# Patient Record
Sex: MALE | Race: WHITE | NOT HISPANIC OR LATINO | Employment: FULL TIME | URBAN - METROPOLITAN AREA
[De-identification: names, ages, dates, MRNs, and addresses within clinical notes are randomized per-mention and may not be internally consistent; named-entity substitution may affect disease eponyms.]

---

## 2017-01-17 RX ORDER — ATORVASTATIN CALCIUM 80 MG/1
TABLET, FILM COATED ORAL
Qty: 90 TABLET | Refills: 2 | Status: SHIPPED | OUTPATIENT
Start: 2017-01-17 | End: 2017-06-13 | Stop reason: SDUPTHER

## 2017-01-19 RX ORDER — ALPRAZOLAM 0.5 MG/1
0.5 TABLET ORAL DAILY PRN
Qty: 30 TABLET | Refills: 2 | Status: SHIPPED | OUTPATIENT
Start: 2017-01-19 | End: 2017-06-13 | Stop reason: SDUPTHER

## 2017-01-19 NOTE — TELEPHONE ENCOUNTER
----- Message from Miranda Arriaga sent at 1/19/2017  9:32 AM CST -----  Patient's wife, Mayte, called.    No. [phone]    Patient is leaving the country in early March.  He needs an appointment with Dr. Olmedo in February to get his medication before he leaves.  Please call.

## 2017-01-19 NOTE — TELEPHONE ENCOUNTER
----- Message from Miranda Arriaga sent at 1/19/2017  9:32 AM CST -----  Patient's wife, Mayte, called.    No. [phone]    Patient is leaving the country in early March.  He needs an appointment with Dr. Olmedo in February to get his medication before he leaves.  Please call.

## 2017-01-20 NOTE — TELEPHONE ENCOUNTER
----- Message from Heather Morin sent at 1/19/2017 10:08 AM CST -----  Contact: Mayte(wife)/[phone]  Patient's wife said she gave you the wrong pharmacy it is actually [facility] at [address] [facility] [address] at [phone].  Please advise

## 2017-01-21 RX ORDER — CARVEDILOL 12.5 MG/1
TABLET ORAL
Qty: 180 TABLET | Refills: 1 | Status: SHIPPED | OUTPATIENT
Start: 2017-01-21 | End: 2017-06-13 | Stop reason: SDUPTHER

## 2017-05-09 RX ORDER — LISINOPRIL 40 MG/1
TABLET ORAL
Qty: 90 TABLET | Refills: 0 | Status: SHIPPED | OUTPATIENT
Start: 2017-05-09 | End: 2017-06-13 | Stop reason: SDUPTHER

## 2017-06-13 ENCOUNTER — LAB VISIT (OUTPATIENT)
Dept: LAB | Facility: HOSPITAL | Age: 57
End: 2017-06-13
Attending: FAMILY MEDICINE
Payer: COMMERCIAL

## 2017-06-13 ENCOUNTER — OFFICE VISIT (OUTPATIENT)
Dept: FAMILY MEDICINE | Facility: CLINIC | Age: 57
End: 2017-06-13
Payer: COMMERCIAL

## 2017-06-13 VITALS
WEIGHT: 209.88 LBS | OXYGEN SATURATION: 97 % | BODY MASS INDEX: 30.05 KG/M2 | HEART RATE: 68 BPM | SYSTOLIC BLOOD PRESSURE: 138 MMHG | DIASTOLIC BLOOD PRESSURE: 84 MMHG | HEIGHT: 70 IN

## 2017-06-13 DIAGNOSIS — Z00.00 ROUTINE GENERAL MEDICAL EXAMINATION AT A HEALTH CARE FACILITY: ICD-10-CM

## 2017-06-13 DIAGNOSIS — I25.10 CORONARY ARTERY DISEASE DUE TO CALCIFIED CORONARY LESION: ICD-10-CM

## 2017-06-13 DIAGNOSIS — I10 ESSENTIAL HYPERTENSION: ICD-10-CM

## 2017-06-13 DIAGNOSIS — F17.200 TOBACCO DEPENDENCE: ICD-10-CM

## 2017-06-13 DIAGNOSIS — E78.5 HYPERLIPIDEMIA, UNSPECIFIED HYPERLIPIDEMIA TYPE: ICD-10-CM

## 2017-06-13 DIAGNOSIS — F41.1 GAD (GENERALIZED ANXIETY DISORDER): ICD-10-CM

## 2017-06-13 DIAGNOSIS — R11.0 NAUSEA: ICD-10-CM

## 2017-06-13 DIAGNOSIS — R73.01 IMPAIRED FASTING GLUCOSE: ICD-10-CM

## 2017-06-13 DIAGNOSIS — Z00.00 ROUTINE GENERAL MEDICAL EXAMINATION AT A HEALTH CARE FACILITY: Primary | ICD-10-CM

## 2017-06-13 DIAGNOSIS — F43.9 STRESS: ICD-10-CM

## 2017-06-13 DIAGNOSIS — I25.84 CORONARY ARTERY DISEASE DUE TO CALCIFIED CORONARY LESION: ICD-10-CM

## 2017-06-13 DIAGNOSIS — Z12.11 COLON CANCER SCREENING: ICD-10-CM

## 2017-06-13 DIAGNOSIS — R41.3 MEMORY PROBLEM: ICD-10-CM

## 2017-06-13 DIAGNOSIS — M62.81 MUSCLE WEAKNESS: ICD-10-CM

## 2017-06-13 DIAGNOSIS — R06.09 DOE (DYSPNEA ON EXERTION): ICD-10-CM

## 2017-06-13 DIAGNOSIS — I73.9 PERIPHERAL VASCULAR DISEASE OF EXTREMITY: ICD-10-CM

## 2017-06-13 DIAGNOSIS — Z11.59 NEED FOR HEPATITIS C SCREENING TEST: ICD-10-CM

## 2017-06-13 LAB
ALBUMIN SERPL BCP-MCNC: 4 G/DL
ALP SERPL-CCNC: 59 U/L
ALT SERPL W/O P-5'-P-CCNC: 25 U/L
ANION GAP SERPL CALC-SCNC: 8 MMOL/L
AST SERPL-CCNC: 22 U/L
BASOPHILS # BLD AUTO: 0.04 K/UL
BASOPHILS NFR BLD: 0.4 %
BILIRUB SERPL-MCNC: 0.8 MG/DL
BUN SERPL-MCNC: 15 MG/DL
CALCIUM SERPL-MCNC: 9.3 MG/DL
CHLORIDE SERPL-SCNC: 104 MMOL/L
CHOLEST/HDLC SERPL: 4.3 {RATIO}
CO2 SERPL-SCNC: 24 MMOL/L
COMPLEXED PSA SERPL-MCNC: 0.77 NG/ML
CREAT SERPL-MCNC: 1 MG/DL
DIFFERENTIAL METHOD: ABNORMAL
EOSINOPHIL # BLD AUTO: 0.3 K/UL
EOSINOPHIL NFR BLD: 2.9 %
ERYTHROCYTE [DISTWIDTH] IN BLOOD BY AUTOMATED COUNT: 12.8 %
EST. GFR  (AFRICAN AMERICAN): >60 ML/MIN/1.73 M^2
EST. GFR  (NON AFRICAN AMERICAN): >60 ML/MIN/1.73 M^2
GLUCOSE SERPL-MCNC: 111 MG/DL
HCT VFR BLD AUTO: 42.7 %
HDL/CHOLESTEROL RATIO: 23.3 %
HDLC SERPL-MCNC: 120 MG/DL
HDLC SERPL-MCNC: 28 MG/DL
HGB BLD-MCNC: 15.1 G/DL
LDLC SERPL CALC-MCNC: 66.8 MG/DL
LYMPHOCYTES # BLD AUTO: 3.4 K/UL
LYMPHOCYTES NFR BLD: 31.4 %
MCH RBC QN AUTO: 32.8 PG
MCHC RBC AUTO-ENTMCNC: 35.4 %
MCV RBC AUTO: 93 FL
MONOCYTES # BLD AUTO: 0.9 K/UL
MONOCYTES NFR BLD: 8.1 %
NEUTROPHILS # BLD AUTO: 6.2 K/UL
NEUTROPHILS NFR BLD: 57 %
NONHDLC SERPL-MCNC: 92 MG/DL
PLATELET # BLD AUTO: 272 K/UL
PMV BLD AUTO: 9.2 FL
POTASSIUM SERPL-SCNC: 4.9 MMOL/L
PROT SERPL-MCNC: 6.8 G/DL
RBC # BLD AUTO: 4.61 M/UL
SODIUM SERPL-SCNC: 136 MMOL/L
TRIGL SERPL-MCNC: 126 MG/DL
TSH SERPL DL<=0.005 MIU/L-ACNC: 0.96 UIU/ML
WBC # BLD AUTO: 10.82 K/UL

## 2017-06-13 PROCEDURE — 99215 OFFICE O/P EST HI 40 MIN: CPT | Mod: 25,S$GLB,, | Performed by: FAMILY MEDICINE

## 2017-06-13 PROCEDURE — 86803 HEPATITIS C AB TEST: CPT

## 2017-06-13 PROCEDURE — 83036 HEMOGLOBIN GLYCOSYLATED A1C: CPT

## 2017-06-13 PROCEDURE — 84153 ASSAY OF PSA TOTAL: CPT

## 2017-06-13 PROCEDURE — 99396 PREV VISIT EST AGE 40-64: CPT | Mod: S$GLB,,, | Performed by: FAMILY MEDICINE

## 2017-06-13 PROCEDURE — 84443 ASSAY THYROID STIM HORMONE: CPT

## 2017-06-13 PROCEDURE — 85025 COMPLETE CBC W/AUTO DIFF WBC: CPT

## 2017-06-13 PROCEDURE — 36415 COLL VENOUS BLD VENIPUNCTURE: CPT

## 2017-06-13 PROCEDURE — 99999 PR PBB SHADOW E&M-EST. PATIENT-LVL IV: CPT | Mod: PBBFAC,,, | Performed by: FAMILY MEDICINE

## 2017-06-13 PROCEDURE — 80053 COMPREHEN METABOLIC PANEL: CPT

## 2017-06-13 PROCEDURE — 80061 LIPID PANEL: CPT

## 2017-06-13 RX ORDER — ALPRAZOLAM 0.5 MG/1
0.5 TABLET ORAL DAILY PRN
Qty: 30 TABLET | Refills: 2 | Status: SHIPPED | OUTPATIENT
Start: 2017-06-13 | End: 2018-05-03 | Stop reason: SDUPTHER

## 2017-06-13 RX ORDER — ATORVASTATIN CALCIUM 80 MG/1
80 TABLET, FILM COATED ORAL DAILY
Qty: 90 TABLET | Refills: 3 | Status: SHIPPED | OUTPATIENT
Start: 2017-06-13 | End: 2017-10-02 | Stop reason: SDUPTHER

## 2017-06-13 RX ORDER — CARVEDILOL 12.5 MG/1
12.5 TABLET ORAL 2 TIMES DAILY WITH MEALS
Qty: 180 TABLET | Refills: 3 | Status: SHIPPED | OUTPATIENT
Start: 2017-06-13 | End: 2017-10-02 | Stop reason: SDUPTHER

## 2017-06-13 RX ORDER — EZETIMIBE 10 MG/1
10 TABLET ORAL DAILY
Qty: 90 TABLET | Refills: 3 | Status: SHIPPED | OUTPATIENT
Start: 2017-06-13 | End: 2017-10-02 | Stop reason: SDUPTHER

## 2017-06-13 RX ORDER — SERTRALINE HYDROCHLORIDE 25 MG/1
25 TABLET, FILM COATED ORAL DAILY
Qty: 30 TABLET | Refills: 11 | Status: SHIPPED | OUTPATIENT
Start: 2017-06-13 | End: 2018-06-22 | Stop reason: SDUPTHER

## 2017-06-13 RX ORDER — LISINOPRIL 40 MG/1
40 TABLET ORAL DAILY
Qty: 90 TABLET | Refills: 3 | Status: SHIPPED | OUTPATIENT
Start: 2017-06-13 | End: 2017-10-02 | Stop reason: SDUPTHER

## 2017-06-13 NOTE — PROGRESS NOTES
"(Portions of this note were dictated using voice recognition software and may contain dictation related errors in spelling/grammar/syntax not found on text review)    CC:   Chief Complaint   Patient presents with    Annual Exam       HPI: 57 y.o. male presents today for annual wellness exam although has several other acute complaints to discuss as well which are dressed here    1.  Got a bike recently, has not ridden a bike in over 30 years.  When he started he felt that after even minimal activity he felt like his legs are getting very weak.  No significant pain or claudication type symptoms with this activity but just overall feeling like his legs were "jelly".  He does not get much exercise overall.  Not significant dyspnea during this activity    2.  Did have an issue where [place] and had to climb several flights of stairs with some heavy tools.  Felt very winded after this activity.  Does have a CAD history, has a cardiology point of coming up tomorrow.  No recent stress test on file    3.  In the morning before he eats anything he will feel some nausea and dry heaves.  No specific emesis.  Sometimes feels a little bit better when he eats something but then a few hours later he will have the recurrence of the symptoms.  He takes Nexium over-the-counter daily.  No recent endoscopies.  No blood in the stool, hematemesis, diarrhea, melena, constipation.  No severe abdominal pain.  He is a chronic smoker    4.  Sometimes he feels like he will forget certain things.  For example he will walk into the kitchen and forget why he was there.  His wife notices this to.  They joke about it but he is concerned that this could be something more serious.    5.  He finds himself stressed out a lot.  Feels anxious and perseverates on matters.  Wonders a lot of "what if this happens".  Denies mariia depression but sometimes feels feelings of hopelessness.  He thinks his job might be putting a lot of " stress on him.  They also take care of his 1-year-old grandson.  Wife sometimes seems very occupied on his grandson, but sometimes he wonders when they can have some time to themselves and this can be somewhat upsetting.  He is on Xanax monotherapy as needed but he is not on any maintenance therapy for anxiety    Past Medical History:   Diagnosis Date    Anticoagulant long-term use     Anxiety     BCC (basal cell carcinoma)     CAD (coronary artery disease)     Erectile dysfunction     HLD (hyperlipidemia)     HTN (hypertension)     Impaired fasting glucose        Past Surgical History:   Procedure Laterality Date    ANGIOPLASTY      CARDIAC SURGERY      stents placed    KNEE ARTHROSCOPY W/ MENISCECTOMY      VARICOSE VEIN SURGERY      left saphenous       Family History   Problem Relation Age of Onset    Diabetes Brother     Coronary artery disease Brother      premature    Stomach cancer Father     No Known Problems Mother     No Known Problems Sister     No Known Problems Maternal Grandmother     No Known Problems Maternal Grandfather     No Known Problems Paternal Grandmother     No Known Problems Paternal Grandfather     No Known Problems Maternal Aunt     No Known Problems Maternal Uncle     No Known Problems Paternal Aunt     No Known Problems Paternal Uncle     Prostate cancer Neg Hx     Colon cancer Neg Hx     Anemia Neg Hx     Arrhythmia Neg Hx     Asthma Neg Hx     Clotting disorder Neg Hx     Fainting Neg Hx     Heart attack Neg Hx     Heart disease Neg Hx     Heart failure Neg Hx     Hyperlipidemia Neg Hx     Hypertension Neg Hx     Stroke Neg Hx     Atrial Septal Defect Neg Hx        Social History     Social History    Marital status:      Spouse name: N/A    Number of children: N/A    Years of education: N/A     Occupational History    Not on file.     Social History Main Topics    Smoking status: Current Every Day Smoker     Packs/day: 1.00      Years: 30.00     Types: Cigarettes    Smokeless tobacco: Current User    Alcohol use Yes      Comment: every now and then per pt    Drug use: No    Sexual activity: Not on file     Other Topics Concern    Not on file     Social History Narrative    No narrative on file       ROS:  GENERAL: No fever, chills, fatigability or weight loss.  SKIN: No rashes, no itching.  HEAD: no ha  EYES: No visual changes  EARS: No ear pain or changes in hearing.  NOSE: No congestion or rhinorrhea.  MOUTH & THROAT: No hoarseness, change in voice, or sore throat.  NODES: Denies swollen glands.  CHEST: Above.  CARDIOVASCULAR: Denies chest pain, PND, orthopnea.  ABDOMEN: Above  URINARY: No flank pain, dysuria or hematuria.  PERIPHERAL VASCULAR: No claudication or cyanosis.  MUSCULOSKELETAL: Above.  NEUROLOGIC: No weakness or numbness.  PSYCHIATRIC: Above    Vital signs reviewed  PE:   APPEARANCE: Well nourished, well developed, in no acute distress.    HEAD: Normocephalic, atraumatic.  EYES: PERRL. EOMI.   Conjunctivae noninjected.  EARS: TM's intact. Light reflex normal. No retraction or perforation  NOSE: Mucosa pink. Airway clear.  MOUTH & THROAT: No tonsillar enlargement. No pharyngeal erythema or exudate.   NECK: Supple with no cervical lymphadenopathy.  No carotid bruits.  No thyromegaly  CHEST: Good inspiratory effort. Lungs clear to auscultation with no wheezes or crackles.  CARDIOVASCULAR: Normal S1, S2. No rubs, murmurs, or gallops.  ABDOMEN: Bowel sounds normal. Not distended. Soft. No tenderness or masses. No organomegaly.  EXTREMITIES: .  Diminished peripheral pulses bilaterally.  Lack of hair on distal lower extremities.  No edema. he does have some varicose veins noted bilateral lower extremitiesH  MSK: Normal hip flexor, hamstring, quadriceps strength bilaterally.  No sign of muscle atrophy.    PSYCHIATRIC: Pleasant demeanor.  Mood is described as anxious.  Normal affect in the office    IMPRESSION  Muscle  weakness  Nausea  Stress  Generalized anxiety disorder  Memory problem      PLAN  1.  Could be element of deconditioning.  No severe claudication symptoms associated with his muscle as described above.  Would advise graduated increase in activity since he does not get any dedicated exercise and a regular basis.  If significant development of muscle pain with activity, further delineation of his PAD status will be needed    2.  Nausea: Concerned about his chronic tobacco use and daily use of Nexium and potential risks for PUD.  No recent endoscopies.  He is due for his colonoscopy already, but will consult gastroenterology first to see if perhaps he can have an EGD done at the same time    3. Stress and anxiety: On no maintenance treatment.  This could potentially have something to do with his memory problems as well.  Discussed maintenance treatment with him and he agrees to try.  Will start Zoloft 25 mg a day and titrate upward as needed.  Discussed time to effectiveness and potential risks and side effects.  We can follow-up in the next 1-2 months to check on effectiveness.  He is to let me know if he has any problems with this medication or any other concerns    4.  He will be following up with cardiology given the JUAREZ symptom that he experienced on the job out of the country several months prior, along with his current CAD status

## 2017-06-13 NOTE — PROGRESS NOTES
(Portions of this note were dictated using voice recognition software and may contain dictation related errors in spelling/grammar/syntax not found on text review)    CC:   Chief Complaint   Patient presents with    Annual Exam       HPI: 57 y.o. male here for annual exam but several of the complaints mentioned in the attached evaluation and management note.    Anxiety, taking Xanax 0.5 mg half a tablet once a day, sometimes twice a day as needed.  Needs refill    PAD, evaluated by cardiology, had angiography with ballooning on 10/19/15, improved symptomology and JULY thereafter.  Was on currently on Pletal but not on currently.  No current claudication symptoms    Hypertension/hyperlipidemia/CAD history: On Coreg 12.5 mg twice a day, lisinopril 40 mg daily, atorvastatin 80 mg daily, 81 mg aspirin regimen, Zetia 10 mg daily.  Still smoking, pre-contemplative per our last discussion on this.          Past Medical History:   Diagnosis Date    Anticoagulant long-term use     Anxiety     CAD (coronary artery disease)     Erectile dysfunction     HLD (hyperlipidemia)     HTN (hypertension)     Impaired fasting glucose        Past Surgical History:   Procedure Laterality Date    ANGIOPLASTY      CARDIAC SURGERY      stents placed    KNEE ARTHROSCOPY W/ MENISCECTOMY      VARICOSE VEIN SURGERY      left saphenous       Family History   Problem Relation Age of Onset    Diabetes Brother     Coronary artery disease Brother      premature    Stomach cancer Father     No Known Problems Mother     No Known Problems Sister     No Known Problems Maternal Grandmother     No Known Problems Maternal Grandfather     No Known Problems Paternal Grandmother     No Known Problems Paternal Grandfather     No Known Problems Maternal Aunt     No Known Problems Maternal Uncle     No Known Problems Paternal Aunt     No Known Problems Paternal Uncle     Prostate cancer Neg Hx     Colon cancer Neg Hx     Anemia Neg Hx      Arrhythmia Neg Hx     Asthma Neg Hx     Clotting disorder Neg Hx     Fainting Neg Hx     Heart attack Neg Hx     Heart disease Neg Hx     Heart failure Neg Hx     Hyperlipidemia Neg Hx     Hypertension Neg Hx     Stroke Neg Hx     Atrial Septal Defect Neg Hx        Social History     Social History    Marital status:      Spouse name: N/A    Number of children: N/A    Years of education: N/A     Occupational History    Not on file.     Social History Main Topics    Smoking status: Current Every Day Smoker     Packs/day: 1.00     Years: 30.00     Types: Cigarettes    Smokeless tobacco: Current User    Alcohol use Yes      Comment: every now and then per pt    Drug use: No    Sexual activity: Not on file     Other Topics Concern    Not on file     Social History Narrative    No narrative on file       HEALTH SCREENINGS    Immunizations  Tetanus in 2015  Pneumovax 2012    Colonoscopy 2010.  Repeat in 5 years.,  2 polyps removed.  One hyperplastic polyp and one tubular adenoma.      Lab Results   Component Value Date    WBC 11.20 05/09/2016    HGB 15.3 05/09/2016    HCT 44.5 05/09/2016     05/09/2016    CHOL 126 05/09/2016    TRIG 89 05/09/2016    HDL 34 (L) 05/09/2016    ALT 24 05/09/2016    AST 19 05/09/2016     05/09/2016    K 5.2 (H) 05/09/2016     05/09/2016    CREATININE 0.8 05/09/2016    BUN 13 05/09/2016    CO2 28 05/09/2016    TSH 0.562 05/09/2016    PSA 0.49 05/09/2016    INR 1.0 10/19/2015    GLUF 96 06/22/2007    HGBA1C 6.2 05/09/2016    LDLCALC 74.2 05/09/2016     05/09/2016             Vital signs reviewed  PE:   APPEARANCE: Well nourished, well developed, in no acute distress.    HEAD: Normocephalic, atraumatic.  EYES: PERRL. EOMI.   Conjunctivae noninjected.  EARS: TM's intact. Light reflex normal. No retraction or perforation.    NOSE: Mucosa pink. Airway clear.  MOUTH & THROAT: No tonsillar enlargement. No pharyngeal erythema or exudate.   NECK:  Supple with no cervical lymphadenopathy.  No carotid bruits.  No thyromegaly  CHEST: Good inspiratory effort. Lungs clear to auscultation with no wheezes or crackles.  CARDIOVASCULAR: Normal S1, S2. No rubs, murmurs, or gallops.  ABDOMEN: Bowel sounds normal. Not distended. Soft. No tenderness or masses. No organomegaly.  EXTREMITIES: No edema, cyanosis, or clubbing.  PROSTATE: Smooth, symmetric, non-enlarged, nontender      IMPRESSION  1. Routine general medical examination at a health care facility    2. Coronary artery disease due to calcified coronary lesion    3. Essential hypertension    4. Impaired fasting glucose    5. Hyperlipidemia, unspecified hyperlipidemia type    6. Peripheral vascular disease of extremity: s/p R. SFA DCB 10/19/2015    7. Colon cancer screening        PLAN  Orders Placed This Encounter   Procedures    CT Chest Lung Screening Low Dose    CBC auto differential    Comprehensive metabolic panel    Hemoglobin A1c    Lipid panel    TSH    PSA, Screening       Hypertension: Fair control on recheck.  Continue current therapy, meds refilled    Hyperlipidemia: Check labs.  Lipitor and Zetia are refilled    I FG: Try to work on weight loss, dietary modification, exercise.  Check labs above    CAD: He has an appointment with cardiology coming up tomorrow.  Continue current therapy including aspirin regimen    PVD:.  As with CAD    Colon cancer screening: Overdue.  see E/M note    Lung cancer screening.  Patient meets criteria for this.  Low-dose lung cancer CT ordered    Tobacco dependence: Significant time spent with counseling on cessation.  Offered free cessation clinic referral.  Patient is pre-contemplative about this    Please see attached evaluation and management note for further details on acute symptomology

## 2017-06-14 LAB
ESTIMATED AVG GLUCOSE: 117 MG/DL
HBA1C MFR BLD HPLC: 5.7 %
HCV AB SERPL QL IA: NEGATIVE

## 2017-06-22 ENCOUNTER — OFFICE VISIT (OUTPATIENT)
Dept: GASTROENTEROLOGY | Facility: CLINIC | Age: 57
End: 2017-06-22
Payer: COMMERCIAL

## 2017-06-22 ENCOUNTER — HOSPITAL ENCOUNTER (OUTPATIENT)
Dept: RADIOLOGY | Facility: HOSPITAL | Age: 57
Discharge: HOME | End: 2017-06-22
Attending: FAMILY MEDICINE

## 2017-06-22 VITALS
SYSTOLIC BLOOD PRESSURE: 137 MMHG | HEIGHT: 70 IN | BODY MASS INDEX: 29.7 KG/M2 | DIASTOLIC BLOOD PRESSURE: 89 MMHG | HEART RATE: 62 BPM | WEIGHT: 207.44 LBS

## 2017-06-22 DIAGNOSIS — F17.200 TOBACCO DEPENDENCE: ICD-10-CM

## 2017-06-22 DIAGNOSIS — Z12.11 COLON CANCER SCREENING: Primary | ICD-10-CM

## 2017-06-22 DIAGNOSIS — R13.10 DYSPHAGIA, UNSPECIFIED TYPE: ICD-10-CM

## 2017-06-22 DIAGNOSIS — R10.13 ABDOMINAL PAIN, EPIGASTRIC: ICD-10-CM

## 2017-06-22 DIAGNOSIS — Z86.010 HISTORY OF COLON POLYPS: ICD-10-CM

## 2017-06-22 DIAGNOSIS — K21.9 GASTROESOPHAGEAL REFLUX DISEASE, ESOPHAGITIS PRESENCE NOT SPECIFIED: ICD-10-CM

## 2017-06-22 PROCEDURE — 99204 OFFICE O/P NEW MOD 45 MIN: CPT | Mod: S$GLB,,, | Performed by: NURSE PRACTITIONER

## 2017-06-22 PROCEDURE — 99999 PR PBB SHADOW E&M-EST. PATIENT-LVL III: CPT | Mod: PBBFAC,,, | Performed by: NURSE PRACTITIONER

## 2017-06-22 PROCEDURE — 76497 UNLISTED CT PROCEDURE: CPT | Mod: TC

## 2017-06-22 NOTE — PROGRESS NOTES
Subjective:       Patient ID: Jose G Shafer is a 57 y.o. male.    Chief Complaint: Nausea    HPI  Nausea every morning.  No vomiting.  Has chronic acid reflux and uses Nexium daily, if he does not use this will have heartburn and reflux.  Reports dysphagia with certain foods like meats.  Has had to regurgitate for relief.  Father had esophageal cancer.  He has never had EGD.   Bowel habit is changed over the last 3-5 months with more frequent stools.  No blood with bowel movements or black stools.  No diarrhea.   Colonoscopy in 2010 with colon polyps. Denies family history of colon cancer.  He uses Ibuprofen regularly for arthritic complaints.   Review of Systems   Constitutional: Negative.  Negative for activity change, appetite change, fatigue, fever and unexpected weight change.   HENT: Positive for trouble swallowing. Negative for sore throat.    Respiratory: Negative.  Negative for cough, choking and shortness of breath.    Cardiovascular: Negative.  Negative for chest pain.   Gastrointestinal: Positive for nausea. Negative for abdominal distention, abdominal pain, blood in stool, constipation, diarrhea and vomiting.   Genitourinary: Negative.  Negative for difficulty urinating, dysuria and hematuria.   Musculoskeletal: Negative.  Negative for neck pain and neck stiffness.   Skin: Negative.    Neurological: Negative.  Negative for dizziness, syncope, weakness and light-headedness.   Psychiatric/Behavioral: Negative.        Objective:      Physical Exam   Constitutional: He is oriented to person, place, and time. He appears well-developed and well-nourished. No distress.   HENT:   Head: Normocephalic.   Eyes: No scleral icterus.   Neck: Neck supple.   Cardiovascular: Normal rate.    Pulmonary/Chest: Effort normal. No respiratory distress. He exhibits no tenderness.   Abdominal: Soft. Bowel sounds are normal. He exhibits no distension and no mass. There is tenderness in the epigastric area.    Musculoskeletal: Normal range of motion.   Neurological: He is alert and oriented to person, place, and time.   Skin: Skin is warm and dry. He is not diaphoretic. No pallor.   Psychiatric: He has a normal mood and affect. His behavior is normal. Judgment and thought content normal.   Vitals reviewed.      Assessment:       1. Colon cancer screening    2. History of colon polyps    3. Gastroesophageal reflux disease, esophagitis presence not specified    4. Abdominal pain, epigastric    5. Dysphagia, unspecified type        Plan:         Jose G was seen today for nausea.    Diagnoses and all orders for this visit:    Colon cancer screening    History of colon polyps    Gastroesophageal reflux disease, esophagitis presence not specified    Abdominal pain, epigastric    Dysphagia, unspecified type    Other orders  -     Case request GI: ESOPHAGOGASTRODUODENOSCOPY (EGD), COLONOSCOPY    I have explained the planned procedures to the patient.The risks, benefits and alternatives of the procedure were also explained in detail. Patient verbalized understanding, all questions were answered. The patient agrees to proceed as planned    Continue Nexium.  Discussed reflux prevention.  Avoid NSAIDs.

## 2017-06-22 NOTE — LETTER
June 22, 2017      Amaury Olmedo MD  [address] [facility] [address] [facility] [address]  [address]  [place] [address]           [place] [facility]  [address] [facility] [address] [facility] [address]  [place] [address]  Phone: [phone]          Patient: Jose G Shafer   MR Number: [id number]   YOB: 1960   Date of Visit: 6/22/2017       Dear Dr. Amaury Olmedo:    Thank you for referring Jose G Shafer to me for evaluation. Attached you will find relevant portions of my assessment and plan of care.    If you have questions, please do not hesitate to call me. I look forward to following Jose G Shafer along with you.    Sincerely,    Viji De Luna, NITISH    Enclosure  CC:  No Recipients    If you would like to receive this communication electronically, please contact [email or web address] or [phone] to request more information on [employer] access.    For providers and/or their staff who would like to refer a patient to [facility], please contact us through our one-stop-shop provider referral line, [facility] , at [phone].    If you feel you have received this communication in error or would no longer like to receive these types of communications, please e-mail [email or web address]

## 2017-06-23 ENCOUNTER — OFFICE VISIT (OUTPATIENT)
Dept: CARDIOLOGY | Facility: CLINIC | Age: 57
End: 2017-06-23
Payer: COMMERCIAL

## 2017-06-23 ENCOUNTER — HOSPITAL ENCOUNTER (OUTPATIENT)
Dept: CARDIOLOGY | Facility: CLINIC | Age: 57
Discharge: HOME | End: 2017-06-23
Payer: COMMERCIAL

## 2017-06-23 VITALS
SYSTOLIC BLOOD PRESSURE: 138 MMHG | DIASTOLIC BLOOD PRESSURE: 80 MMHG | WEIGHT: 208.75 LBS | BODY MASS INDEX: 29.89 KG/M2 | HEART RATE: 64 BPM | HEIGHT: 70 IN

## 2017-06-23 DIAGNOSIS — I87.2 VENOUS INSUFFICIENCY OF BOTH LOWER EXTREMITIES: ICD-10-CM

## 2017-06-23 DIAGNOSIS — R06.09 DOE (DYSPNEA ON EXERTION): ICD-10-CM

## 2017-06-23 DIAGNOSIS — I73.9 PERIPHERAL VASCULAR DISEASE OF EXTREMITY: ICD-10-CM

## 2017-06-23 DIAGNOSIS — N52.01 ERECTILE DYSFUNCTION DUE TO ARTERIAL INSUFFICIENCY: ICD-10-CM

## 2017-06-23 DIAGNOSIS — I25.10 CAD (CORONARY ARTERY DISEASE): ICD-10-CM

## 2017-06-23 DIAGNOSIS — R20.2 NUMBNESS AND TINGLING OF LEFT SIDE OF FACE: ICD-10-CM

## 2017-06-23 DIAGNOSIS — I25.10 CORONARY ARTERY DISEASE, ANGINA PRESENCE UNSPECIFIED, UNSPECIFIED VESSEL OR LESION TYPE, UNSPECIFIED WHETHER NATIVE OR TRANSPLANTED HEART: ICD-10-CM

## 2017-06-23 DIAGNOSIS — E78.00 PURE HYPERCHOLESTEROLEMIA: ICD-10-CM

## 2017-06-23 DIAGNOSIS — R20.0 NUMBNESS AND TINGLING OF LEFT SIDE OF FACE: ICD-10-CM

## 2017-06-23 DIAGNOSIS — I25.118 CORONARY ARTERY DISEASE OF NATIVE ARTERY OF NATIVE HEART WITH STABLE ANGINA PECTORIS: Primary | ICD-10-CM

## 2017-06-23 DIAGNOSIS — I25.10 CORONARY ARTERY DISEASE, ANGINA PRESENCE UNSPECIFIED, UNSPECIFIED VESSEL OR LESION TYPE, UNSPECIFIED WHETHER NATIVE OR TRANSPLANTED HEART: Primary | ICD-10-CM

## 2017-06-23 DIAGNOSIS — I10 ESSENTIAL HYPERTENSION: ICD-10-CM

## 2017-06-23 PROCEDURE — 99214 OFFICE O/P EST MOD 30 MIN: CPT | Mod: S$GLB,,, | Performed by: INTERNAL MEDICINE

## 2017-06-23 PROCEDURE — 99999 PR PBB SHADOW E&M-EST. PATIENT-LVL III: CPT | Mod: PBBFAC,,, | Performed by: INTERNAL MEDICINE

## 2017-06-23 PROCEDURE — 93000 ELECTROCARDIOGRAM COMPLETE: CPT | Mod: S$GLB,,, | Performed by: INTERNAL MEDICINE

## 2017-06-23 RX ORDER — SILDENAFIL 100 MG/1
100 TABLET, FILM COATED ORAL
Qty: 10 TABLET | Refills: 3 | Status: SHIPPED | OUTPATIENT
Start: 2017-06-23 | End: 2018-09-21

## 2017-06-23 RX ORDER — HYDROGEN PEROXIDE 3 %
20 SOLUTION, NON-ORAL MISCELLANEOUS
COMMUNITY
End: 2022-12-10 | Stop reason: CLARIF

## 2017-06-23 NOTE — PROGRESS NOTES
"Subjective:   Patient ID:  Jose G Shafer is a 57 y.o. male who presents for follow-up of Peripheral Vascular Disease      HPI:   Pt is a 56 y/o man with a h/o HTN, HPL, and CAD s/p remote PCI, PVD s/p PCI of SFA in 2015 here for JUAREZ and chest tightness.  In 6-2005 he had an inferior STEMI-->DAVID to pRCA.  LVEF preserved.  Since Nov 2016 he has noticed increased fatigue and JUAREZ with his usual activities.  He has had episodes of CP as well.  The most noticeable episode occurred in April after climbing stairs and ladders at work.  He states that "I just couldn't go one and my coworkers were concerned".  CP resolved after resting. He is no longer having claudication.  He also c/o erectile dysfunction - viagra does work. ROS+ facial tingling.  Doesn't feel that he could walk a far distance on a treadmill.     Vitals:    06/23/17 0748   BP: 138/80   Pulse: 64   Weight: 94.7 kg (208 lb 12.4 oz)   Height: 5' 10" (1.778 m)     Body mass index is 29.96 kg/m².  Estimated Creatinine Clearance: 94.2 mL/min (based on Cr of 1).    Lab Results   Component Value Date     06/13/2017    K 4.9 06/13/2017     06/13/2017    CO2 24 06/13/2017    BUN 15 06/13/2017    CREATININE 1.0 06/13/2017     (H) 06/13/2017    HGBA1C 5.7 (H) 06/13/2017    MG 2.4 06/23/2005    AST 22 06/13/2017    ALT 25 06/13/2017    ALBUMIN 4.0 06/13/2017    PROT 6.8 06/13/2017    BILITOT 0.8 06/13/2017    WBC 10.82 06/13/2017    HGB 15.1 06/13/2017    HCT 42.7 06/13/2017    MCV 93 06/13/2017     06/13/2017    INR 1.0 10/19/2015    PSA 0.77 06/13/2017    TSH 0.956 06/13/2017    CHOL 120 06/13/2017    HDL 28 (L) 06/13/2017    LDLCALC 66.8 06/13/2017    TRIG 126 06/13/2017       Current Outpatient Prescriptions   Medication Sig    alprazolam (XANAX) 0.5 MG tablet Take 1 tablet (0.5 mg total) by mouth daily as needed for Anxiety.    ascorbic acid (VITAMIN C) 500 MG tablet Take 500 mg by mouth 2 (two) times daily.    aspirin (BABY ASPIRIN) " 81 MG Chew Take 81 mg by mouth. 1 Tablet, Chewable Oral Every day    atorvastatin (LIPITOR) 80 MG tablet Take 1 tablet (80 mg total) by mouth once daily.    carvedilol (COREG) 12.5 MG tablet Take 1 tablet (12.5 mg total) by mouth 2 (two) times daily with meals.    esomeprazole (NEXIUM) 20 MG capsule Take 20 mg by mouth before breakfast.    ezetimibe (ZETIA) 10 mg tablet Take 1 tablet (10 mg total) by mouth once daily.    lisinopril (PRINIVIL,ZESTRIL) 40 MG tablet Take 1 tablet (40 mg total) by mouth once daily.    MULTIVITAMIN ORAL Take by mouth. 1  By mouth Every morning    nitroGLYCERIN (NITROSTAT) 0.4 MG SL tablet Place 1 tablet (0.4 mg total) under the tongue every 5 (five) minutes as needed for Chest pain. 1 Tablet, Sublingual Sublingual    omega-3 fatty acids (FISH OIL) 500 mg Cap Take by mouth. 1 Capsule Oral     promethazine (PHENERGAN) 25 MG tablet Take 1 tablet (25 mg total) by mouth every 6 (six) hours as needed for Nausea.    sertraline (ZOLOFT) 25 MG tablet Take 1 tablet (25 mg total) by mouth once daily.    sildenafil (VIAGRA) 100 MG tablet Take 1 tablet (100 mg total) by mouth On call Procedure for Erectile Dysfunction. 1 Tablet Oral .  take one tablet 30 minutes prior to sex.     No current facility-administered medications for this visit.        Review of Systems   Constitution: Negative for decreased appetite, weakness, malaise/fatigue, weight gain and weight loss.   HENT: Negative for headaches.    Eyes: Negative for visual disturbance.   Cardiovascular: Negative for chest pain, claudication, dyspnea on exertion, irregular heartbeat, orthopnea, palpitations, paroxysmal nocturnal dyspnea and syncope.   Respiratory: Negative for cough, shortness of breath and snoring.    Skin: Negative for rash.   Musculoskeletal: Negative for arthritis, muscle cramps, muscle weakness and myalgias.   Gastrointestinal: Negative for abdominal pain, anorexia, change in bowel habit and nausea.  "  Genitourinary: Negative for dysuria and frequency.   Neurological: Negative for excessive daytime sleepiness, dizziness, loss of balance and numbness.   Psychiatric/Behavioral: Negative for depression.       Objective:   Physical Exam   Constitutional: He is oriented to person, place, and time. He appears well-developed and well-nourished.   HENT:   Head: Normocephalic and atraumatic.   Eyes: Pupils are equal, round, and reactive to light.   Neck: Normal range of motion. Neck supple.   Cardiovascular: Normal rate, regular rhythm, normal heart sounds and intact distal pulses.  Exam reveals decreased pulses. Exam reveals no gallop and no friction rub.    No murmur heard.  Pulses:       Dorsalis pedis pulses are 0 on the right side, and 0 on the left side.   Pulmonary/Chest: Effort normal and breath sounds normal.   Abdominal: Soft. Bowel sounds are normal. There is no hepatosplenomegaly. There is no tenderness.   Musculoskeletal: Normal range of motion.   Neurological: He is alert and oriented to person, place, and time.   Skin: Skin is warm and dry.   Psychiatric: He has a normal mood and affect. His speech is normal and behavior is normal. Judgment and thought content normal.       Assessment:     1. Coronary artery disease of native artery of native heart with stable angina pectoris    2. Essential hypertension    3. Peripheral vascular disease of extremity: s/p R. SFA DCB 10/19/2015    4. Venous insufficiency of both lower extremities    5. Pure hypercholesterolemia    6. Erectile dysfunction due to arterial insufficiency    7. JUAREZ (dyspnea on exertion)    8. Numbness and tingling of left side of face        Plan:   Pt with sxs concerning for worsening of CAD. Last SPECT in 2011 was inconclusive with inability to achieve target and "fixed" inferior wall defect thought to be infarct on study - however ECHO with normal WMA.  Will obtain PET with absolute flow to get a definitive answer on ischemic " burden.  Carotids to evaluate facial complaints - no bruits heard but he has PVD and CAD  Refilled viagra- Pt aware of nitro- viagra interaction and safe use of either med.  He hasn't really used nitro in several years.

## 2017-06-27 ENCOUNTER — PATIENT MESSAGE (OUTPATIENT)
Dept: CARDIOLOGY | Facility: CLINIC | Age: 57
End: 2017-06-27

## 2017-06-30 ENCOUNTER — PATIENT MESSAGE (OUTPATIENT)
Dept: FAMILY MEDICINE | Facility: CLINIC | Age: 57
End: 2017-06-30

## 2017-07-14 ENCOUNTER — TELEPHONE (OUTPATIENT)
Dept: GASTROENTEROLOGY | Facility: CLINIC | Age: 57
End: 2017-07-14

## 2017-07-17 ENCOUNTER — SURGERY (OUTPATIENT)
Age: 57
End: 2017-07-17

## 2017-07-17 ENCOUNTER — ANESTHESIA EVENT (OUTPATIENT)
Dept: ENDOSCOPY | Facility: HOSPITAL | Age: 57
End: 2017-07-17
Payer: COMMERCIAL

## 2017-07-17 ENCOUNTER — ANESTHESIA (OUTPATIENT)
Dept: ENDOSCOPY | Facility: HOSPITAL | Age: 57
End: 2017-07-17
Payer: COMMERCIAL

## 2017-07-17 ENCOUNTER — HOSPITAL ENCOUNTER (OUTPATIENT)
Facility: HOSPITAL | Age: 57
Discharge: HOME | End: 2017-07-17
Attending: INTERNAL MEDICINE | Admitting: INTERNAL MEDICINE
Payer: COMMERCIAL

## 2017-07-17 DIAGNOSIS — Z12.11 SCREENING FOR COLORECTAL CANCER: ICD-10-CM

## 2017-07-17 DIAGNOSIS — Z12.12 SCREENING FOR COLORECTAL CANCER: ICD-10-CM

## 2017-07-17 DIAGNOSIS — K21.9 GASTROESOPHAGEAL REFLUX DISEASE, ESOPHAGITIS PRESENCE NOT SPECIFIED: ICD-10-CM

## 2017-07-17 DIAGNOSIS — R13.14 PHARYNGOESOPHAGEAL DYSPHAGIA: Primary | ICD-10-CM

## 2017-07-17 DIAGNOSIS — Z86.010 HISTORY OF COLON POLYPS: ICD-10-CM

## 2017-07-17 PROCEDURE — 45385 COLONOSCOPY W/LESION REMOVAL: CPT | Mod: 33,,, | Performed by: INTERNAL MEDICINE

## 2017-07-17 PROCEDURE — 37000008 HC ANESTHESIA 1ST 15 MINUTES: Performed by: INTERNAL MEDICINE

## 2017-07-17 PROCEDURE — 88305 TISSUE EXAM BY PATHOLOGIST: CPT | Performed by: PATHOLOGY

## 2017-07-17 PROCEDURE — 27201012 HC FORCEPS, HOT/COLD, DISP: Performed by: INTERNAL MEDICINE

## 2017-07-17 PROCEDURE — 25000003 PHARM REV CODE 250: Performed by: INTERNAL MEDICINE

## 2017-07-17 PROCEDURE — 45385 COLONOSCOPY W/LESION REMOVAL: CPT | Performed by: INTERNAL MEDICINE

## 2017-07-17 PROCEDURE — 43239 EGD BIOPSY SINGLE/MULTIPLE: CPT | Mod: 51,,, | Performed by: INTERNAL MEDICINE

## 2017-07-17 PROCEDURE — 27201089 HC SNARE, DISP (ANY): Performed by: INTERNAL MEDICINE

## 2017-07-17 PROCEDURE — 43450 DILATE ESOPHAGUS 1/MULT PASS: CPT | Mod: 51,,, | Performed by: INTERNAL MEDICINE

## 2017-07-17 PROCEDURE — 43450 DILATE ESOPHAGUS 1/MULT PASS: CPT | Performed by: INTERNAL MEDICINE

## 2017-07-17 PROCEDURE — 25000003 PHARM REV CODE 250: Performed by: NURSE ANESTHETIST, CERTIFIED REGISTERED

## 2017-07-17 PROCEDURE — 88305 TISSUE EXAM BY PATHOLOGIST: CPT | Mod: 26,,, | Performed by: PATHOLOGY

## 2017-07-17 PROCEDURE — 63600175 PHARM REV CODE 636 W HCPCS: Performed by: NURSE ANESTHETIST, CERTIFIED REGISTERED

## 2017-07-17 PROCEDURE — 37000009 HC ANESTHESIA EA ADD 15 MINS: Performed by: INTERNAL MEDICINE

## 2017-07-17 PROCEDURE — 43239 EGD BIOPSY SINGLE/MULTIPLE: CPT | Performed by: INTERNAL MEDICINE

## 2017-07-17 RX ORDER — PROPOFOL 10 MG/ML
VIAL (ML) INTRAVENOUS
Status: DISCONTINUED | OUTPATIENT
Start: 2017-07-17 | End: 2017-07-17

## 2017-07-17 RX ORDER — FENTANYL CITRATE 50 UG/ML
INJECTION, SOLUTION INTRAMUSCULAR; INTRAVENOUS
Status: DISCONTINUED | OUTPATIENT
Start: 2017-07-17 | End: 2017-07-17

## 2017-07-17 RX ORDER — SODIUM CHLORIDE 9 MG/ML
INJECTION, SOLUTION INTRAVENOUS CONTINUOUS
Status: DISCONTINUED | OUTPATIENT
Start: 2017-07-17 | End: 2017-07-17 | Stop reason: HOSPADM

## 2017-07-17 RX ORDER — PROPOFOL 10 MG/ML
VIAL (ML) INTRAVENOUS CONTINUOUS PRN
Status: DISCONTINUED | OUTPATIENT
Start: 2017-07-17 | End: 2017-07-17

## 2017-07-17 RX ORDER — LIDOCAINE HCL/PF 100 MG/5ML
SYRINGE (ML) INTRAVENOUS
Status: DISCONTINUED | OUTPATIENT
Start: 2017-07-17 | End: 2017-07-17

## 2017-07-17 RX ADMIN — LIDOCAINE HYDROCHLORIDE 100 MG: 20 INJECTION, SOLUTION INTRAVENOUS at 01:07

## 2017-07-17 RX ADMIN — PROPOFOL 150 MCG/KG/MIN: 10 INJECTION, EMULSION INTRAVENOUS at 01:07

## 2017-07-17 RX ADMIN — SODIUM CHLORIDE: 0.9 INJECTION, SOLUTION INTRAVENOUS at 12:07

## 2017-07-17 RX ADMIN — PROPOFOL 20 MG: 10 INJECTION, EMULSION INTRAVENOUS at 01:07

## 2017-07-17 RX ADMIN — FENTANYL CITRATE 50 MCG: 50 INJECTION, SOLUTION INTRAMUSCULAR; INTRAVENOUS at 01:07

## 2017-07-17 RX ADMIN — PROPOFOL 40 MG: 10 INJECTION, EMULSION INTRAVENOUS at 01:07

## 2017-07-17 NOTE — DISCHARGE INSTRUCTIONS
Discharge Summary/Instructions for EGD and Colonoscopy  Jose G Shafer  7/17/2017  Jeremiah Syed Jr., *    Restrictions on Activity:    - Do not drive car or operate machinery until the day after the procedure.  - The following day: return to full activity including work.  - For 3 days: No heavy lifting, straining or running.  - Diet: Eat and drink normally unless instructed otherwise.    Treatment for Common Side Effects:  - Mild abdominal pain and bloating or excessive gas: rest, eat lightly and use a heating pad.   -Sore Throat - treat with throat lozenges, gargle with warm salt water.    Symptoms to watch for and report to your physician:  1. Severe abdominal pain.  2. Fever within 24 hours after a procedure.  3. A large amount of rectal bleeding. (A small amount of blood from the rectum is not serious, especially if hemorrhoids are present.)  4. Because air was put into your colon during the procedure, expelling large amount of air from your rectum is normal.  5. You may not have a bowel movement for 1-3 days because of the colonoscopy prep. This is normal.  6. Go directly to the emergency room if you notice any of the following:     Chills and/orfever over 101   Persistent vomiting   Severe abdominal pain, other than gas cramps   Severe chest pain   Black, tarry stools   Any bleeding - exceeding one tablespoon    If you have any questions or problems, please call your Physician:    Jeremiah Syed Jr., *    Lab Results: Contact Physician's Office    If a complication or emergency situation arises and you are unable to reach your Physician - GO TO THE EMERGENCY ROOM.

## 2017-07-17 NOTE — TRANSFER OF CARE
"Anesthesia Transfer of Care Note    Patient: Jose G Shafer    Procedure(s) Performed: Procedure(s) (LRB):  ESOPHAGOGASTRODUODENOSCOPY (EGD) (N/A)  COLONOSCOPY Miralax (N/A)    Patient location: GI    Anesthesia Type: MAC    Transport from OR: Transported from OR on room air with adequate spontaneous ventilation    Post pain: adequate analgesia    Post assessment: no apparent anesthetic complications and tolerated procedure well    Post vital signs: stable    Level of consciousness: awake, alert and oriented    Nausea/Vomiting: no nausea/vomiting    Complications: none    Transfer of care protocol was followed      Last vitals:   Visit Vitals  BP (!) 136/93 (BP Method: Automatic)   Pulse 71   Temp 36.9 °C (98.5 °F) (Oral)   Resp 18   Ht 5' 10" (1.778 m)   Wt 93.4 kg (206 lb)   SpO2 98%   BMI 29.56 kg/m²     "

## 2017-07-17 NOTE — ANESTHESIA POSTPROCEDURE EVALUATION
"Anesthesia Post Evaluation    Patient: Jose G Shafer    Procedure(s) Performed: Procedure(s) (LRB):  ESOPHAGOGASTRODUODENOSCOPY (EGD) (N/A)  COLONOSCOPY Miralax (N/A)    Final Anesthesia Type: MAC  Patient location during evaluation: GI PACU  Patient participation: Yes- Able to Participate  Level of consciousness: awake and alert and oriented  Post-procedure vital signs: reviewed and stable  Pain management: adequate  Airway patency: patent  PONV status at discharge: No PONV  Anesthetic complications: no      Cardiovascular status: blood pressure returned to baseline, hemodynamically stable and stable  Respiratory status: unassisted, spontaneous ventilation and room air  Hydration status: euvolemic  Follow-up not needed.        Visit Vitals  BP (!) 136/93 (BP Method: Automatic)   Pulse 71   Temp 36.9 °C (98.5 °F) (Oral)   Resp 18   Ht 5' 10" (1.778 m)   Wt 93.4 kg (206 lb)   SpO2 98%   BMI 29.56 kg/m²       Pain/Duran Score: Pain Assessment Performed: Yes (7/17/2017 12:34 PM)  Presence of Pain: denies (7/17/2017 12:34 PM)      "

## 2017-07-17 NOTE — ANESTHESIA PREPROCEDURE EVALUATION
07/17/2017  Jose G Shafer is a 57 y.o., male with CAD s/p PCI in 2005, smoker, PVD, HTN and HLD for colonoscopy and EGD under MAC.     Anesthesia Evaluation    I have reviewed the Patient Summary Reports.    I have reviewed the Nursing Notes.   I have reviewed the Medications.     Review of Systems  Social:  Smoker    Hematology/Oncology:     Oncology Normal     EENT/Dental:EENT/Dental Normal   Cardiovascular:   Exercise tolerance: good Hypertension CAD  CABG/stent  PVD hyperlipidemia    Pulmonary:  Pulmonary Normal    Hepatic/GI:  Hepatic/GI Normal    Neurological:  Neurology Normal    Endocrine:   Diabetes (pre-diabetes)        Physical Exam  General:  Well nourished    Airway/Jaw/Neck:  Airway Findings: Mouth Opening: Normal Tongue: Normal  General Airway Assessment: Adult  Mallampati: III  TM Distance: Normal, at least 6 cm      Dental:  Dental Findings: Periodontal disease, Severe    Chest/Lungs:  Chest/Lungs Findings: Decreased Breath Sounds Bilateral, Normal Respiratory Rate, Expiratory Wheezes, Mild     Heart/Vascular:  Heart Findings: Normal          Lab Results   Component Value Date    WBC 10.82 06/13/2017    HGB 15.1 06/13/2017    HCT 42.7 06/13/2017    MCV 93 06/13/2017     06/13/2017       Chemistry        Component Value Date/Time     06/13/2017 0911    K 4.9 06/13/2017 0911     06/13/2017 0911    CO2 24 06/13/2017 0911    BUN 15 06/13/2017 0911    CREATININE 1.0 06/13/2017 0911     (H) 06/13/2017 0911        Component Value Date/Time    CALCIUM 9.3 06/13/2017 0911    ALKPHOS 59 06/13/2017 0911    AST 22 06/13/2017 0911    ALT 25 06/13/2017 0911    BILITOT 0.8 06/13/2017 0911    ESTGFRAFRICA >60 06/13/2017 0911    EGFRNONAA >60 06/13/2017 0911        ECG 6/23/2017  Normal sinus rhythm  Left axis deviation  Incomplete right bundle branch block  Possible Inferior  infarct ,age undetermined  Abnormal ECG  When compared with ECG of 19-OCT-2015 09:35,  Possible Inferior infarct is now Present  Confirmed by SUSHIL JORDAN MD ([id number]) on 6/23/2017 5:44:40 PM      Anesthesia Plan  Type of Anesthesia, risks & benefits discussed:  Anesthesia Type:  MAC  Patient's Preference:   Intra-op Monitoring Plan:   Intra-op Monitoring Plan Comments:   Post Op Pain Control Plan:   Post Op Pain Control Plan Comments:   Induction:   IV  Beta Blocker:  Patient is on a Beta-Blocker and has received one dose within the past 24 hours (No further documentation required).       Informed Consent: Patient understands risks and agrees with Anesthesia plan.  Questions answered. Anesthesia consent signed with patient.  ASA Score: 3     Day of Surgery Review of History & Physical:            Ready For Surgery From Anesthesia Perspective.

## 2017-07-18 VITALS
TEMPERATURE: 98 F | BODY MASS INDEX: 29.49 KG/M2 | DIASTOLIC BLOOD PRESSURE: 70 MMHG | OXYGEN SATURATION: 98 % | HEIGHT: 70 IN | WEIGHT: 206 LBS | SYSTOLIC BLOOD PRESSURE: 138 MMHG | RESPIRATION RATE: 18 BRPM | HEART RATE: 75 BPM

## 2017-07-28 ENCOUNTER — CLINICAL SUPPORT (OUTPATIENT)
Dept: CARDIOLOGY | Facility: CLINIC | Age: 57
End: 2017-07-28
Payer: COMMERCIAL

## 2017-07-28 ENCOUNTER — PATIENT MESSAGE (OUTPATIENT)
Dept: CARDIOLOGY | Facility: CLINIC | Age: 57
End: 2017-07-28

## 2017-07-28 ENCOUNTER — TELEPHONE (OUTPATIENT)
Dept: CARDIOLOGY | Facility: CLINIC | Age: 57
End: 2017-07-28

## 2017-07-28 DIAGNOSIS — I25.118 CORONARY ARTERY DISEASE OF NATIVE ARTERY OF NATIVE HEART WITH STABLE ANGINA PECTORIS: ICD-10-CM

## 2017-07-28 DIAGNOSIS — R06.09 DOE (DYSPNEA ON EXERTION): ICD-10-CM

## 2017-07-28 DIAGNOSIS — R20.0 NUMBNESS AND TINGLING OF LEFT SIDE OF FACE: ICD-10-CM

## 2017-07-28 DIAGNOSIS — R20.2 NUMBNESS AND TINGLING OF LEFT SIDE OF FACE: ICD-10-CM

## 2017-07-28 LAB — INTERNAL CAROTID STENOSIS: NORMAL

## 2017-07-28 PROCEDURE — 78492 MYOCRD IMG PET MLT RST&STRS: CPT | Mod: S$GLB,,, | Performed by: INTERNAL MEDICINE

## 2017-07-28 PROCEDURE — 93015 CV STRESS TEST SUPVJ I&R: CPT | Mod: S$GLB,,, | Performed by: INTERNAL MEDICINE

## 2017-07-28 PROCEDURE — 93880 EXTRACRANIAL BILAT STUDY: CPT | Mod: S$GLB,,, | Performed by: INTERNAL MEDICINE

## 2017-07-28 PROCEDURE — A9555 RB82 RUBIDIUM: HCPCS | Mod: S$GLB,,, | Performed by: INTERNAL MEDICINE

## 2017-07-28 NOTE — TELEPHONE ENCOUNTER
----- Message from Tony Quezada MD sent at 7/28/2017  3:45 PM CDT -----  Please contact pt and inform that carotids look ok.

## 2017-07-31 ENCOUNTER — TELEPHONE (OUTPATIENT)
Dept: GASTROENTEROLOGY | Facility: CLINIC | Age: 57
End: 2017-07-31

## 2017-07-31 NOTE — TELEPHONE ENCOUNTER
----- Message from Jeremiah Syed Jr., MD sent at 7/30/2017 10:59 PM CDT -----  Gastric biopsies negative for H. Pylori  Small colon adenoma. F/u colon 5 yr

## 2017-10-02 RX ORDER — ATORVASTATIN CALCIUM 80 MG/1
80 TABLET, FILM COATED ORAL DAILY
Qty: 90 TABLET | Refills: 3 | Status: SHIPPED | OUTPATIENT
Start: 2017-10-02 | End: 2018-08-15 | Stop reason: SDUPTHER

## 2017-10-02 RX ORDER — EZETIMIBE 10 MG/1
10 TABLET ORAL DAILY
Qty: 90 TABLET | Refills: 3 | Status: SHIPPED | OUTPATIENT
Start: 2017-10-02 | End: 2018-08-15 | Stop reason: SDUPTHER

## 2017-10-02 RX ORDER — CARVEDILOL 12.5 MG/1
12.5 TABLET ORAL 2 TIMES DAILY WITH MEALS
Qty: 180 TABLET | Refills: 3 | Status: SHIPPED | OUTPATIENT
Start: 2017-10-02 | End: 2018-08-15 | Stop reason: SDUPTHER

## 2017-10-02 RX ORDER — LISINOPRIL 40 MG/1
40 TABLET ORAL DAILY
Qty: 90 TABLET | Refills: 3 | Status: SHIPPED | OUTPATIENT
Start: 2017-10-02 | End: 2018-08-15 | Stop reason: SDUPTHER

## 2018-05-04 ENCOUNTER — APPOINTMENT (RX ONLY)
Dept: URBAN - METROPOLITAN AREA CLINIC 98 | Facility: CLINIC | Age: 58
Setting detail: DERMATOLOGY
End: 2018-05-04

## 2018-05-04 ENCOUNTER — TELEPHONE (OUTPATIENT)
Dept: FAMILY MEDICINE | Facility: CLINIC | Age: 58
End: 2018-05-04

## 2018-05-04 DIAGNOSIS — D485 NEOPLASM OF UNCERTAIN BEHAVIOR OF SKIN: ICD-10-CM

## 2018-05-04 DIAGNOSIS — L57.8 OTHER SKIN CHANGES DUE TO CHRONIC EXPOSURE TO NONIONIZING RADIATION: ICD-10-CM

## 2018-05-04 DIAGNOSIS — Z85.828 PERSONAL HISTORY OF OTHER MALIGNANT NEOPLASM OF SKIN: ICD-10-CM

## 2018-05-04 DIAGNOSIS — L82.1 OTHER SEBORRHEIC KERATOSIS: ICD-10-CM

## 2018-05-04 DIAGNOSIS — D22 MELANOCYTIC NEVI: ICD-10-CM

## 2018-05-04 DIAGNOSIS — L57.0 ACTINIC KERATOSIS: ICD-10-CM

## 2018-05-04 DIAGNOSIS — L81.4 OTHER MELANIN HYPERPIGMENTATION: ICD-10-CM

## 2018-05-04 DIAGNOSIS — I83.81 VARICOSE VEINS OF LOWER EXTREMITIES WITH PAIN: ICD-10-CM

## 2018-05-04 PROBLEM — D48.5 NEOPLASM OF UNCERTAIN BEHAVIOR OF SKIN: Status: ACTIVE | Noted: 2018-05-04

## 2018-05-04 PROBLEM — D22.39 MELANOCYTIC NEVI OF OTHER PARTS OF FACE: Status: ACTIVE | Noted: 2018-05-04

## 2018-05-04 PROBLEM — I83.813 VARICOSE VEINS OF BILATERAL LOWER EXTREMITIES WITH PAIN: Status: ACTIVE | Noted: 2018-05-04

## 2018-05-04 PROBLEM — I10 ESSENTIAL (PRIMARY) HYPERTENSION: Status: ACTIVE | Noted: 2018-05-04

## 2018-05-04 PROCEDURE — ? SUNSCREEN RECOMMENDATIONS

## 2018-05-04 PROCEDURE — ? LIQUID NITROGEN

## 2018-05-04 PROCEDURE — 99214 OFFICE O/P EST MOD 30 MIN: CPT | Mod: 25

## 2018-05-04 PROCEDURE — 17003 DESTRUCT PREMALG LES 2-14: CPT

## 2018-05-04 PROCEDURE — ? BIOPSY BY SHAVE METHOD

## 2018-05-04 PROCEDURE — ? OBSERVATION AND MEASURE

## 2018-05-04 PROCEDURE — ? COUNSELING

## 2018-05-04 PROCEDURE — 11100: CPT | Mod: 59

## 2018-05-04 PROCEDURE — 17000 DESTRUCT PREMALG LESION: CPT

## 2018-05-04 PROCEDURE — ? TREATMENT REGIMEN

## 2018-05-04 RX ORDER — ALPRAZOLAM 0.5 MG/1
0.5 TABLET ORAL DAILY PRN
Qty: 30 TABLET | Refills: 2 | Status: SHIPPED | OUTPATIENT
Start: 2018-05-04 | End: 2018-05-04 | Stop reason: SDUPTHER

## 2018-05-04 RX ORDER — ALPRAZOLAM 0.5 MG/1
0.5 TABLET ORAL DAILY PRN
Qty: 30 TABLET | Refills: 2 | Status: SHIPPED | OUTPATIENT
Start: 2018-05-04 | End: 2018-09-21 | Stop reason: SDUPTHER

## 2018-05-04 ASSESSMENT — LOCATION SIMPLE DESCRIPTION DERM
LOCATION SIMPLE: LEFT HAND
LOCATION SIMPLE: LEFT ANTERIOR NECK
LOCATION SIMPLE: RIGHT TEMPLE
LOCATION SIMPLE: CHEST
LOCATION SIMPLE: LEFT SHOULDER
LOCATION SIMPLE: NECK
LOCATION SIMPLE: LEFT UPPER BACK
LOCATION SIMPLE: RIGHT PRETIBIAL REGION
LOCATION SIMPLE: RIGHT FOREARM
LOCATION SIMPLE: LEFT CHEEK
LOCATION SIMPLE: RIGHT ANTERIOR NECK
LOCATION SIMPLE: LEFT WRIST
LOCATION SIMPLE: RIGHT SHOULDER
LOCATION SIMPLE: LEFT FOREARM
LOCATION SIMPLE: RIGHT UPPER BACK
LOCATION SIMPLE: LEFT PRETIBIAL REGION
LOCATION SIMPLE: NOSE

## 2018-05-04 ASSESSMENT — LOCATION ZONE DERM
LOCATION ZONE: HAND
LOCATION ZONE: TRUNK
LOCATION ZONE: NECK
LOCATION ZONE: NECK
LOCATION ZONE: FACE
LOCATION ZONE: ARM
LOCATION ZONE: NOSE
LOCATION ZONE: LEG

## 2018-05-04 ASSESSMENT — LOCATION DETAILED DESCRIPTION DERM
LOCATION DETAILED: RIGHT DISTAL PRETIBIAL REGION
LOCATION DETAILED: RIGHT CENTRAL TEMPLE
LOCATION DETAILED: LEFT RADIAL DORSAL HAND
LOCATION DETAILED: LEFT SUPERIOR UPPER BACK
LOCATION DETAILED: RIGHT MEDIAL SUPERIOR CHEST
LOCATION DETAILED: LEFT INFERIOR MEDIAL UPPER BACK
LOCATION DETAILED: LEFT INFERIOR PREAURICULAR CHEEK
LOCATION DETAILED: LEFT DISTAL DORSAL FOREARM
LOCATION DETAILED: LEFT MEDIAL MALAR CHEEK
LOCATION DETAILED: RIGHT SUPERIOR LATERAL UPPER BACK
LOCATION DETAILED: RIGHT POSTERIOR SHOULDER
LOCATION DETAILED: LEFT SUPERIOR LATERAL NECK
LOCATION DETAILED: LEFT POSTERIOR SHOULDER
LOCATION DETAILED: NASAL DORSUM
LOCATION DETAILED: LEFT CLAVICULAR NECK
LOCATION DETAILED: LEFT DISTAL PRETIBIAL REGION
LOCATION DETAILED: LEFT PROXIMAL DORSAL FOREARM
LOCATION DETAILED: RIGHT PROXIMAL DORSAL FOREARM
LOCATION DETAILED: LEFT MEDIAL SUPERIOR CHEST
LOCATION DETAILED: LEFT DORSAL WRIST
LOCATION DETAILED: RIGHT MID-UPPER BACK
LOCATION DETAILED: RIGHT INFERIOR ANTERIOR NECK

## 2018-05-04 NOTE — PROCEDURE: LIQUID NITROGEN
Detail Level: Detailed
Render Post-Care Instructions In Note?: yes
Post-Care Instructions: LIQUID NITROGEN TREATMENT Patient Information\\nLiquid Nitrogen is a very cold, liquefied gas with a temperature of 320 degrees below zero. It is used to freeze and destroy a variety of skin lesions such as keratoses and warts. It burns when applied and sometimes for several minutes thereafter. There are certain expectations that you should have following treatment:\\n1. The skin tends to become red and swollen within hours of treatment. In many patients, true blisters occur and are especially common around the fingers and eyelids. A scab then forms which will sometimes remain for 1 ­3 weeks and drop off. The lesion treated will often drop off at that point as well.\\n2. If you get a large or tender blister, you may gently prick the blister with a \"sterilized\" (i.e. soaked in alcohol) needle and allow the blister fluid to drain, but leave the blister roof intact. If the blister isn't bothering you, then it's best to leave it alone.\\n3. Clean the treatment site with soap and water once or twice daily. \\n4. We recommend applying petrolatum (Vaseline, Aquaphor) several times daily to the treated areas for the next 1 to 2 weeks. This will speed up healing, decrease pain, and improve the appearance of any scar that may form. We do not recommend antibiotic ointment (Bacitracin or Polysporin) as this has no superiority to pertolatum and may cause an allergic reaction. If the treated area is in a body­fold (arm pit, groin) then zinc oxide paste (Desitin ointment, A&D ointment, Toni's Butt Paste) may be preferable. A Band­Aid is not necessary unless you find that the area is very weepy. \\n5. In general, you may participate without restriction in your daily activities including sports, swimming, and showering. We do not however recommend getting any wound in fresh or salt water.\\n6. If you have any questions, please contact our office.\\nIMPORTANT: Lesions treated with liquid nitrogen should resolve completely. If a lesion persists beyond 6 weeks we advise you to return to the clinic immediately for re­evaluation. (The  is instructed to get you in immediately.) Warts and Benign Keratoses may require multiple treatment sessions to clear the lesions. Commonly you can develop a white blemish or scar at the treatment site.
Consent: The patient's consent was obtained including but not limited to risks of crusting, scabbing, blistering, scarring, darker or lighter pigmentary change, recurrence, incomplete removal and infection.
Duration Of Freeze Thaw-Cycle (Seconds): 0

## 2018-05-04 NOTE — PROCEDURE: BIOPSY BY SHAVE METHOD
Wound Care: Petrolatum
Bill 53125 For Specimen Handling/Conveyance To Laboratory?: no
Hemostasis: Drysol
Cryotherapy Text: The wound bed was treated with cryotherapy after the biopsy was performed.
Consent: Written consent was obtained and risks were reviewed including but not limited to scarring, infection, bleeding, scabbing, incomplete removal, nerve damage and allergy to anesthesia.
Billing Type: Third-Party Bill
Electrodesiccation And Curettage Text: The wound bed was treated with electrodesiccation and curettage after the biopsy was performed.
Was A Bandage Applied: Yes
Biopsy Method: Personna blade
Size Of Lesion In Cm: 1.5
Detail Level: Detailed
Dressing: bandage
Lab Facility: [id number]
Lab: [id number]
Anesthesia Volume In Cc (Will Not Render If 0): 0.5
Electrodesiccation Text: The wound bed was treated with electrodesiccation after the biopsy was performed.
Curettage Text: The wound bed was treated with curettage after the biopsy was performed.
Post-Care Instructions: 1) Gently wash the biopsy site with regular soap and water daily. If a scab develops, you can dilute hydrogen peroxide 1:1 with tap water and apply it to dissolve the crust.\\n2) Keep a small amount of white petrolatum (Aquaphor) and a non­stick bandage on the dressing at all times. Antibiotic ointments (Neosporin, etc) have not shown any superiority to simple petrolatum, cost more, and run the risk of a contact allergy. Keeping the wound covered has been shown to be superior to \"airing it out.\" If the bandage is irritating you, let us know and we can find a less­irritating alternative dressing together.\\n3) Notify the office if significant, persistent bleeding occurs from the biopsy site.\\n4) Do not expose the wound to fresh, salty, or brackish water until it has completely healed (after about 2 weeks). Tap or chlorinated water should be fine.\\n5) A small rim of redness and a small amount of yellow debris on the wound bed are normal. If you encounter increasing warmth, redness, pain, or liquid pus after the first day, these might indicate a localized infection and you should notify our office via phone or email.\\n6) If regular bandaids are uncomfortable or irritating, then oval hydrocolloid dressings (often sold as \"blister pads\") can be cut to fit and placed on the wound after the first 3­4 days, and changed out every 3­4 days. It is ok to wear these dressings in the shower or pool.\\n7) If stitches have been placed, you will need to return to the clinic in 1 or 2 weeks to have them professionally removed. Cutting the knots yourself may leave irritating portions of suture material under the skin.\\n8) Do not assume that \"no news is good news.\" If you have not received a call from our office within 7 days, please let us know so that we can investigate what might be holding up the biopsy report.\\n9) Wound care should continue until the biopsy site is pink, smooth, and dry with new skin, usually by 2 weeks.
Anesthesia Type: 1% lidocaine with epinephrine
Notification Instructions: Patient will be notified of biopsy results. However, patient instructed to call the office if not contacted within 2 weeks.
X Size Of Lesion In Cm: 1.1
Additional Anesthesia Volume In Cc (Will Not Render If 0): 0
Type Of Destruction Used: Curettage
Silver Nitrate Text: The wound bed was treated with silver nitrate after the biopsy was performed.
Biopsy Type: H and E

## 2018-06-22 RX ORDER — SERTRALINE HYDROCHLORIDE 25 MG/1
25 TABLET, FILM COATED ORAL DAILY
Qty: 30 TABLET | Refills: 11 | Status: SHIPPED | OUTPATIENT
Start: 2018-06-22 | End: 2018-09-21

## 2018-08-16 RX ORDER — LISINOPRIL 40 MG/1
TABLET ORAL
Qty: 90 TABLET | Refills: 2 | Status: SHIPPED | OUTPATIENT
Start: 2018-08-16 | End: 2019-07-18 | Stop reason: SDUPTHER

## 2018-08-16 RX ORDER — CARVEDILOL 12.5 MG/1
TABLET ORAL
Qty: 180 TABLET | Refills: 2 | Status: SHIPPED | OUTPATIENT
Start: 2018-08-16 | End: 2019-07-18 | Stop reason: SDUPTHER

## 2018-08-16 RX ORDER — ATORVASTATIN CALCIUM 80 MG/1
TABLET, FILM COATED ORAL
Qty: 90 TABLET | Refills: 2 | Status: SHIPPED | OUTPATIENT
Start: 2018-08-16 | End: 2019-07-18 | Stop reason: SDUPTHER

## 2018-08-16 RX ORDER — EZETIMIBE 10 MG/1
TABLET ORAL
Qty: 90 TABLET | Refills: 2 | Status: SHIPPED | OUTPATIENT
Start: 2018-08-16 | End: 2019-07-18 | Stop reason: SDUPTHER

## 2018-09-21 ENCOUNTER — OFFICE VISIT (OUTPATIENT)
Dept: FAMILY MEDICINE | Facility: CLINIC | Age: 58
End: 2018-09-21
Payer: COMMERCIAL

## 2018-09-21 VITALS
HEIGHT: 70 IN | BODY MASS INDEX: 31.25 KG/M2 | DIASTOLIC BLOOD PRESSURE: 82 MMHG | HEART RATE: 89 BPM | SYSTOLIC BLOOD PRESSURE: 127 MMHG | WEIGHT: 218.25 LBS | TEMPERATURE: 99 F | OXYGEN SATURATION: 97 %

## 2018-09-21 DIAGNOSIS — I10 ESSENTIAL HYPERTENSION: ICD-10-CM

## 2018-09-21 DIAGNOSIS — I73.9 PERIPHERAL VASCULAR DISEASE OF EXTREMITY: ICD-10-CM

## 2018-09-21 DIAGNOSIS — F17.200 TOBACCO DEPENDENCE: ICD-10-CM

## 2018-09-21 DIAGNOSIS — Z00.00 ROUTINE GENERAL MEDICAL EXAMINATION AT A HEALTH CARE FACILITY: Primary | ICD-10-CM

## 2018-09-21 DIAGNOSIS — E78.5 HYPERLIPIDEMIA, UNSPECIFIED HYPERLIPIDEMIA TYPE: ICD-10-CM

## 2018-09-21 DIAGNOSIS — R41.3 MEMORY PROBLEM: ICD-10-CM

## 2018-09-21 DIAGNOSIS — I25.10 CORONARY ARTERY DISEASE DUE TO CALCIFIED CORONARY LESION: ICD-10-CM

## 2018-09-21 DIAGNOSIS — M67.911 DISORDER OF RIGHT ROTATOR CUFF: ICD-10-CM

## 2018-09-21 DIAGNOSIS — R91.8 PULMONARY NODULES: ICD-10-CM

## 2018-09-21 DIAGNOSIS — R06.83 SNORING: ICD-10-CM

## 2018-09-21 DIAGNOSIS — R73.01 IMPAIRED FASTING GLUCOSE: ICD-10-CM

## 2018-09-21 DIAGNOSIS — I25.84 CORONARY ARTERY DISEASE DUE TO CALCIFIED CORONARY LESION: ICD-10-CM

## 2018-09-21 PROCEDURE — 3079F DIAST BP 80-89 MM HG: CPT | Mod: CPTII,S$GLB,, | Performed by: FAMILY MEDICINE

## 2018-09-21 PROCEDURE — 3008F BODY MASS INDEX DOCD: CPT | Mod: CPTII,S$GLB,, | Performed by: FAMILY MEDICINE

## 2018-09-21 PROCEDURE — 99214 OFFICE O/P EST MOD 30 MIN: CPT | Mod: 25,S$GLB,, | Performed by: FAMILY MEDICINE

## 2018-09-21 PROCEDURE — 90686 IIV4 VACC NO PRSV 0.5 ML IM: CPT | Mod: S$GLB,,, | Performed by: FAMILY MEDICINE

## 2018-09-21 PROCEDURE — 99396 PREV VISIT EST AGE 40-64: CPT | Mod: 25,S$GLB,, | Performed by: FAMILY MEDICINE

## 2018-09-21 PROCEDURE — 90471 IMMUNIZATION ADMIN: CPT | Mod: S$GLB,,, | Performed by: FAMILY MEDICINE

## 2018-09-21 PROCEDURE — 3074F SYST BP LT 130 MM HG: CPT | Mod: CPTII,S$GLB,, | Performed by: FAMILY MEDICINE

## 2018-09-21 PROCEDURE — 99999 PR PBB SHADOW E&M-EST. PATIENT-LVL V: CPT | Mod: PBBFAC,,, | Performed by: FAMILY MEDICINE

## 2018-09-21 RX ORDER — FLUTICASONE PROPIONATE 50 MCG
1 SPRAY, SUSPENSION (ML) NASAL DAILY
Qty: 1 BOTTLE | Refills: 0 | Status: SHIPPED | OUTPATIENT
Start: 2018-09-21 | End: 2018-11-10 | Stop reason: SDUPTHER

## 2018-09-21 RX ORDER — ALPRAZOLAM 0.5 MG/1
0.5 TABLET ORAL DAILY PRN
Qty: 30 TABLET | Refills: 2 | Status: SHIPPED | OUTPATIENT
Start: 2018-09-21 | End: 2019-07-23 | Stop reason: SDUPTHER

## 2018-09-21 NOTE — PATIENT INSTRUCTIONS
ROTATOR CUFF REHABILITATION EXERCISES    You may do all of these exercises right away.  Isometric shoulder external rotation:  a doorway with your elbow bent 90 degrees and the back of the wrist on your injured side pressed against the door frame. Try to press your hand outward into the door frame. Hold for 5 seconds. Do 2 sets of 15.   Isometric shoulder internal rotation:  a doorway with your elbow bent 90 degrees and the front of the wrist on your injured side pressed against the door frame. Try to press your palm into the door frame. Hold for 5 seconds. Do 2 sets of 15.   Wand exercise, flexion: Stand upright and hold a stick in both hands, palms down. Stretch your arms by lifting them over your head, keeping your arms straight. Hold for 5 seconds and return to the starting position. Repeat 10 times.   Wand exercise, extension: Stand upright and hold a stick in both hands behind your back. Move the stick away from your back. Hold this position for 5 seconds. Relax and return to the starting position. Repeat 10 times.   Wand exercise, external rotation: Lie on your back and hold a stick in both hands, palms up. Your upper arms should be resting on the floor with your elbows at your sides and bent 90 degrees. Use your uninjured arm to push your injured arm out away from your body. Keep the elbow of your injured arm at your side while it is being pushed. Hold the stretch for 5 seconds. Repeat 10 times.   Wand exercise, shoulder abduction and adduction: Stand and hold a stick with both hands, palms facing away from your body. Rest the stick against the front of your thighs. Use your uninjured arm to push your injured arm out to the side and up as high as possible. Keep your arms straight. Hold for 5 seconds. Repeat 10 times.   Resisted shoulder external rotation: Stand sideways next to a door with your injured arm farther from the door. Tie a knot in the end of the tubing and shut the knot in the  door at waist level. Hold the other end of the tubing with the hand of your injured arm. Rest the hand of your injured arm across your stomach. Keeping your elbow in at your side, rotate your arm outward and away from your waist. Slowly return your arm to the starting position. Make sure you keep your elbow bent 90 degrees and your forearm parallel to the floor. Repeat 10 times. Build up to 2 sets of 15.   Resisted shoulder internal rotation: Stand sideways next to a door with your injured arm closest to the door. Tie a knot in the end of the tubing and shut the knot in the door at waist level. Hold the other end of the tubing with the hand of your injured arm. Bend the elbow of your injured arm 90 degrees. Keeping your elbow in at your side, rotate your forearm across your body and then slowly back to the starting position. Make sure you keep your forearm parallel to the floor. Do 2 sets of 8 to 12.   Scaption: Stand with your arms at your sides and with your elbows straight. Slowly raise your arms to eye level. As you raise your arms, spread them apart so that they are only slightly in front of your body (at about a 30-degree angle to the front of your body). Point your thumbs toward the ceiling. Hold for 2 seconds and lower your arms slowly. Do 2 sets of 15. Progress to holding a soup can or light weight when you are doing the exercise and increase the weight as the exercise gets easier.   Side-lying external rotation: Lie on your uninjured side with your injured arm at your side and your elbow bent 90 degrees. Keeping your elbow against your side, raise your forearm toward the ceiling and hold for 2 seconds. Slowly lower your arm. Do 2 sets of 15. You can start doing this exercise holding a soup can or light weight and gradually increase the weight as long as there is no pain.   Horizontal abduction: Lie on your stomach on a table or the edge of a bed with the arm on your injured side hanging down over the edge.  "Raise your arm out to the side, with your thumb pointed toward the ceiling, until your arm is parallel to the floor. Hold for 2 seconds and then lower it slowly. Start this exercise with no weight. As you get stronger, add a light weight or hold a soup can. Do 2 sets of 15.   Push-up with a plus: Begin on the floor on your hands and knees. Keep your hands a shoulder width apart and lift your feet off the floor. Arch your back as high as possible and round your shoulders (this is the "plus" part or the exercise). Bend your elbows and lower your body to the floor. Return to the starting position and arch your back again. Do 2 sets of 15.           "

## 2018-09-21 NOTE — PROGRESS NOTES
(Portions of this note were dictated using voice recognition software and may contain dictation related errors in spelling/grammar/syntax not found on text review)    CC:   Chief Complaint   Patient presents with    Shoulder Pain     Right Shoulder    Medication Refill       HPI: 58 y.o. male Last visit June of 2017     Presents today for annual health maintenance exam.  Acute issues also noted on attached e/m note    Anxiety, started on Zoloft 25 mg a day at last visit, discussed cutting down Xanax to just as needed and not on a regular basis.  Finds that he is only taking Xanax once every couple of weeks or so.  Does need a refill however.  Anxiety levels may depend on what he has to do at work.  Had a different job so he feels like the stress level is not as bad as it was prior.  He does note some ED issues however and does not know if this is more related to Zoloft her other risk factors as well given his chronic comorbidities and smoking history.     PAD, evaluated by cardiology, had angiography with ballooning on 10/19/15, improved symptomology and JULY thereafter.  Was on currently on Pletal but not on currently.  No current claudication symptoms.      Hypertension/hyperlipidemia/CAD history: On Coreg 12.5 mg twice a day, lisinopril 40 mg daily, atorvastatin 80 mg daily, 81 mg aspirin regimen, Zetia 10 mg daily.  Still smoking, pre-contemplative per our last discussion on this.     IFG:  Last labs as below    Tobacco dependence:  Had tried Chantix in the past but it made him very nervous.  Has tried nicotine patch and gum.  Has been having some teeth issues so needs to get to the dentist and was concerned about retrying done because of this.  Found the patch also made him somewhat nervous as well.  He has never gone to smoking cessation Clinic but is open to this.    Past Medical History:   Diagnosis Date    Anticoagulant long-term use     Anxiety     BCC (basal cell carcinoma)     CAD (coronary artery  disease)     Erectile dysfunction     HLD (hyperlipidemia)     HTN (hypertension)     Impaired fasting glucose        Past Surgical History:   Procedure Laterality Date    ANGIOPLASTY      AORTOGRAM WITH RUNOFF Left 10/19/2015    Performed by [facility] at [facility] CATH LAB    CARDIAC SURGERY      stents placed    COLONOSCOPY N/A 7/17/2017    Procedure: COLONOSCOPY Miralax;  Surgeon: Jeremiah Syed Jr., MD;  Location: [facility] ENDO;  Service: Endoscopy;  Laterality: N/A;    COLONOSCOPY Miralax N/A 7/17/2017    Performed by Jeremiah Syed Jr., MD at [facility] ENDO    ESOPHAGOGASTRODUODENOSCOPY (EGD) N/A 7/17/2017    Performed by Jeremiah [facility]ed Jr., MD at [facility] ENDO    KNEE ARTHROSCOPY W/ MENISCECTOMY      VARICOSE VEIN SURGERY      left saphenous       Family History   Problem Relation Age of Onset    Diabetes Brother     Coronary artery disease Brother         premature    Stomach cancer Father     No Known Problems Mother     No Known Problems Sister     No Known Problems Maternal Grandmother     No Known Problems Maternal Grandfather     No Known Problems Paternal Grandmother     No Known Problems Paternal Grandfather     No Known Problems Maternal Aunt     No Known Problems Maternal Uncle     No Known Problems Paternal Aunt     No Known Problems Paternal Uncle     Prostate cancer Neg Hx     Colon cancer Neg Hx     Anemia Neg Hx     Arrhythmia Neg Hx     Asthma Neg Hx     Clotting disorder Neg Hx     Fainting Neg Hx     Heart attack Neg Hx     Heart disease Neg Hx     Heart failure Neg Hx     Hyperlipidemia Neg Hx     Hypertension Neg Hx     Stroke Neg Hx     Atrial Septal Defect Neg Hx        Social History     Socioeconomic History    Marital status:      Spouse name: Not on file    Number of children: Not on file    Years of education: Not on file    Highest education level: Not on file   Social Needs    Financial resource strain: Not on file     Food insecurity - worry: Not on file    Food insecurity - inability: Not on file    Transportation needs - medical: Not on file    Transportation needs - non-medical: Not on file   Occupational History    Not on file   Tobacco Use    Smoking status: Current Every Day Smoker     Packs/day: 1.00     Years: 30.00     Pack years: 30.00     Types: Cigarettes    Smokeless tobacco: Never Used   Substance and Sexual Activity    Alcohol use: Yes     Comment: every now and then per pt    Drug use: No    Sexual activity: Not on file   Other Topics Concern    Not on file   Social History Narrative    Not on file         Lab Results   Component Value Date    WBC 10.82 06/13/2017    HGB 15.1 06/13/2017    HCT 42.7 06/13/2017     06/13/2017    CHOL 120 06/13/2017    TRIG 126 06/13/2017    HDL 28 (L) 06/13/2017    ALT 25 06/13/2017    AST 22 06/13/2017     06/13/2017    K 4.9 06/13/2017     06/13/2017    CREATININE 1.0 06/13/2017    CALCIUM 9.3 06/13/2017    BUN 15 06/13/2017    CO2 24 06/13/2017    TSH 0.956 06/13/2017    PSA 0.77 06/13/2017    INR 1.0 10/19/2015    HGBA1C 5.7 (H) 06/13/2017    LDLCALC 66.8 06/13/2017     (H) 06/13/2017               Vital signs reviewed  PE:   APPEARANCE: Well nourished, well developed, in no acute distress.    HEAD: Normocephalic, atraumatic.  EYES: PERRL. EOMI.   Conjunctivae noninjected.  EARS: TM's intact. Light reflex normal. No retraction or perforation.    NOSE:  Deviated septum  MOUTH & THROAT: No tonsillar enlargement. No pharyngeal erythema or exudate.   NECK: Supple with no cervical lymphadenopathy.    CHEST: Good inspiratory effort. Lungs clear to auscultation with no wheezes or crackles.  CARDIOVASCULAR: Normal S1, S2. No rubs, murmurs, or gallops.  ABDOMEN: Bowel sounds normal. Not distended. Soft. No tenderness or masses. No organomegaly.  EXTREMITIES: No edema, cyanosis, or clubbing.  PROSTATE: Smooth, symmetric, non-enlarged,  nontender      IMPRESSION  1. Routine general medical examination at a health care facility    2. Essential hypertension    3. Hyperlipidemia, unspecified hyperlipidemia type    4. Coronary artery disease due to calcified coronary lesion    5. Impaired fasting glucose    6. Peripheral vascular disease of extremity: s/p R. CHAVO DCB 10/19/2015    7. Tobacco dependence    8. Pulmonary nodules        PLAN  Orders Placed This Encounter   Procedures    CT Chest Without Contrast    CBC auto differential    Comprehensive metabolic panel    Lipid panel    TSH    Hemoglobin A1c    PSA, Screening     Hypertension:  Continue current therapy    Dyslipidemia:  Lifestyle modification.  Continue statin and Zetia.  Check labs    CAD/PVD:  Stable    Tobacco dependence:  Smoking cessation Clinic referral    Pulmonary micro nodules noted on screening chest CT last year.  Update chest CT for stability    IFG:  Lifestyle modification    Anxiety:  Fairly stable.  Given some ED issues, can try stopping Zoloft for a few months to see if his symptoms are stable yet ED issues improved.  Refill Xanax for as needed use but if he notices worsening of anxiety with stopping SSRI, may want to try different SSRI    Please see attached evaluation and management note for further details on acute symptomology          HEALTH SCREENINGS   Immunizations  Tetanus in 2015  Pneumovax 2012  Flu: today    CT lung cancer screening 2017, 3 micro nodule densities right lung, limited chronic scarring both lungs, emphysematous blebs noted left apex.  Consideration for follow-up 6/12 months study to confirm micro nodule stability    Colonoscopy 2017.  5 mm polyp, adenoma.  Repeat in 5 years  EGD 2017, normal, no H pylori.

## 2018-09-21 NOTE — PROGRESS NOTES
(Portions of this note were dictated using voice recognition software and may contain dictation related errors in spelling/grammar/syntax not found on text review)    CC:   Chief Complaint   Patient presents with    Shoulder Pain     Right Shoulder    Medication Refill       HPI: 58 y.o. male here for annual exam but has some separate concerns    1.  Right shoulder pain progressive for the last 6 months.  He finds that if he has to internally rotate his arm middle hurt.  Sometimes her sleep on his shoulder.  No direct trauma.  No paresthesias.  No left shoulder problems. Has not tried any specific therapies for this    2.  Concerned about memory.  States that at work he is sharp as a tack.  However, sometimes he has noted that he will go into a store and forget what he was supposed to get.  Denies any bizarre behavior, wandering, getting lost.  Does sometimes feel that when he forgets some of these things, he is under a lot of stress    3.  Wife states that he snores at bedtime.  Denies any fatigue during the day.  No hypersomnolence.  Overall notes good energy.    Past Medical History:   Diagnosis Date    Anticoagulant long-term use     Anxiety     BCC (basal cell carcinoma)     CAD (coronary artery disease)     Erectile dysfunction     HLD (hyperlipidemia)     HTN (hypertension)     Impaired fasting glucose        Past Surgical History:   Procedure Laterality Date    ANGIOPLASTY      AORTOGRAM WITH RUNOFF Left 10/19/2015    Performed by [facility] at [facility] CATH LAB    CARDIAC SURGERY      stents placed    COLONOSCOPY N/A 7/17/2017    Procedure: COLONOSCOPY Miralax;  Surgeon: Jeremiah Syed Jr., MD;  Location: [facility] ENDO;  Service: Endoscopy;  Laterality: N/A;    COLONOSCOPY Miralax N/A 7/17/2017    Performed by Jeremiah [facility] Jr., MD at [facility] ENDO    ESOPHAGOGASTRODUODENOSCOPY (EGD) N/A 7/17/2017    Performed by Jerem[facility] Jr., MD at [facility] ENDO    KNEE ARTHROSCOPY W/  MENISCECTOMY      VARICOSE VEIN SURGERY      left saphenous       Family History   Problem Relation Age of Onset    Diabetes Brother     Coronary artery disease Brother         premature    Stomach cancer Father     No Known Problems Mother     No Known Problems Sister     No Known Problems Maternal Grandmother     No Known Problems Maternal Grandfather     No Known Problems Paternal Grandmother     No Known Problems Paternal Grandfather     No Known Problems Maternal Aunt     No Known Problems Maternal Uncle     No Known Problems Paternal Aunt     No Known Problems Paternal Uncle     Prostate cancer Neg Hx     Colon cancer Neg Hx     Anemia Neg Hx     Arrhythmia Neg Hx     Asthma Neg Hx     Clotting disorder Neg Hx     Fainting Neg Hx     Heart attack Neg Hx     Heart disease Neg Hx     Heart failure Neg Hx     Hyperlipidemia Neg Hx     Hypertension Neg Hx     Stroke Neg Hx     Atrial Septal Defect Neg Hx        Social History     Socioeconomic History    Marital status:      Spouse name: Not on file    Number of children: Not on file    Years of education: Not on file    Highest education level: Not on file   Social Needs    Financial resource strain: Not on file    Food insecurity - worry: Not on file    Food insecurity - inability: Not on file    Transportation needs - medical: Not on file    Transportation needs - non-medical: Not on file   Occupational History    Not on file   Tobacco Use    Smoking status: Current Every Day Smoker     Packs/day: 1.00     Years: 30.00     Pack years: 30.00     Types: Cigarettes    Smokeless tobacco: Never Used   Substance and Sexual Activity    Alcohol use: Yes     Comment: every now and then per pt    Drug use: No    Sexual activity: Not on file   Other Topics Concern    Not on file   Social History Narrative    Not on file       ROS:  GENERAL: No fever, chills, fatigability or weight loss.  SKIN: No rashes, no  itching.  HEAD: No headaches.  EYES: No visual changes  EARS: No ear pain or changes in hearing.  NOSE:  Occasional congestion but nothing severe  MOUTH & THROAT: No hoarseness, change in voice, or sore throat.  NODES: Denies swollen glands.  CHEST: Denies JUAREZ, cyanosis, wheezing, cough and sputum production.  CARDIOVASCULAR: Denies chest pain, PND, orthopnea.  ABDOMEN: No nausea, vomiting, or changes in bowel function.  URINARY: No flank pain, dysuria or hematuria.  PERIPHERAL VASCULAR: No claudication or cyanosis.  MUSCULOSKELETAL:  Above  NEUROLOGIC:  Above    Vital signs reviewed  PE:   APPEARANCE: Well nourished, well developed, in no acute distress.    HEAD: Normocephalic, atraumatic.  EYES: PERRL. EOMI.   Conjunctivae noninjected.  EARS: TM's intact. Light reflex normal. No retraction or perforation  NOSE:  Deviated septum noted  MOUTH & THROAT: No tonsillar enlargement. No pharyngeal erythema or exudate.   NECK: Supple with no cervical lymphadenopathy.    CHEST: Good inspiratory effort. Lungs clear to auscultation with no wheezes or crackles.  CARDIOVASCULAR: Normal S1, S2. No rubs, murmurs, or gallops.  ABDOMEN: Bowel sounds normal. Not distended. Soft. No tenderness or masses. No organomegaly.  EXTREMITIES: No edema, cyanosis, or clubbing.  MSK: .+empty can/painful arc/resisted ext rot right shoulder.  Positive Neer's and Schuler impingement sign.  No long head biceps tendon tenderness.  Negative speed's test.  No acromioclavicular joint tenderness.  NEUROmini-mental status exam:  Time: 4/5 (missed the date:  September 22nd instead of September 21st)  Place: 5/5  Immediate recall: 3/3  WORLD backwards: perfect score, 5 points/5   Delayed recall: 3/3  Object ID: 2/2  Phrase repetition:1/1  Three-step command: 3/3  Read and obey command: 1/1  Write a sentence: 1/1  Clock face: 1/1    Total score: 29/30      IMPRESSION  Right rotator cuff syndrome  Memory problem, possibly situational  Snoring    PLAN  Right  rotator cuff syndrome:  Discussed rotator cuff exercises, Tylenol if needed for pain.  Notify symptoms are more significant in which case we may need to do further imaging.  Could also consider interventions like subacromial corticosteroid injection trial    Memory problem:  We discussed aspects of dementia.  She does not seem to have these major concerning features.  Some of the symptoms may be stress related.  However, multiple risk factors for especially med vascular dementia given his coronary artery disease, PID, smoking history.  Emphasize importance for risk factor modification and quitting smoking.  Could consider a further evaluation for any progressive issues    Snoring:  Discussed differential including deviated septum, nasal congestion/rhinitis, sleep apnea.  Denies any other significant systemic symptoms of sleep apnea like daytime hypersomnolence, overall fatigue, headaches.  Would advise trial of Flonase 2 sprays each nostril once daily for the next month.  Consider further workup including sleep study if symptoms are progressive.  Also if memory issues are progressive, may need to entertain sleep study as well

## 2018-09-25 ENCOUNTER — HOSPITAL ENCOUNTER (OUTPATIENT)
Dept: RADIOLOGY | Facility: HOSPITAL | Age: 58
Discharge: HOME | End: 2018-09-25
Attending: FAMILY MEDICINE
Payer: COMMERCIAL

## 2018-09-25 DIAGNOSIS — Z00.00 ROUTINE GENERAL MEDICAL EXAMINATION AT A HEALTH CARE FACILITY: ICD-10-CM

## 2018-09-25 DIAGNOSIS — R73.01 IMPAIRED FASTING GLUCOSE: ICD-10-CM

## 2018-09-25 DIAGNOSIS — R91.8 PULMONARY NODULES: ICD-10-CM

## 2018-09-25 DIAGNOSIS — I25.10 CORONARY ARTERY DISEASE DUE TO CALCIFIED CORONARY LESION: ICD-10-CM

## 2018-09-25 DIAGNOSIS — I10 ESSENTIAL HYPERTENSION: ICD-10-CM

## 2018-09-25 DIAGNOSIS — I25.84 CORONARY ARTERY DISEASE DUE TO CALCIFIED CORONARY LESION: ICD-10-CM

## 2018-09-25 DIAGNOSIS — E78.5 HYPERLIPIDEMIA, UNSPECIFIED HYPERLIPIDEMIA TYPE: ICD-10-CM

## 2018-09-25 PROCEDURE — 71250 CT THORAX DX C-: CPT | Mod: 26,,, | Performed by: RADIOLOGY

## 2018-09-25 PROCEDURE — 71250 CT THORAX DX C-: CPT | Mod: TC,PO

## 2018-09-28 ENCOUNTER — PATIENT MESSAGE (OUTPATIENT)
Dept: FAMILY MEDICINE | Facility: CLINIC | Age: 58
End: 2018-09-28

## 2018-09-28 DIAGNOSIS — N28.1 RENAL CYST: Primary | ICD-10-CM

## 2018-09-28 DIAGNOSIS — R93.429 ABNORMAL FINDING ON DIAGNOSTIC IMAGING OF KIDNEY: ICD-10-CM

## 2018-09-28 NOTE — TELEPHONE ENCOUNTER
Dear Jose G Shafer      Your labs and lung CT scan or reviewed    Labs show the fasting blood sugars just within the diabetes range.  This secondary test we did call the A1c test is in a more borderline range.  Healthy diet, regular exercise, and weight loss will be critical for blood sugar control.  Also there is evidence suggesting that  quitting smoking can also help with your blood sugar.  I do recommend following up with blood sugar testing in the next 3 months or so    The CT scan showed stable lung nodule.  Given that this was taken over a year after the initial nodule was noted, no  follow-up is needed at this time.  However, the lung exam did show some signs of emphysema, or COPD.  This is typically from smoking.  Let me know if you are having any issues like shortness of breath or cough in which case we may need to get some extra lung testing to see if he needed to start on any medications for this.  Otherwise, quitting smoking is the best thing you can do to prevent progression of your lung disease to  permanent and irreversible damage    There were several other incidental findings noted on the CT scan like fatty liver (diet, exercise, weight loss will help), also a probable cyst was noted on the left kidney, but it is recommended getting an ultrasound to truly characterize this.  If just a  cyst, this is typically benign and nothing needs to be done about it.  I will pass this on to my staff who can contact to about setting up a kidney ultrasound    Please let me know if you have any questions

## 2018-10-09 ENCOUNTER — TELEPHONE (OUTPATIENT)
Dept: FAMILY MEDICINE | Facility: CLINIC | Age: 58
End: 2018-10-09

## 2018-10-09 NOTE — TELEPHONE ENCOUNTER
----- Message from Yesi Donahue sent at 10/9/2018  8:20 AM CDT -----  Contact: [phone] /self  Patient called in returning your call. Please advise.

## 2018-10-17 ENCOUNTER — HOSPITAL ENCOUNTER (OUTPATIENT)
Dept: RADIOLOGY | Facility: HOSPITAL | Age: 58
Discharge: HOME | End: 2018-10-17
Attending: FAMILY MEDICINE
Payer: COMMERCIAL

## 2018-10-17 ENCOUNTER — PATIENT MESSAGE (OUTPATIENT)
Dept: FAMILY MEDICINE | Facility: CLINIC | Age: 58
End: 2018-10-17

## 2018-10-17 DIAGNOSIS — N28.1 RENAL CYST: ICD-10-CM

## 2018-10-17 DIAGNOSIS — R93.429 ABNORMAL FINDING ON DIAGNOSTIC IMAGING OF KIDNEY: ICD-10-CM

## 2018-10-17 PROCEDURE — 76770 US EXAM ABDO BACK WALL COMP: CPT | Mod: 26,,, | Performed by: RADIOLOGY

## 2018-10-17 PROCEDURE — 76770 US EXAM ABDO BACK WALL COMP: CPT | Mod: TC,PO

## 2018-10-17 NOTE — TELEPHONE ENCOUNTER
Dear Jose G Shafer    Your recent ultrasound shows a benign kidney cyst.  No further workup is needed.  Also there is evidence of fatty liver.  Healthy weight, healthy diet, and regular exercise should help treat fatty liver    Amaury Olmedo MD

## 2018-11-11 RX ORDER — FLUTICASONE PROPIONATE 50 MCG
SPRAY, SUSPENSION (ML) NASAL
Qty: 16 ML | Refills: 11 | Status: SHIPPED | OUTPATIENT
Start: 2018-11-11 | End: 2019-07-18 | Stop reason: SDUPTHER

## 2019-01-09 ENCOUNTER — TELEPHONE (OUTPATIENT)
Dept: FAMILY MEDICINE | Facility: CLINIC | Age: 59
End: 2019-01-09

## 2019-01-09 NOTE — TELEPHONE ENCOUNTER
Advised that we did not have anyone specific to recommend but that they could establsih with any [facility] physician on the [facility].

## 2019-01-09 NOTE — TELEPHONE ENCOUNTER
----- Message from Kelsey Dejesus sent at 1/9/2019 11:44 AM CST -----  Contact: wife/Mayte  [phone]  Patient wife is requesting a referral to a Family Physician on the [facility].    Please call and advise

## 2019-05-13 RX ORDER — SERTRALINE HYDROCHLORIDE 25 MG/1
TABLET, FILM COATED ORAL
Qty: 30 TABLET | Refills: 11 | Status: SHIPPED | OUTPATIENT
Start: 2019-05-13 | End: 2019-07-18 | Stop reason: SDUPTHER

## 2019-07-17 ENCOUNTER — PATIENT OUTREACH (OUTPATIENT)
Dept: ADMINISTRATIVE | Facility: HOSPITAL | Age: 59
End: 2019-07-17

## 2019-07-18 ENCOUNTER — TELEPHONE (OUTPATIENT)
Dept: VASCULAR SURGERY | Facility: CLINIC | Age: 59
End: 2019-07-18

## 2019-07-18 ENCOUNTER — OFFICE VISIT (OUTPATIENT)
Dept: FAMILY MEDICINE | Facility: CLINIC | Age: 59
End: 2019-07-18
Payer: COMMERCIAL

## 2019-07-18 VITALS
WEIGHT: 221 LBS | HEIGHT: 70 IN | BODY MASS INDEX: 31.64 KG/M2 | DIASTOLIC BLOOD PRESSURE: 87 MMHG | SYSTOLIC BLOOD PRESSURE: 133 MMHG | HEART RATE: 69 BPM | TEMPERATURE: 98 F

## 2019-07-18 DIAGNOSIS — Z76.89 ENCOUNTER TO ESTABLISH CARE: Primary | ICD-10-CM

## 2019-07-18 DIAGNOSIS — I87.2 VENOUS INSUFFICIENCY OF BOTH LOWER EXTREMITIES: ICD-10-CM

## 2019-07-18 DIAGNOSIS — I73.9 PERIPHERAL VASCULAR DISEASE OF EXTREMITY: ICD-10-CM

## 2019-07-18 DIAGNOSIS — M25.522 LEFT ELBOW PAIN: ICD-10-CM

## 2019-07-18 DIAGNOSIS — Z79.899 ENCOUNTER FOR LONG-TERM (CURRENT) USE OF MEDICATIONS: ICD-10-CM

## 2019-07-18 DIAGNOSIS — F17.210 CIGARETTE SMOKER: ICD-10-CM

## 2019-07-18 DIAGNOSIS — R07.9 CHEST PAIN AT REST: ICD-10-CM

## 2019-07-18 DIAGNOSIS — M77.12 LATERAL EPICONDYLITIS OF LEFT ELBOW: ICD-10-CM

## 2019-07-18 DIAGNOSIS — Z76.0 MEDICATION REFILL: ICD-10-CM

## 2019-07-18 PROCEDURE — 3079F DIAST BP 80-89 MM HG: CPT | Mod: CPTII,S$GLB,, | Performed by: FAMILY MEDICINE

## 2019-07-18 PROCEDURE — 99406 BEHAV CHNG SMOKING 3-10 MIN: CPT | Mod: S$GLB,,, | Performed by: FAMILY MEDICINE

## 2019-07-18 PROCEDURE — 3008F BODY MASS INDEX DOCD: CPT | Mod: CPTII,S$GLB,, | Performed by: FAMILY MEDICINE

## 2019-07-18 PROCEDURE — 99999 PR PBB SHADOW E&M-EST. PATIENT-LVL IV: CPT | Mod: PBBFAC,,, | Performed by: FAMILY MEDICINE

## 2019-07-18 PROCEDURE — 99406 PR TOBACCO USE CESSATION INTERMEDIATE 3-10 MINUTES: ICD-10-PCS | Mod: S$GLB,,, | Performed by: FAMILY MEDICINE

## 2019-07-18 PROCEDURE — 3008F PR BODY MASS INDEX (BMI) DOCUMENTED: ICD-10-PCS | Mod: CPTII,S$GLB,, | Performed by: FAMILY MEDICINE

## 2019-07-18 PROCEDURE — 3075F PR MOST RECENT SYSTOLIC BLOOD PRESS GE 130-139MM HG: ICD-10-PCS | Mod: CPTII,S$GLB,, | Performed by: FAMILY MEDICINE

## 2019-07-18 PROCEDURE — 99215 OFFICE O/P EST HI 40 MIN: CPT | Mod: 25,S$GLB,, | Performed by: FAMILY MEDICINE

## 2019-07-18 PROCEDURE — 3075F SYST BP GE 130 - 139MM HG: CPT | Mod: CPTII,S$GLB,, | Performed by: FAMILY MEDICINE

## 2019-07-18 PROCEDURE — 3079F PR MOST RECENT DIASTOLIC BLOOD PRESSURE 80-89 MM HG: ICD-10-PCS | Mod: CPTII,S$GLB,, | Performed by: FAMILY MEDICINE

## 2019-07-18 PROCEDURE — 99999 PR PBB SHADOW E&M-EST. PATIENT-LVL IV: ICD-10-PCS | Mod: PBBFAC,,, | Performed by: FAMILY MEDICINE

## 2019-07-18 PROCEDURE — 99215 PR OFFICE/OUTPT VISIT, EST, LEVL V, 40-54 MIN: ICD-10-PCS | Mod: 25,S$GLB,, | Performed by: FAMILY MEDICINE

## 2019-07-18 RX ORDER — LISINOPRIL 40 MG/1
TABLET ORAL
Qty: 90 TABLET | Refills: 3 | Status: SHIPPED | OUTPATIENT
Start: 2019-07-18 | End: 2021-07-01 | Stop reason: SDUPTHER

## 2019-07-18 RX ORDER — CILOSTAZOL 50 MG/1
50 TABLET ORAL 2 TIMES DAILY
Qty: 60 TABLET | Refills: 11 | Status: SHIPPED | OUTPATIENT
Start: 2019-07-18 | End: 2020-07-19 | Stop reason: SDUPTHER

## 2019-07-18 RX ORDER — CARVEDILOL 12.5 MG/1
12.5 TABLET ORAL 2 TIMES DAILY WITH MEALS
Qty: 180 TABLET | Refills: 3 | Status: SHIPPED | OUTPATIENT
Start: 2019-07-18 | End: 2020-07-16

## 2019-07-18 RX ORDER — SERTRALINE HYDROCHLORIDE 25 MG/1
25 TABLET, FILM COATED ORAL DAILY
Qty: 90 TABLET | Refills: 3 | Status: SHIPPED | OUTPATIENT
Start: 2019-07-18 | End: 2020-07-10 | Stop reason: SDUPTHER

## 2019-07-18 RX ORDER — IBUPROFEN 200 MG
1 TABLET ORAL DAILY
Qty: 28 PATCH | Refills: 0 | Status: SHIPPED | OUTPATIENT
Start: 2019-07-18 | End: 2021-08-11

## 2019-07-18 RX ORDER — IBUPROFEN 800 MG/1
800 TABLET ORAL EVERY 6 HOURS PRN
Qty: 30 TABLET | Refills: 1 | Status: SHIPPED | OUTPATIENT
Start: 2019-07-18 | End: 2022-12-10 | Stop reason: CLARIF

## 2019-07-18 RX ORDER — NITROGLYCERIN 0.4 MG/1
0.4 TABLET SUBLINGUAL EVERY 5 MIN PRN
Qty: 100 TABLET | Refills: 3 | Status: SHIPPED | OUTPATIENT
Start: 2019-07-18

## 2019-07-18 RX ORDER — EZETIMIBE 10 MG/1
TABLET ORAL
Qty: 90 TABLET | Refills: 3 | Status: SHIPPED | OUTPATIENT
Start: 2019-07-18 | End: 2021-07-01 | Stop reason: SDUPTHER

## 2019-07-18 RX ORDER — ATORVASTATIN CALCIUM 80 MG/1
TABLET, FILM COATED ORAL
Qty: 90 TABLET | Refills: 3 | Status: SHIPPED | OUTPATIENT
Start: 2019-07-18 | End: 2021-03-28 | Stop reason: SDUPTHER

## 2019-07-18 RX ORDER — METHYLPREDNISOLONE 4 MG/1
TABLET ORAL
Qty: 1 PACKAGE | Refills: 0 | Status: SHIPPED | OUTPATIENT
Start: 2019-07-18 | End: 2019-08-08

## 2019-07-18 RX ORDER — FLUTICASONE PROPIONATE 50 MCG
SPRAY, SUSPENSION (ML) NASAL
Qty: 16 ML | Refills: 11 | Status: SHIPPED | OUTPATIENT
Start: 2019-07-18

## 2019-07-18 NOTE — PATIENT INSTRUCTIONS
How to Quit Smoking  Smoking is one of the hardest habits to break. About half of all people who have ever smoked have been able to quit. Most people who still smoke want to quit. Here are some of the best ways to stop smoking.    Keep trying  Most smokers make many attempts at quitting before they are successful. Its important not to give up.  Go cold turkey  Most former smokers quit cold turkey (all at once). Trying to cut back gradually doesn't seem to work as well, perhaps because it continues the smoking habit. Also, it is possible to inhale more while smoking fewer cigarettes. This results in the same amount of nicotine in your body.  Get support  Support programs can be a big help, especially for heavy smokers. These groups offer lectures, ways to change behavior, and peer support. Here are some ways to find a support program:  · Free national quitline: 800-QUIT-NOW (967-277-5579).  · Hospital quit-smoking programs.  · American Lung Association: (437.587.9223).  · American Cancer Society (585-258-8669).  Support at home is important too. Nonsmokers can offer praise and encouragement. If the smoker in your life finds it hard to quit, encourage them to keep trying.  Over-the-counter medicines  Nicotine replacement therapy may make quitting easier. Certain aids, such as the nicotine patch, gum, and lozenges, are available without a prescription. It is best to use these under a doctors care, though. The skin patch provides a steady supply of nicotine. Nicotine gum and lozenges give temporary bursts of low levels of nicotine. Both methods reduce the craving for cigarettes. Warning: If you have nausea, vomiting, dizziness, weakness, or a fast heartbeat, stop using these products and see your doctor.  Prescription medicines  After reviewing your smoking patterns and past attempts to quit, your doctor may offer a prescription medicine such as bupropion, varenicline, a nicotine inhaler, or nasal spray. Each has  "advantages and side effects. Your doctor can review these with you.  Health benefits of quitting  The benefits of quitting start right away and keep improving the longer you go without smoking. These benefits occur at any age.  So whether you are 17 or 70, quitting is a good decision. Some of the benefits include:  · 20 minutes: Blood pressure and pulse return to normal.  · 8 hours: Oxygen levels return to normal.  · 2 days: Ability to smell and taste begin to improve as damaged nerves regrow.  · 2 to 3 weeks: Circulation and lung function improve.  · 1 to 9 months: Coughing, congestion, and shortness of breath decrease; tiredness decreases.  · 1 year: Risk of heart attack decreases by half.  · 5 years: Risk of lung cancer decreases by half; risk of stroke becomes the same as a nonsmokers.  For more on how to quit smoking, try these online resources:   · Smokefree.gov  · "Clearing the Air" booklet from the National Cancer [place]: smokefree.gov/sites/default/files/pdf/clearing-the-air-accessible.pdf  Date Last Reviewed: 3/1/2017  © 8996-5007 [employer]. [address] [place] [address]. All rights reserved. This information is not intended as a substitute for professional medical care. Always follow your healthcare professional's instructions.        "

## 2019-07-18 NOTE — TELEPHONE ENCOUNTER
----- Message from Tawana Ross sent at 7/18/2019  7:51 AM CDT -----  Contact: self call cell  Pt needs to be seen for Peripheral vascular disease of extremity  Venous insufficiency of both lower extremities.  I was told to send a message because we can t book on yall .please advise

## 2019-07-18 NOTE — PROGRESS NOTES
Subjective:      Patient ID: Jose G Shafer is a 59 y.o. male.    Chief Complaint: Establish Care; Left elbow pain; Leg Pain; Medication Refill; and Nicotine Dependence      Problem List Items Addressed This Visit     Peripheral vascular disease of extremity: s/p R. SFA DCB 10/19/2015    Overview     Chronic.  Worsening.  Patient reports he had a balloon in 2015.  Was seen vascular surgery prior.  Starting to have claudication in both legs.         Venous insufficiency of both lower extremities    Overview     Chronic.  Patient has varicose veins bilaterally.  Had vein stripping performed previously.  Swelling in the left leg worse than the right.  Patient cannot wear compression stockings daily because of his work.         Current Assessment & Plan      strongly recommend compression stockings.  Follow-up with vascular surgery         Encounter for long-term (current) use of medications    Overview     07/18/2019   Patient is on CHRONIC long-term drug therapy for managed conditions. See medication list. Reports compliance.  No side effects reported.  Routine lab work is being monitored.  Patient does  need refills today.     Lab Results   Component Value Date    WBC 11.04 09/25/2018    HGB 14.6 09/25/2018    HCT 42.9 09/25/2018    MCV 98 09/25/2018     09/25/2018      CMP  Sodium   Date Value Ref Range Status   09/25/2018 140 136 - 145 mmol/L Final     Potassium   Date Value Ref Range Status   09/25/2018 4.9 3.5 - 5.1 mmol/L Final     Chloride   Date Value Ref Range Status   09/25/2018 105 95 - 110 mmol/L Final     CO2   Date Value Ref Range Status   09/25/2018 28 23 - 29 mmol/L Final     Glucose   Date Value Ref Range Status   09/25/2018 127 (H) 70 - 110 mg/dL Final     BUN, Bld   Date Value Ref Range Status   09/25/2018 18 6 - 20 mg/dL Final     Creatinine   Date Value Ref Range Status   09/25/2018 1.0 0.5 - 1.4 mg/dL Final     Calcium   Date Value Ref Range Status   09/25/2018 10.3 8.7 - 10.5 mg/dL  Final     Total Protein   Date Value Ref Range Status   09/25/2018 6.9 6.0 - 8.4 g/dL Final     Albumin   Date Value Ref Range Status   09/25/2018 4.2 3.5 - 5.2 g/dL Final     Total Bilirubin   Date Value Ref Range Status   09/25/2018 0.8 0.1 - 1.0 mg/dL Final     Comment:     For infants and newborns, interpretation of results should be based  on gestational age, weight and in agreement with clinical  observations.  Premature Infant recommended reference ranges:  Up to 24 hours.............<8.0 mg/dL  Up to 48 hours............<12.0 mg/dL  3-5 days..................<15.0 mg/dL  6-29 days.................<15.0 mg/dL       Alkaline Phosphatase   Date Value Ref Range Status   09/25/2018 64 55 - 135 U/L Final     AST   Date Value Ref Range Status   09/25/2018 24 10 - 40 U/L Final     ALT   Date Value Ref Range Status   09/25/2018 32 10 - 44 U/L Final     Anion Gap   Date Value Ref Range Status   09/25/2018 7 (L) 8 - 16 mmol/L Final     eGFR if    Date Value Ref Range Status   09/25/2018 >60.0 >60 mL/min/1.73 m^2 Final     eGFR if non    Date Value Ref Range Status   09/25/2018 >60.0 >60 mL/min/1.73 m^2 Final     Comment:     Calculation used to obtain the estimated glomerular filtration  rate (eGFR) is the CKD-EPI equation.        Lab Results   Component Value Date    TSH 1.082 09/25/2018             Current Assessment & Plan     Discussed risks and benefits of medications          Encounter to establish care - Primary    Overview     New patient.  Patient has several problems today would like addressed.  See problem list.         Current Assessment & Plan     Complete history and physical was completed today.  Complete and thorough medication reconciliation was performed.  Discussed risks and benefits of medications.  Advised patient on orders and health maintenance.  We discussed old records and old labs if available.  Will request any records not available through epic.  Continue  current medications listed on your summary sheet.  07/18/2019           Chest pain at rest    Overview     Patient is refill of nitroglycerin.  Reports chest pain is controlled.         Medication refill    Overview     Patient any medication refills.  Reports compliance.  No side effects reported.         Left elbow pain    Overview     Right handed, started a few months ago a/w with shoulder pain but did exercise and stetching and shoulder is better.    Not taking anything for it.   Sharp, shooting pins   Around the elbow,   Radiates into the forearm  Has not had any steroid injections.          Current Assessment & Plan     Trial of anti-inflammatories and oral steroids.  Will inject epicondylitis if not improving.         Lateral epicondylitis of left elbow    Overview     New problem.  Acute.  Has not had any evaluation or treatment for this condition.  Patient does a lot of overhead lifting.         Cigarette smoker    Overview     Current smoker.  Would like to start nicotine replacement to stop                Past Medical History:  Past Medical History:   Diagnosis Date    Anticoagulant long-term use     Anxiety     BCC (basal cell carcinoma)     CAD (coronary artery disease)     Erectile dysfunction     HLD (hyperlipidemia)     HTN (hypertension)     Impaired fasting glucose     Lateral epicondylitis of left elbow 7/18/2019     Past Surgical History:   Procedure Laterality Date    ANGIOPLASTY      AORTOGRAM WITH RUNOFF Left 10/19/2015    Performed by [facility] at [facility] CATH LAB    CARDIAC SURGERY      stents placed    COLONOSCOPY Miralax N/A 7/17/2017    Per[facility] at [facility] ENDO    ESOPHAGOGASTRODUODENOSCOPY (EGD) N/A 7/17/2017    Per[facility] by [facility] at [facility] ENDO    KNEE ARTHROSCOPY W/ MENISCECTOMY      VARICOSE VEIN SURGERY      left saphenous     Review of patient's allergies indicates:  No Known Allergies  Current Outpatient  Medications on File Prior to Visit   Medication Sig Dispense Refill    ALPRAZolam (XANAX) 0.5 MG tablet Take 1 tablet (0.5 mg total) by mouth daily as needed for Anxiety. 30 tablet 2    ascorbic acid (VITAMIN C) 500 MG tablet Take 500 mg by mouth 2 (two) times daily.      aspirin (BABY ASPIRIN) 81 MG Chew Take 81 mg by mouth. 1 Tablet, Chewable Oral Every day      esomeprazole (NEXIUM) 20 MG capsule Take 20 mg by mouth before breakfast.      MULTIVITAMIN ORAL Take by mouth. 1  By mouth Every morning      omega-3 fatty acids (FISH OIL) 500 mg Cap Take by mouth. 1 Capsule Oral       [DISCONTINUED] atorvastatin (LIPITOR) 80 MG tablet TAKE 1 TABLET BY MOUTH ONE TIME DAILY 90 tablet 2    [DISCONTINUED] carvedilol (COREG) 12.5 MG tablet TAKE 1 TABLET BY MOUTH TWICE DAILY WITH MEALS 180 tablet 2    [DISCONTINUED] ezetimibe (ZETIA) 10 mg tablet TAKE 1 TABLET BY MOUTH ONE TIME DAILY 90 tablet 2    [DISCONTINUED] fluticasone (FLONASE) 50 mcg/actuation nasal spray USE 1 SPRAY (50 MCG TOTAL) BY EACH NARE ROUTE ONCE DAILY. 16 mL 11    [DISCONTINUED] lisinopril (PRINIVIL,ZESTRIL) 40 MG tablet TAKE 1 TABLET BY MOUTH ONE TIME DAILY 90 tablet 2    [DISCONTINUED] nitroGLYCERIN (NITROSTAT) 0.4 MG SL tablet Place 1 tablet (0.4 mg total) under the tongue every 5 (five) minutes as needed for Chest pain. 1 Tablet, Sublingual Sublingual 100 tablet 3    [DISCONTINUED] sertraline (ZOLOFT) 25 MG tablet TAKE 1 TABLET BY MOUTH EVERY DAY 30 tablet 11     No current facility-administered medications on file prior to visit.      Social History     Socioeconomic History    Marital status:      Spouse name: Not on file    Number of children: Not on file    Years of education: Not on file    Highest education level: Not on file   Occupational History    Not on file   Social Needs    Financial resource strain: Not on file    Food insecurity:     Worry: Not on file     Inability: Not on file    Transportation needs:      Medical: Not on file     Non-medical: Not on file   Tobacco Use    Smoking status: Current Every Day Smoker     Packs/day: 1.00     Years: 30.00     Pack years: 30.00     Types: Cigarettes    Smokeless tobacco: Never Used   Substance and Sexual Activity    Alcohol use: Yes     Comment: every now and then per pt    Drug use: No    Sexual activity: Not on file   Lifestyle    Physical activity:     Days per week: Not on file     Minutes per session: Not on file    Stress: Not on file   Relationships    Social connections:     Talks on phone: Not on file     Gets together: Not on file     Attends Jainism service: Not on file     Active member of club or organization: Not on file     Attends meetings of clubs or organizations: Not on file     Relationship status: Not on file   Other Topics Concern    Not on file   Social History Narrative    Not on file     Family History   Problem Relation Age of Onset    Diabetes Brother     Coronary artery disease Brother         premature    Stomach cancer Father     No Known Problems Mother     No Known Problems Sister     No Known Problems Maternal Grandmother     No Known Problems Maternal Grandfather     No Known Problems Paternal Grandmother     No Known Problems Paternal Grandfather     No Known Problems Maternal Aunt     No Known Problems Maternal Uncle     No Known Problems Paternal Aunt     No Known Problems Paternal Uncle     Prostate cancer Neg Hx     Colon cancer Neg Hx     Anemia Neg Hx     Arrhythmia Neg Hx     Asthma Neg Hx     Clotting disorder Neg Hx     Fainting Neg Hx     Heart attack Neg Hx     Heart disease Neg Hx     Heart failure Neg Hx     Hyperlipidemia Neg Hx     Hypertension Neg Hx     Stroke Neg Hx     Atrial Septal Defect Neg Hx        I have reviewed the complete PMH, social history, surgical history, allergies and medications.  As well as family history.    Review of Systems   Constitutional: Positive for fatigue.  "Negative for activity change and unexpected weight change.   HENT: Negative for hearing loss, rhinorrhea and trouble swallowing.    Eyes: Negative for discharge and visual disturbance.   Respiratory: Negative for chest tightness and wheezing.    Cardiovascular: Positive for leg swelling. Negative for chest pain and palpitations.   Gastrointestinal: Negative for blood in stool, constipation, diarrhea and vomiting.   Endocrine: Negative for polydipsia and polyuria.   Genitourinary: Negative for difficulty urinating, hematuria and urgency.   Musculoskeletal: Positive for arthralgias. Negative for joint swelling and neck pain.   Skin: Negative for color change, rash and wound.   Neurological: Negative for weakness and headaches.   Psychiatric/Behavioral: Negative for confusion and dysphoric mood.       Objective:     /87   Pulse 69   Temp 98.4 °F (36.9 °C)   Ht 5' 10" (1.778 m)   Wt 100.2 kg (221 lb)   BMI 31.71 kg/m²     Physical Exam   Constitutional: He is oriented to person, place, and time. He appears well-developed and well-nourished. No distress.   HENT:   Head: Normocephalic and atraumatic.   Eyes: Pupils are equal, round, and reactive to light. EOM are normal.   Neck: Normal range of motion. Neck supple.   Cardiovascular: Normal rate, regular rhythm, normal heart sounds and intact distal pulses.   No murmur heard.  Lower extremity swelling trace on the left no swelling on the right.  Venous stasis changes.  Bilateral varicose veins   Pulmonary/Chest: Effort normal and breath sounds normal. No respiratory distress. He has no wheezes.   Abdominal: Soft. Bowel sounds are normal. He exhibits no distension.   Musculoskeletal: Normal range of motion. He exhibits tenderness. He exhibits no edema.        Left forearm: He exhibits tenderness and bony tenderness. He exhibits no swelling, no edema, no deformity and no laceration.        Arms:  Neurological: He is alert and oriented to person, place, and time. " No cranial nerve deficit.   Skin: Skin is warm and dry. Capillary refill takes less than 2 seconds.   Psychiatric: He has a normal mood and affect. His behavior is normal.   Nursing note and vitals reviewed.      Assessment:     1. Encounter to establish care    2. Encounter for long-term (current) use of medications    3. Peripheral vascular disease of extremity: s/p R. SFA DCB 10/19/2015    4. Venous insufficiency of both lower extremities    5. Chest pain at rest    6. Medication refill    7. Left elbow pain    8. Lateral epicondylitis of left elbow    9. Cigarette smoker        Plan:     I have Reviewed and summarized old records.  I have performed thorough medication reconciliation today and discussed risk and benefits of each medication.  I have reviewed labs and discussed with patient.  All questions were answered.  I am requesting old records and will review them once they are available.    Problem List Items Addressed This Visit     Peripheral vascular disease of extremity: s/p R. SFA DCB 10/19/2015    Relevant Medications    cilostazol (PLETAL) 50 MG Tab    Other Relevant Orders    Ambulatory referral to Vascular Surgery    Venous insufficiency of both lower extremities      strongly recommend compression stockings.  Follow-up with vascular surgery         Relevant Orders    Ambulatory referral to Vascular Surgery    Encounter for long-term (current) use of medications     Discussed risks and benefits of medications          Relevant Medications    atorvastatin (LIPITOR) 80 MG tablet    carvedilol (COREG) 12.5 MG tablet    ezetimibe (ZETIA) 10 mg tablet    fluticasone propionate (FLONASE) 50 mcg/actuation nasal spray    lisinopril (PRINIVIL,ZESTRIL) 40 MG tablet    nitroGLYCERIN (NITROSTAT) 0.4 MG SL tablet    sertraline (ZOLOFT) 25 MG tablet    Encounter to establish care - Primary     Complete history and physical was completed today.  Complete and thorough medication reconciliation was performed.   Discussed risks and benefits of medications.  Advised patient on orders and health maintenance.  We discussed old records and old labs if available.  Will request any records not available through epic.  Continue current medications listed on your summary sheet.  07/18/2019           Chest pain at rest    Relevant Medications    nitroGLYCERIN (NITROSTAT) 0.4 MG SL tablet    Medication refill    Relevant Medications    atorvastatin (LIPITOR) 80 MG tablet    carvedilol (COREG) 12.5 MG tablet    ezetimibe (ZETIA) 10 mg tablet    fluticasone propionate (FLONASE) 50 mcg/actuation nasal spray    lisinopril (PRINIVIL,ZESTRIL) 40 MG tablet    nitroGLYCERIN (NITROSTAT) 0.4 MG SL tablet    sertraline (ZOLOFT) 25 MG tablet    Left elbow pain     Trial of anti-inflammatories and oral steroids.  Will inject epicondylitis if not improving.         Relevant Medications    ibuprofen (ADVIL,MOTRIN) 800 MG tablet    methylPREDNISolone (MEDROL DOSEPACK) 4 mg tablet    Lateral epicondylitis of left elbow    Relevant Medications    ibuprofen (ADVIL,MOTRIN) 800 MG tablet    methylPREDNISolone (MEDROL DOSEPACK) 4 mg tablet    Cigarette smoker    Relevant Medications    nicotine (NICODERM CQ) 21 mg/24 hr    Other Relevant Orders    Ambulatory referral to Smoking Cessation Program          Follow up in about 1 month (around 8/18/2019) for Annual Wellness Exam.    DISCLAIMER: This note was compiled by using a speech recognition dictation system and therefore please be aware that typographical / speech recognition errors can and do occur.  Please contact me if you see any errors specifically.    Leonardo Vázquez MD  We Offer Telehealth & Same Day Appointments!   Book your Telehealth appointment with me through my nurse or   Clinic appointments on [facility]!    Office: [phone]          Check out my Facebook Page and Follow Me at: CLICK HERE    Check out my website at [employer] by clicking on: CLICK HERE    To Schedule appointments  online, go to [employer]: CLICK HERE     Location: [email or web address]    [address] [facility] [place] [address]    FAX: [phone]

## 2019-07-18 NOTE — ASSESSMENT & PLAN NOTE
Complete history and physical was completed today.  Complete and thorough medication reconciliation was performed.  Discussed risks and benefits of medications.  Advised patient on orders and health maintenance.  We discussed old records and old labs if available.  Will request any records not available through epic.  Continue current medications listed on your summary sheet.  07/18/2019

## 2019-07-22 ENCOUNTER — TELEPHONE (OUTPATIENT)
Dept: VASCULAR SURGERY | Facility: CLINIC | Age: 59
End: 2019-07-22

## 2019-07-22 NOTE — TELEPHONE ENCOUNTER
----- Message from Noemi Nicholas sent at 7/22/2019  8:15 AM CDT -----  Type: Needs Medical Advice    Who Called:  Patient  Best Call Back Number: [phone] cell or [phone] wife cell  Additional Information: patient is requesting to schedule a consult for blockages in his legs. Please contact to schedule.     Thank you

## 2019-07-23 DIAGNOSIS — I73.9 PERIPHERAL VASCULAR DISEASE OF EXTREMITY: Primary | ICD-10-CM

## 2019-07-23 RX ORDER — ALPRAZOLAM 0.5 MG/1
0.5 TABLET ORAL DAILY PRN
Qty: 30 TABLET | Refills: 0 | Status: SHIPPED | OUTPATIENT
Start: 2019-07-23 | End: 2020-04-02 | Stop reason: SDUPTHER

## 2019-07-23 NOTE — TELEPHONE ENCOUNTER
Spoke to patient, informed none of the cardiovascular surgeons here in the [facility] are accepting vein patients at this time.  States he was treated for this same problem at [facility] about 3 yrs ago, phone number for [facility] given to patient.

## 2019-07-23 NOTE — TELEPHONE ENCOUNTER
----- Message from Lacey Gilmore sent at 7/23/2019  9:05 AM CDT -----  Contact: Wife-Mayte  Type:  RX Refill Request    Who Called: Mayte  Refill or New Rx:REFILL  RX Name and Strength:  ALPRAZolam (XANAX) 0.5 MG tablet  How is the patient currently taking it? (ex. 1XDay):1  Is this a 30 day or 90 day RX:30  Preferred Pharmacy with phone number:  [facility]/pharmacy #[id number] - [place] - [address] [place] [address]  [place] [address]  Phone: [phone] Fax: [phone]  Local or Mail Order:LOCAL  Ordering Provider:REINIER  Would the patient rather a call back or a response via [facility]? CALL BACK  Best Call Back Number:[phone]  Additional Information:

## 2019-08-09 ENCOUNTER — HOSPITAL ENCOUNTER (OUTPATIENT)
Dept: VASCULAR SURGERY | Facility: CLINIC | Age: 59
Discharge: HOME | End: 2019-08-09
Attending: SURGERY
Payer: COMMERCIAL

## 2019-08-09 DIAGNOSIS — I73.9 PERIPHERAL VASCULAR DISEASE OF EXTREMITY: ICD-10-CM

## 2019-08-09 PROCEDURE — 93923 PR NON-INVASIVE PHYSIOLOGIC STUDY EXTREMITY 3 LEVELS: ICD-10-PCS | Mod: S$GLB,,, | Performed by: SURGERY

## 2019-08-09 PROCEDURE — 93923 UPR/LXTR ART STDY 3+ LVLS: CPT | Mod: S$GLB,,, | Performed by: SURGERY

## 2019-08-16 ENCOUNTER — LAB VISIT (OUTPATIENT)
Dept: LAB | Facility: HOSPITAL | Age: 59
End: 2019-08-16
Attending: FAMILY MEDICINE
Payer: COMMERCIAL

## 2019-08-16 ENCOUNTER — OFFICE VISIT (OUTPATIENT)
Dept: FAMILY MEDICINE | Facility: CLINIC | Age: 59
End: 2019-08-16
Payer: COMMERCIAL

## 2019-08-16 VITALS
WEIGHT: 220.19 LBS | HEIGHT: 70 IN | TEMPERATURE: 98 F | HEART RATE: 80 BPM | SYSTOLIC BLOOD PRESSURE: 128 MMHG | DIASTOLIC BLOOD PRESSURE: 82 MMHG | BODY MASS INDEX: 31.52 KG/M2

## 2019-08-16 DIAGNOSIS — I73.9 PERIPHERAL VASCULAR DISEASE OF EXTREMITY: ICD-10-CM

## 2019-08-16 DIAGNOSIS — Z12.5 ENCOUNTER FOR PROSTATE CANCER SCREENING: ICD-10-CM

## 2019-08-16 DIAGNOSIS — Z23 NEED FOR PNEUMOCOCCAL VACCINATION: ICD-10-CM

## 2019-08-16 DIAGNOSIS — R73.03 PREDIABETES: ICD-10-CM

## 2019-08-16 DIAGNOSIS — I25.118 CORONARY ARTERY DISEASE OF NATIVE ARTERY OF NATIVE HEART WITH STABLE ANGINA PECTORIS: ICD-10-CM

## 2019-08-16 DIAGNOSIS — Z79.899 ENCOUNTER FOR LONG-TERM (CURRENT) USE OF MEDICATIONS: ICD-10-CM

## 2019-08-16 DIAGNOSIS — E78.00 PURE HYPERCHOLESTEROLEMIA: ICD-10-CM

## 2019-08-16 DIAGNOSIS — Z79.899 ENCOUNTER FOR LONG-TERM (CURRENT) USE OF MEDICATIONS: Primary | ICD-10-CM

## 2019-08-16 DIAGNOSIS — R53.83 FATIGUE, UNSPECIFIED TYPE: ICD-10-CM

## 2019-08-16 LAB
ALBUMIN SERPL BCP-MCNC: 4.3 G/DL (ref 3.5–5.2)
ALP SERPL-CCNC: 71 U/L (ref 55–135)
ALT SERPL W/O P-5'-P-CCNC: 24 U/L (ref 10–44)
ANION GAP SERPL CALC-SCNC: 9 MMOL/L (ref 8–16)
AST SERPL-CCNC: 19 U/L (ref 10–40)
BASOPHILS # BLD AUTO: 0.07 K/UL (ref 0–0.2)
BASOPHILS NFR BLD: 0.7 % (ref 0–1.9)
BILIRUB SERPL-MCNC: 0.6 MG/DL (ref 0.1–1)
BILIRUB UR QL STRIP: NEGATIVE
BUN SERPL-MCNC: 16 MG/DL (ref 6–20)
CALCIUM SERPL-MCNC: 9.8 MG/DL (ref 8.7–10.5)
CHLORIDE SERPL-SCNC: 106 MMOL/L (ref 95–110)
CHOLEST SERPL-MCNC: 138 MG/DL (ref 120–199)
CHOLEST/HDLC SERPL: 4.3 {RATIO} (ref 2–5)
CLARITY UR: CLEAR
CO2 SERPL-SCNC: 24 MMOL/L (ref 23–29)
COLOR UR: YELLOW
COMPLEXED PSA SERPL-MCNC: 0.71 NG/ML (ref 0–4)
CREAT SERPL-MCNC: 0.9 MG/DL (ref 0.5–1.4)
DIFFERENTIAL METHOD: ABNORMAL
EOSINOPHIL # BLD AUTO: 0.3 K/UL (ref 0–0.5)
EOSINOPHIL NFR BLD: 3.1 % (ref 0–8)
ERYTHROCYTE [DISTWIDTH] IN BLOOD BY AUTOMATED COUNT: 13 % (ref 11.5–14.5)
EST. GFR  (AFRICAN AMERICAN): >60 ML/MIN/1.73 M^2
EST. GFR  (NON AFRICAN AMERICAN): >60 ML/MIN/1.73 M^2
ESTIMATED AVG GLUCOSE: 117 MG/DL (ref 68–131)
GLUCOSE SERPL-MCNC: 105 MG/DL (ref 70–110)
GLUCOSE UR QL STRIP: NEGATIVE
HBA1C MFR BLD HPLC: 5.7 % (ref 4–5.6)
HCT VFR BLD AUTO: 44.1 % (ref 40–54)
HDLC SERPL-MCNC: 32 MG/DL (ref 40–75)
HDLC SERPL: 23.2 % (ref 20–50)
HGB BLD-MCNC: 14.2 G/DL (ref 14–18)
HGB UR QL STRIP: NEGATIVE
IMM GRANULOCYTES # BLD AUTO: 0.03 K/UL (ref 0–0.04)
IMM GRANULOCYTES NFR BLD AUTO: 0.3 % (ref 0–0.5)
KETONES UR QL STRIP: NEGATIVE
LDLC SERPL CALC-MCNC: 80.2 MG/DL (ref 63–159)
LEUKOCYTE ESTERASE UR QL STRIP: NEGATIVE
LYMPHOCYTES # BLD AUTO: 3 K/UL (ref 1–4.8)
LYMPHOCYTES NFR BLD: 29.4 % (ref 18–48)
MCH RBC QN AUTO: 32.6 PG (ref 27–31)
MCHC RBC AUTO-ENTMCNC: 32.2 G/DL (ref 32–36)
MCV RBC AUTO: 101 FL (ref 82–98)
MONOCYTES # BLD AUTO: 0.9 K/UL (ref 0.3–1)
MONOCYTES NFR BLD: 8.9 % (ref 4–15)
NEUTROPHILS # BLD AUTO: 5.9 K/UL (ref 1.8–7.7)
NEUTROPHILS NFR BLD: 57.6 % (ref 38–73)
NITRITE UR QL STRIP: NEGATIVE
NONHDLC SERPL-MCNC: 106 MG/DL
NRBC BLD-RTO: 0 /100 WBC
PH UR STRIP: 6 [PH] (ref 5–8)
PLATELET # BLD AUTO: 286 K/UL (ref 150–350)
PMV BLD AUTO: 9.8 FL (ref 9.2–12.9)
POTASSIUM SERPL-SCNC: 4.8 MMOL/L (ref 3.5–5.1)
PROT SERPL-MCNC: 7 G/DL (ref 6–8.4)
PROT UR QL STRIP: ABNORMAL
RBC # BLD AUTO: 4.35 M/UL (ref 4.6–6.2)
SODIUM SERPL-SCNC: 139 MMOL/L (ref 136–145)
SP GR UR STRIP: 1.03 (ref 1–1.03)
T3FREE SERPL-MCNC: 2.8 PG/ML (ref 2.3–4.2)
T4 SERPL-MCNC: 5.9 UG/DL (ref 4.5–11.5)
TRIGL SERPL-MCNC: 129 MG/DL (ref 30–150)
TSH SERPL DL<=0.005 MIU/L-ACNC: 0.84 UIU/ML (ref 0.4–4)
URN SPEC COLLECT METH UR: ABNORMAL
WBC # BLD AUTO: 10.14 K/UL (ref 3.9–12.7)

## 2019-08-16 PROCEDURE — 85025 COMPLETE CBC W/AUTO DIFF WBC: CPT

## 2019-08-16 PROCEDURE — 90471 IMMUNIZATION ADMIN: CPT | Mod: S$GLB,,, | Performed by: FAMILY MEDICINE

## 2019-08-16 PROCEDURE — 81002 URINALYSIS NONAUTO W/O SCOPE: CPT | Mod: PO

## 2019-08-16 PROCEDURE — 99999 PR PBB SHADOW E&M-EST. PATIENT-LVL IV: CPT | Mod: PBBFAC,,, | Performed by: FAMILY MEDICINE

## 2019-08-16 PROCEDURE — 84481 FREE ASSAY (FT-3): CPT

## 2019-08-16 PROCEDURE — 90732 PNEUMOCOCCAL POLYSACCHARIDE VACCINE 23-VALENT =>2YO SQ IM: ICD-10-PCS | Mod: S$GLB,,, | Performed by: FAMILY MEDICINE

## 2019-08-16 PROCEDURE — 84443 ASSAY THYROID STIM HORMONE: CPT

## 2019-08-16 PROCEDURE — 80053 COMPREHEN METABOLIC PANEL: CPT

## 2019-08-16 PROCEDURE — 36415 COLL VENOUS BLD VENIPUNCTURE: CPT | Mod: PO

## 2019-08-16 PROCEDURE — 80061 LIPID PANEL: CPT

## 2019-08-16 PROCEDURE — 3074F SYST BP LT 130 MM HG: CPT | Mod: CPTII,S$GLB,, | Performed by: FAMILY MEDICINE

## 2019-08-16 PROCEDURE — 90732 PPSV23 VACC 2 YRS+ SUBQ/IM: CPT | Mod: S$GLB,,, | Performed by: FAMILY MEDICINE

## 2019-08-16 PROCEDURE — 99214 PR OFFICE/OUTPT VISIT, EST, LEVL IV, 30-39 MIN: ICD-10-PCS | Mod: 25,S$GLB,, | Performed by: FAMILY MEDICINE

## 2019-08-16 PROCEDURE — 83036 HEMOGLOBIN GLYCOSYLATED A1C: CPT

## 2019-08-16 PROCEDURE — 3079F DIAST BP 80-89 MM HG: CPT | Mod: CPTII,S$GLB,, | Performed by: FAMILY MEDICINE

## 2019-08-16 PROCEDURE — 3079F PR MOST RECENT DIASTOLIC BLOOD PRESSURE 80-89 MM HG: ICD-10-PCS | Mod: CPTII,S$GLB,, | Performed by: FAMILY MEDICINE

## 2019-08-16 PROCEDURE — 3008F PR BODY MASS INDEX (BMI) DOCUMENTED: ICD-10-PCS | Mod: CPTII,S$GLB,, | Performed by: FAMILY MEDICINE

## 2019-08-16 PROCEDURE — 84436 ASSAY OF TOTAL THYROXINE: CPT

## 2019-08-16 PROCEDURE — 3074F PR MOST RECENT SYSTOLIC BLOOD PRESSURE < 130 MM HG: ICD-10-PCS | Mod: CPTII,S$GLB,, | Performed by: FAMILY MEDICINE

## 2019-08-16 PROCEDURE — 99214 OFFICE O/P EST MOD 30 MIN: CPT | Mod: 25,S$GLB,, | Performed by: FAMILY MEDICINE

## 2019-08-16 PROCEDURE — 84153 ASSAY OF PSA TOTAL: CPT

## 2019-08-16 PROCEDURE — 99999 PR PBB SHADOW E&M-EST. PATIENT-LVL IV: ICD-10-PCS | Mod: PBBFAC,,, | Performed by: FAMILY MEDICINE

## 2019-08-16 PROCEDURE — 90471 PNEUMOCOCCAL POLYSACCHARIDE VACCINE 23-VALENT =>2YO SQ IM: ICD-10-PCS | Mod: S$GLB,,, | Performed by: FAMILY MEDICINE

## 2019-08-16 PROCEDURE — 3008F BODY MASS INDEX DOCD: CPT | Mod: CPTII,S$GLB,, | Performed by: FAMILY MEDICINE

## 2019-08-16 NOTE — PROGRESS NOTES
"Subjective:      Patient ID: Jose G Shafer is a 59 y.o. male.    Chief Complaint: Follow-up (Lab review); Diabetes; and Leg Pain      Problem List Items Addressed This Visit     HLD (hyperlipidemia)    Overview     Lab Results   Component Value Date    CHOL 138 08/16/2019    CHOL 129 09/25/2018    CHOL 120 06/13/2017     Lab Results   Component Value Date    HDL 32 (L) 08/16/2019    HDL 28 (L) 09/25/2018    HDL 28 (L) 06/13/2017     Lab Results   Component Value Date    LDLCALC 80.2 08/16/2019    LDLCALC 78.8 09/25/2018    LDLCALC 66.8 06/13/2017     Lab Results   Component Value Date    TRIG 129 08/16/2019    TRIG 111 09/25/2018    TRIG 126 06/13/2017     Lab Results   Component Value Date    CHOLHDL 23.2 08/16/2019    CHOLHDL 21.7 09/25/2018    CHOLHDL 23.3 06/13/2017     Chronic. Reports compliance with statin. No SE. No sx.          Current Assessment & Plan     Due for fasting lipids. Discussed hyperlipidemia this sees course.  Discussed the risk of cardiovascular disease, increase stroke and heart attack risk.  Patient voiced understanding understood the treatment plan. Discussed healthy diet and increased need for exercise.           CAD (coronary artery disease):s/p MI and PCI 2005    Overview     Prev History: 6-2005 he had an inferior STEMI-->DAVID to pRCA.  LVEF preserved    On ASA  Reports cards took off plavix a few years back.     Dr. Quezada- [place]         Current Assessment & Plan     Advised to follow up with Cardiology concerning previous coronary artery disease and stenting.      Last card note: Pt with sxs concerning for worsening of CAD. Last SPECT in 2011 was inconclusive with inability to achieve target and "fixed" inferior wall defect thought to be infarct on study - however ECHO with normal WMA.  Will obtain PET with absolute flow to get a definitive answer on ischemic burden.  Carotids to evaluate facial complaints - no bruits heard but he has PVD and CAD  Refilled viagra- Pt aware of " nitro- viagra interaction and safe use of either med.  He hasn't really used nitro in several years.          Peripheral vascular disease of extremity: s/p R. SFA DCB 10/19/2015    Overview     Initial HPI: Chronic.  Worsening.  Patient reports he had a balloon in .  Was seen vascular surgery prior.  Starting to have claudication in both legs.    No stent.     ==============================================  2019  Better on Pletal.     VAS US Ankle Brachial Indices Resting  PAT NAME: GELACIO WILLIAM  MED REC#: [id number]  :      1960  SEX:      M  EXAM RILEY: 2019 14:10  REF PHYS: GRACE AGUILERA    Indication:  - Peripheral vascular disease.  Results:  Lower Extremities Segmental Pressure [mmHg]:                    Right             Left  _______________________________________________________________  Brachial          129               127  Low Thigh         140               130  Calf              105               116  Posterior Tibial  110               124  Dorsalis Pedis    107               108  JULY (Post. Tib.)  0.85              0.96  JULY (Dors. Ped.)  0.83              0.84    Report Summary:  Impression:   Rt JULY (0.85) Segment/Brachial Index may be falsely elevated due to suspected medial calcinosis. PVR waveforms indicate moderate multi - level  peripheral arterial obstructive disease.    Lt JULY (0.96) Segment/Brachial Index may be falsely elevated due to suspected medial calcinosis. PVR waveforms indicate moderate multi - level peripheral arterial obstructive disease.    Sonographer: Candido Fitzgerald RVT    Electronically Signed by:  Adrian Marquez MD [[id number]]                        On: 2019 16:26             Current Assessment & Plan     Seeing Vascular next week. Continue Pletal.          Encounter for long-term (current) use of medications - Primary    Overview     2019   Patient is on CHRONIC long-term drug therapy for managed conditions. See medication  list. Reports compliance.  No side effects reported.  Routine lab work is being monitored.  Patient does  need refills today.     Lab Results   Component Value Date    WBC 11.04 09/25/2018    HGB 14.6 09/25/2018    HCT 42.9 09/25/2018    MCV 98 09/25/2018     09/25/2018      CMP  Sodium   Date Value Ref Range Status   09/25/2018 140 136 - 145 mmol/L Final     Potassium   Date Value Ref Range Status   09/25/2018 4.9 3.5 - 5.1 mmol/L Final     Chloride   Date Value Ref Range Status   09/25/2018 105 95 - 110 mmol/L Final     CO2   Date Value Ref Range Status   09/25/2018 28 23 - 29 mmol/L Final     Glucose   Date Value Ref Range Status   09/25/2018 127 (H) 70 - 110 mg/dL Final     BUN, Bld   Date Value Ref Range Status   09/25/2018 18 6 - 20 mg/dL Final     Creatinine   Date Value Ref Range Status   09/25/2018 1.0 0.5 - 1.4 mg/dL Final     Calcium   Date Value Ref Range Status   09/25/2018 10.3 8.7 - 10.5 mg/dL Final     Total Protein   Date Value Ref Range Status   09/25/2018 6.9 6.0 - 8.4 g/dL Final     Albumin   Date Value Ref Range Status   09/25/2018 4.2 3.5 - 5.2 g/dL Final     Total Bilirubin   Date Value Ref Range Status   09/25/2018 0.8 0.1 - 1.0 mg/dL Final     Comment:     For infants and newborns, interpretation of results should be based  on gestational age, weight and in agreement with clinical  observations.  Premature Infant recommended reference ranges:  Up to 24 hours.............<8.0 mg/dL  Up to 48 hours............<12.0 mg/dL  3-5 days..................<15.0 mg/dL  6-29 days.................<15.0 mg/dL       Alkaline Phosphatase   Date Value Ref Range Status   09/25/2018 64 55 - 135 U/L Final     AST   Date Value Ref Range Status   09/25/2018 24 10 - 40 U/L Final     ALT   Date Value Ref Range Status   09/25/2018 32 10 - 44 U/L Final     Anion Gap   Date Value Ref Range Status   09/25/2018 7 (L) 8 - 16 mmol/L Final     eGFR if    Date Value Ref Range Status   09/25/2018 >60.0  >60 mL/min/1.73 m^2 Final     eGFR if non    Date Value Ref Range Status   09/25/2018 >60.0 >60 mL/min/1.73 m^2 Final     Comment:     Calculation used to obtain the estimated glomerular filtration  rate (eGFR) is the CKD-EPI equation.        Lab Results   Component Value Date    TSH 1.082 09/25/2018    ===============================    08/16/2019  Reports doing well with med regimen. Legs are better on Pletal. Due for Annual labs today.          Current Assessment & Plan     Labs ordered.    Complete history and physical was completed today.  Complete and thorough medication reconciliation was performed.  Discussed risks and benefits of medications.  Advised patient on orders and health maintenance.  We discussed old records and old labs if available.  Will request any records not available through epic.  Continue current medications listed on your summary sheet.           Prediabetes    Overview     08/16/2019   Chronic. Stable.     Lab Results   Component Value Date    HGBA1C 5.7 (H) 09/25/2018        Patient taking nothing.     Med Compliance? No meds; not compliant with diet.   Eye Dr.? DUE   Ft exam? DUE, was having issues with the left foot, hurting  Pneumonia Vaccine? DUE            Current Assessment & Plan     Monitor hemoglobin A1c.  Discussed diabetic diet and exercise protocol.  Continue medications.  Monitor for side effects.  Discussed checking blood glucose.  Discussed symptoms to monitor for and to notify me immediately if persistent or worsening.  Follow up with Ophthalmology/Optometry and Podiatry.           Fatigue    Overview     Chronic. Uncontolled. Works a lot. Due for evaluation .          Current Assessment & Plan     Evaluation for chronic fatigue.  Patient with coronary artery disease.  Patient on medications.         Encounter for prostate cancer screening    Overview     08/16/2019  Lab Results   Component Value Date    PSA 0.55 09/25/2018    PSA 0.77 06/13/2017    PSA  0.49 05/09/2016            Current Assessment & Plan     Due for PSA, no sxs today         Need for pneumococcal vaccination    Overview     + smoker, prediabetes, has not had pneum vaccine         Current Assessment & Plan     Update pneumonia vaccine today.                Past Medical History:  Past Medical History:   Diagnosis Date    Anticoagulant long-term use     Anxiety     BCC (basal cell carcinoma)     CAD (coronary artery disease)     Erectile dysfunction     HLD (hyperlipidemia)     HTN (hypertension)     Impaired fasting glucose     Lateral epicondylitis of left elbow 7/18/2019     Past Surgical History:   Procedure Laterality Date    ANGIOPLASTY      AORTOGRAM WITH RUNOFF Left 10/19/2015    Performed by [facility] at [facility] C[facility]    CARDIAC SURGERY      stents placed    COLONOSCOPY Miralax N/A 7/17/2017    Performed by Jeremiah Syed Jr., [facility] at [facility]    ESOPHAGOGASTRODUODENOSCOPY (EGD) N/A 7/17/2017    Performed by Jeremiah Syed Jr., MD at [facility]    KNEE ARTHROSCOPY W/ MENISCECTOMY      VARICOSE VEIN SURGERY      left saphenous     Review of patient's allergies indicates:  No Known Allergies  Current Outpatient Medications on File Prior to Visit   Medication Sig Dispense Refill    ALPRAZolam (XANAX) 0.5 MG tablet Take 1 tablet (0.5 mg total) by mouth daily as needed for Anxiety. 30 tablet 0    ascorbic acid (VITAMIN C) 500 MG tablet Take 500 mg by mouth 2 (two) times daily.      aspirin (BABY ASPIRIN) 81 MG Chew Take 81 mg by mouth. 1 Tablet, Chewable Oral Every day      atorvastatin (LIPITOR) 80 MG tablet TAKE 1 TABLET BY MOUTH ONE TIME DAILY 90 tablet 3    carvedilol (COREG) 12.5 MG tablet Take 1 tablet (12.5 mg total) by mouth 2 (two) times daily with meals. 180 tablet 3    cilostazol (PLETAL) 50 MG Tab Take 1 tablet (50 mg total) by mouth 2 (two) times daily. 60 tablet 11    esomeprazole (NEXIUM) 20 MG capsule Take 20 mg by mouth before  breakfast.      ezetimibe (ZETIA) 10 mg tablet TAKE 1 TABLET BY MOUTH ONE TIME DAILY 90 tablet 3    fluticasone propionate (FLONASE) 50 mcg/actuation nasal spray USE 1 SPRAY (50 MCG TOTAL) BY EACH NARE ROUTE ONCE DAILY. 16 mL 11    ibuprofen (ADVIL,MOTRIN) 800 MG tablet Take 1 tablet (800 mg total) by mouth every 6 (six) hours as needed for Pain. 30 tablet 1    lisinopril (PRINIVIL,ZESTRIL) 40 MG tablet TAKE 1 TABLET BY MOUTH ONE TIME DAILY 90 tablet 3    MULTIVITAMIN ORAL Take by mouth. 1  By mouth Every morning      nicotine (NICODERM CQ) 21 mg/24 hr Place 1 patch onto the skin once daily. 28 patch 0    nitroGLYCERIN (NITROSTAT) 0.4 MG SL tablet Place 1 tablet (0.4 mg total) under the tongue every 5 (five) minutes as needed for Chest pain. 1 Tablet, Sublingual Sublingual 100 tablet 3    omega-3 fatty acids (FISH OIL) 500 mg Cap Take by mouth. 1 Capsule Oral       sertraline (ZOLOFT) 25 MG tablet Take 1 tablet (25 mg total) by mouth once daily. 90 tablet 3     No current facility-administered medications on file prior to visit.      Social History     Socioeconomic History    Marital status:      Spouse name: Not on file    Number of children: Not on file    Years of education: Not on file    Highest education level: Not on file   Occupational History    Not on file   Social Needs    Financial resource strain: Not on file    Food insecurity:     Worry: Not on file     Inability: Not on file    Transportation needs:     Medical: Not on file     Non-medical: Not on file   Tobacco Use    Smoking status: Current Every Day Smoker     Packs/day: 1.00     Years: 30.00     Pack years: 30.00     Types: Cigarettes    Smokeless tobacco: Never Used   Substance and Sexual Activity    Alcohol use: Yes     Comment: every now and then per pt    Drug use: No    Sexual activity: Not on file   Lifestyle    Physical activity:     Days per week: Not on file     Minutes per session: Not on file    Stress:  Not on file   Relationships    Social connections:     Talks on phone: Not on file     Gets together: Not on file     Attends Denominational service: Not on file     Active member of club or organization: Not on file     Attends meetings of clubs or organizations: Not on file     Relationship status: Not on file   Other Topics Concern    Not on file   Social History Narrative    Not on file     Family History   Problem Relation Age of Onset    Diabetes Brother     Coronary artery disease Brother         premature    Stomach cancer Father     No Known Problems Mother     No Known Problems Sister     No Known Problems Maternal Grandmother     No Known Problems Maternal Grandfather     No Known Problems Paternal Grandmother     No Known Problems Paternal Grandfather     No Known Problems Maternal Aunt     No Known Problems Maternal Uncle     No Known Problems Paternal Aunt     No Known Problems Paternal Uncle     Prostate cancer Neg Hx     Colon cancer Neg Hx     Anemia Neg Hx     Arrhythmia Neg Hx     Asthma Neg Hx     Clotting disorder Neg Hx     Fainting Neg Hx     Heart attack Neg Hx     Heart disease Neg Hx     Heart failure Neg Hx     Hyperlipidemia Neg Hx     Hypertension Neg Hx     Stroke Neg Hx     Atrial Septal Defect Neg Hx        I have reviewed the complete PMH, social history, surgical history, allergies and medications.  As well as family history.    Review of Systems   Constitutional: Positive for fatigue. Negative for activity change and unexpected weight change.   HENT: Negative for hearing loss, rhinorrhea and trouble swallowing.    Eyes: Negative for discharge and visual disturbance.   Respiratory: Negative for chest tightness and wheezing.    Cardiovascular: Negative for chest pain and palpitations.   Gastrointestinal: Negative for blood in stool, constipation, diarrhea and vomiting.   Endocrine: Negative for polydipsia and polyuria.   Genitourinary: Negative for difficulty  "urinating, hematuria and urgency.   Musculoskeletal: Positive for myalgias. Negative for arthralgias, joint swelling and neck pain.   Neurological: Negative for weakness and headaches.   Psychiatric/Behavioral: Negative for confusion and dysphoric mood.       Objective:     /82   Pulse 80   Temp 97.9 °F (36.6 °C) (Oral)   Ht 5' 10" (1.778 m)   Wt 99.9 kg (220 lb 3.2 oz)   BMI 31.60 kg/m²     Physical Exam   Constitutional: He is oriented to person, place, and time. He appears well-developed and well-nourished. No distress.   HENT:   Head: Normocephalic and atraumatic.   Eyes: Pupils are equal, round, and reactive to light. EOM are normal.   Neck: Normal range of motion. Neck supple.   Cardiovascular: Normal rate, regular rhythm, normal heart sounds and intact distal pulses.   No murmur heard.  Pulses:       Dorsalis pedis pulses are 2+ on the right side, and 2+ on the left side.        Posterior tibial pulses are 2+ on the right side, and 2+ on the left side.   Lower extremity swelling trace on the left no swelling on the right.  Venous stasis changes.  Bilateral varicose veins   Pulmonary/Chest: Effort normal and breath sounds normal. No respiratory distress. He has no wheezes.   Abdominal: Soft. Bowel sounds are normal. He exhibits no distension.   Musculoskeletal: Normal range of motion. He exhibits tenderness. He exhibits no edema.        Left forearm: He exhibits no tenderness, no bony tenderness, no swelling, no edema, no deformity and no laceration.        Arms:       Right foot: There is normal range of motion and no deformity.        Left foot: There is normal range of motion and no deformity.   Feet:   Right Foot:   Skin Integrity: Positive for callus and dry skin. Negative for ulcer, blister, skin breakdown, erythema or warmth.   Left Foot:   Skin Integrity: Positive for callus and dry skin. Negative for ulcer, blister, skin breakdown, erythema or warmth.   Neurological: He is alert and " "oriented to person, place, and time. No cranial nerve deficit.   Skin: Skin is warm and dry. Capillary refill takes less than 2 seconds.   Psychiatric: He has a normal mood and affect. His behavior is normal.   Nursing note and vitals reviewed.      Assessment:     1. Encounter for long-term (current) use of medications    2. Peripheral vascular disease of extremity: s/p R. SFA DCB 10/19/2015    3. Pure hypercholesterolemia    4. Coronary artery disease of native artery of native heart with stable angina pectoris    5. Prediabetes    6. Fatigue, unspecified type    7. Encounter for prostate cancer screening    8. Need for pneumococcal vaccination        Plan:     I have Reviewed and summarized old records.  I have performed thorough medication reconciliation today and discussed risk and benefits of each medication.  I have reviewed labs and discussed with patient.  All questions were answered.  I am requesting old records and will review them once they are available.    Problem List Items Addressed This Visit     HLD (hyperlipidemia)     Due for fasting lipids. Discussed hyperlipidemia this sees course.  Discussed the risk of cardiovascular disease, increase stroke and heart attack risk.  Patient voiced understanding understood the treatment plan. Discussed healthy diet and increased need for exercise.           Relevant Orders    Lipid panel (Completed)    CAD (coronary artery disease):s/p MI and PCI 2005     Advised to follow up with Cardiology concerning previous coronary artery disease and stenting.      Last card note: Pt with sxs concerning for worsening of CAD. Last SPECT in 2011 was inconclusive with inability to achieve target and "fixed" inferior wall defect thought to be infarct on study - however ECHO with normal WMA.  Will obtain PET with absolute flow to get a definitive answer on ischemic burden.  Carotids to evaluate facial complaints - no bruits heard but he has PVD and CAD  Refilled viagra- Pt " aware of nitro- viagra interaction and safe use of either med.  He hasn't really used nitro in several years.          Relevant Orders    Hemoglobin A1c (Completed)    Lipid panel (Completed)    Peripheral vascular disease of extremity: s/p R. CHAVO DCB 10/19/2015     Seeing Vascular next week. Continue Pletal.          Relevant Orders    Lipid panel (Completed)    Encounter for long-term (current) use of medications - Primary     Labs ordered.    Complete history and physical was completed today.  Complete and thorough medication reconciliation was performed.  Discussed risks and benefits of medications.  Advised patient on orders and health maintenance.  We discussed old records and old labs if available.  Will request any records not available through epic.  Continue current medications listed on your summary sheet.           Relevant Orders    CBC auto differential (Completed)    Comprehensive metabolic panel (Completed)    Hemoglobin A1c (Completed)    Lipid panel (Completed)    TSH (Completed)    T3, free (Completed)    T4 (Completed)    URINALYSIS (Completed)    PSA, Screening (Completed)    Prediabetes     Monitor hemoglobin A1c.  Discussed diabetic diet and exercise protocol.  Continue medications.  Monitor for side effects.  Discussed checking blood glucose.  Discussed symptoms to monitor for and to notify me immediately if persistent or worsening.  Follow up with Ophthalmology/Optometry and Podiatry.           Relevant Orders    Hemoglobin A1c (Completed)    TSH (Completed)    T3, free (Completed)    T4 (Completed)    URINALYSIS (Completed)    Ambulatory referral to Optometry    Ambulatory referral to Podiatry    Fatigue     Evaluation for chronic fatigue.  Patient with coronary artery disease.  Patient on medications.         Relevant Orders    CBC auto differential (Completed)    Comprehensive metabolic panel (Completed)    Hemoglobin A1c (Completed)    TSH (Completed)    T3, free (Completed)    T4  (Completed)    URINALYSIS (Completed)    Encounter for prostate cancer screening     Due for PSA, no sxs today         Relevant Orders    URINALYSIS (Completed)    PSA, Screening (Completed)    Need for pneumococcal vaccination     Update pneumonia vaccine today.         Relevant Orders    Pneumococcal Polysaccharide Vaccine (23 Valent) (SQ/IM) (Completed)          Follow up in about 4 weeks (around 9/13/2019) for lab follow up .    If no improvement in symptoms or symptoms worsen, advised to call/follow-up at clinic or go to ER. Patient voiced understanding and all questions/concerns were addressed.     DISCLAIMER: This note was compiled by using a speech recognition dictation system and therefore please be aware that typographical / speech recognition errors can and do occur.  Please contact me if you see any errors specifically.    Leonardo Vázquez MD  We Offer Telehealth & Same Day Appointments!   Book your Telehealth appointment with me through my nurse or   Clinic appointments on [employer]!    Office: [phone]          Check out my Facebook Page and Follow Me at: CLICK HERE    Check out my website at [employer] by clicking on: CLICK HERE    To Schedule appointments online, go to [employer]: CLICK HERE     Location: [email or web address]    [address] [place] [address]    FAX: [phone]

## 2019-08-16 NOTE — ASSESSMENT & PLAN NOTE
Due for fasting lipids. Discussed hyperlipidemia this sees course.  Discussed the risk of cardiovascular disease, increase stroke and heart attack risk.  Patient voiced understanding understood the treatment plan. Discussed healthy diet and increased need for exercise.

## 2019-08-16 NOTE — ASSESSMENT & PLAN NOTE
Labs ordered.    Complete history and physical was completed today.  Complete and thorough medication reconciliation was performed.  Discussed risks and benefits of medications.  Advised patient on orders and health maintenance.  We discussed old records and old labs if available.  Will request any records not available through epic.  Continue current medications listed on your summary sheet.

## 2019-08-16 NOTE — PATIENT INSTRUCTIONS
Pneumococcal Vaccination  There are 2 pneumococcal vaccinations that protect people against pneumococcal disease.  Pneumococcal disease  Pneumococcal disease is caused by bacteria (Streptococcus pneumoniae). This germ is easily spread when someone with the bacteria coughs, sneezes, laughs, or talks. You can get pneumococcal disease more than once. This is because there are many different types (strains) of the bacteria. Some strains are also resistant to treatment with antibiotics.  There are different kinds of pneumococcal disease, depending on what part of the body is infected. They include:  · Pneumonia. Infection in the lungs.  · Meningitis. Infection of the covering of the brain and spinal cord.  · Otitis media. Infection of the middle ear.  · Bacteremia or septicemia. Infection in the blood.  Pneumococcal disease can be life-threatening, especially for people in high-risk groups. Each year, thousands of people die of this disease. Thousands more become seriously ill.  The vaccine    The pneumococcal vaccines are the best way to avoid pneumococcal disease. They are safe and effective. The vaccines are given as shots (injections). This can be done at your healthcare provider's office or a health clinic. Drugstores, senior centers, and workplaces often offer vaccinations, too. If you have questions, ask your healthcare provider.  The pneumococcal vaccines are recommended for:  · Persons 65 and older  · Infants  · People with chronic health problems (such as diabetes, chronic lung or heart disease, liver disease)  · People who have a cochlear implant  · People who have weakened immune systems  · People who live in nursing homes or other long-term care facilities  · People who smoke or have asthma  They are given:  · In a 4-dose series in infants  · One time in adults; some people need a second dose of one of the vaccines  Your healthcare provider can tell you more about the vaccines, whether you should get them,  and the number of shots you should get.  Date Last Reviewed: 11/1/2016  © 6937-7052 [employer]. [address] [place] [address], [place] [address]. All rights reserved. This information is not intended as a substitute for professional medical care. Always follow your healthcare professional's instructions.

## 2019-08-19 NOTE — PROGRESS NOTES
Check urine microalbumin.  Repeat A1c in 3 months.  Make sure patient acute follow-up appointment so we can discuss CBC and other labs in detail.    Please call the patient to make sure that they received the results through [employer].  I have sent a message to them with the interpretation.  See if they have any questions and make a follow-up appointment if not already schedule and if needed.    I have reviewed your recent blood work.    Your complete blood count is abnormal but stable and consistent with previous readings.  Your red blood cell count has been low over the last 4 years but stable.  Your MCV is slightly elevated from previous labs but your hemoglobin is normal which means that you have no anemia.  Will check for folate and B12 deficiency on you next blood work  Your metabolic panel which shows a glucose kidney function electrolytes and liver function is normal.   Thyroid studies are normal.   Your cholesterol is stable.  LDL is below 100 which is at goal.  Continue statin..    New PSA screening is negative.  Stable from previous readings.  Your hemoglobin A1c is still prediabetic at 5.7.  Consistent with previous readings.  Need to change diet exercise.  Versus starting medication for diabetes.  Your urine is showing signs of protein in the urine which needs to be monitored...

## 2019-08-19 NOTE — PROGRESS NOTES
Please call to make sure patient get the results.    Your urinalysis is abnormal.  Showing trace protein.  This is likely from diabetes and or high blood pressure.  Will need to monitor with regular urinalysis.  We will also check a test called microalbumin under next blood work which test for the amount of protein in the urine.

## 2019-08-22 ENCOUNTER — OFFICE VISIT (OUTPATIENT)
Dept: VASCULAR SURGERY | Facility: CLINIC | Age: 59
End: 2019-08-22
Attending: SURGERY
Payer: COMMERCIAL

## 2019-08-22 VITALS
HEIGHT: 70 IN | WEIGHT: 218.25 LBS | DIASTOLIC BLOOD PRESSURE: 90 MMHG | SYSTOLIC BLOOD PRESSURE: 130 MMHG | BODY MASS INDEX: 31.25 KG/M2 | HEART RATE: 78 BPM | TEMPERATURE: 98 F

## 2019-08-22 DIAGNOSIS — I73.9 PAD (PERIPHERAL ARTERY DISEASE): Primary | ICD-10-CM

## 2019-08-22 DIAGNOSIS — F17.219 CIGARETTE NICOTINE DEPENDENCE WITH NICOTINE-INDUCED DISORDER: ICD-10-CM

## 2019-08-22 PROCEDURE — 99999 PR PBB SHADOW E&M-EST. PATIENT-LVL IV: CPT | Mod: PBBFAC,,, | Performed by: SURGERY

## 2019-08-22 PROCEDURE — 99244 PR OFFICE CONSULTATION,LEVEL IV: ICD-10-PCS | Mod: S$GLB,,, | Performed by: SURGERY

## 2019-08-22 PROCEDURE — 99244 OFF/OP CNSLTJ NEW/EST MOD 40: CPT | Mod: S$GLB,,, | Performed by: SURGERY

## 2019-08-22 PROCEDURE — 99999 PR PBB SHADOW E&M-EST. PATIENT-LVL IV: ICD-10-PCS | Mod: PBBFAC,,, | Performed by: SURGERY

## 2019-08-22 NOTE — LETTER
September 2, 2019      Leonardo Vázquez MD  [address]  [place] [address]           [facility]  [address]  [facility] [address]  Phone: [phone]  Fax: [phone]          Patient: Jose G Shafer   MR Number: [id number]   YOB: 1960   Date of Visit: 8/22/2019       Dear Dr. Leonardo Vázquez:    Thank you for referring Jose G Shafer to me for evaluation. Attached you will find relevant portions of my assessment and plan of care.    If you have questions, please do not hesitate to call me. I look forward to following Jose G Shafer along with you.    Sincerely,    Robin Serna MD    Enclosure  CC:  No Recipients    If you would like to receive this communication electronically, please contact [email or web address] or [phone] to request more information on [employer] Link access.    For providers and/or their staff who would like to refer a patient to [facility], please contact us through our one-stop-shop provider referral line, [facility], at [phone].    If you feel you have received this communication in error or would no longer like to receive these types of communications, please e-mail [email or web address]

## 2019-08-22 NOTE — PROGRESS NOTES
Jose G Shafer  08/22/2019    HPI:  Patient is a 59 y.o. male with a h/o CAD (s/p stenting), HTN, HLD who is here today for evaluation of claudication symptoms. He reports that he has had issues with claudication in his right lower extremity in the past and with an JULY demonstrating of 50-70% stenosis in right SFA. He underwent drug eluting balloon angioplasty (10/19/15) with Dr. Rodriguez which resolved his symptoms. He returns today for similar symptoms of claudication which started in February 2019. He reports that he is unable to walk more than 30 yards without having cramping in his bilateral calves R>L. On the right leg, he feels the cramp goes up the back of his leg and reports some cramping in his buttocks. This is relieved by rest.      Reports hx of MI with stent placment  Denies history of stroke  Tobacco use: 40 pack year - currently smoking 1 ppd    Past Medical History:   Diagnosis Date    Anticoagulant long-term use     Anxiety     BCC (basal cell carcinoma)     CAD (coronary artery disease)     Erectile dysfunction     HLD (hyperlipidemia)     HTN (hypertension)     Impaired fasting glucose     Lateral epicondylitis of left elbow 7/18/2019     Past Surgical History:   Procedure Laterality Date    ANGIOPLASTY      AORTOGRAM WITH RUNOFF Left 10/19/2015    Performed by [facility] at [facility] CATH LAB    CARDIAC SURGERY      stents placed    COLONOSCOPY Miralax N/A 7/17/2017    Performed by Jeremiah [facility]ed Jr., [facility] at [facility] ENDO    ESOPHAGOGASTRODUODENOSCOPY (EGD) N/A 7/17/2017    Performed by [facility] Jr[facility] at [facility] ENDO    KNEE ARTHROSCOPY W/ MENISCECTOMY      VARICOSE VEIN SURGERY      left saphenous     Family History   Problem Relation Age of Onset    Diabetes Brother     Coronary artery disease Brother         premature    Stomach cancer Father     No Known Problems Mother     No Known Problems Sister     No Known Problems Maternal Grandmother     No  Known Problems Maternal Grandfather     No Known Problems Paternal Grandmother     No Known Problems Paternal Grandfather     No Known Problems Maternal Aunt     No Known Problems Maternal Uncle     No Known Problems Paternal Aunt     No Known Problems Paternal Uncle     Prostate cancer Neg Hx     Colon cancer Neg Hx     Anemia Neg Hx     Arrhythmia Neg Hx     Asthma Neg Hx     Clotting disorder Neg Hx     Fainting Neg Hx     Heart attack Neg Hx     Heart disease Neg Hx     Heart failure Neg Hx     Hyperlipidemia Neg Hx     Hypertension Neg Hx     Stroke Neg Hx     Atrial Septal Defect Neg Hx      Social History     Socioeconomic History    Marital status:      Spouse name: Not on file    Number of children: Not on file    Years of education: Not on file    Highest education level: Not on file   Occupational History    Not on file   Social Needs    Financial resource strain: Not on file    Food insecurity:     Worry: Not on file     Inability: Not on file    Transportation needs:     Medical: Not on file     Non-medical: Not on file   Tobacco Use    Smoking status: Current Every Day Smoker     Packs/day: 1.00     Years: 30.00     Pack years: 30.00     Types: Cigarettes    Smokeless tobacco: Never Used   Substance and Sexual Activity    Alcohol use: Yes     Comment: every now and then per pt    Drug use: No    Sexual activity: Not on file   Lifestyle    Physical activity:     Days per week: Not on file     Minutes per session: Not on file    Stress: Not on file   Relationships    Social connections:     Talks on phone: Not on file     Gets together: Not on file     Attends Tenriism service: Not on file     Active member of club or organization: Not on file     Attends meetings of clubs or organizations: Not on file     Relationship status: Not on file   Other Topics Concern    Not on file   Social History Narrative    Not on file       Current Outpatient Medications:      ALPRAZolam (XANAX) 0.5 MG tablet, Take 1 tablet (0.5 mg total) by mouth daily as needed for Anxiety., Disp: 30 tablet, Rfl: 0    ascorbic acid (VITAMIN C) 500 MG tablet, Take 500 mg by mouth 2 (two) times daily., Disp: , Rfl:     aspirin (BABY ASPIRIN) 81 MG Chew, Take 81 mg by mouth. 1 Tablet, Chewable Oral Every day, Disp: , Rfl:     atorvastatin (LIPITOR) 80 MG tablet, TAKE 1 TABLET BY MOUTH ONE TIME DAILY, Disp: 90 tablet, Rfl: 3    carvedilol (COREG) 12.5 MG tablet, Take 1 tablet (12.5 mg total) by mouth 2 (two) times daily with meals., Disp: 180 tablet, Rfl: 3    cilostazol (PLETAL) 50 MG Tab, Take 1 tablet (50 mg total) by mouth 2 (two) times daily., Disp: 60 tablet, Rfl: 11    esomeprazole (NEXIUM) 20 MG capsule, Take 20 mg by mouth before breakfast., Disp: , Rfl:     ezetimibe (ZETIA) 10 mg tablet, TAKE 1 TABLET BY MOUTH ONE TIME DAILY, Disp: 90 tablet, Rfl: 3    fluticasone propionate (FLONASE) 50 mcg/actuation nasal spray, USE 1 SPRAY (50 MCG TOTAL) BY EACH NARE ROUTE ONCE DAILY., Disp: 16 mL, Rfl: 11    ibuprofen (ADVIL,MOTRIN) 800 MG tablet, Take 1 tablet (800 mg total) by mouth every 6 (six) hours as needed for Pain., Disp: 30 tablet, Rfl: 1    lisinopril (PRINIVIL,ZESTRIL) 40 MG tablet, TAKE 1 TABLET BY MOUTH ONE TIME DAILY, Disp: 90 tablet, Rfl: 3    MULTIVITAMIN ORAL, Take by mouth. 1  By mouth Every morning, Disp: , Rfl:     nicotine (NICODERM CQ) 21 mg/24 hr, Place 1 patch onto the skin once daily., Disp: 28 patch, Rfl: 0    nitroGLYCERIN (NITROSTAT) 0.4 MG SL tablet, Place 1 tablet (0.4 mg total) under the tongue every 5 (five) minutes as needed for Chest pain. 1 Tablet, Sublingual Sublingual, Disp: 100 tablet, Rfl: 3    omega-3 fatty acids (FISH OIL) 500 mg Cap, Take by mouth. 1 Capsule Oral , Disp: , Rfl:     sertraline (ZOLOFT) 25 MG tablet, Take 1 tablet (25 mg total) by mouth once daily., Disp: 90 tablet, Rfl: 3    REVIEW OF SYSTEMS:  General: negative; ENT: negative; Allergy  and Immunology: negative; Hematological and Lymphatic: Negative; Endocrine: negative; Respiratory: no cough, shortness of breath, or wheezing; Cardiovascular: no chest pain or dyspnea on exertion; Gastrointestinal: no abdominal pain/back, change in bowel habits, or bloody stools; Genito-Urinary: no dysuria, trouble voiding, or hematuria; Musculoskeletal: negative  Neurological: no TIA or stroke symptoms; Psychiatric: no nervousness, anxiety or depression.    PHYSICAL EXAM:      Pulse: 78  Temp: 97.7 °F (36.5 °C)      General appearance:  Alert, well-appearing, and in no distress.  Oriented to person, place, and time   Neurological: Normal speech, no focal findings noted; CN II - XII grossly intact           Musculoskeletal: Digits/nail without cyanosis/clubbing.  Normal muscle strength/tone.                 Neck: Supple, no significant adenopathy; thyroid is not enlarged                  No carotid bruit can be auscultated                Chest:  Clear to auscultation, no wheezes, rales or rhonchi, symmetric air entry     No use of accessory muscles             Cardiac: Normal rate and regular rhythm, S1 and S2 normal; PMI non-displaced          Abdomen: Soft, nontender, nondistended, no masses or organomegaly     No rebound tenderness noted; bowel sounds normal     Pulsatile aortic mass is not palpable.     No groin adenopathy      Extremities:   2+ femoral pulses bilaterally     1+ pedal pulses palpable.     No pre-tibial edema     No ulcerations    LAB RESULTS:  Lab Results   Component Value Date    K 4.8 08/16/2019    K 4.9 09/25/2018    K 4.9 06/13/2017    CREATININE 0.9 08/16/2019    CREATININE 1.0 09/25/2018    CREATININE 1.0 06/13/2017     Lab Results   Component Value Date    WBC 10.14 08/16/2019    WBC 11.04 09/25/2018    WBC 10.82 06/13/2017    HCT 44.1 08/16/2019    HCT 42.9 09/25/2018    HCT 42.7 06/13/2017     08/16/2019     09/25/2018     06/13/2017     Lab Results   Component  Value Date    HGBA1C 5.7 (H) 08/16/2019    HGBA1C 5.7 (H) 09/25/2018    HGBA1C 5.7 (H) 06/13/2017     IMAGING:  Rt JULY (0.85) Segment/Brachial Index may be falsely elevated due to suspected medial calcinosis.  Lt JULY (0.96) Segment/Brachial Index may be falsely elevated due to suspected medial calcinosis.     IMP/PLAN:  59 y.o. male with claudication symptoms and PVRs consistent with moderate multi-level PAD.      He is already taking recommended medical therapy asa 81 mg and atorvastatin 80 mg  I discussed with him the importance of smoking cessation, but he reports he does not see this happening in the near future. We discussed smoking cessation for greater than 10 minutes as well as the impact that continued smoking can have on the progression of Vascular disease. This discussion included the use of medications, patches, nicotine gum, and lifestyle modifications.  Without smoking cessation, any intervention to improve his claudication will have little to no durability and may worsen his long-term leg function when the disease inevitably recurs.   I also discussed with him exercise programs which may enable him to walk further before experiencing symptoms.    I recommended for him to RTC in 3 months for follow up and repeat ABIs.       Robin Serna MD FACS  Vascular/Endovascular Surgery

## 2019-08-23 ENCOUNTER — HOSPITAL ENCOUNTER (OUTPATIENT)
Dept: RADIOLOGY | Facility: HOSPITAL | Age: 59
Discharge: HOME | End: 2019-08-23
Attending: PODIATRIST
Payer: COMMERCIAL

## 2019-08-23 ENCOUNTER — OFFICE VISIT (OUTPATIENT)
Dept: PODIATRY | Facility: CLINIC | Age: 59
End: 2019-08-23
Payer: COMMERCIAL

## 2019-08-23 VITALS
BODY MASS INDEX: 30.71 KG/M2 | HEIGHT: 70 IN | DIASTOLIC BLOOD PRESSURE: 82 MMHG | SYSTOLIC BLOOD PRESSURE: 132 MMHG | HEART RATE: 69 BPM | WEIGHT: 214.5 LBS

## 2019-08-23 DIAGNOSIS — I77.9 PAOD (PERIPHERAL ARTERIAL OCCLUSIVE DISEASE): ICD-10-CM

## 2019-08-23 DIAGNOSIS — M21.6X2 ACQUIRED EQUINUS DEFORMITY OF BOTH FEET: ICD-10-CM

## 2019-08-23 DIAGNOSIS — R25.2 FOOT CRAMPS: ICD-10-CM

## 2019-08-23 DIAGNOSIS — M21.6X1 ACQUIRED EQUINUS DEFORMITY OF BOTH FEET: ICD-10-CM

## 2019-08-23 DIAGNOSIS — I77.9 PAOD (PERIPHERAL ARTERIAL OCCLUSIVE DISEASE): Primary | ICD-10-CM

## 2019-08-23 PROCEDURE — 93922 UPR/L XTREMITY ART 2 LEVELS: CPT | Mod: TC,PO

## 2019-08-23 PROCEDURE — 93925 LOWER EXTREMITY STUDY: CPT | Mod: 26,,, | Performed by: RADIOLOGY

## 2019-08-23 PROCEDURE — 99203 OFFICE O/P NEW LOW 30 MIN: CPT | Mod: S$GLB,,, | Performed by: PODIATRIST

## 2019-08-23 PROCEDURE — 3008F BODY MASS INDEX DOCD: CPT | Mod: CPTII,S$GLB,, | Performed by: PODIATRIST

## 2019-08-23 PROCEDURE — 99999 PR PBB SHADOW E&M-EST. PATIENT-LVL III: CPT | Mod: PBBFAC,,, | Performed by: PODIATRIST

## 2019-08-23 PROCEDURE — 3079F DIAST BP 80-89 MM HG: CPT | Mod: CPTII,S$GLB,, | Performed by: PODIATRIST

## 2019-08-23 PROCEDURE — 99999 PR PBB SHADOW E&M-EST. PATIENT-LVL III: ICD-10-PCS | Mod: PBBFAC,,, | Performed by: PODIATRIST

## 2019-08-23 PROCEDURE — 3075F PR MOST RECENT SYSTOLIC BLOOD PRESS GE 130-139MM HG: ICD-10-PCS | Mod: CPTII,S$GLB,, | Performed by: PODIATRIST

## 2019-08-23 PROCEDURE — 3079F PR MOST RECENT DIASTOLIC BLOOD PRESSURE 80-89 MM HG: ICD-10-PCS | Mod: CPTII,S$GLB,, | Performed by: PODIATRIST

## 2019-08-23 PROCEDURE — 93922 US ARTERIAL LOWER EXTREMITY BILAT WITH ABI (XPD): ICD-10-PCS | Mod: 26,,, | Performed by: RADIOLOGY

## 2019-08-23 PROCEDURE — 99203 PR OFFICE/OUTPT VISIT, NEW, LEVL III, 30-44 MIN: ICD-10-PCS | Mod: S$GLB,,, | Performed by: PODIATRIST

## 2019-08-23 PROCEDURE — 3008F PR BODY MASS INDEX (BMI) DOCUMENTED: ICD-10-PCS | Mod: CPTII,S$GLB,, | Performed by: PODIATRIST

## 2019-08-23 PROCEDURE — 93922 UPR/L XTREMITY ART 2 LEVELS: CPT | Mod: 26,,, | Performed by: RADIOLOGY

## 2019-08-23 PROCEDURE — 93925 US ARTERIAL LOWER EXTREMITY BILAT WITH ABI (XPD): ICD-10-PCS | Mod: 26,,, | Performed by: RADIOLOGY

## 2019-08-23 PROCEDURE — 3075F SYST BP GE 130 - 139MM HG: CPT | Mod: CPTII,S$GLB,, | Performed by: PODIATRIST

## 2019-08-23 NOTE — LETTER
August 23, 2019      Leonardo Vázquez MD  [address]  [place] [address]           [facility]  [address] [facility] [address] [place] [address]  Phone: [phone]          Patient: Jose G Shafer   MR Number: [id number]   YOB: 1960   Date of Visit: 8/23/2019       Dear Dr. Leonardo Vázquez:    Thank you for referring Jose G Shafer to me for evaluation. Attached you will find relevant portions of my assessment and plan of care.    If you have questions, please do not hesitate to call me. I look forward to following Jose G Shafer along with you.    Sincerely,    Alexis Farah, DPNICHOLAS    Enclosure  CC:  No Recipients    If you would like to receive this communication electronically, please contact [email or web address] or [phone] to request more information on [employer] Link access.    For providers and/or their staff who would like to refer a patient to [facility], please contact us through our one-stop-shop provider referral line, [facility] , at [phone].    If you feel you have received this communication in error or would no longer like to receive these types of communications, please e-mail [email or web address]

## 2019-08-23 NOTE — PROGRESS NOTES
Subjective:      Patient ID: Jose G Shafer is a 59 y.o. male.    Chief Complaint: Toe Pain (toe cramping in both feet)    Jose G is a 59 y.o. male who presents to the podiatry clinic  with complaint of bilateral foot pain. He relates cramping sensations at night but can also come in the day. He has to rub and stretch them out when the cramps come, it happens at least daily. He relates cramping in the calf muscles with short distances walking, he relates that he has had problems with stenosis of his LE before with angiogram to try and open it back up several years ago.       Review of Systems   Constitution: Negative for chills and fever.   Cardiovascular: Positive for claudication and leg swelling.   Respiratory: Negative for shortness of breath.    Skin: Negative for itching, nail changes and rash.   Musculoskeletal: Positive for arthritis, muscle cramps and myalgias. Negative for muscle weakness.   Gastrointestinal: Negative for nausea and vomiting.   Neurological: Negative for focal weakness, loss of balance, numbness and paresthesias.           Objective:      Physical Exam   Constitutional: He is oriented to person, place, and time. He appears well-developed and well-nourished. No distress.   Cardiovascular:   Pulses:       Dorsalis pedis pulses are 1+ on the left side.        Posterior tibial pulses are 1+ on the left side.   Non palpable PT pulses bilateral, 3-5 sec capillary refill time to toes 1-5 bilateral. Toes and feet are warm to touch proximally with distal cooling b/l. There is no hair growth on the feet and toes b/l. There is mild edema b/l with spider veins and varicosities present b/l.      Musculoskeletal:   Equinus noted b/l ankles with < 10 deg DF noted. MMT 5/5 in DF/PF/Inv/Ev resistance with no reproduction of pain in any direction. Passive range of motion of ankle and pedal joints is painless b/l.     Neurological: He is alert and oriented to person, place, and time. He has normal  strength. He displays no atrophy and no tremor. No sensory deficit. He exhibits normal muscle tone.   Negative tinel sign bilateral.   Skin: Skin is warm, dry and intact. No abrasion, no bruising, no burn, no ecchymosis, no laceration, no lesion, no petechiae and no rash noted. He is not diaphoretic. No cyanosis or erythema. No pallor. Nails show no clubbing.   Skin temperature, texture and turgor within normal limits.   Psychiatric: He has a normal mood and affect. His behavior is normal.             Assessment:       Encounter Diagnoses   Name Primary?    PAOD (peripheral arterial occlusive disease) Yes    Acquired equinus deformity of both feet     Foot cramps          Plan:       Jose G was seen today for toe pain.    Diagnoses and all orders for this visit:    PAOD (peripheral arterial occlusive disease)  -     US Lower Extremity Arteries Bilateral; Future    Acquired equinus deformity of both feet  -     Ambulatory consult to Physical Therapy    Foot cramps  -     Ambulatory consult to Physical Therapy      I counseled the patient on his conditions, their implications and medical management.    Discussed cramping can be from PAOD, magnesium or potassium deficiency, dehydration, medication side effects etc.    He had recent JULY's which were fairly normal however these can be elevated due to calcifications in the vessels and with his history of PAOD I will order arterial US to further assess possible stenosis especially with his history of claudication and cramping.    Will start with PT as he is very stiff and this can exacerbate the cramping, will need stretching exercises and a good home stretching program set up    Return 6 weeks follow up I will call with arterial US results.    Alexis Farah DPM

## 2019-08-27 ENCOUNTER — PATIENT MESSAGE (OUTPATIENT)
Dept: PODIATRY | Facility: CLINIC | Age: 59
End: 2019-08-27

## 2019-08-27 ENCOUNTER — TELEPHONE (OUTPATIENT)
Dept: PODIATRY | Facility: CLINIC | Age: 59
End: 2019-08-27

## 2019-08-27 NOTE — TELEPHONE ENCOUNTER
----- Message from Kar Fitzgerald sent at 8/27/2019  7:17 AM CDT -----  Type:  Patient Returning Call    Who Called:  Self   Who Left Message for Patient:    Does the patient know what this is regarding?:  Test results  Best Call Back Number:  [phone]  Additional Information:

## 2019-08-28 ENCOUNTER — PATIENT MESSAGE (OUTPATIENT)
Dept: FAMILY MEDICINE | Facility: CLINIC | Age: 59
End: 2019-08-28

## 2019-08-30 ENCOUNTER — TELEPHONE (OUTPATIENT)
Dept: PODIATRY | Facility: CLINIC | Age: 59
End: 2019-08-30

## 2019-08-30 NOTE — TELEPHONE ENCOUNTER
----- Message from Cesar Michaels sent at 8/29/2019  1:39 PM CDT -----  Contact: pt's wife  Type:  Test Results    Who Called:  Pt's wife  Name of Test (Lab/Mammo/Etc):    Date of Test:  8-23-19  Ordering Provider:  Gagan  Where the test was performed:  [facility]  Best Call Back Number:  [phone]  Additional Information:  Ok to call wife

## 2019-08-30 NOTE — TELEPHONE ENCOUNTER
Spoke with wife in length about US results, she wanted to know if her  needs to see PT still with these issues.  Wife also wanted to know when Farah wanted to see patient back.  Will send message to Vascular dept on [place] as this is what the patient wants.     Let patient Wife know he was out today but as soon as we heard anything back we would give them a call. She voiced all understanding.     Please call patient to see about setting up for Vascular Consult. Thank you.

## 2019-09-10 ENCOUNTER — PATIENT MESSAGE (OUTPATIENT)
Dept: PODIATRY | Facility: CLINIC | Age: 59
End: 2019-09-10

## 2019-09-10 DIAGNOSIS — I77.9 PAOD (PERIPHERAL ARTERIAL OCCLUSIVE DISEASE): Primary | ICD-10-CM

## 2019-09-10 NOTE — TELEPHONE ENCOUNTER
Patient given the results from his US as shown in imaging results - informed him this is the same results given to his wife on 8/30 by Marcia.  Patient verbalized understanding and requested a referral be put in the system for the Vascular department on the [facility].

## 2019-10-02 ENCOUNTER — OFFICE VISIT (OUTPATIENT)
Dept: PODIATRY | Facility: CLINIC | Age: 59
End: 2019-10-02
Payer: COMMERCIAL

## 2019-10-02 VITALS
WEIGHT: 214.5 LBS | HEART RATE: 72 BPM | BODY MASS INDEX: 30.71 KG/M2 | SYSTOLIC BLOOD PRESSURE: 145 MMHG | DIASTOLIC BLOOD PRESSURE: 91 MMHG | HEIGHT: 70 IN

## 2019-10-02 DIAGNOSIS — M21.6X2 ACQUIRED EQUINUS DEFORMITY OF BOTH FEET: ICD-10-CM

## 2019-10-02 DIAGNOSIS — R25.2 FOOT CRAMPS: ICD-10-CM

## 2019-10-02 DIAGNOSIS — I77.9 PAOD (PERIPHERAL ARTERIAL OCCLUSIVE DISEASE): Primary | ICD-10-CM

## 2019-10-02 DIAGNOSIS — M21.6X1 ACQUIRED EQUINUS DEFORMITY OF BOTH FEET: ICD-10-CM

## 2019-10-02 PROCEDURE — 3077F SYST BP >= 140 MM HG: CPT | Mod: CPTII,S$GLB,, | Performed by: PODIATRIST

## 2019-10-02 PROCEDURE — 99213 OFFICE O/P EST LOW 20 MIN: CPT | Mod: S$GLB,,, | Performed by: PODIATRIST

## 2019-10-02 PROCEDURE — 3080F PR MOST RECENT DIASTOLIC BLOOD PRESSURE >= 90 MM HG: ICD-10-PCS | Mod: CPTII,S$GLB,, | Performed by: PODIATRIST

## 2019-10-02 PROCEDURE — 3008F PR BODY MASS INDEX (BMI) DOCUMENTED: ICD-10-PCS | Mod: CPTII,S$GLB,, | Performed by: PODIATRIST

## 2019-10-02 PROCEDURE — 3080F DIAST BP >= 90 MM HG: CPT | Mod: CPTII,S$GLB,, | Performed by: PODIATRIST

## 2019-10-02 PROCEDURE — 3008F BODY MASS INDEX DOCD: CPT | Mod: CPTII,S$GLB,, | Performed by: PODIATRIST

## 2019-10-02 PROCEDURE — 99999 PR PBB SHADOW E&M-EST. PATIENT-LVL III: CPT | Mod: PBBFAC,,, | Performed by: PODIATRIST

## 2019-10-02 PROCEDURE — 3077F PR MOST RECENT SYSTOLIC BLOOD PRESSURE >= 140 MM HG: ICD-10-PCS | Mod: CPTII,S$GLB,, | Performed by: PODIATRIST

## 2019-10-02 PROCEDURE — 99999 PR PBB SHADOW E&M-EST. PATIENT-LVL III: ICD-10-PCS | Mod: PBBFAC,,, | Performed by: PODIATRIST

## 2019-10-02 PROCEDURE — 99213 PR OFFICE/OUTPT VISIT, EST, LEVL III, 20-29 MIN: ICD-10-PCS | Mod: S$GLB,,, | Performed by: PODIATRIST

## 2019-10-02 NOTE — PATIENT INSTRUCTIONS
2. Supportive shoes at all times (athletic shoe including pratt, new balance, asics, HOKA or casual shoes like Dansko, Andria, Naot, Vionoic, Fit flop  clog or wedge with extra heel padding and arch support. For house slippers would recommend Fitflop or Spenco found on amazon.com, Never walk barefoot or in flats.    (Varsity sports, Phidippides, SoWeTrip company, Masseys, Goodfeet, Cantilever, Feet First, Foot Solutions, Therapeutic shoes, SAS, [facility] pro shop) http://www.PromisePay.com/    3. Orthotic (recommend the following brands: Superfeet, Spenco, Powerstep, Sof Sole Fit Series)

## 2019-10-02 NOTE — PROGRESS NOTES
Subjective:      Patient ID: Jose G Shafer is a 59 y.o. male.    Chief Complaint: Follow-up (6 week f/u, PCP--08/16/2019)    Jose G is a 59 y.o. male who presents to the podiatry clinic  with complaint of bilateral foot pain. He relates cramping sensations at night but can also come in the day. He has to rub and stretch them out when the cramps come, it happens at least daily. He relates cramping in the calf muscles with short distances walking, he relates that he has had problems with stenosis of his LE before with angiogram to try and open it back up several years ago.     10/2/19: Patient returns for follow up bilateral leg and foot cramping. Relates he was unable to set up an appointment with vascular, still having cramping in the calf muscles with short distance walking needing to rest before continuing.     Review of Systems   Constitution: Negative for chills and fever.   Cardiovascular: Positive for claudication and leg swelling.   Respiratory: Negative for shortness of breath.    Skin: Negative for itching, nail changes and rash.   Musculoskeletal: Positive for arthritis, muscle cramps and myalgias. Negative for muscle weakness.   Gastrointestinal: Negative for nausea and vomiting.   Neurological: Negative for focal weakness, loss of balance, numbness and paresthesias.           Objective:      Physical Exam   Constitutional: He is oriented to person, place, and time. He appears well-developed and well-nourished. No distress.   Cardiovascular:   Pulses:       Dorsalis pedis pulses are 1+ on the left side.        Posterior tibial pulses are 1+ on the left side.   Non palpable PT pulses bilateral, 3-5 sec capillary refill time to toes 1-5 bilateral. Toes and feet are warm to touch proximally with distal cooling b/l. There is no hair growth on the feet and toes b/l. There is mild edema b/l with spider veins and varicosities present b/l.      Musculoskeletal:   Equinus noted b/l ankles with < 10  deg DF noted. MMT 5/5 in DF/PF/Inv/Ev resistance with no reproduction of pain in any direction. Passive range of motion of ankle and pedal joints is painless b/l.     Neurological: He is alert and oriented to person, place, and time. He has normal strength. He displays no atrophy and no tremor. No sensory deficit. He exhibits normal muscle tone.   Negative tinel sign bilateral.   Skin: Skin is warm, dry and intact. No abrasion, no bruising, no burn, no ecchymosis, no laceration, no lesion, no petechiae and no rash noted. He is not diaphoretic. No cyanosis or erythema. No pallor. Nails show no clubbing.   Skin temperature, texture and turgor within normal limits.   Psychiatric: He has a normal mood and affect. His behavior is normal.             Assessment:       Encounter Diagnoses   Name Primary?    PAOD (peripheral arterial occlusive disease) Yes    Acquired equinus deformity of both feet     Foot cramps          Plan:       Jose G was seen today for follow-up.    Diagnoses and all orders for this visit:    PAOD (peripheral arterial occlusive disease)    Acquired equinus deformity of both feet    Foot cramps      I counseled the patient on his conditions, their implications and medical management.    Will get him an appointment with vascular today as claudication with significant blockages on US are present    Patient will obtain over the counter arch supports and wear them in shoes whenever possible.  Athletic shoes intended for walking or running are usually best.    Return PRN after he sees vascular if symptoms persist    Alexis Farah DPM

## 2019-11-07 ENCOUNTER — OFFICE VISIT (OUTPATIENT)
Dept: VASCULAR SURGERY | Facility: CLINIC | Age: 59
End: 2019-11-07
Payer: COMMERCIAL

## 2019-11-07 VITALS
WEIGHT: 218 LBS | DIASTOLIC BLOOD PRESSURE: 98 MMHG | HEIGHT: 70 IN | BODY MASS INDEX: 31.21 KG/M2 | HEART RATE: 88 BPM | RESPIRATION RATE: 18 BRPM | SYSTOLIC BLOOD PRESSURE: 151 MMHG

## 2019-11-07 DIAGNOSIS — I73.9 PAD (PERIPHERAL ARTERY DISEASE): Primary | ICD-10-CM

## 2019-11-07 DIAGNOSIS — F17.218 NICOTINE DEPENDENCE, CIGARETTES, WITH OTHER NICOTINE-INDUCED DISORDERS: ICD-10-CM

## 2019-11-07 PROCEDURE — 99205 PR OFFICE/OUTPT VISIT, NEW, LEVL V, 60-74 MIN: ICD-10-PCS | Mod: 25,S$GLB,, | Performed by: THORACIC SURGERY (CARDIOTHORACIC VASCULAR SURGERY)

## 2019-11-07 PROCEDURE — 3077F PR MOST RECENT SYSTOLIC BLOOD PRESSURE >= 140 MM HG: ICD-10-PCS | Mod: CPTII,S$GLB,, | Performed by: THORACIC SURGERY (CARDIOTHORACIC VASCULAR SURGERY)

## 2019-11-07 PROCEDURE — 99999 PR PBB SHADOW E&M-EST. PATIENT-LVL III: CPT | Mod: PBBFAC,,, | Performed by: THORACIC SURGERY (CARDIOTHORACIC VASCULAR SURGERY)

## 2019-11-07 PROCEDURE — 3008F BODY MASS INDEX DOCD: CPT | Mod: CPTII,S$GLB,, | Performed by: THORACIC SURGERY (CARDIOTHORACIC VASCULAR SURGERY)

## 2019-11-07 PROCEDURE — 99999 PR PBB SHADOW E&M-EST. PATIENT-LVL III: ICD-10-PCS | Mod: PBBFAC,,, | Performed by: THORACIC SURGERY (CARDIOTHORACIC VASCULAR SURGERY)

## 2019-11-07 PROCEDURE — 3008F PR BODY MASS INDEX (BMI) DOCUMENTED: ICD-10-PCS | Mod: CPTII,S$GLB,, | Performed by: THORACIC SURGERY (CARDIOTHORACIC VASCULAR SURGERY)

## 2019-11-07 PROCEDURE — 99406 BEHAV CHNG SMOKING 3-10 MIN: CPT | Mod: S$GLB,,, | Performed by: THORACIC SURGERY (CARDIOTHORACIC VASCULAR SURGERY)

## 2019-11-07 PROCEDURE — 3080F PR MOST RECENT DIASTOLIC BLOOD PRESSURE >= 90 MM HG: ICD-10-PCS | Mod: CPTII,S$GLB,, | Performed by: THORACIC SURGERY (CARDIOTHORACIC VASCULAR SURGERY)

## 2019-11-07 PROCEDURE — 3077F SYST BP >= 140 MM HG: CPT | Mod: CPTII,S$GLB,, | Performed by: THORACIC SURGERY (CARDIOTHORACIC VASCULAR SURGERY)

## 2019-11-07 PROCEDURE — 99406 PR TOBACCO USE CESSATION INTERMEDIATE 3-10 MINUTES: ICD-10-PCS | Mod: S$GLB,,, | Performed by: THORACIC SURGERY (CARDIOTHORACIC VASCULAR SURGERY)

## 2019-11-07 PROCEDURE — 99205 OFFICE O/P NEW HI 60 MIN: CPT | Mod: 25,S$GLB,, | Performed by: THORACIC SURGERY (CARDIOTHORACIC VASCULAR SURGERY)

## 2019-11-07 PROCEDURE — 3080F DIAST BP >= 90 MM HG: CPT | Mod: CPTII,S$GLB,, | Performed by: THORACIC SURGERY (CARDIOTHORACIC VASCULAR SURGERY)

## 2019-11-07 NOTE — LETTER
November 7, 2019      Alexis Farah, DPNICHOLAS  [address] [facility] [address] [place] [address]           [facility]  [address] [place] [address]  Phone: [phone]          Patient: Jose G Shafer   MR Number: [id number]   YOB: 1960   Date of Visit: 11/7/2019       Dear Dr. Alexis Farah:    Thank you for referring Jose G Shafer to me for evaluation. Attached you will find relevant portions of my assessment and plan of care.    If you have questions, please do not hesitate to call me. I look forward to following Jose G Shafer along with you.    Sincerely,    Blanca Arzola MD    Enclosure  CC:  No Recipients    If you would like to receive this communication electronically, please contact [email or web address] or [phone] to request more information on [employer] Link access.    For providers and/or their staff who would like to refer a patient to [facility], please contact us through our one-stop-shop provider referral line, [facility], at [phone].    If you feel you have received this communication in error or would no longer like to receive these types of communications, please e-mail [email or web address]

## 2019-11-07 NOTE — PROGRESS NOTES
OFFICE VISIT      This patient was referred to me by Dr. Alexis Farah    History of present illness:  The patient is a 59-year-old gentleman with known peripheral arterial occlusive disease who underwent angioplasty of the distal right superficial femoral artery in 2015 by Dr. Drew Rodriguez in [place].  Patient was found to have a 2 cm occlusion of the distal right superficial femoral artery.  His pain in the right lower extremity resolved after that was done.  The patient states that about 6 months ago he started experiencing pain in the right calf after walking but over the last several months it has become more severe and at shorter distances.  He states that now he can walk only about 30 yd before having to quit walking.  He is also getting some pain in the left calf with walking.  This is affecting his ability to work.  He denies any pain that would be consistent with rest pain and ischemic origin but he does complain of cramps in bilateral feet and calves when lying supine at night.  This does not happen every night but does occur quite frequently.  Patient has a strong history of cigarette smoking.  In the past he has not been able to quit smoking but states that he is now interested in quitting smoking.  He he will be referred to the smoking cessation Clinic.      Past Medical History:   Diagnosis Date    Anticoagulant long-term use     Anxiety     BCC (basal cell carcinoma)     CAD (coronary artery disease)     Erectile dysfunction     HLD (hyperlipidemia)     HTN (hypertension)     Impaired fasting glucose     Lateral epicondylitis of left elbow 7/18/2019       Past Surgical History:   Procedure Laterality Date    ANGIOPLASTY      CARDIAC SURGERY      stents placed    COLONOSCOPY N/A 7/17/2017    Procedure: COLONOSCOPY Miralax;  Surgeon: Jeremiah Syed Jr., MD;  [facility];  Service: Endoscopy;  Laterality: N/A;    KNEE ARTHROSCOPY W/ MENISCECTOMY      VARICOSE VEIN  SURGERY      left saphenous       Review of patient's allergies indicates:  No Known Allergies    Current Outpatient Medications   Medication Sig Dispense Refill    ALPRAZolam (XANAX) 0.5 MG tablet Take 1 tablet (0.5 mg total) by mouth daily as needed for Anxiety. 30 tablet 0    ascorbic acid (VITAMIN C) 500 MG tablet Take 500 mg by mouth 2 (two) times daily.      aspirin (BABY ASPIRIN) 81 MG Chew Take 81 mg by mouth. 1 Tablet, Chewable Oral Every day      atorvastatin (LIPITOR) 80 MG tablet TAKE 1 TABLET BY MOUTH ONE TIME DAILY 90 tablet 3    carvedilol (COREG) 12.5 MG tablet Take 1 tablet (12.5 mg total) by mouth 2 (two) times daily with meals. 180 tablet 3    cilostazol (PLETAL) 50 MG Tab Take 1 tablet (50 mg total) by mouth 2 (two) times daily. 60 tablet 11    esomeprazole (NEXIUM) 20 MG capsule Take 20 mg by mouth before breakfast.      ezetimibe (ZETIA) 10 mg tablet TAKE 1 TABLET BY MOUTH ONE TIME DAILY 90 tablet 3    ibuprofen (ADVIL,MOTRIN) 800 MG tablet Take 1 tablet (800 mg total) by mouth every 6 (six) hours as needed for Pain. 30 tablet 1    lisinopril (PRINIVIL,ZESTRIL) 40 MG tablet TAKE 1 TABLET BY MOUTH ONE TIME DAILY 90 tablet 3    MULTIVITAMIN ORAL Take by mouth. 1  By mouth Every morning      nitroGLYCERIN (NITROSTAT) 0.4 MG SL tablet Place 1 tablet (0.4 mg total) under the tongue every 5 (five) minutes as needed for Chest pain. 1 Tablet, Sublingual Sublingual 100 tablet 3    omega-3 fatty acids (FISH OIL) 500 mg Cap Take by mouth. 1 Capsule Oral       sertraline (ZOLOFT) 25 MG tablet Take 1 tablet (25 mg total) by mouth once daily. 90 tablet 3    fluticasone propionate (FLONASE) 50 mcg/actuation nasal spray USE 1 SPRAY (50 MCG TOTAL) BY EACH NARE ROUTE ONCE DAILY. (Patient not taking: Reported on 11/7/2019) 16 mL 11    nicotine (NICODERM CQ) 21 mg/24 hr Place 1 patch onto the skin once daily. (Patient not taking: Reported on 11/7/2019) 28 patch 0     No current  facility-administered medications for this visit.        Family History   Problem Relation Age of Onset    Diabetes Brother     Coronary artery disease Brother         premature    Stomach cancer Father     No Known Problems Mother     No Known Problems Sister     No Known Problems Maternal Grandmother     No Known Problems Maternal Grandfather     No Known Problems Paternal Grandmother     No Known Problems Paternal Grandfather     No Known Problems Maternal Aunt     No Known Problems Maternal Uncle     No Known Problems Paternal Aunt     No Known Problems Paternal Uncle     Prostate cancer Neg Hx     Colon cancer Neg Hx     Anemia Neg Hx     Arrhythmia Neg Hx     Asthma Neg Hx     Clotting disorder Neg Hx     Fainting Neg Hx     Heart attack Neg Hx     Heart disease Neg Hx     Heart failure Neg Hx     Hyperlipidemia Neg Hx     Hypertension Neg Hx     Stroke Neg Hx     Atrial Septal Defect Neg Hx        Social History     Socioeconomic History    Marital status:      Spouse name: Not on file    Number of children: Not on file    Years of education: Not on file    Highest education level: Not on file   Occupational History    Not on file   Social Needs    Financial resource strain: Not on file    Food insecurity:     Worry: Not on file     Inability: Not on file    Transportation needs:     Medical: Not on file     Non-medical: Not on file   Tobacco Use    Smoking status: Current Every Day Smoker     Packs/day: 1.00     Years: 40.00     Pack years: 40.00     Types: Cigarettes    Smokeless tobacco: Never Used   Substance and Sexual Activity    Alcohol use: Yes     Comment: every now and then per pt    Drug use: No    Sexual activity: Not on file   Lifestyle    Physical activity:     Days per week: Not on file     Minutes per session: Not on file    Stress: Not on file   Relationships    Social connections:     Talks on phone: Not on file     Gets together: Not on file      Attends Hoahaoism service: Not on file     Active member of club or organization: Not on file     Attends meetings of clubs or organizations: Not on file     Relationship status: Not on file   Other Topics Concern    Not on file   Social History Narrative    Not on file       REVIEW OF SYSTEMS:  He complains of pain in bilateral calves much worse on the right and on the left after walking about 30 yd.  He has to quit small walking for the pain to go away.  He also complains of cramps in bilateral feet and bilateral calves when lying supine night.        PHYSICAL EXAM:  Physical Exam    Vitals:    11/07/19 1451   BP: (!) 151/98   Pulse: 88   Resp: 18     Awake and alert  Head is normocephalic  Pupils equal round reactive  Sclerae anicteric  Neck is supple without any jugular vein distension.  Trachea is midline.  Heart has a regular rate and rhythm.  Lungs are clear.  Abdomen is soft and nontender.  Extremities:  Bilateral feet are pink and warm with slightly decreased capillary refill.  He has palpable femoral pulses but I cannot palpate popliteal dorsalis pedis or posterior tibial pulses in either lower extremity.  Neurologic:  He is awake and alert and oriented. He has no lateralizing neurologic deficits.  Gait is normal.      US Lower Extrem Arteries Bilat with JULY (xpd)   Order: [id number]   Status:  Final result   Visible to patient:  Yes (Patient Portal) Next appt:  None Dx:  PAOD (peripheral arterial occlusive d...   Details     Reading Physician Reading Date Result Priority   Myles Coles IV, MD 8/23/2019 Routine      Narrative     EXAMINATION:  US ARTERIAL LOWER EXTREMITY BILAT WITH JULY (XPD)    CLINICAL HISTORY:  Disorder of arteries and arterioles, unspecified    TECHNIQUE:  Ankle-brachial indices and lower extremity bilateral arterial.  Measurements were made by NASCET criteria.    COMPARISON:  08/28/2015    FINDINGS:  A right arm systolic pressure of 128 was obtained with a Left arm systolic  pressure of 119 being obtained.  A right ankle systolic pressure of 135 was obtained, and a left ankle systolic pressure of 126 was obtained.  This creates a right ankle-brachial index of 1.1 and a a left ankle-brachial index of 0.98.    A moderate quantity of atherosclerotic plaque appears to be present in the right common femoral artery which demonstrates triphasic flow.  Monophasic flow is noted within the femoral artery proximally suggesting a possible proximal stenosis.  A large quantity of plaque is suspected within the mid femoral artery which demonstrates a velocity elevation to 374 from 83.  Within the distal femoral artery no significant diastolic flow is noted with monophasic systolic flow.  The posterior tibial artery, peroneal artery, and anterior tibial artery demonstrates some monophasic flow as well    The left common femoral artery demonstrates extensive atherosclerotic calcification within the mid femoral artery as well with a velocity elevation to 336 centimeters/second from 47 centimeters/second.  The left common femoral artery demonstrates monophasic flow as do the more distal patent vessels suggesting possible proximal stenosis.  The popliteal artery, peroneal artery, anterior tibial artery, and posterior tibial artery demonstrate monophasic flow      Impression       The ankle-brachial index on the right is of 1.1 an ankle-brachial index on the left is of 0.98.    Monophasic flow is noted through the entire left lower extremity arterial system suggesting proximal stenosis.    Velocity elevations are noted within each mid femoral vessel suggesting stenoses in the 50-75% range with velocity ratios that may suggest this degree of stenosis is greater than 75% bilaterally.  If more sensitive and specific evaluation is necessary CTA could be performed.  The degree of plaque visualized within the femoral arteries bilaterally suggests possible multifocal stenosis not ideally displayed on this  exam      Electronically signed by: Myles Coles MD  Date: 08/23/2019  Time: 16:10             Last Resulted: 08/23/19 16:10 Order Details View Encounter Lab and Collection Details Routing Result History            Patient Result Comments     Viewed by Jose G Shafer on 9/17/2019 12:32 PM   Written by Alexis Farah DPM on 8/26/2019 12:43 PM   Although the JULY's are normal the ultrasound shows evidences of significant stenosis bilateral of greater than 75% with follow up imaging suggested. I recommend either going back to the vascular surgeon at [facility] or getting set up with cardiology or vascular surgery here on the [facility] to have this worked up further.   External Result Report     External Result Report   Narrative     EXAMINATION:  US ARTERIAL LOWER EXTREMITY BILAT WITH JULY (XPD)    CLINICAL HISTORY:  Disorder of arteries and arterioles, unspecified    TECHNIQUE:  Ankle-brachial indices and lower extremity bilateral arterial.  Measurements were made by NASCET criteria.    COMPARISON:  08/28/2015    FINDINGS:  A right arm systolic pressure of 128 was obtained with a Left arm systolic pressure of 119 being obtained.  A right ankle systolic pressure of 135 was obtained, and a left ankle systolic pressure of 126 was obtained.  This creates a right ankle-brachial index of 1.1 and a a left ankle-brachial index of 0.98.    A moderate quantity of atherosclerotic plaque appears to be present in the right common femoral artery which demonstrates triphasic flow.  Monophasic flow is noted within the femoral artery proximally suggesting a possible proximal stenosis.  A large quantity of plaque is suspected within the mid femoral artery which demonstrates a velocity elevation to 374 from 83.  Within the distal femoral artery no significant diastolic flow is noted with monophasic systolic flow.  The posterior tibial artery, peroneal artery, and anterior tibial artery demonstrates some monophasic flow as  well    The left common femoral artery demonstrates extensive atherosclerotic calcification within the mid femoral artery as well with a velocity elevation to 336 centimeters/second from 47 centimeters/second.  The left common femoral artery demonstrates monophasic flow as do the more distal patent vessels suggesting possible proximal stenosis.  The popliteal artery, peroneal artery, anterior tibial artery, and posterior tibial artery demonstrate monophasic flow   Impression       The ankle-brachial index on the right is of 1.1 an ankle-brachial index on the left is of 0.98.    Monophasic flow is noted through the entire left lower extremity arterial system suggesting proximal stenosis.    Velocity elevations are noted within each mid femoral vessel suggesting stenoses in the 50-75% range with velocity ratios that may suggest this degree of stenosis is greater than 75% bilaterally.  If more sensitive and specific evaluation is necessary CTA could be performed.  The degree of plaque visualized within the femoral arteries bilaterally suggests possible multifocal stenosis not ideally displayed on this exam                IMPRESSION:  1.  Peripheral arterial occlusive disease  2.  Severe intermittent claudication affecting ability to work.  3.  Nocturnal cramps  4.  Nicotine dependence, cigarette smoking  5.  Coronary artery disease  6.  Hypertension  7.  Hyperlipidemia      RECOMMENDATIONS:  The patient has peripheral arterial occlusive disease.  He has intermittent claudication that is affecting his ability to work.  He does have cramps in bilateral feet and calves at night but I think that these are nocturnal cramps.  He has no symptoms characteristic of rest pain of ischemic origin.  Had a long talk with the patient regarding smoking cessation and he was counseled for over 5 min.  Patient states that he wants to quit smoking and he is now being referred to the smoking cessation Clinic.  I told the patient that I  usually do not recommend intervention for intermittent claudication.  However the patient cannot do his work.  I told him that I really would not like to do an operation but we could consider an arteriogram with possible percutaneous intervention, especially if he quit smoking.  I did warn him that his nocturnal cramps would not go way with improving blood supply to the lower extremities.  No one knows what causes nocturnal cramps and the only thing that helped was quinine sulfate but the FDA took it off the market many years ago.  The patient has been referred to the smoking cessation Clinic.  We will set up arteriography in the next 1-2 weeks.        Ashish Arzola MD

## 2019-11-11 DIAGNOSIS — I73.9 PAD (PERIPHERAL ARTERY DISEASE): Primary | ICD-10-CM

## 2019-11-11 RX ORDER — LIDOCAINE HYDROCHLORIDE 10 MG/ML
1 INJECTION, SOLUTION EPIDURAL; INFILTRATION; INTRACAUDAL; PERINEURAL ONCE
Status: CANCELLED | OUTPATIENT
Start: 2019-11-11 | End: 2019-11-11

## 2019-11-22 PROBLEM — I73.9 PAD (PERIPHERAL ARTERY DISEASE): Status: ACTIVE | Noted: 2019-11-22

## 2019-11-25 ENCOUNTER — TELEPHONE (OUTPATIENT)
Dept: VASCULAR SURGERY | Facility: CLINIC | Age: 59
End: 2019-11-25

## 2019-11-25 NOTE — TELEPHONE ENCOUNTER
----- Message from Fiorella Yu sent at 11/25/2019 10:42 AM CST -----  Contact: Wife  ( Mayte ) [phone]  Needs a 2 week follow up appointment. Please advise

## 2019-11-25 NOTE — TELEPHONE ENCOUNTER
Spoke to wife, f/u angio appt rescheduled from 12/12 to 12/4 @ 9441, voices understanding and is agreeable.

## 2019-11-25 NOTE — TELEPHONE ENCOUNTER
S/P AORO 11/22 @ [facility], spoke to wife, follow up appt scheduled 12/12 @ 2:15 pm, voices understanding and is agreeable.

## 2019-11-25 NOTE — TELEPHONE ENCOUNTER
----- Message from Sreekanth Dunn sent at 11/25/2019  2:37 PM CST -----  Contact: pt  Type:  Patient Returning Call    Who Called:  pt  Who Left Message for Patient:  Amalia blood  Does the patient know what this is regarding?:  no  Best Call Back Number:  [phone]  Additional Information:

## 2019-12-04 ENCOUNTER — OFFICE VISIT (OUTPATIENT)
Dept: VASCULAR SURGERY | Facility: CLINIC | Age: 59
End: 2019-12-04
Payer: COMMERCIAL

## 2019-12-04 VITALS
DIASTOLIC BLOOD PRESSURE: 96 MMHG | HEIGHT: 70 IN | SYSTOLIC BLOOD PRESSURE: 148 MMHG | BODY MASS INDEX: 31.85 KG/M2 | HEART RATE: 69 BPM | WEIGHT: 222.44 LBS

## 2019-12-04 DIAGNOSIS — I73.9 PAD (PERIPHERAL ARTERY DISEASE): Primary | ICD-10-CM

## 2019-12-04 PROCEDURE — 3080F DIAST BP >= 90 MM HG: CPT | Mod: CPTII,S$GLB,, | Performed by: THORACIC SURGERY (CARDIOTHORACIC VASCULAR SURGERY)

## 2019-12-04 PROCEDURE — 3080F PR MOST RECENT DIASTOLIC BLOOD PRESSURE >= 90 MM HG: ICD-10-PCS | Mod: CPTII,S$GLB,, | Performed by: THORACIC SURGERY (CARDIOTHORACIC VASCULAR SURGERY)

## 2019-12-04 PROCEDURE — 99214 OFFICE O/P EST MOD 30 MIN: CPT | Mod: S$GLB,,, | Performed by: THORACIC SURGERY (CARDIOTHORACIC VASCULAR SURGERY)

## 2019-12-04 PROCEDURE — 99999 PR PBB SHADOW E&M-EST. PATIENT-LVL III: CPT | Mod: PBBFAC,,, | Performed by: THORACIC SURGERY (CARDIOTHORACIC VASCULAR SURGERY)

## 2019-12-04 PROCEDURE — 99999 PR PBB SHADOW E&M-EST. PATIENT-LVL III: ICD-10-PCS | Mod: PBBFAC,,, | Performed by: THORACIC SURGERY (CARDIOTHORACIC VASCULAR SURGERY)

## 2019-12-04 PROCEDURE — 99214 PR OFFICE/OUTPT VISIT, EST, LEVL IV, 30-39 MIN: ICD-10-PCS | Mod: S$GLB,,, | Performed by: THORACIC SURGERY (CARDIOTHORACIC VASCULAR SURGERY)

## 2019-12-04 PROCEDURE — 3008F BODY MASS INDEX DOCD: CPT | Mod: CPTII,S$GLB,, | Performed by: THORACIC SURGERY (CARDIOTHORACIC VASCULAR SURGERY)

## 2019-12-04 PROCEDURE — 3008F PR BODY MASS INDEX (BMI) DOCUMENTED: ICD-10-PCS | Mod: CPTII,S$GLB,, | Performed by: THORACIC SURGERY (CARDIOTHORACIC VASCULAR SURGERY)

## 2019-12-04 PROCEDURE — 3077F PR MOST RECENT SYSTOLIC BLOOD PRESSURE >= 140 MM HG: ICD-10-PCS | Mod: CPTII,S$GLB,, | Performed by: THORACIC SURGERY (CARDIOTHORACIC VASCULAR SURGERY)

## 2019-12-04 PROCEDURE — 3077F SYST BP >= 140 MM HG: CPT | Mod: CPTII,S$GLB,, | Performed by: THORACIC SURGERY (CARDIOTHORACIC VASCULAR SURGERY)

## 2019-12-04 NOTE — PROGRESS NOTES
OFFICE VISIT      History of present illness:  The patient is a 59-year-old gentleman with peripheral arterial occlusive disease who underwent angioplasty and stenting of his right superficial femoral artery and right popliteal artery for severe intermittent claudication affecting activities of daily living.  Patient states that he can now walk without getting pain in his right lower extremity.  He is still having pain in his left calf with walking short distances.  At the time of angiography was found to have a high-grade stenosis of the distal left superficial femoral artery.      Past Medical History:   Diagnosis Date    Anticoagulant long-term use     Anxiety     BCC (basal cell carcinoma)     CAD (coronary artery disease)     Erectile dysfunction     GERD (gastroesophageal reflux disease)     HLD (hyperlipidemia)     HTN (hypertension)     Impaired fasting glucose     Lateral epicondylitis of left elbow 7/18/2019       Past Surgical History:   Procedure Laterality Date    ANGIOPLASTY      AORTOGRAPHY WITH SERIALOGRAPHY N/A 11/22/2019    Procedure: AORTOGRAM, WITH SERIALOGRAPHY;  Surgeon: Blanca Arzola MD;  Location: [facility] CATH;  Service: Cardiovascular;  Laterality: N/A;    CARDIAC SURGERY      stents placed    COLONOSCOPY N/A 7/17/2017    Procedure: COLONOSCOPY Miralax;  Surgeon: Jeremiah Syed Jr., MD;  Location: [facility] ENDO;  Service: Endoscopy;  Laterality: N/A;    KNEE ARTHROSCOPY W/ MENISCECTOMY      PERCUTANEOUS TRANSLUMINAL ANGIOPLASTY N/A 11/22/2019    Procedure: Pta (Angioplasty, Percutaneous, Transluminal);  Surgeon: Blanca Arzola MD;  Location: [facility] CATH;  Service: Cardiovascular;  Laterality: N/A;    VARICOSE VEIN SURGERY      left saphenous       Review of patient's allergies indicates:  No Known Allergies    Current Outpatient Medications   Medication Sig Dispense Refill    ALPRAZolam (XANAX) 0.5 MG tablet Take 1 tablet (0.5 mg total) by mouth daily as needed for Anxiety. 30 tablet  0    ascorbic acid (VITAMIN C) 500 MG tablet Take 500 mg by mouth 2 (two) times daily.      aspirin (BABY ASPIRIN) 81 MG Chew Take 81 mg by mouth. 1 Tablet, Chewable Oral Every day      atorvastatin (LIPITOR) 80 MG tablet TAKE 1 TABLET BY MOUTH ONE TIME DAILY 90 tablet 3    carvedilol (COREG) 12.5 MG tablet Take 1 tablet (12.5 mg total) by mouth 2 (two) times daily with meals. 180 tablet 3    cilostazol (PLETAL) 50 MG Tab Take 1 tablet (50 mg total) by mouth 2 (two) times daily. 60 tablet 11    clopidogrel (PLAVIX) 75 mg tablet Take 1 tablet (75 mg total) by mouth once daily. 180 tablet 1    esomeprazole (NEXIUM) 20 MG capsule Take 20 mg by mouth before breakfast.      ezetimibe (ZETIA) 10 mg tablet TAKE 1 TABLET BY MOUTH ONE TIME DAILY 90 tablet 3    fluticasone propionate (FLONASE) 50 mcg/actuation nasal spray USE 1 SPRAY (50 MCG TOTAL) BY EACH NARE ROUTE ONCE DAILY. 16 mL 11    ibuprofen (ADVIL,MOTRIN) 800 MG tablet Take 1 tablet (800 mg total) by mouth every 6 (six) hours as needed for Pain. 30 tablet 1    lisinopril (PRINIVIL,ZESTRIL) 40 MG tablet TAKE 1 TABLET BY MOUTH ONE TIME DAILY 90 tablet 3    MULTIVITAMIN ORAL Take by mouth. 1  By mouth Every morning      nicotine (NICODERM CQ) 21 mg/24 hr Place 1 patch onto the skin once daily. 28 patch 0    nitroGLYCERIN (NITROSTAT) 0.4 MG SL tablet Place 1 tablet (0.4 mg total) under the tongue every 5 (five) minutes as needed for Chest pain. 1 Tablet, Sublingual Sublingual 100 tablet 3    omega-3 fatty acids (FISH OIL) 500 mg Cap Take by mouth. 1 Capsule Oral       sertraline (ZOLOFT) 25 MG tablet Take 1 tablet (25 mg total) by mouth once daily. 90 tablet 3     No current facility-administered medications for this visit.      Facility-Administered Medications Ordered in Other Visits   Medication Dose Route Frequency Provider Last Rate Last Dose    lidocaine (PF) 10 mg/ml (1%) injection 10 mg  1 mL Intradermal Once Xavi Cornelius MD            Family History   Problem Relation Age of Onset    Diabetes Brother     Coronary artery disease Brother         premature    Stomach cancer Father     No Known Problems Mother     No Known Problems Sister     No Known Problems Maternal Grandmother     No Known Problems Maternal Grandfather     No Known Problems Paternal Grandmother     No Known Problems Paternal Grandfather     No Known Problems Maternal Aunt     No Known Problems Maternal Uncle     No Known Problems Paternal Aunt     No Known Problems Paternal Uncle     Prostate cancer Neg Hx     Colon cancer Neg Hx     Anemia Neg Hx     Arrhythmia Neg Hx     Asthma Neg Hx     Clotting disorder Neg Hx     Fainting Neg Hx     Heart attack Neg Hx     Heart disease Neg Hx     Heart failure Neg Hx     Hyperlipidemia Neg Hx     Hypertension Neg Hx     Stroke Neg Hx     Atrial Septal Defect Neg Hx        Social History     Socioeconomic History    Marital status:      Spouse name: Not on file    Number of children: Not on file    Years of education: Not on file    Highest education level: Not on file   Occupational History    Not on file   Social Needs    Financial resource strain: Not on file    Food insecurity:     Worry: Not on file     Inability: Not on file    Transportation needs:     Medical: Not on file     Non-medical: Not on file   Tobacco Use    Smoking status: Current Every Day Smoker     Packs/day: 0.25     Years: 40.00     Pack years: 10.00     Types: Cigarettes    Smokeless tobacco: Never Used   Substance and Sexual Activity    Alcohol use: Yes     Comment: every now and then per pt    Drug use: No    Sexual activity: Not on file   Lifestyle    Physical activity:     Days per week: Not on file     Minutes per session: Not on file    Stress: Not on file   Relationships    Social connections:     Talks on phone: Not on file     Gets together: Not on file     Attends Mandaen service: Not on file      Active member of club or organization: Not on file     Attends meetings of clubs or organizations: Not on file     Relationship status: Not on file   Other Topics Concern    Not on file   Social History Narrative    Not on file       REVIEW OF SYSTEMS:  The severe pain in the right lower extremity with ambulation has resolved since he underwent angioplasty and stenting of the right superficial femoral and popliteal arteries.  He is still experiencing pain in the left calf after walking very short distances and this is affecting his activities of daily living.  All other systems are reviewed and are negative.        PHYSICAL EXAM:  Physical Exam    Vitals:    12/04/19 0942   BP: (!) 148/96   Pulse: 69     Awake and alert.  Head is normocephalic.  Neck is supple without any masses or jugular vein distension.  Heart has a regular rate and rhythm.  Lungs are clear.  Abdomen is soft and nontender.  Extremities the right foot is warmer than the left foot and has normal capillary refill.  The left ft has slightly decreased capillary refill.  The patient now has a palpable right posterior tibial pulse which was not present prior to the angioplasty and stenting of the right superficial femoral and popliteal arteries.  He has no palpable left posterior tibial or dorsalis pedis pulses.  Neurologic:  Grossly intact.        IMPRESSION:  1.  Peripheral arterial occlusive disease.  2.  High-grade stenosis of the right superficial femoral and popliteal arteries.  3.  Status post angioplasty and stenting of the right superficial femoral and popliteal arteries.  4.  High-grade stenosis of the left superficial femoral artery.  5. Severe intermittent claudication of the left lower extremity affecting of his ability to do activities of daily living.      RECOMMENDATIONS:  We will set up angioplasty with or without stenting of the left superficial femoral artery although I think that he might need stenting since the stenosis is being  caused by heavily calcified plaque.        Ashish Arzola MD

## 2019-12-05 DIAGNOSIS — I73.9 PAD (PERIPHERAL ARTERY DISEASE): Primary | ICD-10-CM

## 2019-12-05 RX ORDER — LIDOCAINE HYDROCHLORIDE 10 MG/ML
1 INJECTION, SOLUTION EPIDURAL; INFILTRATION; INTRACAUDAL; PERINEURAL ONCE
Status: CANCELLED | OUTPATIENT
Start: 2019-12-05 | End: 2019-12-05

## 2020-01-02 ENCOUNTER — OFFICE VISIT (OUTPATIENT)
Dept: CARDIOLOGY | Facility: CLINIC | Age: 60
End: 2020-01-02
Payer: COMMERCIAL

## 2020-01-02 VITALS
DIASTOLIC BLOOD PRESSURE: 94 MMHG | HEIGHT: 70 IN | WEIGHT: 219.56 LBS | HEART RATE: 86 BPM | BODY MASS INDEX: 31.43 KG/M2 | SYSTOLIC BLOOD PRESSURE: 158 MMHG

## 2020-01-02 DIAGNOSIS — I25.10 CORONARY ARTERY DISEASE INVOLVING NATIVE CORONARY ARTERY OF NATIVE HEART WITHOUT ANGINA PECTORIS: ICD-10-CM

## 2020-01-02 DIAGNOSIS — I10 ESSENTIAL HYPERTENSION: ICD-10-CM

## 2020-01-02 DIAGNOSIS — R07.89 ATYPICAL CHEST PAIN: Primary | ICD-10-CM

## 2020-01-02 DIAGNOSIS — I73.9 PAD (PERIPHERAL ARTERY DISEASE): ICD-10-CM

## 2020-01-02 DIAGNOSIS — E78.2 MIXED HYPERLIPIDEMIA: ICD-10-CM

## 2020-01-02 DIAGNOSIS — I73.9 PERIPHERAL VASCULAR DISEASE OF EXTREMITY: ICD-10-CM

## 2020-01-02 PROCEDURE — 3077F PR MOST RECENT SYSTOLIC BLOOD PRESSURE >= 140 MM HG: ICD-10-PCS | Mod: CPTII,S$GLB,, | Performed by: INTERNAL MEDICINE

## 2020-01-02 PROCEDURE — 3080F DIAST BP >= 90 MM HG: CPT | Mod: CPTII,S$GLB,, | Performed by: INTERNAL MEDICINE

## 2020-01-02 PROCEDURE — 99999 PR PBB SHADOW E&M-EST. PATIENT-LVL III: CPT | Mod: PBBFAC,,, | Performed by: INTERNAL MEDICINE

## 2020-01-02 PROCEDURE — 3077F SYST BP >= 140 MM HG: CPT | Mod: CPTII,S$GLB,, | Performed by: INTERNAL MEDICINE

## 2020-01-02 PROCEDURE — 99214 OFFICE O/P EST MOD 30 MIN: CPT | Mod: S$GLB,,, | Performed by: INTERNAL MEDICINE

## 2020-01-02 PROCEDURE — 3008F BODY MASS INDEX DOCD: CPT | Mod: CPTII,S$GLB,, | Performed by: INTERNAL MEDICINE

## 2020-01-02 PROCEDURE — 93000 ELECTROCARDIOGRAM COMPLETE: CPT | Mod: S$GLB,,, | Performed by: INTERNAL MEDICINE

## 2020-01-02 PROCEDURE — 93000 EKG 12-LEAD: ICD-10-PCS | Mod: S$GLB,,, | Performed by: INTERNAL MEDICINE

## 2020-01-02 PROCEDURE — 99214 PR OFFICE/OUTPT VISIT, EST, LEVL IV, 30-39 MIN: ICD-10-PCS | Mod: S$GLB,,, | Performed by: INTERNAL MEDICINE

## 2020-01-02 PROCEDURE — 3080F PR MOST RECENT DIASTOLIC BLOOD PRESSURE >= 90 MM HG: ICD-10-PCS | Mod: CPTII,S$GLB,, | Performed by: INTERNAL MEDICINE

## 2020-01-02 PROCEDURE — 3008F PR BODY MASS INDEX (BMI) DOCUMENTED: ICD-10-PCS | Mod: CPTII,S$GLB,, | Performed by: INTERNAL MEDICINE

## 2020-01-02 PROCEDURE — 99999 PR PBB SHADOW E&M-EST. PATIENT-LVL III: ICD-10-PCS | Mod: PBBFAC,,, | Performed by: INTERNAL MEDICINE

## 2020-01-02 NOTE — PROGRESS NOTES
Subjective:    Patient ID:  Jose G Shafer is a 59 y.o. male who presents for evaluation of Coronary Artery Disease (Ref by Dr. Arzola - establish care.  Aortogram - Dr Arzola 12/13/19); Hypertension; Hyperlipidemia; and Peripheral Vascular Disease      Problem List Items Addressed This Visit        Cardiac/Vascular    Coronary artery disease involving native coronary artery of native heart without angina pectoris    Overview     Prev History: 6-2005 he had an inferior STEMI-->DAVID to pRCA.  LVEF preserved           Essential hypertension    Relevant Orders    Echo    Mixed hyperlipidemia    PAD (peripheral artery disease)    Overview     Status post right SFA and left SFA intervention in late 2019 with Dr. Arzola         Peripheral vascular disease of extremity: s/p R. SFA DCB 10/19/2015      Other Visit Diagnoses     Atypical chest pain    -  Primary    Relevant Orders    Nuclear Stress - Cardiology Interpreted          HPI    Referred by Dr. Arzola    The patient states that he feels well. Having some symptoms of atypical of chest discomfort he wants to get evaluated.    No further claudication in the legs.    Home BP reportedly in the 130s systolic  Last ischemic evaluation was PET stress test in 2017 that showed normal perfusion    Personal history of heart attack or stroke - 2005 - STEMI  Family history of heart disease - Grandparents and uncles    Past Medical History:   Diagnosis Date    Anticoagulant long-term use     Anxiety     BCC (basal cell carcinoma)     CAD (coronary artery disease)     Erectile dysfunction     GERD (gastroesophageal reflux disease)     HLD (hyperlipidemia)     HTN (hypertension)     Impaired fasting glucose     Lateral epicondylitis of left elbow 7/18/2019       Past Surgical History:   Procedure Laterality Date    ANGIOPLASTY      AORTOGRAPHY WITH SERIALOGRAPHY N/A 11/22/2019    Procedure: AORTOGRAM, WITH SERIALOGRAPHY;  Surgeon: Blanca Arzola MD;  Location: [facility] CATH;   Service: Cardiovascular;  Laterality: N/A;    AORTOGRAPHY WITH SERIALOGRAPHY N/A 12/13/2019    Procedure: AORTOGRAM, WITH SERIALOGRAPHY;  Surgeon: Blanca Arzola MD;  Location: [facility] CATH;  Service: Cardiovascular;  Laterality: N/A;    CARDIAC SURGERY  2005    stents placed    COLONOSCOPY N/A 7/17/2017    Procedure: COLONOSCOPY Miralax;  Surgeon: Jeremiah Syed Jr., MD;  Location: [facility] ENDO;  Service: Endoscopy;  Laterality: N/A;    KNEE ARTHROSCOPY W/ MENISCECTOMY      PERCUTANEOUS TRANSLUMINAL ANGIOPLASTY N/A 11/22/2019    Procedure: Pta (Angioplasty, Percutaneous, Transluminal);  Surgeon: Blanca Arzola MD;  Location: [facility] CATH;  Service: Cardiovascular;  Laterality: N/A;    PERCUTANEOUS TRANSLUMINAL ANGIOPLASTY N/A 12/13/2019    Procedure: Pta (Angioplasty, Percutaneous, Transluminal);  Surgeon: Blanca Arzola MD;  Location: [facility] CATH;  Service: Cardiovascular;  Laterality: N/A;    VARICOSE VEIN SURGERY      left saphenous       Family History   Problem Relation Age of Onset    Diabetes Brother     Coronary artery disease Brother         premature    Stomach cancer Father     No Known Problems Mother     No Known Problems Sister     No Known Problems Maternal Grandmother     No Known Problems Maternal Grandfather     No Known Problems Paternal Grandmother     No Known Problems Paternal Grandfather     No Known Problems Maternal Aunt     No Known Problems Maternal Uncle     No Known Problems Paternal Aunt     No Known Problems Paternal Uncle     Prostate cancer Neg Hx     Colon cancer Neg Hx     Anemia Neg Hx     Arrhythmia Neg Hx     Asthma Neg Hx     Clotting disorder Neg Hx     Fainting Neg Hx     Heart attack Neg Hx     Heart disease Neg Hx     Heart failure Neg Hx     Hyperlipidemia Neg Hx     Hypertension Neg Hx     Stroke Neg Hx     Atrial Septal Defect Neg Hx        Social History     Socioeconomic History    Marital status:      Spouse name: Not on file    Number of  children: Not on file    Years of education: Not on file    Highest education level: Not on file   Occupational History    Not on file   Social Needs    Financial resource strain: Not on file    Food insecurity:     Worry: Not on file     Inability: Not on file    Transportation needs:     Medical: Not on file     Non-medical: Not on file   Tobacco Use    Smoking status: Current Every Day Smoker     Packs/day: 0.25     Years: 40.00     Pack years: 10.00     Types: Cigarettes    Smokeless tobacco: Never Used   Substance and Sexual Activity    Alcohol use: Yes     Comment: every now and then per pt    Drug use: No    Sexual activity: Not on file   Lifestyle    Physical activity:     Days per week: Not on file     Minutes per session: Not on file    Stress: Not on file   Relationships    Social connections:     Talks on phone: Not on file     Gets together: Not on file     Attends Rastafari service: Not on file     Active member of club or organization: Not on file     Attends meetings of clubs or organizations: Not on file     Relationship status: Not on file   Other Topics Concern    Not on file   Social History Narrative    Not on file       Review of patient's allergies indicates:  No Known Allergies    Review of Systems   Constitution: Negative for decreased appetite, fever and malaise/fatigue.   Eyes: Negative for blurred vision.   Cardiovascular: Negative for chest pain, dyspnea on exertion, irregular heartbeat and leg swelling.   Respiratory: Negative for cough, hemoptysis, shortness of breath and wheezing.    Endocrine: Negative for cold intolerance and heat intolerance.   Hematologic/Lymphatic: Negative for bleeding problem.   Musculoskeletal: Negative for muscle weakness and myalgias.   Gastrointestinal: Negative for abdominal pain, constipation and diarrhea.   Genitourinary: Negative for bladder incontinence.   Neurological: Negative for dizziness and weakness.   Psychiatric/Behavioral:  "Negative for depression.        Objective:     Vitals:    01/02/20 0855   BP: (!) 158/94   BP Location: Left arm   Patient Position: Sitting   BP Method: Medium (Automatic)   Pulse: 86   Weight: 99.6 kg (219 lb 9.3 oz)   Height: 5' 10" (1.778 m)        Physical Exam   Constitutional: He is oriented to person, place, and time. He appears well-developed and well-nourished.   HENT:   Head: Normocephalic and atraumatic.   Neck: Normal range of motion. Neck supple. No JVD present.   Cardiovascular: Normal rate, regular rhythm and normal heart sounds. Exam reveals no gallop and no friction rub.   No murmur heard.  Pulmonary/Chest: Effort normal and breath sounds normal. No respiratory distress. He has no wheezes. He has no rales.   Abdominal: Soft. Bowel sounds are normal. There is no tenderness. There is no rebound and no guarding.   Musculoskeletal: He exhibits no edema.   Neurological: He is alert and oriented to person, place, and time.   Skin: Skin is warm and dry.   Psychiatric: His behavior is normal.           Current Outpatient Medications on File Prior to Visit   Medication Sig    ALPRAZolam (XANAX) 0.5 MG tablet Take 1 tablet (0.5 mg total) by mouth daily as needed for Anxiety.    ascorbic acid (VITAMIN C) 500 MG tablet Take 500 mg by mouth 2 (two) times daily.    aspirin (BABY ASPIRIN) 81 MG Chew Take 81 mg by mouth. 1 Tablet, Chewable Oral Every day    atorvastatin (LIPITOR) 80 MG tablet TAKE 1 TABLET BY MOUTH ONE TIME DAILY    carvedilol (COREG) 12.5 MG tablet Take 1 tablet (12.5 mg total) by mouth 2 (two) times daily with meals.    cilostazol (PLETAL) 50 MG Tab Take 1 tablet (50 mg total) by mouth 2 (two) times daily.    clopidogrel (PLAVIX) 75 mg tablet Take 1 tablet (75 mg total) by mouth once daily.    esomeprazole (NEXIUM) 20 MG capsule Take 20 mg by mouth before breakfast.    ezetimibe (ZETIA) 10 mg tablet TAKE 1 TABLET BY MOUTH ONE TIME DAILY    fluticasone propionate (FLONASE) 50 " mcg/actuation nasal spray USE 1 SPRAY (50 MCG TOTAL) BY EACH NARE ROUTE ONCE DAILY.    ibuprofen (ADVIL,MOTRIN) 800 MG tablet Take 1 tablet (800 mg total) by mouth every 6 (six) hours as needed for Pain.    lisinopril (PRINIVIL,ZESTRIL) 40 MG tablet TAKE 1 TABLET BY MOUTH ONE TIME DAILY    MULTIVITAMIN ORAL Take by mouth. 1  By mouth Every morning    nicotine (NICODERM CQ) 21 mg/24 hr Place 1 patch onto the skin once daily.    nitroGLYCERIN (NITROSTAT) 0.4 MG SL tablet Place 1 tablet (0.4 mg total) under the tongue every 5 (five) minutes as needed for Chest pain. 1 Tablet, Sublingual Sublingual    omega-3 fatty acids (FISH OIL) 500 mg Cap Take by mouth. 1 Capsule Oral     sertraline (ZOLOFT) 25 MG tablet Take 1 tablet (25 mg total) by mouth once daily.     Current Facility-Administered Medications on File Prior to Visit   Medication    lidocaine (PF) 10 mg/ml (1%) injection 10 mg       Lipid Panel:   Lab Results   Component Value Date    CHOL 138 08/16/2019    HDL 32 (L) 08/16/2019    LDLCALC 80.2 08/16/2019    TRIG 129 08/16/2019    CHOLHDL 23.2 08/16/2019         The 10-year ASCVD risk score (Javan CURRY Jr., et al., 2013) is: 20.8%    Values used to calculate the score:      Age: 59 years      Sex: Male      Is Non- : No      Diabetic: No      Tobacco smoker: Yes      Systolic Blood Pressure: 158 mmHg      Is BP treated: Yes      HDL Cholesterol: 32 mg/dL      Total Cholesterol: 138 mg/dL    All pertinent labs, imaging, and EKGs reviewed.    Assessment:       1. Atypical chest pain    2. Mixed hyperlipidemia    3. PAD (peripheral artery disease)    4. Essential hypertension    5. Coronary artery disease involving native coronary artery of native heart without angina pectoris    6. Peripheral vascular disease of extremity: s/p R. CHAVO DCB 10/19/2015         Plan:     Symptoms of atypical chest pain  BP elevated today  Home BP reportedly in the 130s systolic    Home BP  log  Echocardiogram  Nuclear stress test  Continue aspirin 81 mg PO Daily  Continue Plavix 75 mg PO Daily for least 6 months (06/14/2020)  Continue atorvastatin 80 mg PO Daily    Continue other cardiac medications  Mediterranean Diet/Cardiovascular Exercise Program    Patient queried and all questions were answered.    F/u in 6 months to reassess symptoms      Signed:    Harjinder Perez MD  1/2/2020 8:32 AM

## 2020-01-02 NOTE — LETTER
January 2, 2020      Blanca Arzola MD  [address] [facility] [address] [place] [address]           [facility]  [address] [place] [address]  Phone: [phone]          Patient: Jose G Shafer   MR Number: [id number]   YOB: 1960   Date of Visit: 1/2/2020       Dear Dr. Blanca Arzola:    Thank you for referring Jose G Shafer to me for evaluation. Attached you will find relevant portions of my assessment and plan of care.    If you have questions, please do not hesitate to call me. I look forward to following Jose G Shafer along with you.    Sincerely,    Harjinder Perez MD    Enclosure  CC:  No Recipients    If you would like to receive this communication electronically, please contact [email or web address] or [phone] to request more information on Dragon Ports Link access.    For providers and/or their staff who would like to refer a patient to [facility], please contact us through our one-stop-shop provider referral line, [facility], at [phone].    If you feel you have received this communication in error or would no longer like to receive these types of communications, please e-mail [email or web address]

## 2020-01-03 DIAGNOSIS — I25.10 CORONARY ARTERY DISEASE INVOLVING NATIVE CORONARY ARTERY OF NATIVE HEART WITHOUT ANGINA PECTORIS: Primary | ICD-10-CM

## 2020-01-15 ENCOUNTER — PATIENT MESSAGE (OUTPATIENT)
Dept: VASCULAR SURGERY | Facility: CLINIC | Age: 60
End: 2020-01-15

## 2020-01-20 ENCOUNTER — HOSPITAL ENCOUNTER (OUTPATIENT)
Dept: RADIOLOGY | Facility: HOSPITAL | Age: 60
Discharge: HOME | End: 2020-01-20
Attending: INTERNAL MEDICINE
Payer: COMMERCIAL

## 2020-01-20 ENCOUNTER — CLINICAL SUPPORT (OUTPATIENT)
Dept: CARDIOLOGY | Facility: CLINIC | Age: 60
End: 2020-01-20
Attending: INTERNAL MEDICINE
Payer: COMMERCIAL

## 2020-01-20 VITALS — BODY MASS INDEX: 31.35 KG/M2 | HEIGHT: 70 IN | WEIGHT: 219 LBS

## 2020-01-20 DIAGNOSIS — R07.89 ATYPICAL CHEST PAIN: ICD-10-CM

## 2020-01-20 DIAGNOSIS — I10 ESSENTIAL HYPERTENSION: ICD-10-CM

## 2020-01-20 PROCEDURE — 93306 ECHO (CUPID ONLY): ICD-10-PCS | Mod: S$GLB,,, | Performed by: INTERNAL MEDICINE

## 2020-01-20 PROCEDURE — 93015 STRESS TEST WITH MYOCARDIAL PERFUSION (CUPID ONLY): ICD-10-PCS | Mod: ,,, | Performed by: INTERNAL MEDICINE

## 2020-01-20 PROCEDURE — 99999 PR PBB SHADOW E&M-EST. PATIENT-LVL I: ICD-10-PCS | Mod: PBBFAC,,,

## 2020-01-20 PROCEDURE — 78452 STRESS TEST WITH MYOCARDIAL PERFUSION (CUPID ONLY): ICD-10-PCS | Mod: 26,,, | Performed by: INTERNAL MEDICINE

## 2020-01-20 PROCEDURE — 78452 HT MUSCLE IMAGE SPECT MULT: CPT | Mod: 26,,, | Performed by: INTERNAL MEDICINE

## 2020-01-20 PROCEDURE — 93306 TTE W/DOPPLER COMPLETE: CPT | Mod: S$GLB,,, | Performed by: INTERNAL MEDICINE

## 2020-01-20 PROCEDURE — 99999 PR PBB SHADOW E&M-EST. PATIENT-LVL I: CPT | Mod: PBBFAC,,,

## 2020-01-20 PROCEDURE — 93015 CV STRESS TEST SUPVJ I&R: CPT | Mod: ,,, | Performed by: INTERNAL MEDICINE

## 2020-01-21 LAB
AV INDEX (PROSTH): 0.62
AV MEAN GRADIENT: 5 MMHG
AV PEAK GRADIENT: 11 MMHG
AV VALVE AREA: 2.66 CM2
AV VELOCITY RATIO: 0.62
BSA FOR ECHO PROCEDURE: 2.21 M2
CV ECHO LV RWT: 0.31 CM
CV STRESS BASE HR: 74 BPM
DIASTOLIC BLOOD PRESSURE: 92 MMHG
DOP CALC AO PEAK VEL: 1.66 M/S
DOP CALC AO VTI: 32.45 CM
DOP CALC LVOT AREA: 4.3 CM2
DOP CALC LVOT DIAMETER: 2.34 CM
DOP CALC LVOT PEAK VEL: 1.03 M/S
DOP CALC LVOT STROKE VOLUME: 86.4 CM3
DOP CALCLVOT PEAK VEL VTI: 20.1 CM
E WAVE DECELERATION TIME: 143.01 MSEC
E/A RATIO: 1.22
ECHO LV POSTERIOR WALL: 0.85 CM (ref 0.6–1.1)
FRACTIONAL SHORTENING: 28 % (ref 28–44)
INTERVENTRICULAR SEPTUM: 0.85 CM (ref 0.6–1.1)
LA MAJOR: 4.87 CM
LA MINOR: 4.53 CM
LA WIDTH: 3.48 CM
LEFT ATRIUM SIZE: 4.23 CM
LEFT ATRIUM VOLUME INDEX: 27.1 ML/M2
LEFT ATRIUM VOLUME: 58.73 CM3
LEFT INTERNAL DIMENSION IN SYSTOLE: 3.96 CM (ref 2.1–4)
LEFT VENTRICLE DIASTOLIC VOLUME INDEX: 67.31 ML/M2
LEFT VENTRICLE DIASTOLIC VOLUME: 146.02 ML
LEFT VENTRICLE MASS INDEX: 79 G/M2
LEFT VENTRICLE SYSTOLIC VOLUME INDEX: 31.6 ML/M2
LEFT VENTRICLE SYSTOLIC VOLUME: 68.48 ML
LEFT VENTRICULAR INTERNAL DIMENSION IN DIASTOLE: 5.48 CM (ref 3.5–6)
LEFT VENTRICULAR MASS: 171.64 G
MV PEAK A VEL: 0.73 M/S
MV PEAK E VEL: 0.89 M/S
NUC REST EJECTION FRACTION: 62
OHS CV CPX 1 MINUTE RECOVERY HEART RATE: 105 BPM
OHS CV CPX 85 PERCENT MAX PREDICTED HEART RATE MALE: 136
OHS CV CPX MAX PREDICTED HEART RATE: 160
OHS CV CPX PATIENT IS FEMALE: 0
OHS CV CPX PATIENT IS MALE: 1
OHS CV CPX PEAK DIASTOLIC BLOOD PRESSURE: 92 MMHG
OHS CV CPX PEAK HEAR RATE: 111 BPM
OHS CV CPX PEAK RATE PRESSURE PRODUCT: NORMAL
OHS CV CPX PEAK SYSTOLIC BLOOD PRESSURE: 146 MMHG
OHS CV CPX PERCENT MAX PREDICTED HEART RATE ACHIEVED: 69
OHS CV CPX RATE PRESSURE PRODUCT PRESENTING: NORMAL
PISA TR MAX VEL: 2.27 M/S
PULM VEIN S/D RATIO: 1.34
PV PEAK D VEL: 0.41 M/S
PV PEAK S VEL: 0.55 M/S
RA MAJOR: 4.27 CM
RA PRESSURE: 3 MMHG
RA WIDTH: 2.72 CM
RIGHT VENTRICULAR END-DIASTOLIC DIMENSION: 3.3 CM
SINUS: 3.49 CM
STRESS ECHO TARGET HR: 136 BPM
SYSTOLIC BLOOD PRESSURE: 146 MMHG
TR MAX PG: 21 MMHG
TRICUSPID ANNULAR PLANE SYSTOLIC EXCURSION: 2.43 CM
TV REST PULMONARY ARTERY PRESSURE: 24 MMHG

## 2020-04-02 ENCOUNTER — PATIENT MESSAGE (OUTPATIENT)
Dept: FAMILY MEDICINE | Facility: CLINIC | Age: 60
End: 2020-04-02

## 2020-04-02 RX ORDER — ALPRAZOLAM 0.5 MG/1
0.5 TABLET ORAL DAILY PRN
Qty: 30 TABLET | Refills: 0 | Status: CANCELLED | OUTPATIENT
Start: 2020-04-02

## 2020-04-02 NOTE — TELEPHONE ENCOUNTER
Last office visit was 08/16/2019 with Dr. Vázquez, Patient requesting refill on pended medication.  Would you like a video visit?

## 2020-04-28 ENCOUNTER — TELEPHONE (OUTPATIENT)
Dept: CARDIOLOGY | Facility: CLINIC | Age: 60
End: 2020-04-28

## 2020-04-28 NOTE — TELEPHONE ENCOUNTER
----- Message from Jade Pacheco sent at 4/28/2020  3:30 PM CDT -----  Contact: [phone]  Patient is returning nurse's phone call, in regards to rescheduling an appt.  I attempted to schedule an appt dates show Sept 2020.  Please call patient back at [phone].

## 2020-07-10 DIAGNOSIS — Z76.0 MEDICATION REFILL: ICD-10-CM

## 2020-07-10 DIAGNOSIS — Z79.899 ENCOUNTER FOR LONG-TERM (CURRENT) USE OF MEDICATIONS: ICD-10-CM

## 2020-07-10 RX ORDER — SERTRALINE HYDROCHLORIDE 25 MG/1
25 TABLET, FILM COATED ORAL DAILY
Qty: 90 TABLET | Refills: 1 | Status: SHIPPED | OUTPATIENT
Start: 2020-07-10 | End: 2021-01-04

## 2020-07-16 ENCOUNTER — OFFICE VISIT (OUTPATIENT)
Dept: FAMILY MEDICINE | Facility: CLINIC | Age: 60
End: 2020-07-16
Payer: COMMERCIAL

## 2020-07-16 ENCOUNTER — LAB VISIT (OUTPATIENT)
Dept: LAB | Facility: HOSPITAL | Age: 60
End: 2020-07-16
Attending: FAMILY MEDICINE
Payer: COMMERCIAL

## 2020-07-16 VITALS
HEIGHT: 70 IN | WEIGHT: 222.19 LBS | BODY MASS INDEX: 31.81 KG/M2 | HEART RATE: 72 BPM | DIASTOLIC BLOOD PRESSURE: 98 MMHG | SYSTOLIC BLOOD PRESSURE: 142 MMHG | TEMPERATURE: 99 F

## 2020-07-16 DIAGNOSIS — R91.8 PULMONARY NODULES: ICD-10-CM

## 2020-07-16 DIAGNOSIS — Z00.01 ENCOUNTER FOR GENERAL ADULT MEDICAL EXAMINATION WITH ABNORMAL FINDINGS: Primary | ICD-10-CM

## 2020-07-16 DIAGNOSIS — R91.8 OTHER NONSPECIFIC ABNORMAL FINDING OF LUNG FIELD: ICD-10-CM

## 2020-07-16 DIAGNOSIS — Z79.899 ENCOUNTER FOR LONG-TERM (CURRENT) USE OF MEDICATIONS: ICD-10-CM

## 2020-07-16 DIAGNOSIS — Z13.6 ENCOUNTER FOR LIPID SCREENING FOR CARDIOVASCULAR DISEASE: ICD-10-CM

## 2020-07-16 DIAGNOSIS — Z12.5 ENCOUNTER FOR PROSTATE CANCER SCREENING: ICD-10-CM

## 2020-07-16 DIAGNOSIS — Z13.220 ENCOUNTER FOR LIPID SCREENING FOR CARDIOVASCULAR DISEASE: ICD-10-CM

## 2020-07-16 DIAGNOSIS — Z00.01 ENCOUNTER FOR GENERAL ADULT MEDICAL EXAMINATION WITH ABNORMAL FINDINGS: ICD-10-CM

## 2020-07-16 DIAGNOSIS — I10 ESSENTIAL HYPERTENSION: Chronic | ICD-10-CM

## 2020-07-16 DIAGNOSIS — F17.210 CIGARETTE SMOKER: ICD-10-CM

## 2020-07-16 DIAGNOSIS — Z76.0 MEDICATION REFILL: ICD-10-CM

## 2020-07-16 DIAGNOSIS — F41.9 ANXIETY: ICD-10-CM

## 2020-07-16 LAB
ALBUMIN SERPL BCP-MCNC: 4.3 G/DL (ref 3.5–5.2)
ALP SERPL-CCNC: 71 U/L (ref 55–135)
ALT SERPL W/O P-5'-P-CCNC: 31 U/L (ref 10–44)
ANION GAP SERPL CALC-SCNC: 9 MMOL/L (ref 8–16)
AST SERPL-CCNC: 24 U/L (ref 10–40)
BILIRUB SERPL-MCNC: 0.6 MG/DL (ref 0.1–1)
BUN SERPL-MCNC: 17 MG/DL (ref 6–20)
CALCIUM SERPL-MCNC: 9.5 MG/DL (ref 8.7–10.5)
CHLORIDE SERPL-SCNC: 108 MMOL/L (ref 95–110)
CHOLEST SERPL-MCNC: 121 MG/DL (ref 120–199)
CHOLEST/HDLC SERPL: 4.2 {RATIO} (ref 2–5)
CO2 SERPL-SCNC: 25 MMOL/L (ref 23–29)
CREAT SERPL-MCNC: 0.9 MG/DL (ref 0.5–1.4)
ERYTHROCYTE [DISTWIDTH] IN BLOOD BY AUTOMATED COUNT: 12.8 % (ref 11.5–14.5)
EST. GFR  (AFRICAN AMERICAN): >60 ML/MIN/1.73 M^2
EST. GFR  (NON AFRICAN AMERICAN): >60 ML/MIN/1.73 M^2
GLUCOSE SERPL-MCNC: 99 MG/DL (ref 70–110)
HCT VFR BLD AUTO: 44.6 % (ref 40–54)
HDLC SERPL-MCNC: 29 MG/DL (ref 40–75)
HDLC SERPL: 24 % (ref 20–50)
HGB BLD-MCNC: 14.7 G/DL (ref 14–18)
LDLC SERPL CALC-MCNC: 69 MG/DL (ref 63–159)
MCH RBC QN AUTO: 32.9 PG (ref 27–31)
MCHC RBC AUTO-ENTMCNC: 33 G/DL (ref 32–36)
MCV RBC AUTO: 100 FL (ref 82–98)
NONHDLC SERPL-MCNC: 92 MG/DL
PLATELET # BLD AUTO: 296 K/UL (ref 150–350)
PMV BLD AUTO: 10.2 FL (ref 9.2–12.9)
POTASSIUM SERPL-SCNC: 4.6 MMOL/L (ref 3.5–5.1)
PROT SERPL-MCNC: 7 G/DL (ref 6–8.4)
RBC # BLD AUTO: 4.47 M/UL (ref 4.6–6.2)
SODIUM SERPL-SCNC: 142 MMOL/L (ref 136–145)
TRIGL SERPL-MCNC: 115 MG/DL (ref 30–150)
TSH SERPL DL<=0.005 MIU/L-ACNC: 0.83 UIU/ML (ref 0.4–4)
WBC # BLD AUTO: 11.79 K/UL (ref 3.9–12.7)

## 2020-07-16 PROCEDURE — 84443 ASSAY THYROID STIM HORMONE: CPT

## 2020-07-16 PROCEDURE — 99213 PR OFFICE/OUTPT VISIT, EST, LEVL III, 20-29 MIN: ICD-10-PCS | Mod: 25,S$GLB,, | Performed by: FAMILY MEDICINE

## 2020-07-16 PROCEDURE — 80053 COMPREHEN METABOLIC PANEL: CPT

## 2020-07-16 PROCEDURE — 36415 COLL VENOUS BLD VENIPUNCTURE: CPT | Mod: PO

## 2020-07-16 PROCEDURE — 99396 PREV VISIT EST AGE 40-64: CPT | Mod: S$GLB,,, | Performed by: FAMILY MEDICINE

## 2020-07-16 PROCEDURE — 3080F DIAST BP >= 90 MM HG: CPT | Mod: CPTII,S$GLB,, | Performed by: FAMILY MEDICINE

## 2020-07-16 PROCEDURE — 85027 COMPLETE CBC AUTOMATED: CPT

## 2020-07-16 PROCEDURE — 99999 PR PBB SHADOW E&M-EST. PATIENT-LVL V: ICD-10-PCS | Mod: PBBFAC,,, | Performed by: FAMILY MEDICINE

## 2020-07-16 PROCEDURE — 3008F PR BODY MASS INDEX (BMI) DOCUMENTED: ICD-10-PCS | Mod: CPTII,S$GLB,, | Performed by: FAMILY MEDICINE

## 2020-07-16 PROCEDURE — 83036 HEMOGLOBIN GLYCOSYLATED A1C: CPT

## 2020-07-16 PROCEDURE — 3080F PR MOST RECENT DIASTOLIC BLOOD PRESSURE >= 90 MM HG: ICD-10-PCS | Mod: CPTII,S$GLB,, | Performed by: FAMILY MEDICINE

## 2020-07-16 PROCEDURE — 3077F PR MOST RECENT SYSTOLIC BLOOD PRESSURE >= 140 MM HG: ICD-10-PCS | Mod: CPTII,S$GLB,, | Performed by: FAMILY MEDICINE

## 2020-07-16 PROCEDURE — 3008F BODY MASS INDEX DOCD: CPT | Mod: CPTII,S$GLB,, | Performed by: FAMILY MEDICINE

## 2020-07-16 PROCEDURE — 80061 LIPID PANEL: CPT

## 2020-07-16 PROCEDURE — 99396 PR PREVENTIVE VISIT,EST,40-64: ICD-10-PCS | Mod: S$GLB,,, | Performed by: FAMILY MEDICINE

## 2020-07-16 PROCEDURE — 99999 PR PBB SHADOW E&M-EST. PATIENT-LVL V: CPT | Mod: PBBFAC,,, | Performed by: FAMILY MEDICINE

## 2020-07-16 PROCEDURE — 99213 OFFICE O/P EST LOW 20 MIN: CPT | Mod: 25,S$GLB,, | Performed by: FAMILY MEDICINE

## 2020-07-16 PROCEDURE — 3077F SYST BP >= 140 MM HG: CPT | Mod: CPTII,S$GLB,, | Performed by: FAMILY MEDICINE

## 2020-07-16 RX ORDER — ALPRAZOLAM 0.5 MG/1
0.5 TABLET ORAL DAILY PRN
Qty: 30 TABLET | Refills: 0 | Status: SHIPPED | OUTPATIENT
Start: 2020-07-16 | End: 2021-01-12 | Stop reason: SDUPTHER

## 2020-07-16 RX ORDER — ALPRAZOLAM 0.5 MG/1
0.5 TABLET ORAL DAILY PRN
Qty: 30 TABLET | Refills: 0 | Status: CANCELLED | OUTPATIENT
Start: 2020-07-16

## 2020-07-16 RX ORDER — CARVEDILOL 25 MG/1
25 TABLET ORAL 2 TIMES DAILY WITH MEALS
Qty: 180 TABLET | Refills: 3 | Status: SHIPPED | OUTPATIENT
Start: 2020-07-16 | End: 2021-07-13

## 2020-07-16 NOTE — PROGRESS NOTES
PLAN:      Problem List Items Addressed This Visit     Essential hypertension (Chronic)     Blood pressure check in two weeks.  Patient has cardiology follow-up soon.    Smoking cessation encouraged.Discussed hypertension disease course, DASH-diet and exercise.  Discussed medication regimen importance of treating high blood pressure.  Patient advised of risk of untreated blood pressure.    ER precautions were given for symptoms of hypertensive urgency and emergency.           Encounter for long-term (current) use of medications (Chronic)     Complete history and physical was completed today.  Complete and thorough medication reconciliation was performed.  Discussed risks and benefits of medications.  Advised patient on orders and health maintenance.  We discussed old records and old labs if available.  Will request any records not available through epic.  Continue current medications listed on your summary sheet.           Relevant Medications    ALPRAZolam (XANAX) 0.5 MG tablet    carvediloL (COREG) 25 MG tablet    Other Relevant Orders    CBC Without Differential    TSH    Comprehensive metabolic panel    Hemoglobin A1C    Lipid Panel    Medication refill (Chronic)    Relevant Medications    carvediloL (COREG) 25 MG tablet    Cigarette smoker (Chronic)     Patient has still not stop smoking.  But is trying.         Relevant Orders    CT Chest Lung Screening Low Dose    Anxiety (Chronic)     Refill Xanax.  Follow-up in six months.  Patient is also taking Zoloft 25 mg daily.Discussed anxiety condition course.  Discussed SSRI as first-line treatment for this condition.  Discussed risk of discontinuing this medication without tapering.  Patient was educated, advised of side effects, and all questions were answered.  Patient voiced understanding.  Patient will follow up routinely and notify us if having any side effects or worsening or persistent symptoms.  ER precautions were given.           Relevant Medications     ALPRAZolam (XANAX) 0.5 MG tablet    Pulmonary nodules (Chronic)     Stop smoking.  Update low-dose CT scan.         Relevant Orders    CT Chest Lung Screening Low Dose    Other nonspecific abnormal finding of lung field    Relevant Orders    CT Chest Lung Screening Low Dose      Other Visit Diagnoses     Encounter for general adult medical examination with abnormal findings    -  Primary    Relevant Orders    CBC Without Differential    TSH    Comprehensive metabolic panel    Hemoglobin A1C    Lipid Panel    Encounter for lipid screening for cardiovascular disease        Relevant Orders    Lipid Panel        Future Appointments     Date Provider Specialty Appt Notes    7/30/2020  Family Medicine 2 week BP Check    9/22/2020 Harjinder Perez MD Cardiology 6 month f/u    1/5/2021  Lab 6 month labs    1/15/2021 Leonardo Vázquez MD Family Medicine 6 month f/u           Medication List with Changes/Refills   Current Medications    ASCORBIC ACID (VITAMIN C) 500 MG TABLET    Take 500 mg by mouth 2 (two) times daily.    ASPIRIN (BABY ASPIRIN) 81 MG CHEW    Take 81 mg by mouth. 1 Tablet, Chewable Oral Every day    ATORVASTATIN (LIPITOR) 80 MG TABLET    TAKE 1 TABLET BY MOUTH ONE TIME DAILY    CILOSTAZOL (PLETAL) 50 MG TAB    Take 1 tablet (50 mg total) by mouth 2 (two) times daily.    CLOPIDOGREL (PLAVIX) 75 MG TABLET    Take 1 tablet (75 mg total) by mouth once daily.    ESOMEPRAZOLE (NEXIUM) 20 MG CAPSULE    Take 20 mg by mouth before breakfast.    EZETIMIBE (ZETIA) 10 MG TABLET    TAKE 1 TABLET BY MOUTH ONE TIME DAILY    FLUTICASONE PROPIONATE (FLONASE) 50 MCG/ACTUATION NASAL SPRAY    USE 1 SPRAY (50 MCG TOTAL) BY EACH NARE ROUTE ONCE DAILY.    IBUPROFEN (ADVIL,MOTRIN) 800 MG TABLET    Take 1 tablet (800 mg total) by mouth every 6 (six) hours as needed for Pain.    LISINOPRIL (PRINIVIL,ZESTRIL) 40 MG TABLET    TAKE 1 TABLET BY MOUTH ONE TIME DAILY    MULTIVITAMIN ORAL    Take by mouth. 1  By mouth Every morning     NICOTINE (NICODERM CQ) 21 MG/24 HR    Place 1 patch onto the skin once daily.    NITROGLYCERIN (NITROSTAT) 0.4 MG SL TABLET    Place 1 tablet (0.4 mg total) under the tongue every 5 (five) minutes as needed for Chest pain. 1 Tablet, Sublingual Sublingual    OMEGA-3 FATTY ACIDS (FISH OIL) 500 MG CAP    Take by mouth. 1 Capsule Oral     SERTRALINE (ZOLOFT) 25 MG TABLET    Take 1 tablet (25 mg total) by mouth once daily.   Changed and/or Refilled Medications    Modified Medication Previous Medication    ALPRAZOLAM (XANAX) 0.5 MG TABLET ALPRAZolam (XANAX) 0.5 MG tablet       Take 1 tablet (0.5 mg total) by mouth daily as needed for Anxiety.    Take 1 tablet (0.5 mg total) by mouth daily as needed for Anxiety.    CARVEDILOL (COREG) 25 MG TABLET carvedilol (COREG) 12.5 MG tablet       Take 1 tablet (25 mg total) by mouth 2 (two) times daily with meals.    Take 1 tablet (12.5 mg total) by mouth 2 (two) times daily with meals.       Leonardo Vázquez M.D.     ==========================================================================  Subjective:      Patient ID: Jose G Shaefr is a 60 y.o. male.  has a past medical history of Anticoagulant long-term use, Anxiety, BCC (basal cell carcinoma), CAD (coronary artery disease), Erectile dysfunction, GERD (gastroesophageal reflux disease), HLD (hyperlipidemia), HTN (hypertension), Impaired fasting glucose, and Lateral epicondylitis of left elbow (7/18/2019).     Chief Complaint: Annual Exam      Problem List Items Addressed This Visit     Essential hypertension (Chronic)    Overview     CHRONIC.  July 2020:  Uncontrolled today. BP Reviewed.  Compliant with BP medications. No SE reported.  Patient reports that he smoked a cigarette prior to coming into the office.  (-) CP, SOB, palpitations, dizziness, lightheadedness, HA, arm numbness, tingling or weakness, syncope.  Creatinine   Date Value Ref Range Status   07/16/2020 0.9 0.5 - 1.4 mg/dL Final              Current Assessment  & Plan     Blood pressure check in two weeks.  Patient has cardiology follow-up soon.    Smoking cessation encouraged.Discussed hypertension disease course, DASH-diet and exercise.  Discussed medication regimen importance of treating high blood pressure.  Patient advised of risk of untreated blood pressure.    ER precautions were given for symptoms of hypertensive urgency and emergency.           Encounter for long-term (current) use of medications (Chronic)    Overview     07/18/2019 CHRONIC long-term drug therapy for managed conditions. See medication list. Reports compliance.  No side effects reported.  Routine lab work is being monitored.  Patient does  need refills today.    ===============================  08/16/2019Reports doing well with med regimen. Legs are better on Pletal. Due for Annual labs today.   =====================================================  JULY 2020:  Reviewed labs with the patient.  CHRONIC. Stable. Compliant with medications for managed conditions. See medication list. No SE reported.   Routine lab analysis is being monitored. Refills were addressed.  Lab Results   Component Value Date    WBC 11.79 07/16/2020    HGB 14.7 07/16/2020    HCT 44.6 07/16/2020     (H) 07/16/2020     07/16/2020         Chemistry        Component Value Date/Time     07/16/2020 1011    K 4.6 07/16/2020 1011     07/16/2020 1011    CO2 25 07/16/2020 1011    BUN 17 07/16/2020 1011    CREATININE 0.9 07/16/2020 1011    GLU 99 07/16/2020 1011        Component Value Date/Time    CALCIUM 9.5 07/16/2020 1011    ALKPHOS 71 07/16/2020 1011    AST 24 07/16/2020 1011    ALT 31 07/16/2020 1011    BILITOT 0.6 07/16/2020 1011    ESTGFRAFRICA >60.0 07/16/2020 1011    EGFRNONAA >60.0 07/16/2020 1011          Lab Results   Component Value Date    TSH 0.834 07/16/2020    K2OSBUK 5.9 08/16/2019    T3FREE 2.8 08/16/2019       ====================================================         Current Assessment &  Plan     Complete history and physical was completed today.  Complete and thorough medication reconciliation was performed.  Discussed risks and benefits of medications.  Advised patient on orders and health maintenance.  We discussed old records and old labs if available.  Will request any records not available through epic.  Continue current medications listed on your summary sheet.           Medication refill (Chronic)    Overview     Patient any medication refills.  Reports compliance.  No side effects reported.         Cigarette smoker (Chronic)    Overview     Current smoker.  Would like to start nicotine replacement to stop         Current Assessment & Plan     Patient has still not stop smoking.  But is trying.         Anxiety (Chronic)    Overview     Chronic.  Stable.  Needs refill on medications.  Patient reports compliance.  No side effects reported.The patient was checked in the [facility] [employer] [facility] [employer]acy's  Prescription Monitoring Program. There appears to be no incongruities with the patient's verbalized history.            Current Assessment & Plan     Refill Xanax.  Follow-up in six months.  Patient is also taking Zoloft 25 mg daily.Discussed anxiety condition course.  Discussed SSRI as first-line treatment for this condition.  Discussed risk of discontinuing this medication without tapering.  Patient was educated, advised of side effects, and all questions were answered.  Patient voiced understanding.  Patient will follow up routinely and notify us if having any side effects or worsening or persistent symptoms.  ER precautions were given.           Pulmonary nodules (Chronic)    Overview     Reading Physician Reading Date Result Priority   Kurt Bender MD  [phone] 9/25/2018       Narrative & Impression     EXAMINATION:  CT CHEST WITHOUT CONTRAST     CLINICAL HISTORY:  pulmonary micronodules noted CT lung screenign 2017; Other nonspecific abnormal finding of lung  field     TECHNIQUE:  Low dose axial images, sagittal and coronal reformations were obtained from the thoracic inlet to the lung bases. Contrast was not administered.     COMPARISON:  CT chest without contrast-06/22/2017.     FINDINGS:  The soft tissue structures at the base of the neck are unremarkable.  There is prominent calcified atherosclerotic plaque deposition observed at the origin of the left subclavian artery which results in < 50% luminal stenosis.  There is a bovine arch present as an anatomic variant with common origin of the innominate artery and left common carotid artery.  There are prominent coronary artery calcifications present.  There is trace pericardial fluid present within the anterior pericardium inferiorly (series 2, image 78).  No lymphadenopathy in the chest or axilla.  The trachea and mainstem bronchi are unremarkable.     There are multiple subpleural blebs present within both upper lobes compatible with chronic obstructive airways disease/emphysema.  There are several scattered solid nodules in the left and right chest which are unchanged in number or size when compared to the previous examination.  There is an unchanged solid 4 mm right upper lobe pulmonary nodule (series 4, image 72).  There is a 3 mm solid nodule within the right upper lobe abutting the intralobar fissure anteriorly (series 4, image 130) and there is a 4 mm solid nodule within the superior segment of the right upper lobe (series 4, image 153), both unchanged.  There are scattered areas of linear atelectasis and/or fibrotic scarring within the left and right chest.  There is a 3 mm solid subpleural nodule present within the left lower lobe (series 4, image 162), and there is a 4 mm left lower lobe solid pulmonary nodule (series 4, image 149), both unchanged.  There is a 3 mm left lower lobe pulmonary nodule (series 4, image 127), unchanged.  No new zone of airspace consolidation or new pulmonary mass.  No significant  volume of pleural fluid or pneumothorax.     There is diffuse fatty infiltration of the liver.  There is a 23 mm probable cyst at the upper pole of the left kidney which measures 2 HU (series 2, image 98).  The presence of a cyst could be confirmed with renal ultrasound.  There is an 11 mm left adrenal adenoma present which measures 7.5 HU on the non-contrast images, unchanged when compared to the previous study.  There is nonspecific perinephric fluid noted bilaterally.     There is advanced multilevel degenerative change of the lower thoracic spine in the form of marginal osteophyte formation and degenerative disc disease.     IMPRESSION:      1. No interval detrimental change when compared to the prior study.  Multiple solid pulmonary nodules which range in size from 3-4 mm.  For multiple solid nodules all <6 mm, Fleischner Society 2017 guidelines recommend no routine follow up for a low risk patient, or follow up with non-contrast chest CT at 12 months after discovery in a high risk patient.  The current examination represents a 15 month follow-up examination.  2. Imaging findings which suggest chronic obstructive airways disease  3. Fatty infiltration of the liver.  4. 23 mm probable cyst at the upper pole of the left kidney, unchanged.  Consider confirmation with renal ultrasound.  5. 11 mm lipid rich left adrenal adenoma, unchanged        Electronically signed by: Subhash Bender MD  Date:                                            09/25/2018  Time:                                           08:32              Current Assessment & Plan     Stop smoking.  Update low-dose CT scan.         Other nonspecific abnormal finding of lung field    Overview     Patient had previous abnormal CT scan lungs.  Will go in set up repeat.           Other Visit Diagnoses     Encounter for general adult medical examination with abnormal findings    -  Primary    Encounter for lipid screening for cardiovascular disease                Past Medical History:  Past Medical History:   Diagnosis Date    Anticoagulant long-term use     Anxiety     BCC (basal cell carcinoma)     CAD (coronary artery disease)     Erectile dysfunction     GERD (gastroesophageal reflux disease)     HLD (hyperlipidemia)     HTN (hypertension)     Impaired fasting glucose     Lateral epicondylitis of left elbow 7/18/2019     Past Surgical History:   Procedure Laterality Date    ANGIOPLASTY      AORTOGRAPHY WITH SERIALOGRAPHY N/A 11/22/2019    Procedure: AORTOGRAM, WITH SERIALOGRAPHY;  Surgeon: Blanca Arzola MD;  Location: [facility] CATH;  Service: Cardiovascular;  Laterality: N/A;    AORTOGRAPHY WITH SERIALOGRAPHY N/A 12/13/2019    Procedure: AORTOGRAM, WITH SERIALOGRAPHY;  Surgeon: Blanca Arzola MD;  [facility];  Service: Cardiovascular;  Laterality: N/A;    CARDIAC SURGERY  2005    stents placed    COLONOSCOPY N/A 7/17/2017    Procedure: COLONOSCOPY Miralax;  Surgeon: Jeremiah Syed Jr., MD;  Location: [facility] ENDO;  Service: Endoscopy;  Laterality: N/A;    KNEE ARTHROSCOPY W/ MENISCECTOMY      PERCUTANEOUS TRANSLUMINAL ANGIOPLASTY N/A 11/22/2019    Procedure: Pta (Angioplasty, Percutaneous, Transluminal);  Surgeon: Blanca Arzola MD;  [facility];  Service: Cardiovascular;  Laterality: N/A;    PERCUTANEOUS TRANSLUMINAL ANGIOPLASTY N/A 12/13/2019    Procedure: Pta (Angioplasty, Percutaneous, Transluminal);  Surgeon: Blanca Arzola MD;  [facility];  Service: Cardiovascular;  Laterality: N/A;    VARICOSE VEIN SURGERY      left saphenous     Review of patient's allergies indicates:  No Known Allergies  Medication List with Changes/Refills   Current Medications    ASCORBIC ACID (VITAMIN C) 500 MG TABLET    Take 500 mg by mouth 2 (two) times daily.    ASPIRIN (BABY ASPIRIN) 81 MG CHEW    Take 81 mg by mouth. 1 Tablet, Chewable Oral Every day    ATORVASTATIN (LIPITOR) 80 MG TABLET    TAKE 1 TABLET BY MOUTH ONE TIME DAILY    CILOSTAZOL (PLETAL)  50 MG TAB    Take 1 tablet (50 mg total) by mouth 2 (two) times daily.    CLOPIDOGREL (PLAVIX) 75 MG TABLET    Take 1 tablet (75 mg total) by mouth once daily.    ESOMEPRAZOLE (NEXIUM) 20 MG CAPSULE    Take 20 mg by mouth before breakfast.    EZETIMIBE (ZETIA) 10 MG TABLET    TAKE 1 TABLET BY MOUTH ONE TIME DAILY    FLUTICASONE PROPIONATE (FLONASE) 50 MCG/ACTUATION NASAL SPRAY    USE 1 SPRAY (50 MCG TOTAL) BY EACH NARE ROUTE ONCE DAILY.    IBUPROFEN (ADVIL,MOTRIN) 800 MG TABLET    Take 1 tablet (800 mg total) by mouth every 6 (six) hours as needed for Pain.    LISINOPRIL (PRINIVIL,ZESTRIL) 40 MG TABLET    TAKE 1 TABLET BY MOUTH ONE TIME DAILY    MULTIVITAMIN ORAL    Take by mouth. 1  By mouth Every morning    NICOTINE (NICODERM CQ) 21 MG/24 HR    Place 1 patch onto the skin once daily.    NITROGLYCERIN (NITROSTAT) 0.4 MG SL TABLET    Place 1 tablet (0.4 mg total) under the tongue every 5 (five) minutes as needed for Chest pain. 1 Tablet, Sublingual Sublingual    OMEGA-3 FATTY ACIDS (FISH OIL) 500 MG CAP    Take by mouth. 1 Capsule Oral     SERTRALINE (ZOLOFT) 25 MG TABLET    Take 1 tablet (25 mg total) by mouth once daily.   Changed and/or Refilled Medications    Modified Medication Previous Medication    ALPRAZOLAM (XANAX) 0.5 MG TABLET ALPRAZolam (XANAX) 0.5 MG tablet       Take 1 tablet (0.5 mg total) by mouth daily as needed for Anxiety.    Take 1 tablet (0.5 mg total) by mouth daily as needed for Anxiety.    CARVEDILOL (COREG) 25 MG TABLET carvedilol (COREG) 12.5 MG tablet       Take 1 tablet (25 mg total) by mouth 2 (two) times daily with meals.    Take 1 tablet (12.5 mg total) by mouth 2 (two) times daily with meals.      Social History     Tobacco Use    Smoking status: Current Every Day Smoker     Packs/day: 1.00     Years: 30.00     Pack years: 30.00     Types: Cigarettes    Smokeless tobacco: Never Used   Substance Use Topics    Alcohol use: Yes     Alcohol/week: 20.0 standard drinks     Types: 20 Cans  of beer per week     Frequency: 2-3 times a week     Drinks per session: 3 or 4     Binge frequency: Less than monthly     Comment: every now and then per pt      Family History   Problem Relation Age of Onset    Diabetes Brother     Coronary artery disease Brother         premature    Heart disease Brother     Hypertension Brother     Stomach cancer Father     No Known Problems Mother     No Known Problems Sister     No Known Problems Maternal Grandmother     No Known Problems Maternal Grandfather     No Known Problems Paternal Grandmother     No Known Problems Paternal Grandfather     No Known Problems Maternal Aunt     No Known Problems Maternal Uncle     No Known Problems Paternal Aunt     No Known Problems Paternal Uncle     Prostate cancer Neg Hx     Colon cancer Neg Hx     Anemia Neg Hx     Arrhythmia Neg Hx     Asthma Neg Hx     Clotting disorder Neg Hx     Fainting Neg Hx     Heart attack Neg Hx     Heart disease Neg Hx     Heart failure Neg Hx     Hyperlipidemia Neg Hx     Hypertension Neg Hx     Stroke Neg Hx     Atrial Septal Defect Neg Hx        I have reviewed the complete PMH, social history, surgical history, allergies and medications.  As well as family history.    Review of Systems   Constitutional: Negative for activity change and unexpected weight change.   HENT: Negative for hearing loss, rhinorrhea and trouble swallowing.    Eyes: Negative for discharge and visual disturbance.   Respiratory: Negative for chest tightness and wheezing.    Cardiovascular: Negative for chest pain and palpitations.   Gastrointestinal: Negative for blood in stool, constipation, diarrhea and vomiting.   Endocrine: Negative for polydipsia and polyuria.   Genitourinary: Negative for difficulty urinating, hematuria and urgency.   Musculoskeletal: Negative for arthralgias, joint swelling and neck pain.   Neurological: Negative for weakness and headaches.   Psychiatric/Behavioral: Negative for  "confusion and dysphoric mood. The patient is nervous/anxious.      Objective:   BP (!) 142/98 (BP Location: Right arm, Patient Position: Sitting, BP Method: Medium (Manual))   Pulse 72   Temp 98.8 °F (37.1 °C) (Oral)   Ht 5' 10" (1.778 m)   Wt 100.8 kg (222 lb 3.2 oz)   BMI 31.88 kg/m²   Physical Exam  Vitals signs and nursing note reviewed.   Constitutional:       General: He is not in acute distress.     Appearance: He is well-developed.   HENT:      Head: Normocephalic and atraumatic.   Eyes:      Pupils: Pupils are equal, round, and reactive to light.   Neck:      Musculoskeletal: Normal range of motion and neck supple.   Cardiovascular:      Rate and Rhythm: Normal rate and regular rhythm.      Heart sounds: Normal heart sounds. No murmur.   Pulmonary:      Effort: Pulmonary effort is normal. No respiratory distress.      Breath sounds: Normal breath sounds. No wheezing.   Abdominal:      General: Bowel sounds are normal.      Palpations: Abdomen is soft.   Musculoskeletal: Normal range of motion.   Skin:     General: Skin is warm and dry.      Capillary Refill: Capillary refill takes less than 2 seconds.   Neurological:      Mental Status: He is alert and oriented to person, place, and time.      Cranial Nerves: No cranial nerve deficit.   Psychiatric:         Attention and Perception: Attention and perception normal.         Mood and Affect: Mood is anxious.         Behavior: Behavior normal.         Thought Content: Thought content is not paranoid or delusional. Thought content does not include homicidal or suicidal ideation. Thought content does not include homicidal or suicidal plan.         Assessment:     1. Encounter for general adult medical examination with abnormal findings    2. Encounter for long-term (current) use of medications    3. Anxiety    4. Encounter for lipid screening for cardiovascular disease    5. Medication refill    6. Other nonspecific abnormal finding of lung field    7. " Pulmonary nodules    8. Cigarette smoker    9. Essential hypertension      MDM:   Patient here for annual wellness exam.  This noted specifically for chronic conditions and med refills.  I have Reviewed and summarized old records.  I have performed thorough medication reconciliation today and discussed risk and benefits of each medication.  I have reviewed labs and discussed with patient.  All questions were answered.  I am requesting old records and will review them once they are available.    I have signed for the following orders AND/OR meds.  Orders Placed This Encounter   Procedures    CT Chest Lung Screening Low Dose     Standing Status:   Future     Standing Expiration Date:   7/16/2021     Order Specific Question:   Is there documentation of shared decision making for this lung screening exam?     Answer:   Yes     Order Specific Question:   Are you a current or former smoker who quit within the past 15 years?     Answer:   Yes     Order Specific Question:   Are you between age 55-77 years old?     Answer:   Yes     Order Specific Question:   Has the patient smoked 30 or more packs of cigarettes/year?     Answer:   Yes     Order Specific Question:   Does the patient show any signs or symptoms of lung cancer?     Answer:   No     Order Specific Question:   Is this the first (baseline) CT or an annual exam?     Answer:   Annual [2]     Order Specific Question:   May the Radiologist modify the order per protocol to meet the clinical needs of the patient?     Answer:   Yes    CBC Without Differential     Standing Status:   Standing     Number of Occurrences:   99     Standing Expiration Date:   9/14/2021    TSH     Standing Status:   Standing     Number of Occurrences:   99     Standing Expiration Date:   9/14/2021    Comprehensive metabolic panel     Standing Status:   Standing     Number of Occurrences:   99     Standing Expiration Date:   7/11/2040    Hemoglobin A1C     Standing Status:   Standing      Number of Occurrences:   99     Standing Expiration Date:   7/11/2040    Lipid Panel     Standing Status:   Standing     Number of Occurrences:   99     Standing Expiration Date:   7/11/2040     Medications Ordered This Encounter   Medications    ALPRAZolam (XANAX) 0.5 MG tablet     Sig: Take 1 tablet (0.5 mg total) by mouth daily as needed for Anxiety.     Dispense:  30 tablet     Refill:  0    carvediloL (COREG) 25 MG tablet     Sig: Take 1 tablet (25 mg total) by mouth 2 (two) times daily with meals.     Dispense:  180 tablet     Refill:  3     .        Follow up in about 6 months (around 1/16/2021), or if symptoms worsen or fail to improve, for Med refills, LAB RESULTS, 2 weeks for BP check.    If no improvement in symptoms or symptoms worsen, advised to call/follow-up at clinic or go to ER. Patient voiced understanding and all questions/concerns were addressed.     DISCLAIMER: This note was compiled by using a speech recognition dictation system and therefore please be aware that typographical / speech recognition errors can and do occur.  Please contact me if you see any errors specifically.    Leonardo Vázquez M.D.       Office: [phone]   [address] [place] [address]  FAX: [phone]

## 2020-07-16 NOTE — PATIENT INSTRUCTIONS
Follow up in about 6 months (around 1/16/2021), or if symptoms worsen or fail to improve, for Med refills, LAB RESULTS, 2 weeks for BP check.     If no improvement in symptoms or symptoms worsen, please be advised to call MD, follow-up at clinic and/or go to ER if becomes severe.    Leonardo Vázquez M.D.        We Offer TELEHEALTH & Same Day Appointments!   Book your Telehealth appointment with me through my nurse or   Clinic appointments on [employer]!    [address] [place] [address]    Office: [phone]   FAX: [phone]    Check out my Facebook Page and Follow Me at: [email or web address]    Check out my website at [employer] by clicking on: [email or web address]    To Schedule appointments online, go to [employer]: [email or web address]

## 2020-07-17 LAB
ESTIMATED AVG GLUCOSE: 117 MG/DL (ref 68–131)
HBA1C MFR BLD HPLC: 5.7 % (ref 4–5.6)

## 2020-07-17 NOTE — PROGRESS NOTES
Please CALL patient with results and Document verification.   [phone]    All labs are stable.  Repeat in six months prior to appointment

## 2020-07-18 PROBLEM — F41.9 ANXIETY: Chronic | Status: ACTIVE | Noted: 2020-07-16

## 2020-07-18 PROBLEM — R91.8 PULMONARY NODULES: Chronic | Status: ACTIVE | Noted: 2020-07-16

## 2020-07-18 NOTE — ASSESSMENT & PLAN NOTE
Blood pressure check in two weeks.  Patient has cardiology follow-up soon.    Smoking cessation encouraged.Discussed hypertension disease course, DASH-diet and exercise.  Discussed medication regimen importance of treating high blood pressure.  Patient advised of risk of untreated blood pressure.    ER precautions were given for symptoms of hypertensive urgency and emergency.

## 2020-07-18 NOTE — PROGRESS NOTES
This note is specifically for wellness visit performed today.     WELLNESS EXAM      Patient ID: Jose G Shafer is a 60 y.o. male.  has a past medical history of Anticoagulant long-term use, Anxiety, BCC (basal cell carcinoma), CAD (coronary artery disease), Erectile dysfunction, GERD (gastroesophageal reflux disease), HLD (hyperlipidemia), HTN (hypertension), Impaired fasting glucose, and Lateral epicondylitis of left elbow (7/18/2019).     Chief Complaint:  Encounter for wellness exam    Well Adult Physical: Patient here for a comprehensive physical exam.The patient reports chronic problems.  Follow-up with Cardiology soon.  Blood pressure is elevated today.  Patient denies any symptoms.  Do you take any herbs or supplements that were not prescribed by a doctor? no   Are you taking calcium supplements? no   Are you taking aspirin daily?  Yes   History:  None reported  UROLOGIST:  None  Date last prostate exam: Not available  Date last PSA:  Reviewed next PSA due in August 2020  Lab Results   Component Value Date    PSA 0.71 08/16/2019    PSA 0.55 09/25/2018    PSA 0.77 06/13/2017      No history of STDs    Health Maintenance Topics with due status: Not Due       Topic Last Completion Date    TETANUS VACCINE 11/23/2015    Colorectal Cancer Screening 07/17/2017    Influenza Vaccine 09/21/2018    PROSTATE-SPECIFIC ANTIGEN 08/16/2019    High Dose Statin 07/16/2020    Lipid Panel 07/16/2020        ==============================================  History reviewed.     Health Maintenance Due   Topic Date Due    Shingles Vaccine (1 of 2) 01/04/2010    LDCT Lung Screen  09/25/2019       Past Medical History:  Past Medical History:   Diagnosis Date    Anticoagulant long-term use     Anxiety     BCC (basal cell carcinoma)     CAD (coronary artery disease)     Erectile dysfunction     GERD (gastroesophageal reflux disease)     HLD (hyperlipidemia)     HTN (hypertension)     Impaired fasting glucose      Lateral epicondylitis of left elbow 7/18/2019     Past Surgical History:   Procedure Laterality Date    ANGIOPLASTY      AORTOGRAPHY WITH SERIALOGRAPHY N/A 11/22/2019    Procedure: AORTOGRAM, WITH SERIALOGRAPHY;  Surgeon: Blanca Arzola MD;  Location: [facility] CATH;  Service: Cardiovascular;  Laterality: N/A;    AORTOGRAPHY WITH SERIALOGRAPHY N/A 12/13/2019    Procedure: AORTOGRAM, WITH SERIALOGRAPHY;  Surgeon: Blanca Arzola MD;  Location: [facility] CATH;  Service: Cardiovascular;  Laterality: N/A;    CARDIAC SURGERY  2005    stents placed    COLONOSCOPY N/A 7/17/2017    Procedure: COLONOSCOPY Miralax;  Surgeon: Jeremiah Syed Jr., MD;  Location: [facility] ENDO;  Service: Endoscopy;  Laterality: N/A;    KNEE ARTHROSCOPY W/ MENISCECTOMY      PERCUTANEOUS TRANSLUMINAL ANGIOPLASTY N/A 11/22/2019    Procedure: Pta (Angioplasty, Percutaneous, Transluminal);  Surgeon: Blanca Arzola MD;  Location: [facility] CATH;  Service: Cardiovascular;  Laterality: N/A;    PERCUTANEOUS TRANSLUMINAL ANGIOPLASTY N/A 12/13/2019    Procedure: Pta (Angioplasty, Percutaneous, Transluminal);  Surgeon: Blanca Arzola MD;  Location: [facility] CATH;  Service: Cardiovascular;  Laterality: N/A;    VARICOSE VEIN SURGERY      left saphenous     Review of patient's allergies indicates:  No Known Allergies  Current Outpatient Medications on File Prior to Visit   Medication Sig Dispense Refill    ascorbic acid (VITAMIN C) 500 MG tablet Take 500 mg by mouth 2 (two) times daily.      aspirin (BABY ASPIRIN) 81 MG Chew Take 81 mg by mouth. 1 Tablet, Chewable Oral Every day      atorvastatin (LIPITOR) 80 MG tablet TAKE 1 TABLET BY MOUTH ONE TIME DAILY 90 tablet 3    cilostazol (PLETAL) 50 MG Tab Take 1 tablet (50 mg total) by mouth 2 (two) times daily. 60 tablet 11    clopidogrel (PLAVIX) 75 mg tablet Take 1 tablet (75 mg total) by mouth once daily. 180 tablet 1    esomeprazole (NEXIUM) 20 MG capsule Take 20 mg by mouth before breakfast.      ezetimibe (ZETIA) 10 mg  tablet TAKE 1 TABLET BY MOUTH ONE TIME DAILY 90 tablet 3    fluticasone propionate (FLONASE) 50 mcg/actuation nasal spray USE 1 SPRAY (50 MCG TOTAL) BY EACH NARE ROUTE ONCE DAILY. 16 mL 11    ibuprofen (ADVIL,MOTRIN) 800 MG tablet Take 1 tablet (800 mg total) by mouth every 6 (six) hours as needed for Pain. 30 tablet 1    lisinopril (PRINIVIL,ZESTRIL) 40 MG tablet TAKE 1 TABLET BY MOUTH ONE TIME DAILY 90 tablet 3    MULTIVITAMIN ORAL Take by mouth. 1  By mouth Every morning      nicotine (NICODERM CQ) 21 mg/24 hr Place 1 patch onto the skin once daily. 28 patch 0    nitroGLYCERIN (NITROSTAT) 0.4 MG SL tablet Place 1 tablet (0.4 mg total) under the tongue every 5 (five) minutes as needed for Chest pain. 1 Tablet, Sublingual Sublingual 100 tablet 3    omega-3 fatty acids (FISH OIL) 500 mg Cap Take by mouth. 1 Capsule Oral       sertraline (ZOLOFT) 25 MG tablet Take 1 tablet (25 mg total) by mouth once daily. 90 tablet 1     Current Facility-Administered Medications on File Prior to Visit   Medication Dose Route Frequency Provider Last Rate Last Dose    lidocaine (PF) 10 mg/ml (1%) injection 10 mg  1 mL Intradermal Once Xavi Cornelius MD         Social History     Socioeconomic History    Marital status:      Spouse name: Not on file    Number of children: Not on file    Years of education: Not on file    Highest education level: Not on file   Occupational History    Not on file   Social Needs    Financial resource strain: Not hard at all    Food insecurity     Worry: Never true     Inability: Never true    Transportation needs     Medical: No     Non-medical: No   Tobacco Use    Smoking status: Current Every Day Smoker     Packs/day: 1.00     Years: 30.00     Pack years: 30.00     Types: Cigarettes    Smokeless tobacco: Never Used   Substance and Sexual Activity    Alcohol use: Yes     Alcohol/week: 20.0 standard drinks     Types: 20 Cans of beer per week     Frequency: 2-3 times a week      Drinks per session: 3 or 4     Binge frequency: Less than monthly     Comment: every now and then per pt    Drug use: No    Sexual activity: Yes     Partners: Female   Lifestyle    Physical activity     Days per week: 2 days     Minutes per session: 60 min    Stress: Only a little   Relationships    Social connections     Talks on phone: Once a week     Gets together: Once a week     Attends Rastafarian service: Not on file     Active member of club or organization: No     Attends meetings of clubs or organizations: Never     Relationship status:    Other Topics Concern    Not on file   Social History Narrative    Not on file     Family History   Problem Relation Age of Onset    Diabetes Brother     Coronary artery disease Brother         premature    Heart disease Brother     Hypertension Brother     Stomach cancer Father     No Known Problems Mother     No Known Problems Sister     No Known Problems Maternal Grandmother     No Known Problems Maternal Grandfather     No Known Problems Paternal Grandmother     No Known Problems Paternal Grandfather     No Known Problems Maternal Aunt     No Known Problems Maternal Uncle     No Known Problems Paternal Aunt     No Known Problems Paternal Uncle     Prostate cancer Neg Hx     Colon cancer Neg Hx     Anemia Neg Hx     Arrhythmia Neg Hx     Asthma Neg Hx     Clotting disorder Neg Hx     Fainting Neg Hx     Heart attack Neg Hx     Heart disease Neg Hx     Heart failure Neg Hx     Hyperlipidemia Neg Hx     Hypertension Neg Hx     Stroke Neg Hx     Atrial Septal Defect Neg Hx        Vitals:    07/16/20 0920   BP: (!) 142/98   Pulse:    Temp:       Body mass index is 31.88 kg/m².     Review of Systems   Constitutional: Negative for chills, fever and unexpected weight change.   HENT: Negative for ear pain and sore throat.    Eyes: Negative for redness and visual disturbance.   Respiratory: Negative for cough and shortness of breath.     Cardiovascular: Negative for chest pain and palpitations.   Gastrointestinal: Negative for nausea and vomiting.   Musculoskeletal: Negative for arthralgias and myalgias.   Skin: Negative for rash and wound.   Neurological: Negative for weakness and headaches.         Objective:      Physical Exam   Constitutional: He is oriented to person, place, and time. He appears well-developed and well-nourished. No distress.   HENT:   Head: Normocephalic and atraumatic.   Eyes: Pupils are equal, round, and reactive to light. EOM are normal.   Neck: Normal range of motion. Neck supple.   Cardiovascular: Normal rate, regular rhythm, normal heart sounds and intact distal pulses.   Pulmonary/Chest: Effort normal and breath sounds normal. No respiratory distress. He has no wheezes.   Musculoskeletal: Normal range of motion. He exhibits no edema.   Neurological: He is alert and oriented to person, place, and time. No cranial nerve deficit.   Skin: Skin is warm and dry. Capillary refill takes less than 2 seconds.   Psychiatric: He has a normal mood and affect. His behavior is normal.   Nursing note and vitals reviewed.        Assessment / Plan:      1. Z00.01 -patient here for annual wellness exam.  Labs ordered.  Health maintenance was reviewed and ordered.    Stable on current medicine regimen.  Refills today.  See other note for details.    Complete history and physical was completed today.  Complete and thorough medication reconciliation was performed.  Discussed risks and benefits of medications.  Advised patient on orders and health maintenance.  We discussed old records and old labs if available.  Will request any records not available through epic.  Continue current medications listed on your summary sheet.    All questions were answered. Patient had no further concerns. Advised of diagnoses and plan. Follow up as planned or return sooner if symptoms persist or worsen.     Orders Placed This Encounter   Procedures    CT  Chest Lung Screening Low Dose     Standing Status:   Future     Standing Expiration Date:   7/16/2021     Order Specific Question:   Is there documentation of shared decision making for this lung screening exam?     Answer:   Yes     Order Specific Question:   Are you a current or former smoker who quit within the past 15 years?     Answer:   Yes     Order Specific Question:   Are you between age 55-77 years old?     Answer:   Yes     Order Specific Question:   Has the patient smoked 30 or more packs of cigarettes/year?     Answer:   Yes     Order Specific Question:   Does the patient show any signs or symptoms of lung cancer?     Answer:   No     Order Specific Question:   Is this the first (baseline) CT or an annual exam?     Answer:   Annual [2]     Order Specific Question:   May the Radiologist modify the order per protocol to meet the clinical needs of the patient?     Answer:   Yes    CBC Without Differential     Standing Status:   Standing     Number of Occurrences:   99     Standing Expiration Date:   9/14/2021    TSH     Standing Status:   Standing     Number of Occurrences:   99     Standing Expiration Date:   9/14/2021    Comprehensive metabolic panel     Standing Status:   Standing     Number of Occurrences:   99     Standing Expiration Date:   7/11/2040    Hemoglobin A1C     Standing Status:   Standing     Number of Occurrences:   99     Standing Expiration Date:   7/11/2040    Lipid Panel     Standing Status:   Standing     Number of Occurrences:   99     Standing Expiration Date:   7/11/2040    PSA, Screening     Standing Status:   Future     Standing Expiration Date:   9/16/2021        Leonardo Vázquez MD

## 2020-07-18 NOTE — ASSESSMENT & PLAN NOTE
Refill Xanax.  Follow-up in six months.  Patient is also taking Zoloft 25 mg daily.Discussed anxiety condition course.  Discussed SSRI as first-line treatment for this condition.  Discussed risk of discontinuing this medication without tapering.  Patient was educated, advised of side effects, and all questions were answered.  Patient voiced understanding.  Patient will follow up routinely and notify us if having any side effects or worsening or persistent symptoms.  ER precautions were given.

## 2020-08-27 ENCOUNTER — CLINICAL SUPPORT (OUTPATIENT)
Dept: FAMILY MEDICINE | Facility: CLINIC | Age: 60
End: 2020-08-27
Payer: COMMERCIAL

## 2020-08-27 VITALS — DIASTOLIC BLOOD PRESSURE: 79 MMHG | SYSTOLIC BLOOD PRESSURE: 131 MMHG | HEART RATE: 74 BPM

## 2020-08-27 DIAGNOSIS — Z01.30 BP CHECK: Primary | ICD-10-CM

## 2020-08-27 PROCEDURE — 99999 PR PBB SHADOW E&M-EST. PATIENT-LVL III: ICD-10-PCS | Mod: PBBFAC,,,

## 2020-08-27 PROCEDURE — 99999 PR PBB SHADOW E&M-EST. PATIENT-LVL III: CPT | Mod: PBBFAC,,,

## 2020-08-27 NOTE — PROGRESS NOTES
Blood pressure well controlled on current medication regimen.  Keep follow-up appointment as scheduled.

## 2020-09-21 ENCOUNTER — PATIENT OUTREACH (OUTPATIENT)
Dept: ADMINISTRATIVE | Facility: OTHER | Age: 60
End: 2020-09-21

## 2020-09-21 NOTE — PROGRESS NOTES
Subjective:    Patient ID:  Jose G Shafer is a 60 y.o. male who presents for follow-up of Coronary Artery Disease (6 month f/u ) and Peripheral Vascular Disease      Problem List Items Addressed This Visit        Cardiac/Vascular    Coronary artery disease involving native coronary artery of native heart without angina pectoris - Primary    Overview     Prev History: 6-2005 he had an inferior STEMI-->DAVID to pRCA.  LVEF preserved           Essential hypertension (Chronic)    Overview     CHRONIC.  July 2020:  Uncontrolled today. BP Reviewed.  Compliant with BP medications. No SE reported.  Patient reports that he smoked a cigarette prior to coming into the office.  (-) CP, SOB, palpitations, dizziness, lightheadedness, HA, arm numbness, tingling or weakness, syncope.  Creatinine   Date Value Ref Range Status   07/16/2020 0.9 0.5 - 1.4 mg/dL Final              Mixed hyperlipidemia    PAD (peripheral artery disease)    Overview     Status post right SFA and left SFA intervention in late 2019 with Dr. Arzola         Peripheral vascular disease of extremity: s/p R. SFA DCB 10/19/2015    Venous insufficiency of both lower extremities    Overview     Chronic.  Patient has varicose veins bilaterally.  Had vein stripping performed previously.  Swelling in the left leg worse than the right.  Patient cannot wear compression stockings daily because of his work.               HPI    Patient was last seen on 01/02/2020 at which time he was evaluated for atypical chest pain.  Echocardiogram nuclear stress test were ordered for evaluation.  Home blood pressure log was also requested.  Echocardiogram showed preserved ejection fraction without acute abnormalities and nuclear stress test showed no evidence of reversible ischemia.    On assessment today, the patient states that he is feeling OK in general.    Thinks he is still taking coreg 12.5mg PO BID even though 25mg PO BID was recently prescribed by PCP.    No chest pain.  No  "shortness of breath.  Just getting back to work    Review of Systems   Constitution: Negative for decreased appetite, fever and malaise/fatigue.   Eyes: Negative for blurred vision.   Cardiovascular: Negative for chest pain, dyspnea on exertion, irregular heartbeat and leg swelling.   Respiratory: Negative for cough, hemoptysis, shortness of breath and wheezing.    Endocrine: Negative for cold intolerance and heat intolerance.   Hematologic/Lymphatic: Negative for bleeding problem.   Musculoskeletal: Negative for muscle weakness and myalgias.   Gastrointestinal: Negative for abdominal pain, constipation and diarrhea.   Genitourinary: Negative for bladder incontinence.   Neurological: Negative for dizziness and weakness.   Psychiatric/Behavioral: Negative for depression.        Objective:     Vitals:    09/22/20 1106   BP: (!) 138/99   BP Location: Right arm   Patient Position: Sitting   BP Method: Medium (Automatic)   Pulse: 83   Weight: 100.1 kg (220 lb 10.9 oz)   Height: 5' 10" (1.778 m)        Physical Exam   Constitutional: He is oriented to person, place, and time. He appears well-developed and well-nourished.   HENT:   Head: Normocephalic and atraumatic.   Neck: Normal range of motion. Neck supple. No JVD present.   Cardiovascular: Normal rate, regular rhythm and normal heart sounds. Exam reveals no gallop and no friction rub.   No murmur heard.  Pulmonary/Chest: Effort normal and breath sounds normal. No respiratory distress. He has no wheezes. He has no rales.   Abdominal: Soft. Bowel sounds are normal. There is no abdominal tenderness. There is no rebound and no guarding.   Musculoskeletal:         General: No edema.   Neurological: He is alert and oriented to person, place, and time.   Skin: Skin is warm and dry.   Psychiatric: His behavior is normal.           Current Outpatient Medications on File Prior to Visit   Medication Sig    ALPRAZolam (XANAX) 0.5 MG tablet Take 1 tablet (0.5 mg total) by mouth " daily as needed for Anxiety.    ascorbic acid (VITAMIN C) 500 MG tablet Take 500 mg by mouth 2 (two) times daily.    aspirin (BABY ASPIRIN) 81 MG Chew Take 81 mg by mouth. 1 Tablet, Chewable Oral Every day    atorvastatin (LIPITOR) 80 MG tablet TAKE 1 TABLET BY MOUTH ONE TIME DAILY    carvediloL (COREG) 25 MG tablet Take 1 tablet (25 mg total) by mouth 2 (two) times daily with meals.    cilostazoL (PLETAL) 50 MG Tab TAKE 1 TABLET BY MOUTH TWICE A DAY    clopidogrel (PLAVIX) 75 mg tablet Take 1 tablet (75 mg total) by mouth once daily.    esomeprazole (NEXIUM) 20 MG capsule Take 20 mg by mouth before breakfast.    ezetimibe (ZETIA) 10 mg tablet TAKE 1 TABLET BY MOUTH ONE TIME DAILY    fluticasone propionate (FLONASE) 50 mcg/actuation nasal spray USE 1 SPRAY (50 MCG TOTAL) BY EACH NARE ROUTE ONCE DAILY.    ibuprofen (ADVIL,MOTRIN) 800 MG tablet Take 1 tablet (800 mg total) by mouth every 6 (six) hours as needed for Pain.    lisinopril (PRINIVIL,ZESTRIL) 40 MG tablet TAKE 1 TABLET BY MOUTH ONE TIME DAILY    MULTIVITAMIN ORAL Take by mouth. 1  By mouth Every morning    nicotine (NICODERM CQ) 21 mg/24 hr Place 1 patch onto the skin once daily.    nitroGLYCERIN (NITROSTAT) 0.4 MG SL tablet Place 1 tablet (0.4 mg total) under the tongue every 5 (five) minutes as needed for Chest pain. 1 Tablet, Sublingual Sublingual    omega-3 fatty acids (FISH OIL) 500 mg Cap Take by mouth. 1 Capsule Oral     sertraline (ZOLOFT) 25 MG tablet Take 1 tablet (25 mg total) by mouth once daily.    [DISCONTINUED] cilostazol (PLETAL) 50 MG Tab Take 1 tablet (50 mg total) by mouth 2 (two) times daily.     Current Facility-Administered Medications on File Prior to Visit   Medication    lidocaine (PF) 10 mg/ml (1%) injection 10 mg       Lipid Panel:   Lab Results   Component Value Date    CHOL 121 07/16/2020    HDL 29 (L) 07/16/2020    LDLCALC 69.0 07/16/2020    TRIG 115 07/16/2020    CHOLHDL 24.0 07/16/2020       The ASCVD Risk  score (Javan CURRY Jr., et al., 2013) failed to calculate for the following reasons:    The valid total cholesterol range is 130 to 320 mg/dL    All pertinent labs, imaging, and EKGs reviewed.  Patient's most recent EKG tracing was personally interpreted by this provider.    Assessment:       1. Coronary artery disease involving native coronary artery of native heart without angina pectoris    2. Essential hypertension    3. Mixed hyperlipidemia    4. PAD (peripheral artery disease)    5. Peripheral vascular disease of extremity: s/p R. SFA DCB 10/19/2015    6. Venous insufficiency of both lower extremities         Plan:     Symptoms OK  BP borderline    Agree with PCP increase of coreg  Ensure he is taking Coreg 25mg PO BID and call with BP log numbers in 2 weeks   Continue aspirin 81 mg PO Daily  Continue Plavix 75 mg PO Daily, has passed 6 month duncan, so can stop  Continue atorvastatin 80 mg PO Daily    Continue other cardiac medications  Mediterranean Diet/Cardiovascular Exercise Program    Patient queried and all questions were answered.    F/u in 9 months to reassess      Signed:    Harjinder Perez MD  9/22/2020 1:19 PM

## 2020-09-21 NOTE — PROGRESS NOTES
LINKS immunization registry updated  Care Everywhere updated  Health Maintenance updated  Chart reviewed for overdue Proactive [facility] ([facility]) health maintenance testing (CRS, Breast Ca, Diabetic Eye Exam)   Orders entered:N/A

## 2020-09-22 ENCOUNTER — OFFICE VISIT (OUTPATIENT)
Dept: CARDIOLOGY | Facility: CLINIC | Age: 60
End: 2020-09-22
Payer: COMMERCIAL

## 2020-09-22 VITALS
BODY MASS INDEX: 31.59 KG/M2 | DIASTOLIC BLOOD PRESSURE: 99 MMHG | HEIGHT: 70 IN | SYSTOLIC BLOOD PRESSURE: 138 MMHG | WEIGHT: 220.69 LBS | HEART RATE: 83 BPM

## 2020-09-22 DIAGNOSIS — E78.2 MIXED HYPERLIPIDEMIA: ICD-10-CM

## 2020-09-22 DIAGNOSIS — I25.10 CORONARY ARTERY DISEASE INVOLVING NATIVE CORONARY ARTERY OF NATIVE HEART WITHOUT ANGINA PECTORIS: Primary | ICD-10-CM

## 2020-09-22 DIAGNOSIS — I73.9 PAD (PERIPHERAL ARTERY DISEASE): ICD-10-CM

## 2020-09-22 DIAGNOSIS — I87.2 VENOUS INSUFFICIENCY OF BOTH LOWER EXTREMITIES: ICD-10-CM

## 2020-09-22 DIAGNOSIS — I10 ESSENTIAL HYPERTENSION: Chronic | ICD-10-CM

## 2020-09-22 DIAGNOSIS — I73.9 PERIPHERAL VASCULAR DISEASE OF EXTREMITY: ICD-10-CM

## 2020-09-22 PROCEDURE — 3080F DIAST BP >= 90 MM HG: CPT | Mod: CPTII,S$GLB,, | Performed by: INTERNAL MEDICINE

## 2020-09-22 PROCEDURE — 3008F PR BODY MASS INDEX (BMI) DOCUMENTED: ICD-10-PCS | Mod: CPTII,S$GLB,, | Performed by: INTERNAL MEDICINE

## 2020-09-22 PROCEDURE — 3075F SYST BP GE 130 - 139MM HG: CPT | Mod: CPTII,S$GLB,, | Performed by: INTERNAL MEDICINE

## 2020-09-22 PROCEDURE — 99214 PR OFFICE/OUTPT VISIT, EST, LEVL IV, 30-39 MIN: ICD-10-PCS | Mod: S$GLB,,, | Performed by: INTERNAL MEDICINE

## 2020-09-22 PROCEDURE — 99214 OFFICE O/P EST MOD 30 MIN: CPT | Mod: S$GLB,,, | Performed by: INTERNAL MEDICINE

## 2020-09-22 PROCEDURE — 3075F PR MOST RECENT SYSTOLIC BLOOD PRESS GE 130-139MM HG: ICD-10-PCS | Mod: CPTII,S$GLB,, | Performed by: INTERNAL MEDICINE

## 2020-09-22 PROCEDURE — 99999 PR PBB SHADOW E&M-EST. PATIENT-LVL III: ICD-10-PCS | Mod: PBBFAC,,, | Performed by: INTERNAL MEDICINE

## 2020-09-22 PROCEDURE — 3080F PR MOST RECENT DIASTOLIC BLOOD PRESSURE >= 90 MM HG: ICD-10-PCS | Mod: CPTII,S$GLB,, | Performed by: INTERNAL MEDICINE

## 2020-09-22 PROCEDURE — 99999 PR PBB SHADOW E&M-EST. PATIENT-LVL III: CPT | Mod: PBBFAC,,, | Performed by: INTERNAL MEDICINE

## 2020-09-22 PROCEDURE — 3008F BODY MASS INDEX DOCD: CPT | Mod: CPTII,S$GLB,, | Performed by: INTERNAL MEDICINE

## 2020-11-25 ENCOUNTER — TELEPHONE (OUTPATIENT)
Dept: FAMILY MEDICINE | Facility: CLINIC | Age: 60
End: 2020-11-25

## 2020-11-25 NOTE — TELEPHONE ENCOUNTER
NP from an urgent care facility in [place] called, patient seen, concerned about possible clot and was asking about testing, informed we do not have any available appts in clinic or U/S today, spoke to Dr Cardenas and he advised for patient to have u/s done at clinic and if he has a clot, they can address it or send him to ER, if no clot, he can follow up with Dr Vázquez, NP informed.

## 2020-11-27 ENCOUNTER — TELEPHONE (OUTPATIENT)
Dept: VASCULAR SURGERY | Facility: CLINIC | Age: 60
End: 2020-11-27

## 2020-11-27 NOTE — TELEPHONE ENCOUNTER
----- Message from Tomas Sin sent at 11/27/2020  4:07 PM CST -----  Type:  Sooner Apoointment Request    Caller is requesting a sooner appointment.  Caller declined first available appointment listed below.  Caller will not accept being placed on the waitlist and is requesting a message be sent to doctor.    Name of Caller:  Mayte Shafer (Spouse)  When is the first available appointment?    Symptoms:  Left lower leg pain--ER Follow  Best Call Back Number:  [phone]  Additional Information:

## 2020-12-02 ENCOUNTER — PATIENT OUTREACH (OUTPATIENT)
Dept: ADMINISTRATIVE | Facility: OTHER | Age: 60
End: 2020-12-02

## 2020-12-02 ENCOUNTER — TELEPHONE (OUTPATIENT)
Dept: VASCULAR SURGERY | Facility: CLINIC | Age: 60
End: 2020-12-02

## 2020-12-02 NOTE — TELEPHONE ENCOUNTER
Concerned about patient's leg pain due to PVD, informed patient is scheduled for Dr Arzola's soonest available appointment on 12/10 at 0815, instructed if pain unbearable, foot becomes cold or discolored, foot has no palatable pulse, go to ED.  Voices understanding and is agreeable.

## 2020-12-02 NOTE — PROGRESS NOTES
Health Maintenance Due   Topic Date Due    Shingles Vaccine (1 of 2) 01/04/2010    Influenza Vaccine (1) 08/01/2020     Updates were requested from care everywhere.  Chart was reviewed for overdue [facility] ([facility]) topics (CRS, Breast Cancer Screening, Eye exam)  Health Maintenance has been updated.  LINKS immunization registry triggered.  Immunizations were reconciled.

## 2020-12-02 NOTE — TELEPHONE ENCOUNTER
----- Message from Paula Montes sent at 12/2/2020 11:16 AM CST -----  Type: Needs Medical Advice  Who Called:  Wife  Best Call Back Number:   Additional Information: Mayte wants a call back about some information that's important she needs to share about the patient

## 2020-12-03 RX ORDER — CLOPIDOGREL BISULFATE 75 MG/1
75 TABLET ORAL DAILY
Qty: 30 TABLET | Refills: 5 | Status: SHIPPED | OUTPATIENT
Start: 2020-12-03 | End: 2021-05-28

## 2020-12-03 NOTE — TELEPHONE ENCOUNTER
Spoke to pt and advised to restart Plavix and go to ER if kaden is sever or does not go away; pt expressed understanding

## 2020-12-03 NOTE — TELEPHONE ENCOUNTER
Please advise: pt was in ER for leg pain, ultrasounds done; is only able to walk about 20 feet with out extreme pain; he is asking if he should restart his blood thinners until he gets in to see  on the 10th

## 2020-12-10 ENCOUNTER — OFFICE VISIT (OUTPATIENT)
Dept: VASCULAR SURGERY | Facility: CLINIC | Age: 60
End: 2020-12-10
Payer: COMMERCIAL

## 2020-12-10 VITALS
HEIGHT: 70 IN | WEIGHT: 220.44 LBS | DIASTOLIC BLOOD PRESSURE: 91 MMHG | SYSTOLIC BLOOD PRESSURE: 126 MMHG | HEART RATE: 90 BPM | BODY MASS INDEX: 31.56 KG/M2

## 2020-12-10 DIAGNOSIS — I73.9 PAD (PERIPHERAL ARTERY DISEASE): Primary | ICD-10-CM

## 2020-12-10 PROCEDURE — 3074F PR MOST RECENT SYSTOLIC BLOOD PRESSURE < 130 MM HG: ICD-10-PCS | Mod: CPTII,S$GLB,, | Performed by: THORACIC SURGERY (CARDIOTHORACIC VASCULAR SURGERY)

## 2020-12-10 PROCEDURE — 3008F PR BODY MASS INDEX (BMI) DOCUMENTED: ICD-10-PCS | Mod: CPTII,S$GLB,, | Performed by: THORACIC SURGERY (CARDIOTHORACIC VASCULAR SURGERY)

## 2020-12-10 PROCEDURE — 3080F DIAST BP >= 90 MM HG: CPT | Mod: CPTII,S$GLB,, | Performed by: THORACIC SURGERY (CARDIOTHORACIC VASCULAR SURGERY)

## 2020-12-10 PROCEDURE — 99215 PR OFFICE/OUTPT VISIT, EST, LEVL V, 40-54 MIN: ICD-10-PCS | Mod: S$GLB,,, | Performed by: THORACIC SURGERY (CARDIOTHORACIC VASCULAR SURGERY)

## 2020-12-10 PROCEDURE — 99999 PR PBB SHADOW E&M-EST. PATIENT-LVL IV: CPT | Mod: PBBFAC,,, | Performed by: THORACIC SURGERY (CARDIOTHORACIC VASCULAR SURGERY)

## 2020-12-10 PROCEDURE — 1125F PR PAIN SEVERITY QUANTIFIED, PAIN PRESENT: ICD-10-PCS | Mod: S$GLB,,, | Performed by: THORACIC SURGERY (CARDIOTHORACIC VASCULAR SURGERY)

## 2020-12-10 PROCEDURE — 99215 OFFICE O/P EST HI 40 MIN: CPT | Mod: S$GLB,,, | Performed by: THORACIC SURGERY (CARDIOTHORACIC VASCULAR SURGERY)

## 2020-12-10 PROCEDURE — 3080F PR MOST RECENT DIASTOLIC BLOOD PRESSURE >= 90 MM HG: ICD-10-PCS | Mod: CPTII,S$GLB,, | Performed by: THORACIC SURGERY (CARDIOTHORACIC VASCULAR SURGERY)

## 2020-12-10 PROCEDURE — 3074F SYST BP LT 130 MM HG: CPT | Mod: CPTII,S$GLB,, | Performed by: THORACIC SURGERY (CARDIOTHORACIC VASCULAR SURGERY)

## 2020-12-10 PROCEDURE — 3008F BODY MASS INDEX DOCD: CPT | Mod: CPTII,S$GLB,, | Performed by: THORACIC SURGERY (CARDIOTHORACIC VASCULAR SURGERY)

## 2020-12-10 PROCEDURE — 1125F AMNT PAIN NOTED PAIN PRSNT: CPT | Mod: S$GLB,,, | Performed by: THORACIC SURGERY (CARDIOTHORACIC VASCULAR SURGERY)

## 2020-12-10 PROCEDURE — 99999 PR PBB SHADOW E&M-EST. PATIENT-LVL IV: ICD-10-PCS | Mod: PBBFAC,,, | Performed by: THORACIC SURGERY (CARDIOTHORACIC VASCULAR SURGERY)

## 2020-12-10 NOTE — PROGRESS NOTES
History of present illness:  The patient is a 59-year-old gentleman with peripheral arterial occlusive disease who underwent angioplasty and stenting of his right superficial femoral artery and right popliteal artery for severe intermittent claudication affecting activities of daily living in 2019.  Patient states that he can now walk without getting pain in his right lower extremity.  He is still having pain in his left calf with walking short distances.  At the time of angiography was found to have a high-grade stenosis of the distal left superficial femoral artery.  He then underwent angioplasty and stenting of the left superficial femoral artery.  The patient had been doing well and his claudication resolved.  Three weeks ago he had sudden onset of pain in his left calf and foot.  His calf is .  His foot was discolored purple but that has resolved and it is now pink.  He has no symptoms of rest pain but develops significant pain in his left calf after walking just a few feet.  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                Past Medical History:   Diagnosis Date    Anticoagulant long-term use      Anxiety      BCC (basal cell carcinoma)      CAD (coronary artery disease)      Erectile dysfunction      GERD (gastroesophageal reflux disease)      HLD (hyperlipidemia)      HTN (hypertension)      Impaired fasting glucose      Lateral epicondylitis of left elbow 7/18/2019               Past Surgical History:   Procedure Laterality Date    ANGIOPLASTY        AORTOGRAPHY WITH SERIALOGRAPHY N/A 11/22/2019     Procedure: AORTOGRAM, WITH SERIALOGRAPHY;  Surgeon: Blanca Arzola MD;  Location: [facility] CATH;  Service: Cardiovascular;  Laterality: N/A;    CARDIAC SURGERY         stents placed    COLONOSCOPY N/A 7/17/2017     Procedure: COLONOSCOPY Miralax;  Surgeon: Jeremiah Syed Jr., MD;  Location: [facility] ENDO;  Service: Endoscopy;   Laterality: N/A;    KNEE ARTHROSCOPY W/ MENISCECTOMY        PERCUTANEOUS TRANSLUMINAL ANGIOPLASTY N/A 11/22/2019     Procedure: Pta (Angioplasty, Percutaneous, Transluminal);  Surgeon: Blanca Arzola MD;  Location: [facility];  Service: Cardiovascular;  Laterality: N/A;    VARICOSE VEIN SURGERY         left saphenous         Review of patient's allergies indicates:  No Known Allergies                 Current Outpatient Medications   Medication Sig Dispense Refill    ALPRAZolam (XANAX) 0.5 MG tablet Take 1 tablet (0.5 mg total) by mouth daily as needed for Anxiety. 30 tablet 0    ascorbic acid (VITAMIN C) 500 MG tablet Take 500 mg by mouth 2 (two) times daily.        aspirin (BABY ASPIRIN) 81 MG Chew Take 81 mg by mouth. 1 Tablet, Chewable Oral Every day        atorvastatin (LIPITOR) 80 MG tablet TAKE 1 TABLET BY MOUTH ONE TIME DAILY 90 tablet 3    carvedilol (COREG) 12.5 MG tablet Take 1 tablet (12.5 mg total) by mouth 2 (two) times daily with meals. 180 tablet 3    cilostazol (PLETAL) 50 MG Tab Take 1 tablet (50 mg total) by mouth 2 (two) times daily. 60 tablet 11    clopidogrel (PLAVIX) 75 mg tablet Take 1 tablet (75 mg total) by mouth once daily. 180 tablet 1    esomeprazole (NEXIUM) 20 MG capsule Take 20 mg by mouth before breakfast.        ezetimibe (ZETIA) 10 mg tablet TAKE 1 TABLET BY MOUTH ONE TIME DAILY 90 tablet 3    fluticasone propionate (FLONASE) 50 mcg/actuation nasal spray USE 1 SPRAY (50 MCG TOTAL) BY EACH NARE ROUTE ONCE DAILY. 16 mL 11    ibuprofen (ADVIL,MOTRIN) 800 MG tablet Take 1 tablet (800 mg total) by mouth every 6 (six) hours as needed for Pain. 30 tablet 1    lisinopril (PRINIVIL,ZESTRIL) 40 MG tablet TAKE 1 TABLET BY MOUTH ONE TIME DAILY 90 tablet 3    MULTIVITAMIN ORAL Take by mouth. 1  By mouth Every morning        nicotine (NICODERM CQ) 21 mg/24 hr Place 1 patch onto the skin once daily. 28 patch 0    nitroGLYCERIN (NITROSTAT) 0.4 MG SL tablet Place 1 tablet (0.4 mg  total) under the tongue every 5 (five) minutes as needed for Chest pain. 1 Tablet, Sublingual Sublingual 100 tablet 3    omega-3 fatty acids (FISH OIL) 500 mg Cap Take by mouth. 1 Capsule Oral         sertraline (ZOLOFT) 25 MG tablet Take 1 tablet (25 mg total) by mouth once daily. 90 tablet 3      No current facility-administered medications for this visit.                         Facility-Administered Medications Ordered in Other Visits   Medication Dose Route Frequency Provider Last Rate Last Dose    lidocaine (PF) 10 mg/ml (1%) injection 10 mg  1 mL Intradermal Once Xavi Cornelius MD                       Family History   Problem Relation Age of Onset    Diabetes Brother      Coronary artery disease Brother           premature    Stomach cancer Father      No Known Problems Mother      No Known Problems Sister      No Known Problems Maternal Grandmother      No Known Problems Maternal Grandfather      No Known Problems Paternal Grandmother      No Known Problems Paternal Grandfather      No Known Problems Maternal Aunt      No Known Problems Maternal Uncle      No Known Problems Paternal Aunt      No Known Problems Paternal Uncle      Prostate cancer Neg Hx      Colon cancer Neg Hx      Anemia Neg Hx      Arrhythmia Neg Hx      Asthma Neg Hx      Clotting disorder Neg Hx      Fainting Neg Hx      Heart attack Neg Hx      Heart disease Neg Hx      Heart failure Neg Hx      Hyperlipidemia Neg Hx      Hypertension Neg Hx      Stroke Neg Hx      Atrial Septal Defect Neg Hx           Social History                Socioeconomic History    Marital status:        Spouse name: Not on file    Number of children: Not on file    Years of education: Not on file    Highest education level: Not on file   Occupational History    Not on file   Social Needs    Financial resource strain: Not on file    Food insecurity:       Worry: Not on file       Inability: Not on file     Transportation needs:       Medical: Not on file       Non-medical: Not on file   Tobacco Use    Smoking status: Current Every Day Smoker       Packs/day: 0.25       Years: 40.00       Pack years: 10.00       Types: Cigarettes    Smokeless tobacco: Never Used   Substance and Sexual Activity    Alcohol use: Yes       Comment: every now and then per pt    Drug use: No    Sexual activity: Not on file   Lifestyle    Physical activity:       Days per week: Not on file       Minutes per session: Not on file    Stress: Not on file   Relationships    Social connections:       Talks on phone: Not on file       Gets together: Not on file       Attends [employer] service: Not on file       Active member of club or organization: Not on file       Attends meetings of clubs or organizations: Not on file       Relationship status: Not on file   Other Topics Concern    Not on file   Social History Narrative    Not on file         REVIEW OF SYSTEMS:  The severe pain in the right lower extremity with ambulation has resolved since he underwent angioplasty and stenting of the right superficial femoral and popliteal arteries.  He is still experiencing pain in the left calf after walking very short distances and this is affecting his activities of daily living and his ability to do his work.  All other systems are reviewed and are negative.           PHYSICAL EXAM:  Physical Exam           Vitals:     12/04/19 0942   BP: (!) 148/96   Pulse: 69      Awake and alert.  Head is normocephalic.  Neck is supple without any masses or jugular vein distension.  Heart has a regular rate and rhythm.  Lungs are clear.  Abdomen is soft and nontender.  Extremities the right foot is warmer than the left foot and has normal capillary refill.  The left ft has slightly decreased capillary refill.  The patient now has a palpable right posterior tibial pulse which was not present prior to the angioplasty and stenting of the right superficial femoral  and popliteal arteries.  He has no palpable left posterior tibial or dorsalis pedis pulses.  Neurologic:  Grossly intact.           IMPRESSION:  1.  Peripheral arterial occlusive disease.  2.  Occluded left superficial femoral artery  3.  Severe intermittent claudication affecting activities of daily living and ability to perform his job.  4.  Status post angioplasty and stenting of the right superficial femoral artery    5.  Coronary artery disease  6.  Hypertension  7.  Hyperlipidemia    RECOMMENDATIONS:  We will proceed with aortogram with runoff.  Intervention will depend on findings and the possibility of crossing the occluded left superficial femoral artery.  Risks and benefits were discussed.  He voiced understanding and wishes to proceed.        Ashish Arzola MD

## 2020-12-14 DIAGNOSIS — I73.9 PAD (PERIPHERAL ARTERY DISEASE): Primary | ICD-10-CM

## 2020-12-14 RX ORDER — LIDOCAINE HYDROCHLORIDE 10 MG/ML
1 INJECTION, SOLUTION EPIDURAL; INFILTRATION; INTRACAUDAL; PERINEURAL ONCE
Status: CANCELLED | OUTPATIENT
Start: 2020-12-14 | End: 2020-12-14

## 2020-12-15 ENCOUNTER — LAB VISIT (OUTPATIENT)
Dept: FAMILY MEDICINE | Facility: CLINIC | Age: 60
End: 2020-12-15
Payer: COMMERCIAL

## 2020-12-15 DIAGNOSIS — I73.9 PAD (PERIPHERAL ARTERY DISEASE): ICD-10-CM

## 2020-12-15 PROCEDURE — U0003 INFECTIOUS AGENT DETECTION BY NUCLEIC ACID (DNA OR RNA); SEVERE ACUTE RESPIRATORY SYNDROME CORONAVIRUS 2 (SARS-COV-2) (CORONAVIRUS DISEASE [COVID-19]), AMPLIFIED PROBE TECHNIQUE, MAKING USE OF HIGH THROUGHPUT TECHNOLOGIES AS DESCRIBED BY CMS-2020-01-R: HCPCS

## 2020-12-16 LAB — SARS-COV-2 RNA RESP QL NAA+PROBE: NOT DETECTED

## 2020-12-18 ENCOUNTER — PATIENT MESSAGE (OUTPATIENT)
Dept: VASCULAR SURGERY | Facility: CLINIC | Age: 60
End: 2020-12-18

## 2020-12-18 NOTE — TELEPHONE ENCOUNTER
----- Message from Ezio De La Cruz sent at 12/18/2020 10:00 AM CST -----  Contact: Poly  Who Called: PT  Regarding: Poly from [facility] called to schedule an appointment for a two week hospital follow up for the pt and is requesting a callback.   Would the patient rather a call back or a response via [facility]? Call back  Best Call Back Number: [phone] contact the pt for scheduling   Additional Information:

## 2021-01-07 ENCOUNTER — OFFICE VISIT (OUTPATIENT)
Dept: VASCULAR SURGERY | Facility: CLINIC | Age: 61
End: 2021-01-07
Payer: COMMERCIAL

## 2021-01-07 VITALS
DIASTOLIC BLOOD PRESSURE: 87 MMHG | SYSTOLIC BLOOD PRESSURE: 132 MMHG | HEIGHT: 70 IN | HEART RATE: 85 BPM | WEIGHT: 222.69 LBS | BODY MASS INDEX: 31.88 KG/M2

## 2021-01-07 DIAGNOSIS — I73.9 PAD (PERIPHERAL ARTERY DISEASE): Primary | ICD-10-CM

## 2021-01-07 PROCEDURE — 1125F AMNT PAIN NOTED PAIN PRSNT: CPT | Mod: S$GLB,,, | Performed by: THORACIC SURGERY (CARDIOTHORACIC VASCULAR SURGERY)

## 2021-01-07 PROCEDURE — 3075F SYST BP GE 130 - 139MM HG: CPT | Mod: CPTII,S$GLB,, | Performed by: THORACIC SURGERY (CARDIOTHORACIC VASCULAR SURGERY)

## 2021-01-07 PROCEDURE — 1125F PR PAIN SEVERITY QUANTIFIED, PAIN PRESENT: ICD-10-PCS | Mod: S$GLB,,, | Performed by: THORACIC SURGERY (CARDIOTHORACIC VASCULAR SURGERY)

## 2021-01-07 PROCEDURE — 99999 PR PBB SHADOW E&M-EST. PATIENT-LVL IV: ICD-10-PCS | Mod: PBBFAC,,, | Performed by: THORACIC SURGERY (CARDIOTHORACIC VASCULAR SURGERY)

## 2021-01-07 PROCEDURE — 3079F DIAST BP 80-89 MM HG: CPT | Mod: CPTII,S$GLB,, | Performed by: THORACIC SURGERY (CARDIOTHORACIC VASCULAR SURGERY)

## 2021-01-07 PROCEDURE — 99999 PR PBB SHADOW E&M-EST. PATIENT-LVL IV: CPT | Mod: PBBFAC,,, | Performed by: THORACIC SURGERY (CARDIOTHORACIC VASCULAR SURGERY)

## 2021-01-07 PROCEDURE — 3008F PR BODY MASS INDEX (BMI) DOCUMENTED: ICD-10-PCS | Mod: CPTII,S$GLB,, | Performed by: THORACIC SURGERY (CARDIOTHORACIC VASCULAR SURGERY)

## 2021-01-07 PROCEDURE — 3079F PR MOST RECENT DIASTOLIC BLOOD PRESSURE 80-89 MM HG: ICD-10-PCS | Mod: CPTII,S$GLB,, | Performed by: THORACIC SURGERY (CARDIOTHORACIC VASCULAR SURGERY)

## 2021-01-07 PROCEDURE — 3075F PR MOST RECENT SYSTOLIC BLOOD PRESS GE 130-139MM HG: ICD-10-PCS | Mod: CPTII,S$GLB,, | Performed by: THORACIC SURGERY (CARDIOTHORACIC VASCULAR SURGERY)

## 2021-01-07 PROCEDURE — 99214 OFFICE O/P EST MOD 30 MIN: CPT | Mod: S$GLB,,, | Performed by: THORACIC SURGERY (CARDIOTHORACIC VASCULAR SURGERY)

## 2021-01-07 PROCEDURE — 3008F BODY MASS INDEX DOCD: CPT | Mod: CPTII,S$GLB,, | Performed by: THORACIC SURGERY (CARDIOTHORACIC VASCULAR SURGERY)

## 2021-01-07 PROCEDURE — 99214 PR OFFICE/OUTPT VISIT, EST, LEVL IV, 30-39 MIN: ICD-10-PCS | Mod: S$GLB,,, | Performed by: THORACIC SURGERY (CARDIOTHORACIC VASCULAR SURGERY)

## 2021-01-12 ENCOUNTER — TELEPHONE (OUTPATIENT)
Dept: VASCULAR SURGERY | Facility: CLINIC | Age: 61
End: 2021-01-12

## 2021-01-12 DIAGNOSIS — I73.9 PAD (PERIPHERAL ARTERY DISEASE): Primary | ICD-10-CM

## 2021-01-12 DIAGNOSIS — Z79.899 ENCOUNTER FOR LONG-TERM (CURRENT) USE OF MEDICATIONS: ICD-10-CM

## 2021-01-12 DIAGNOSIS — Z76.0 MEDICATION REFILL: ICD-10-CM

## 2021-01-12 RX ORDER — SERTRALINE HYDROCHLORIDE 25 MG/1
25 TABLET, FILM COATED ORAL DAILY
Qty: 90 TABLET | Refills: 3 | Status: SHIPPED | OUTPATIENT
Start: 2021-01-12 | End: 2021-12-28

## 2021-01-13 ENCOUNTER — OFFICE VISIT (OUTPATIENT)
Dept: FAMILY MEDICINE | Facility: CLINIC | Age: 61
End: 2021-01-13
Payer: COMMERCIAL

## 2021-01-13 DIAGNOSIS — F41.9 ANXIETY: ICD-10-CM

## 2021-01-13 DIAGNOSIS — Z76.0 MEDICATION REFILL: Primary | ICD-10-CM

## 2021-01-13 DIAGNOSIS — Z79.899 ENCOUNTER FOR LONG-TERM (CURRENT) USE OF MEDICATIONS: ICD-10-CM

## 2021-01-13 PROCEDURE — 99213 OFFICE O/P EST LOW 20 MIN: CPT | Mod: 95,,, | Performed by: NURSE PRACTITIONER

## 2021-01-13 PROCEDURE — 99213 PR OFFICE/OUTPT VISIT, EST, LEVL III, 20-29 MIN: ICD-10-PCS | Mod: 95,,, | Performed by: NURSE PRACTITIONER

## 2021-01-13 RX ORDER — ALPRAZOLAM 0.5 MG/1
0.5 TABLET ORAL DAILY PRN
Qty: 30 TABLET | Refills: 5 | OUTPATIENT
Start: 2021-01-13

## 2021-01-15 ENCOUNTER — HOSPITAL ENCOUNTER (OUTPATIENT)
Dept: RADIOLOGY | Facility: HOSPITAL | Age: 61
Discharge: HOME | End: 2021-01-15
Attending: THORACIC SURGERY (CARDIOTHORACIC VASCULAR SURGERY)
Payer: COMMERCIAL

## 2021-01-15 DIAGNOSIS — I73.9 PAD (PERIPHERAL ARTERY DISEASE): ICD-10-CM

## 2021-01-15 PROCEDURE — 93922 UPR/L XTREMITY ART 2 LEVELS: CPT | Mod: TC,PO

## 2021-01-15 PROCEDURE — 93925 US ARTERIAL LOWER EXTREMITY BILAT WITH ABI (XPD): ICD-10-PCS | Mod: 26,,, | Performed by: RADIOLOGY

## 2021-01-15 PROCEDURE — 93925 LOWER EXTREMITY STUDY: CPT | Mod: 26,,, | Performed by: RADIOLOGY

## 2021-01-15 PROCEDURE — 93922 US ARTERIAL LOWER EXTREMITY BILAT WITH ABI (XPD): ICD-10-PCS | Mod: 26,,, | Performed by: RADIOLOGY

## 2021-01-15 PROCEDURE — 93922 UPR/L XTREMITY ART 2 LEVELS: CPT | Mod: 26,,, | Performed by: RADIOLOGY

## 2021-02-08 ENCOUNTER — CLINICAL SUPPORT (OUTPATIENT)
Dept: SMOKING CESSATION | Facility: CLINIC | Age: 61
End: 2021-02-08
Payer: COMMERCIAL

## 2021-02-08 DIAGNOSIS — F17.210 HEAVY SMOKER (MORE THAN 20 CIGARETTES PER DAY): Primary | ICD-10-CM

## 2021-02-08 PROCEDURE — 99404 PREV MED CNSL INDIV APPRX 60: CPT | Mod: S$GLB,,, | Performed by: NURSE PRACTITIONER

## 2021-02-08 PROCEDURE — 99404 PR PREVENT COUNSEL,INDIV,60 MIN: ICD-10-PCS | Mod: S$GLB,,, | Performed by: NURSE PRACTITIONER

## 2021-02-08 RX ORDER — VARENICLINE TARTRATE 0.5 (11)-1
KIT ORAL
Qty: 53 TABLET | Refills: 0 | Status: SHIPPED | OUTPATIENT
Start: 2021-02-08 | End: 2021-03-01 | Stop reason: DRUGHIGH

## 2021-02-15 ENCOUNTER — CLINICAL SUPPORT (OUTPATIENT)
Dept: SMOKING CESSATION | Facility: CLINIC | Age: 61
End: 2021-02-15
Payer: COMMERCIAL

## 2021-02-15 DIAGNOSIS — F17.210 HEAVY SMOKER (MORE THAN 20 CIGARETTES PER DAY): Primary | ICD-10-CM

## 2021-02-15 PROCEDURE — 99404 PREV MED CNSL INDIV APPRX 60: CPT | Mod: S$GLB,,, | Performed by: NURSE PRACTITIONER

## 2021-02-15 PROCEDURE — 99404 PR PREVENT COUNSEL,INDIV,60 MIN: ICD-10-PCS | Mod: S$GLB,,, | Performed by: NURSE PRACTITIONER

## 2021-02-22 ENCOUNTER — CLINICAL SUPPORT (OUTPATIENT)
Dept: SMOKING CESSATION | Facility: CLINIC | Age: 61
End: 2021-02-22
Payer: COMMERCIAL

## 2021-02-22 DIAGNOSIS — F17.210 HEAVY SMOKER (MORE THAN 20 CIGARETTES PER DAY): Primary | ICD-10-CM

## 2021-02-22 PROCEDURE — 99404 PR PREVENT COUNSEL,INDIV,60 MIN: ICD-10-PCS | Mod: S$GLB,,, | Performed by: NURSE PRACTITIONER

## 2021-02-22 PROCEDURE — 99404 PREV MED CNSL INDIV APPRX 60: CPT | Mod: S$GLB,,, | Performed by: NURSE PRACTITIONER

## 2021-03-01 ENCOUNTER — CLINICAL SUPPORT (OUTPATIENT)
Dept: SMOKING CESSATION | Facility: CLINIC | Age: 61
End: 2021-03-01
Payer: COMMERCIAL

## 2021-03-01 DIAGNOSIS — F17.210 LIGHT CIGARETTE SMOKER (1-9 CIGS/DAY): Primary | ICD-10-CM

## 2021-03-01 PROCEDURE — 99404 PR PREVENT COUNSEL,INDIV,60 MIN: ICD-10-PCS | Mod: S$GLB,,, | Performed by: NURSE PRACTITIONER

## 2021-03-01 PROCEDURE — 99404 PREV MED CNSL INDIV APPRX 60: CPT | Mod: S$GLB,,, | Performed by: NURSE PRACTITIONER

## 2021-03-01 RX ORDER — VARENICLINE TARTRATE 1 MG/1
1 TABLET, FILM COATED ORAL 2 TIMES DAILY
Qty: 58 TABLET | Refills: 0 | Status: SHIPPED | OUTPATIENT
Start: 2021-03-01 | End: 2021-04-05 | Stop reason: SDUPTHER

## 2021-03-08 ENCOUNTER — CLINICAL SUPPORT (OUTPATIENT)
Dept: SMOKING CESSATION | Facility: CLINIC | Age: 61
End: 2021-03-08

## 2021-03-08 DIAGNOSIS — F17.210 LIGHT CIGARETTE SMOKER (1-9 CIGS/DAY): Primary | ICD-10-CM

## 2021-03-08 PROCEDURE — 99404 PR PREVENT COUNSEL,INDIV,60 MIN: ICD-10-PCS | Mod: S$GLB,,, | Performed by: NURSE PRACTITIONER

## 2021-03-08 PROCEDURE — 99404 PREV MED CNSL INDIV APPRX 60: CPT | Mod: S$GLB,,, | Performed by: NURSE PRACTITIONER

## 2021-03-15 ENCOUNTER — CLINICAL SUPPORT (OUTPATIENT)
Dept: SMOKING CESSATION | Facility: CLINIC | Age: 61
End: 2021-03-15
Payer: COMMERCIAL

## 2021-03-15 DIAGNOSIS — F17.210 LIGHT CIGARETTE SMOKER (1-9 CIGS/DAY): Primary | ICD-10-CM

## 2021-03-15 PROCEDURE — 99404 PR PREVENT COUNSEL,INDIV,60 MIN: ICD-10-PCS | Mod: S$GLB,,, | Performed by: NURSE PRACTITIONER

## 2021-03-15 PROCEDURE — 99404 PREV MED CNSL INDIV APPRX 60: CPT | Mod: S$GLB,,, | Performed by: NURSE PRACTITIONER

## 2021-03-18 ENCOUNTER — IMMUNIZATION (OUTPATIENT)
Dept: FAMILY MEDICINE | Facility: CLINIC | Age: 61
End: 2021-03-18
Payer: COMMERCIAL

## 2021-03-18 DIAGNOSIS — Z23 NEED FOR VACCINATION: Primary | ICD-10-CM

## 2021-03-18 PROCEDURE — 0011A COVID-19, MRNA, LNP-S, PF, 100 MCG/0.5 ML DOSE VACCINE: CPT | Mod: CV19,S$GLB,, | Performed by: FAMILY MEDICINE

## 2021-03-18 PROCEDURE — 0011A COVID-19, MRNA, LNP-S, PF, 100 MCG/0.5 ML DOSE VACCINE: ICD-10-PCS | Mod: CV19,S$GLB,, | Performed by: FAMILY MEDICINE

## 2021-03-18 PROCEDURE — 91301 COVID-19, MRNA, LNP-S, PF, 100 MCG/0.5 ML DOSE VACCINE: ICD-10-PCS | Mod: S$GLB,,, | Performed by: FAMILY MEDICINE

## 2021-03-18 PROCEDURE — 91301 COVID-19, MRNA, LNP-S, PF, 100 MCG/0.5 ML DOSE VACCINE: CPT | Mod: S$GLB,,, | Performed by: FAMILY MEDICINE

## 2021-03-22 ENCOUNTER — CLINICAL SUPPORT (OUTPATIENT)
Dept: SMOKING CESSATION | Facility: CLINIC | Age: 61
End: 2021-03-22
Payer: COMMERCIAL

## 2021-03-22 DIAGNOSIS — F17.210 LIGHT CIGARETTE SMOKER (1-9 CIGS/DAY): Primary | ICD-10-CM

## 2021-03-22 PROCEDURE — 99404 PR PREVENT COUNSEL,INDIV,60 MIN: ICD-10-PCS | Mod: S$GLB,,, | Performed by: NURSE PRACTITIONER

## 2021-03-22 PROCEDURE — 99404 PREV MED CNSL INDIV APPRX 60: CPT | Mod: S$GLB,,, | Performed by: NURSE PRACTITIONER

## 2021-03-28 DIAGNOSIS — Z76.0 MEDICATION REFILL: ICD-10-CM

## 2021-03-28 DIAGNOSIS — Z79.899 ENCOUNTER FOR LONG-TERM (CURRENT) USE OF MEDICATIONS: ICD-10-CM

## 2021-03-28 RX ORDER — ATORVASTATIN CALCIUM 80 MG/1
TABLET, FILM COATED ORAL
Qty: 90 TABLET | Refills: 4 | Status: SHIPPED | OUTPATIENT
Start: 2021-03-28 | End: 2021-08-11 | Stop reason: SDUPTHER

## 2021-04-05 ENCOUNTER — CLINICAL SUPPORT (OUTPATIENT)
Dept: SMOKING CESSATION | Facility: CLINIC | Age: 61
End: 2021-04-05

## 2021-04-05 DIAGNOSIS — F17.210 LIGHT CIGARETTE SMOKER (1-9 CIGS/DAY): Primary | ICD-10-CM

## 2021-04-05 PROCEDURE — 99404 PREV MED CNSL INDIV APPRX 60: CPT | Mod: S$GLB,,, | Performed by: NURSE PRACTITIONER

## 2021-04-05 PROCEDURE — 99404 PR PREVENT COUNSEL,INDIV,60 MIN: ICD-10-PCS | Mod: S$GLB,,, | Performed by: NURSE PRACTITIONER

## 2021-04-05 RX ORDER — VARENICLINE TARTRATE 1 MG/1
1 TABLET, FILM COATED ORAL 2 TIMES DAILY
Qty: 58 TABLET | Refills: 0 | Status: SHIPPED | OUTPATIENT
Start: 2021-04-05 | End: 2021-08-11

## 2021-04-12 ENCOUNTER — CLINICAL SUPPORT (OUTPATIENT)
Dept: SMOKING CESSATION | Facility: CLINIC | Age: 61
End: 2021-04-12

## 2021-04-12 DIAGNOSIS — F17.210 LIGHT CIGARETTE SMOKER (1-9 CIGS/DAY): Primary | ICD-10-CM

## 2021-04-12 PROCEDURE — 99407 PR TOBACCO USE CESSATION INTENSIVE >10 MINUTES: ICD-10-PCS | Mod: S$GLB,,, | Performed by: NURSE PRACTITIONER

## 2021-04-12 PROCEDURE — 99407 BEHAV CHNG SMOKING > 10 MIN: CPT | Mod: S$GLB,,, | Performed by: NURSE PRACTITIONER

## 2021-04-15 ENCOUNTER — IMMUNIZATION (OUTPATIENT)
Dept: FAMILY MEDICINE | Facility: CLINIC | Age: 61
End: 2021-04-15
Payer: COMMERCIAL

## 2021-04-15 DIAGNOSIS — Z23 NEED FOR VACCINATION: Primary | ICD-10-CM

## 2021-04-15 PROCEDURE — 0012A COVID-19, MRNA, LNP-S, PF, 100 MCG/0.5 ML DOSE VACCINE: CPT | Mod: CV19,S$GLB,, | Performed by: FAMILY MEDICINE

## 2021-04-15 PROCEDURE — 91301 COVID-19, MRNA, LNP-S, PF, 100 MCG/0.5 ML DOSE VACCINE: ICD-10-PCS | Mod: S$GLB,,, | Performed by: FAMILY MEDICINE

## 2021-04-15 PROCEDURE — 91301 COVID-19, MRNA, LNP-S, PF, 100 MCG/0.5 ML DOSE VACCINE: CPT | Mod: S$GLB,,, | Performed by: FAMILY MEDICINE

## 2021-04-15 PROCEDURE — 0012A COVID-19, MRNA, LNP-S, PF, 100 MCG/0.5 ML DOSE VACCINE: ICD-10-PCS | Mod: CV19,S$GLB,, | Performed by: FAMILY MEDICINE

## 2021-05-25 ENCOUNTER — TELEPHONE (OUTPATIENT)
Dept: VASCULAR SURGERY | Facility: CLINIC | Age: 61
End: 2021-05-25

## 2021-05-25 DIAGNOSIS — I73.9 PAD (PERIPHERAL ARTERY DISEASE): Primary | ICD-10-CM

## 2021-06-07 ENCOUNTER — TELEPHONE (OUTPATIENT)
Dept: CARDIOLOGY | Facility: CLINIC | Age: 61
End: 2021-06-07

## 2021-07-01 DIAGNOSIS — Z76.0 MEDICATION REFILL: ICD-10-CM

## 2021-07-01 DIAGNOSIS — Z79.899 ENCOUNTER FOR LONG-TERM (CURRENT) USE OF MEDICATIONS: ICD-10-CM

## 2021-07-02 ENCOUNTER — OFFICE VISIT (OUTPATIENT)
Dept: CARDIOLOGY | Facility: CLINIC | Age: 61
End: 2021-07-02
Payer: COMMERCIAL

## 2021-07-02 DIAGNOSIS — I73.9 PERIPHERAL VASCULAR DISEASE OF EXTREMITY: ICD-10-CM

## 2021-07-02 DIAGNOSIS — I87.2 VENOUS INSUFFICIENCY OF BOTH LOWER EXTREMITIES: ICD-10-CM

## 2021-07-02 DIAGNOSIS — E78.2 MIXED HYPERLIPIDEMIA: ICD-10-CM

## 2021-07-02 DIAGNOSIS — I25.10 CORONARY ARTERY DISEASE INVOLVING NATIVE CORONARY ARTERY OF NATIVE HEART WITHOUT ANGINA PECTORIS: Primary | ICD-10-CM

## 2021-07-02 DIAGNOSIS — I10 ESSENTIAL HYPERTENSION: Chronic | ICD-10-CM

## 2021-07-02 PROCEDURE — 99214 PR OFFICE/OUTPT VISIT, EST, LEVL IV, 30-39 MIN: ICD-10-PCS | Mod: 95,,, | Performed by: INTERNAL MEDICINE

## 2021-07-02 PROCEDURE — 99214 OFFICE O/P EST MOD 30 MIN: CPT | Mod: 95,,, | Performed by: INTERNAL MEDICINE

## 2021-07-02 RX ORDER — EZETIMIBE 10 MG/1
TABLET ORAL
Qty: 90 TABLET | Refills: 0 | Status: SHIPPED | OUTPATIENT
Start: 2021-07-02 | End: 2021-08-11 | Stop reason: SDUPTHER

## 2021-07-02 RX ORDER — LISINOPRIL 40 MG/1
TABLET ORAL
Qty: 90 TABLET | Refills: 0 | Status: SHIPPED | OUTPATIENT
Start: 2021-07-02 | End: 2021-08-11 | Stop reason: SDUPTHER

## 2021-07-09 ENCOUNTER — HOSPITAL ENCOUNTER (OUTPATIENT)
Dept: RADIOLOGY | Facility: HOSPITAL | Age: 61
Discharge: HOME | End: 2021-07-09
Attending: THORACIC SURGERY (CARDIOTHORACIC VASCULAR SURGERY)
Payer: COMMERCIAL

## 2021-07-09 DIAGNOSIS — I73.9 PAD (PERIPHERAL ARTERY DISEASE): ICD-10-CM

## 2021-07-09 PROCEDURE — 93925 LOWER EXTREMITY STUDY: CPT | Mod: 26,,, | Performed by: RADIOLOGY

## 2021-07-09 PROCEDURE — 93925 US ARTERIAL LOWER EXTREMITY BILAT WITH ABI (XPD): ICD-10-PCS | Mod: 26,,, | Performed by: RADIOLOGY

## 2021-07-09 PROCEDURE — 93922 UPR/L XTREMITY ART 2 LEVELS: CPT | Mod: TC,PO

## 2021-07-09 PROCEDURE — 93922 US ARTERIAL LOWER EXTREMITY BILAT WITH ABI (XPD): ICD-10-PCS | Mod: 26,,, | Performed by: RADIOLOGY

## 2021-07-09 PROCEDURE — 93922 UPR/L XTREMITY ART 2 LEVELS: CPT | Mod: 26,,, | Performed by: RADIOLOGY

## 2021-07-13 DIAGNOSIS — I73.9 PERIPHERAL VASCULAR DISEASE OF EXTREMITY: ICD-10-CM

## 2021-07-13 DIAGNOSIS — Z79.899 ENCOUNTER FOR LONG-TERM (CURRENT) USE OF MEDICATIONS: ICD-10-CM

## 2021-07-13 DIAGNOSIS — Z76.0 MEDICATION REFILL: ICD-10-CM

## 2021-07-13 RX ORDER — CILOSTAZOL 50 MG/1
TABLET ORAL
Qty: 180 TABLET | Refills: 4 | Status: SHIPPED | OUTPATIENT
Start: 2021-07-13 | End: 2022-08-22

## 2021-07-13 RX ORDER — CARVEDILOL 25 MG/1
TABLET ORAL
Qty: 180 TABLET | Refills: 4 | Status: SHIPPED | OUTPATIENT
Start: 2021-07-13 | End: 2022-09-12 | Stop reason: SDUPTHER

## 2021-07-14 ENCOUNTER — PATIENT MESSAGE (OUTPATIENT)
Dept: VASCULAR SURGERY | Facility: CLINIC | Age: 61
End: 2021-07-14

## 2021-07-28 ENCOUNTER — PATIENT MESSAGE (OUTPATIENT)
Dept: VASCULAR SURGERY | Facility: CLINIC | Age: 61
End: 2021-07-28

## 2021-07-29 ENCOUNTER — PATIENT OUTREACH (OUTPATIENT)
Dept: ADMINISTRATIVE | Facility: HOSPITAL | Age: 61
End: 2021-07-29

## 2021-08-03 DIAGNOSIS — F41.9 ANXIETY: ICD-10-CM

## 2021-08-03 DIAGNOSIS — Z79.899 ENCOUNTER FOR LONG-TERM (CURRENT) USE OF MEDICATIONS: ICD-10-CM

## 2021-08-03 RX ORDER — ALPRAZOLAM 0.5 MG/1
0.5 TABLET ORAL DAILY PRN
Qty: 30 TABLET | Refills: 0 | OUTPATIENT
Start: 2021-08-03

## 2021-08-04 ENCOUNTER — PATIENT MESSAGE (OUTPATIENT)
Dept: FAMILY MEDICINE | Facility: CLINIC | Age: 61
End: 2021-08-04

## 2021-08-04 ENCOUNTER — LAB VISIT (OUTPATIENT)
Dept: FAMILY MEDICINE | Facility: CLINIC | Age: 61
End: 2021-08-04
Payer: COMMERCIAL

## 2021-08-04 DIAGNOSIS — R05.9 COUGH: ICD-10-CM

## 2021-08-04 DIAGNOSIS — M79.10 MUSCLE PAIN: ICD-10-CM

## 2021-08-04 PROCEDURE — U0003 INFECTIOUS AGENT DETECTION BY NUCLEIC ACID (DNA OR RNA); SEVERE ACUTE RESPIRATORY SYNDROME CORONAVIRUS 2 (SARS-COV-2) (CORONAVIRUS DISEASE [COVID-19]), AMPLIFIED PROBE TECHNIQUE, MAKING USE OF HIGH THROUGHPUT TECHNOLOGIES AS DESCRIBED BY CMS-2020-01-R: HCPCS | Performed by: FAMILY MEDICINE

## 2021-08-04 PROCEDURE — U0005 INFEC AGEN DETEC AMPLI PROBE: HCPCS | Performed by: FAMILY MEDICINE

## 2021-08-05 LAB
SARS-COV-2 RNA RESP QL NAA+PROBE: NOT DETECTED
SARS-COV-2- CYCLE NUMBER: -1

## 2021-08-11 ENCOUNTER — OFFICE VISIT (OUTPATIENT)
Dept: FAMILY MEDICINE | Facility: CLINIC | Age: 61
End: 2021-08-11
Payer: COMMERCIAL

## 2021-08-11 ENCOUNTER — PATIENT OUTREACH (OUTPATIENT)
Dept: ADMINISTRATIVE | Facility: OTHER | Age: 61
End: 2021-08-11

## 2021-08-11 VITALS — DIASTOLIC BLOOD PRESSURE: 88 MMHG | SYSTOLIC BLOOD PRESSURE: 130 MMHG

## 2021-08-11 DIAGNOSIS — E78.2 MIXED HYPERLIPIDEMIA: Chronic | ICD-10-CM

## 2021-08-11 DIAGNOSIS — Z13.6 ENCOUNTER FOR LIPID SCREENING FOR CARDIOVASCULAR DISEASE: ICD-10-CM

## 2021-08-11 DIAGNOSIS — Z00.00 WELL ADULT EXAM: Primary | ICD-10-CM

## 2021-08-11 DIAGNOSIS — Z13.220 ENCOUNTER FOR LIPID SCREENING FOR CARDIOVASCULAR DISEASE: ICD-10-CM

## 2021-08-11 DIAGNOSIS — I10 ESSENTIAL HYPERTENSION: Chronic | ICD-10-CM

## 2021-08-11 DIAGNOSIS — Z76.0 MEDICATION REFILL: ICD-10-CM

## 2021-08-11 DIAGNOSIS — F41.9 ANXIETY: ICD-10-CM

## 2021-08-11 DIAGNOSIS — Z79.899 ENCOUNTER FOR LONG-TERM (CURRENT) USE OF MEDICATIONS: ICD-10-CM

## 2021-08-11 PROCEDURE — 1160F RVW MEDS BY RX/DR IN RCRD: CPT | Mod: CPTII,,, | Performed by: FAMILY MEDICINE

## 2021-08-11 PROCEDURE — 3079F PR MOST RECENT DIASTOLIC BLOOD PRESSURE 80-89 MM HG: ICD-10-PCS | Mod: CPTII,,, | Performed by: FAMILY MEDICINE

## 2021-08-11 PROCEDURE — 1159F MED LIST DOCD IN RCRD: CPT | Mod: CPTII,,, | Performed by: FAMILY MEDICINE

## 2021-08-11 PROCEDURE — 1160F PR REVIEW ALL MEDS BY PRESCRIBER/CLIN PHARMACIST DOCUMENTED: ICD-10-PCS | Mod: CPTII,,, | Performed by: FAMILY MEDICINE

## 2021-08-11 PROCEDURE — 3075F SYST BP GE 130 - 139MM HG: CPT | Mod: CPTII,,, | Performed by: FAMILY MEDICINE

## 2021-08-11 PROCEDURE — 99396 PR PREVENTIVE VISIT,EST,40-64: ICD-10-PCS | Mod: 95,,, | Performed by: FAMILY MEDICINE

## 2021-08-11 PROCEDURE — 3079F DIAST BP 80-89 MM HG: CPT | Mod: CPTII,,, | Performed by: FAMILY MEDICINE

## 2021-08-11 PROCEDURE — 1159F PR MEDICATION LIST DOCUMENTED IN MEDICAL RECORD: ICD-10-PCS | Mod: CPTII,,, | Performed by: FAMILY MEDICINE

## 2021-08-11 PROCEDURE — 3075F PR MOST RECENT SYSTOLIC BLOOD PRESS GE 130-139MM HG: ICD-10-PCS | Mod: CPTII,,, | Performed by: FAMILY MEDICINE

## 2021-08-11 PROCEDURE — 99396 PREV VISIT EST AGE 40-64: CPT | Mod: 95,,, | Performed by: FAMILY MEDICINE

## 2021-08-11 RX ORDER — LISINOPRIL 40 MG/1
TABLET ORAL
Qty: 90 TABLET | Refills: 4 | Status: SHIPPED | OUTPATIENT
Start: 2021-08-11 | End: 2022-09-12 | Stop reason: SDUPTHER

## 2021-08-11 RX ORDER — ATORVASTATIN CALCIUM 80 MG/1
TABLET, FILM COATED ORAL
Qty: 90 TABLET | Refills: 4 | Status: SHIPPED | OUTPATIENT
Start: 2021-08-11 | End: 2022-09-12 | Stop reason: SDUPTHER

## 2021-08-11 RX ORDER — ALPRAZOLAM 0.5 MG/1
0.5 TABLET ORAL DAILY PRN
Qty: 30 TABLET | Refills: 5 | Status: SHIPPED | OUTPATIENT
Start: 2021-08-11 | End: 2022-02-11 | Stop reason: SDUPTHER

## 2021-08-11 RX ORDER — EZETIMIBE 10 MG/1
TABLET ORAL
Qty: 90 TABLET | Refills: 4 | Status: SHIPPED | OUTPATIENT
Start: 2021-08-11 | End: 2022-09-08

## 2021-08-12 ENCOUNTER — OFFICE VISIT (OUTPATIENT)
Dept: VASCULAR SURGERY | Facility: CLINIC | Age: 61
End: 2021-08-12
Payer: COMMERCIAL

## 2021-08-12 VITALS — HEIGHT: 70 IN | WEIGHT: 227.06 LBS | BODY MASS INDEX: 32.51 KG/M2

## 2021-08-12 DIAGNOSIS — I73.9 PAD (PERIPHERAL ARTERY DISEASE): Primary | ICD-10-CM

## 2021-08-12 PROCEDURE — 1159F PR MEDICATION LIST DOCUMENTED IN MEDICAL RECORD: ICD-10-PCS | Mod: CPTII,S$GLB,, | Performed by: THORACIC SURGERY (CARDIOTHORACIC VASCULAR SURGERY)

## 2021-08-12 PROCEDURE — 3008F PR BODY MASS INDEX (BMI) DOCUMENTED: ICD-10-PCS | Mod: CPTII,S$GLB,, | Performed by: THORACIC SURGERY (CARDIOTHORACIC VASCULAR SURGERY)

## 2021-08-12 PROCEDURE — 99999 PR PBB SHADOW E&M-EST. PATIENT-LVL IV: CPT | Mod: PBBFAC,,, | Performed by: THORACIC SURGERY (CARDIOTHORACIC VASCULAR SURGERY)

## 2021-08-12 PROCEDURE — 3044F PR MOST RECENT HEMOGLOBIN A1C LEVEL <7.0%: ICD-10-PCS | Mod: CPTII,S$GLB,, | Performed by: THORACIC SURGERY (CARDIOTHORACIC VASCULAR SURGERY)

## 2021-08-12 PROCEDURE — 99215 OFFICE O/P EST HI 40 MIN: CPT | Mod: S$GLB,,, | Performed by: THORACIC SURGERY (CARDIOTHORACIC VASCULAR SURGERY)

## 2021-08-12 PROCEDURE — 99999 PR PBB SHADOW E&M-EST. PATIENT-LVL IV: ICD-10-PCS | Mod: PBBFAC,,, | Performed by: THORACIC SURGERY (CARDIOTHORACIC VASCULAR SURGERY)

## 2021-08-12 PROCEDURE — 3044F HG A1C LEVEL LT 7.0%: CPT | Mod: CPTII,S$GLB,, | Performed by: THORACIC SURGERY (CARDIOTHORACIC VASCULAR SURGERY)

## 2021-08-12 PROCEDURE — 3008F BODY MASS INDEX DOCD: CPT | Mod: CPTII,S$GLB,, | Performed by: THORACIC SURGERY (CARDIOTHORACIC VASCULAR SURGERY)

## 2021-08-12 PROCEDURE — 1159F MED LIST DOCD IN RCRD: CPT | Mod: CPTII,S$GLB,, | Performed by: THORACIC SURGERY (CARDIOTHORACIC VASCULAR SURGERY)

## 2021-08-12 PROCEDURE — 99215 PR OFFICE/OUTPT VISIT, EST, LEVL V, 40-54 MIN: ICD-10-PCS | Mod: S$GLB,,, | Performed by: THORACIC SURGERY (CARDIOTHORACIC VASCULAR SURGERY)

## 2021-08-12 PROCEDURE — 1160F PR REVIEW ALL MEDS BY PRESCRIBER/CLIN PHARMACIST DOCUMENTED: ICD-10-PCS | Mod: CPTII,S$GLB,, | Performed by: THORACIC SURGERY (CARDIOTHORACIC VASCULAR SURGERY)

## 2021-08-12 PROCEDURE — 1160F RVW MEDS BY RX/DR IN RCRD: CPT | Mod: CPTII,S$GLB,, | Performed by: THORACIC SURGERY (CARDIOTHORACIC VASCULAR SURGERY)

## 2021-08-13 ENCOUNTER — PATIENT MESSAGE (OUTPATIENT)
Dept: FAMILY MEDICINE | Facility: CLINIC | Age: 61
End: 2021-08-13

## 2021-08-13 ENCOUNTER — TELEPHONE (OUTPATIENT)
Dept: FAMILY MEDICINE | Facility: CLINIC | Age: 61
End: 2021-08-13

## 2021-08-17 ENCOUNTER — PATIENT MESSAGE (OUTPATIENT)
Dept: VASCULAR SURGERY | Facility: CLINIC | Age: 61
End: 2021-08-17

## 2021-09-07 ENCOUNTER — PATIENT MESSAGE (OUTPATIENT)
Dept: VASCULAR SURGERY | Facility: CLINIC | Age: 61
End: 2021-09-07

## 2021-09-08 ENCOUNTER — TELEPHONE (OUTPATIENT)
Dept: SMOKING CESSATION | Facility: CLINIC | Age: 61
End: 2021-09-08

## 2021-09-28 ENCOUNTER — TELEPHONE (OUTPATIENT)
Dept: SMOKING CESSATION | Facility: CLINIC | Age: 61
End: 2021-09-28

## 2021-09-28 ENCOUNTER — CLINICAL SUPPORT (OUTPATIENT)
Dept: SMOKING CESSATION | Facility: CLINIC | Age: 61
End: 2021-09-28

## 2021-09-28 DIAGNOSIS — F17.200 NICOTINE DEPENDENCE: Primary | ICD-10-CM

## 2021-09-28 PROCEDURE — 99407 BEHAV CHNG SMOKING > 10 MIN: CPT | Mod: S$GLB,,,

## 2021-09-28 PROCEDURE — 99407 PR TOBACCO USE CESSATION INTENSIVE >10 MINUTES: ICD-10-PCS | Mod: S$GLB,,,

## 2021-10-18 ENCOUNTER — PATIENT MESSAGE (OUTPATIENT)
Dept: VASCULAR SURGERY | Facility: CLINIC | Age: 61
End: 2021-10-18

## 2021-10-18 DIAGNOSIS — I73.9 PERIPHERAL VASCULAR DISEASE, UNSPECIFIED: ICD-10-CM

## 2021-10-18 DIAGNOSIS — Z01.818 PRE-OP TESTING: ICD-10-CM

## 2021-10-18 DIAGNOSIS — I73.9 PAD (PERIPHERAL ARTERY DISEASE): Primary | ICD-10-CM

## 2021-10-18 RX ORDER — LIDOCAINE HYDROCHLORIDE 10 MG/ML
1 INJECTION, SOLUTION EPIDURAL; INFILTRATION; INTRACAUDAL; PERINEURAL ONCE
Status: CANCELLED | OUTPATIENT
Start: 2021-10-18 | End: 2021-10-18

## 2021-10-21 ENCOUNTER — CLINICAL SUPPORT (OUTPATIENT)
Dept: SMOKING CESSATION | Facility: CLINIC | Age: 61
End: 2021-10-21

## 2021-10-21 DIAGNOSIS — F17.210 MODERATE SMOKER (20 OR LESS PER DAY): Primary | ICD-10-CM

## 2021-10-21 PROCEDURE — 99404 PREV MED CNSL INDIV APPRX 60: CPT | Mod: S$GLB,,, | Performed by: NURSE PRACTITIONER

## 2021-10-21 PROCEDURE — 99404 PR PREVENT COUNSEL,INDIV,60 MIN: ICD-10-PCS | Mod: S$GLB,,, | Performed by: NURSE PRACTITIONER

## 2021-10-21 RX ORDER — BUPROPION HYDROCHLORIDE 150 MG/1
150 TABLET, EXTENDED RELEASE ORAL 2 TIMES DAILY
Qty: 60 TABLET | Refills: 0 | Status: SHIPPED | OUTPATIENT
Start: 2021-10-21 | End: 2021-10-29

## 2021-11-09 ENCOUNTER — PATIENT MESSAGE (OUTPATIENT)
Dept: VASCULAR SURGERY | Facility: CLINIC | Age: 61
End: 2021-11-09
Payer: COMMERCIAL

## 2021-11-29 ENCOUNTER — CLINICAL SUPPORT (OUTPATIENT)
Dept: SMOKING CESSATION | Facility: CLINIC | Age: 61
End: 2021-11-29

## 2021-11-29 DIAGNOSIS — F17.210 LIGHT CIGARETTE SMOKER (1-9 CIGS/DAY): Primary | ICD-10-CM

## 2021-11-29 PROCEDURE — 99404 PR PREVENT COUNSEL,INDIV,60 MIN: ICD-10-PCS | Mod: S$GLB,,, | Performed by: NURSE PRACTITIONER

## 2021-11-29 PROCEDURE — 99404 PREV MED CNSL INDIV APPRX 60: CPT | Mod: S$GLB,,, | Performed by: NURSE PRACTITIONER

## 2021-11-29 RX ORDER — BUPROPION HYDROCHLORIDE 150 MG/1
150 TABLET, EXTENDED RELEASE ORAL 2 TIMES DAILY
Qty: 60 TABLET | Refills: 0 | Status: SHIPPED | OUTPATIENT
Start: 2021-11-29 | End: 2021-12-13 | Stop reason: SDUPTHER

## 2021-12-06 ENCOUNTER — CLINICAL SUPPORT (OUTPATIENT)
Dept: SMOKING CESSATION | Facility: CLINIC | Age: 61
End: 2021-12-06

## 2021-12-06 DIAGNOSIS — F17.210 LIGHT CIGARETTE SMOKER (1-9 CIGS/DAY): Primary | ICD-10-CM

## 2021-12-06 PROCEDURE — 99404 PREV MED CNSL INDIV APPRX 60: CPT | Mod: S$GLB,,, | Performed by: NURSE PRACTITIONER

## 2021-12-06 PROCEDURE — 99404 PR PREVENT COUNSEL,INDIV,60 MIN: ICD-10-PCS | Mod: S$GLB,,, | Performed by: NURSE PRACTITIONER

## 2021-12-13 RX ORDER — BUPROPION HYDROCHLORIDE 150 MG/1
150 TABLET, EXTENDED RELEASE ORAL 2 TIMES DAILY
Qty: 60 TABLET | Refills: 0 | Status: SHIPPED | OUTPATIENT
Start: 2021-12-13 | End: 2021-12-26

## 2021-12-28 DIAGNOSIS — Z79.899 ENCOUNTER FOR LONG-TERM (CURRENT) USE OF MEDICATIONS: ICD-10-CM

## 2021-12-28 DIAGNOSIS — Z76.0 MEDICATION REFILL: ICD-10-CM

## 2021-12-28 RX ORDER — SERTRALINE HYDROCHLORIDE 25 MG/1
TABLET, FILM COATED ORAL
Qty: 90 TABLET | Refills: 0 | Status: SHIPPED | OUTPATIENT
Start: 2021-12-28 | End: 2022-04-10 | Stop reason: SDUPTHER

## 2022-01-06 ENCOUNTER — CLINICAL SUPPORT (OUTPATIENT)
Dept: SMOKING CESSATION | Facility: CLINIC | Age: 62
End: 2022-01-06

## 2022-01-06 DIAGNOSIS — F17.210 LIGHT CIGARETTE SMOKER (1-9 CIGS/DAY): Primary | ICD-10-CM

## 2022-01-06 PROCEDURE — 99404 PR PREVENT COUNSEL,INDIV,60 MIN: ICD-10-PCS | Mod: S$GLB,,, | Performed by: NURSE PRACTITIONER

## 2022-01-06 PROCEDURE — 99404 PREV MED CNSL INDIV APPRX 60: CPT | Mod: S$GLB,,, | Performed by: NURSE PRACTITIONER

## 2022-01-06 NOTE — PROGRESS NOTES
Individual Follow-Up Form    1/6/2022    Quit Date: none    Clinical Status of Patient: Outpatient    Length of Service: 60 minutes    Continuing Medication: yes  Wellbutrin    Other Medications: none     Target Symptoms: Withdrawal and medication side effects. The following were rated moderate (3) to severe (4) on TCRS:  · Moderate (3): none  · Severe (4): none    Comments: #4 Phone conference due to distance and Covid. Patient reports decreasing cigarette smoking from 1.5 packs of cigarettes per day to 5 or 6 cigarettes per day. We discussed and reviewed strategies, habitual behavior, stress, and high risk situations. Introduced stress with addition interventions, SOLVE, relaxation with interventions, nutrition, exercise, weight gain, and the importance of rewarding oneself for accomplishments toward becoming tobacco free. Open discussion of all items with interventions. The patient remains on the prescribed tobacco cessation medication regimen of 150 mg Wellbutrin SR BID without any negative side effects at this time. The patient denies any abnormal behavioral or mental changes at this time. The patient will continue with therapy sessions and medication monitoring by CTTS. Prescribed medication management will be by physician. Patient requested to be seen at the [facility]. He has been scheduled for 1/12/22 at 3:00.     Diagnosis: F17.210    Next Visit: 1 week

## 2022-01-06 NOTE — Clinical Note
Phone conference due to distance and Covid. Patient reports decreasing cigarette smoking from 1.5 packs of cigarettes per day to 5 or 6 cigarettes per day. We discussed and reviewed strategies, habitual behavior, stress, and high risk situations. Introduced stress with addition interventions, SOLVE, relaxation with interventions, nutrition, exercise, weight gain, and the importance of rewarding oneself for accomplishments toward becoming tobacco free. Open discussion of all items with interventions. The patient remains on the prescribed tobacco cessation medication regimen of 150 mg Wellbutrin SR BID without any negative side effects at this time. The patient denies any abnormal behavioral or mental changes at this time. The patient will continue with therapy sessions and medication monitoring by CTTS. Prescribed medication management will be by physician. Patient requested to be seen at the [place] [facility]. He has been scheduled for 1/12/22 at 3:00.

## 2022-01-19 ENCOUNTER — CLINICAL SUPPORT (OUTPATIENT)
Dept: SMOKING CESSATION | Facility: CLINIC | Age: 62
End: 2022-01-19

## 2022-01-19 DIAGNOSIS — F17.210 LIGHT CIGARETTE SMOKER (1-9 CIGS/DAY): Primary | ICD-10-CM

## 2022-01-19 PROCEDURE — 99404 PR PREVENT COUNSEL,INDIV,60 MIN: ICD-10-PCS | Mod: S$GLB,,, | Performed by: INTERNAL MEDICINE

## 2022-01-19 PROCEDURE — 99404 PREV MED CNSL INDIV APPRX 60: CPT | Mod: S$GLB,,, | Performed by: INTERNAL MEDICINE

## 2022-01-19 RX ORDER — BUPROPION HYDROCHLORIDE 150 MG/1
150 TABLET, EXTENDED RELEASE ORAL 2 TIMES DAILY
Qty: 60 TABLET | Refills: 0 | Status: SHIPPED | OUTPATIENT
Start: 2022-01-19 | End: 2022-01-23 | Stop reason: SDUPTHER

## 2022-01-19 NOTE — PROGRESS NOTES
Individual Follow-Up Form    1/19/2022    Quit Date: none    Clinical Status of Patient: Outpatient    Length of Service: 60 minutes    Continuing Medication: yes  Wellbutrin    Other Medications: none     Target Symptoms: Withdrawal and medication side effects. The following were rated moderate (3) to severe (4) on TCRS:  · Moderate (3): none  · Severe (4): none    Comments: #5 We had a phone conference due to him having a Covid exposure at work. Patient reports decreasing cigarette smoking from 1 pack per day for 44 years to 4 to 5 cigarettes per day. We discussed and reviewed strategies, habitual behavior, high risks situations, understanding urges and cravings, stress and relaxation with open discussion and additional interventions, Introduced lapses, relapses, understanding them and analyzing the situation of a lapse, conflict issues that may be linked to a lapse. The patient remains on the prescribed tobacco cessation medication regimen of 150 mg Wellbutrin SR BID without any negative side effects at this time. The patient denies any abnormal behavioral or mental changes at this time. The patient will continue with therapy sessions and medication monitoring by CTTS. Prescribed medication management will be by physician.       Diagnosis: F17.210    Next Visit: 1 week

## 2022-01-19 NOTE — Clinical Note
We had a phone conference due to him having a Covid exposure at work. Patient reports decreasing cigarette smoking from 1 pack per day for 44 years to 4 to 5 cigarettes per day. We discussed and reviewed strategies, habitual behavior, high risks situations, understanding urges and cravings, stress and relaxation with open discussion and additional interventions, Introduced lapses, relapses, understanding them and analyzing the situation of a lapse, conflict issues that may be linked to a lapse. The patient remains on the prescribed tobacco cessation medication regimen of 150 mg Wellbutrin SR BID without any negative side effects at this time. The patient denies any abnormal behavioral or mental changes at this time. The patient will continue with therapy sessions and medication monitoring by CTTS. Prescribed medication management will be by physician.

## 2022-01-22 DIAGNOSIS — F17.210 LIGHT CIGARETTE SMOKER (1-9 CIGS/DAY): ICD-10-CM

## 2022-01-23 RX ORDER — BUPROPION HYDROCHLORIDE 150 MG/1
TABLET, EXTENDED RELEASE ORAL
Qty: 60 TABLET | Refills: 0 | Status: SHIPPED | OUTPATIENT
Start: 2022-01-23 | End: 2022-02-25 | Stop reason: SDUPTHER

## 2022-01-23 NOTE — TELEPHONE ENCOUNTER
No new care gaps identified.  Powered by [employer]. Reference number: [id number].   1/22/2022 9:18:52 PM CST

## 2022-01-23 NOTE — TELEPHONE ENCOUNTER
Encounter details require adjustment(s)/ updating by [facility] Staff  As of this time Protocols and CDM: did not populate or display   Adjustment(s) made: Department  CDM should display. Medication(s) delegated by the OR.  Will resend refill request encounter to P Centralized Refill Staff Pool.   [facility]   Note composed:9:18 PM 01/22/2022

## 2022-02-11 ENCOUNTER — OFFICE VISIT (OUTPATIENT)
Dept: FAMILY MEDICINE | Facility: CLINIC | Age: 62
End: 2022-02-11
Payer: COMMERCIAL

## 2022-02-11 DIAGNOSIS — Z79.899 ENCOUNTER FOR LONG-TERM (CURRENT) USE OF MEDICATIONS: ICD-10-CM

## 2022-02-11 DIAGNOSIS — F41.9 ANXIETY: ICD-10-CM

## 2022-02-11 PROCEDURE — 99213 OFFICE O/P EST LOW 20 MIN: CPT | Mod: 95,,, | Performed by: NURSE PRACTITIONER

## 2022-02-11 PROCEDURE — 99213 PR OFFICE/OUTPT VISIT, EST, LEVL III, 20-29 MIN: ICD-10-PCS | Mod: 95,,, | Performed by: NURSE PRACTITIONER

## 2022-02-11 RX ORDER — ALPRAZOLAM 0.5 MG/1
0.5 TABLET ORAL DAILY PRN
Qty: 30 TABLET | Refills: 5 | Status: SHIPPED | OUTPATIENT
Start: 2022-02-11 | End: 2022-09-12 | Stop reason: SDUPTHER

## 2022-02-11 RX ORDER — ALPRAZOLAM 0.5 MG/1
TABLET ORAL
Qty: 30 TABLET | OUTPATIENT
Start: 2022-02-11

## 2022-02-11 NOTE — TELEPHONE ENCOUNTER
No new care gaps identified.  Powered by [employer]. Reference number: [id number].   2/11/2022 4:32:19 AM CST

## 2022-02-11 NOTE — ASSESSMENT & PLAN NOTE
He is taking Xanax 0.5 mg daily PRN for severe anxiety. Medication refilled. Follow-up in six months.  Patient is also taking Zoloft 25 mg daily. He is demonstrating no behaviors to suggest inappropriate use of prescribed medications. [place] Board of Pharmacy Controlled Prescription Drug Monitoring database was queired and showed no activity to suggest abuse, diversion, or other inappropriate use of prescription medications.     Discussed anxiety condition course.  Discussed SSRI as first-line treatment for this condition.  Discussed risk of discontinuing this medication without tapering.  Patient was educated, advised of side effects, and all questions were answered.  Patient voiced understanding.  Patient will follow up routinely and notify us if having any side effects or worsening or persistent symptoms.  ER precautions were given.

## 2022-02-11 NOTE — TELEPHONE ENCOUNTER
Patient is due for an appointment.  Please schedule appointment with JOSUE for medication  refill.

## 2022-02-11 NOTE — PROGRESS NOTES
Primary Care Telemedicine Note  The patient location is:  Patient's Home - [place]  The chief complaint leading to consultation is:   Chief Complaint   Patient presents with    Medication Refill      Total time: 20 minutes see MDM below. The total time spent on the encounter, which includes face to face time and non-face to face time preparing to see the patient (eg, review of previous medical records, tests), Obtaining and/or reviewing separately obtained history, documenting clinical information in the electronic or other health record, independently interpreting results (not separately reported)/communicating results to the patient/family/caregiver, and/or care coordination (not separately reported).    Visit type: Virtual visit with synchronous audio only and video  Each patient to whom he or she provides medical services by telemedicine is:  (1) informed of the relationship between the physician and patient and the respective role of any other health care provider with respect to management of the patient; and (2) notified that he or she may decline to receive medical services by telemedicine and may withdraw from such care at any time.  =================================================================    Assessment/Plan:  Problem List Items Addressed This Visit        Psychiatric    Encounter for long-term (current) use of medications (Chronic)    Overview     07/18/2019 CHRONIC long-term drug therapy for managed conditions. See medication list. Reports compliance.  No side effects reported.  Routine lab work is being monitored.  Patient does  need refills today.    ===============================  08/16/2019Reports doing well with med regimen. Legs are better on Pletal. Due for Annual labs today.   =====================================================  JULY 2020:  Reviewed labs with the patient.  CHRONIC. Stable. Compliant with medications for managed conditions. See medication list. No SE reported.    Routine lab analysis is being monitored. Refills were addressed.  Lab Results   Component Value Date    WBC 11.79 07/16/2020    HGB 14.7 07/16/2020    HCT 44.6 07/16/2020     (H) 07/16/2020     07/16/2020         Chemistry        Component Value Date/Time     07/16/2020 1011    K 4.6 07/16/2020 1011     07/16/2020 1011    CO2 25 07/16/2020 1011    BUN 17 07/16/2020 1011    CREATININE 0.9 07/16/2020 1011    GLU 99 07/16/2020 1011        Component Value Date/Time    CALCIUM 9.5 07/16/2020 1011    ALKPHOS 71 07/16/2020 1011    AST 24 07/16/2020 1011    ALT 31 07/16/2020 1011    BILITOT 0.6 07/16/2020 1011    ESTGFRAFRICA >60.0 07/16/2020 1011    EGFRNONAA >60.0 07/16/2020 1011          Lab Results   Component Value Date    TSH 0.834 07/16/2020    D9BFXYV 5.9 08/16/2019    T3FREE 2.8 08/16/2019       ====================================================         Current Assessment & Plan     Complete history and physical was completed today.  Complete and thorough medication reconciliation was performed.  Discussed risks and benefits of medications.  Advised patient on orders and health maintenance.  We discussed old records and old labs if available.  Will request any records not available through epic.  Continue current medications listed on your summary sheet.         Relevant Medications    ALPRAZolam (XANAX) 0.5 MG tablet    Anxiety (Chronic)    Overview     Chronic.  Stable.  Needs refill on medications.  Patient reports compliance.  No side effects reported.The patient was checked in the [facility] Board of Pharmacy's  Prescription Monitoring Program. There appears to be no incongruities with the patient's verbalized history.            Current Assessment & Plan     He is taking Xanax 0.5 mg daily PRN for severe anxiety. Medication refilled. Follow-up in six months.  Patient is also taking Zoloft 25 mg daily. He is demonstrating no behaviors to suggest inappropriate use of  prescribed medications. [place] Board of Pharmacy Controlled Prescription Drug Monitoring database was queired and showed no activity to suggest abuse, diversion, or other inappropriate use of prescription medications.     Discussed anxiety condition course.  Discussed SSRI as first-line treatment for this condition.  Discussed risk of discontinuing this medication without tapering.  Patient was educated, advised of side effects, and all questions were answered.  Patient voiced understanding.  Patient will follow up routinely and notify us if having any side effects or worsening or persistent symptoms.  ER precautions were given.         Relevant Medications    ALPRAZolam (XANAX) 0.5 MG tablet        Follow up in about 6 months (around 8/11/2022), or if symptoms worsen or fail to improve, for Follow up with Dr. Vázquez.    Vivian Ward, LAUREN  _____________________________________________________________________________________________________________________________________________________    CC: medication refill    HPI: Patient is in clinic today as an established patient here for medication refills. Remaining chronic conditions have been reviewed and remain stable. Further detail as stated above.     Anxiety: Patient complains of anxiety disorder.  He has the following symptoms: difficulty concentrating, feelings of losing control, irritable, psychomotor agitation, racing thoughts. Onset of symptoms was approximately several months ago, stable since that time. He denies current suicidal and homicidal ideation. Risk factors: negative life event increase stressors with family and work Previous treatment includes benzodiazepines Xanax and Zoloft.  He complains of the following side effects from the treatment: none.    Past Medical History:  Past Medical History:   Diagnosis Date    Anticoagulant long-term use     Anxiety     BCC (basal cell carcinoma)     CAD (coronary artery disease)     Erectile  dysfunction     GERD (gastroesophageal reflux disease)     HLD (hyperlipidemia)     HTN (hypertension)     Impaired fasting glucose     Lateral epicondylitis of left elbow 7/18/2019    Myocardial infarction     PONV (postoperative nausea and vomiting)      Past Surgical History:   Procedure Laterality Date    ANGIOPLASTY      AORTOGRAPHY WITH EXTREMITY RUNOFF  11/16/2021    Procedure: AORTOGRAM, WITH EXTREMITY RUNOFF;  Surgeon: Blanca Arzola MD;  Location: [facility] CATH;  Service: Cardiovascular;;    AORTOGRAPHY WITH SERIALOGRAPHY N/A 11/22/2019    Procedure: AORTOGRAM, WITH SERIALOGRAPHY;  Surgeon: Blanca Arzola MD;  Location: [facility] CATH;  Service: Cardiovascular;  Laterality: N/A;    AORTOGRAPHY WITH SERIALOGRAPHY N/A 12/13/2019    Procedure: AORTOGRAM, WITH SERIALOGRAPHY;  Surgeon: Blanca Arzola MD;  Location: [facility] CATH;  Service: Cardiovascular;  Laterality: N/A;    AORTOGRAPHY WITH SERIALOGRAPHY  12/17/2020    Procedure: AORTOGRAM, WITH SERIALOGRAPHY;  Surgeon: Blanca Arzola MD;  Location: [facility] CATH;  Service: Cardiovascular;;    CARDIAC SURGERY  2005    stents placed    COLONOSCOPY N/A 7/17/2017    Procedure: COLONOSCOPY Miralax;  Surgeon: Jeremiah Syed Jr., MD;  Location: [facility] ENDO;  Service: Endoscopy;  Laterality: N/A;    KNEE ARTHROSCOPY W/ MENISCECTOMY      PERCUTANEOUS TRANSLUMINAL ANGIOPLASTY N/A 11/22/2019    Procedure: Pta (Angioplasty, Percutaneous, Transluminal);  Surgeon: Blanca Arzola MD;  Location: [facility] CATH;  Service: Cardiovascular;  Laterality: N/A;    PERCUTANEOUS TRANSLUMINAL ANGIOPLASTY N/A 12/13/2019    Procedure: Pta (Angioplasty, Percutaneous, Transluminal);  Surgeon: Blanca Arzola MD;  Location: [facility] CATH;  Service: Cardiovascular;  Laterality: N/A;    PERCUTANEOUS TRANSLUMINAL ANGIOPLASTY  11/16/2021    Procedure: PTA (ANGIOPLASTY, PERCUTANEOUS, TRANSLUMINAL);  Surgeon: Blanca Arzola MD;  Location: [facility] CATH;  Service: Cardiovascular;;    VARICOSE VEIN SURGERY      left saphenous      Review of patient's allergies indicates:  No Known Allergies  Social History     Tobacco Use    Smoking status: Current Every Day Smoker     Packs/day: 1.50     Years: 44.00     Pack years: 66.00     Types: Cigarettes    Smokeless tobacco: Never Used   Substance Use Topics    Alcohol use: Yes     Alcohol/week: 20.0 standard drinks     Types: 20 Cans of beer per week     Comment: every now and then per pt    Drug use: No     Family History   Problem Relation Age of Onset    Diabetes Brother     Coronary artery disease Brother         premature    Heart disease Brother     Hypertension Brother     Stomach cancer Father     No Known Problems Mother     No Known Problems Sister     No Known Problems Maternal Grandmother     No Known Problems Maternal Grandfather     No Known Problems Paternal Grandmother     No Known Problems Paternal Grandfather     No Known Problems Maternal Aunt     No Known Problems Maternal Uncle     No Known Problems Paternal Aunt     No Known Problems Paternal Uncle     Prostate cancer Neg Hx     Colon cancer Neg Hx     Anemia Neg Hx     Arrhythmia Neg Hx     Asthma Neg Hx     Clotting disorder Neg Hx     Fainting Neg Hx     Heart attack Neg Hx     Heart disease Neg Hx     Heart failure Neg Hx     Hyperlipidemia Neg Hx     Hypertension Neg Hx     Stroke Neg Hx     Atrial Septal Defect Neg Hx      Current Outpatient Medications on File Prior to Visit   Medication Sig Dispense Refill    ascorbic acid (VITAMIN C) 500 MG tablet Take 500 mg by mouth 2 (two) times daily.      aspirin (ECOTRIN) 81 MG EC tablet Take 1 tablet (81 mg total) by mouth once daily. 1 tablet 0    aspirin 81 MG Chew Take 81 mg by mouth once daily. 1 Tablet, Chewable Oral Every day      atorvastatin (LIPITOR) 80 MG tablet TAKE 1 TABLET BY MOUTH ONE TIME DAILY (Patient taking differently: Take 80 mg by mouth once daily. TAKE 1 TABLET BY MOUTH ONE TIME DAILY) 90 tablet 4    buPROPion  (WELLBUTRIN SR) 150 MG TBSR 12 hr tablet TAKE 1 TABLET BY MOUTH TWICE A DAY 60 tablet 0    carvediloL (COREG) 25 MG tablet TAKE 1 TABLET BY MOUTH TWICE A DAY WITH FOOD (Patient taking differently: Take 25 mg by mouth 2 (two) times daily with meals.) 180 tablet 4    cilostazoL (PLETAL) 50 MG Tab TAKE 1 TABLET BY MOUTH TWICE A DAY (Patient taking differently: 50 mg 2 (two) times daily.) 180 tablet 4    clopidogreL (PLAVIX) 75 mg tablet TAKE 1 TABLET BY MOUTH EVERY DAY 90 tablet 1    esomeprazole (NEXIUM) 20 MG capsule Take 20 mg by mouth before breakfast.      ezetimibe (ZETIA) 10 mg tablet TAKE 1 TABLET BY MOUTH ONE TIME DAILY (Patient taking differently: Take 10 mg by mouth once daily. TAKE 1 TABLET BY MOUTH ONE TIME DAILY) 90 tablet 4    fluticasone propionate (FLONASE) 50 mcg/actuation nasal spray USE 1 SPRAY (50 MCG TOTAL) BY EACH NARE ROUTE ONCE DAILY. (Patient taking differently: 1 spray by Each Nostril route daily as needed. USE 1 SPRAY (50 MCG TOTAL) BY EACH NARE ROUTE ONCE DAILY.) 16 mL 11    HYDROcodone-acetaminophen (NORCO) 5-325 mg per tablet Take 1 tablet by mouth every 4 to 6 hours as needed for Pain. 28 tablet 0    ibuprofen (ADVIL,MOTRIN) 800 MG tablet Take 1 tablet (800 mg total) by mouth every 6 (six) hours as needed for Pain. 30 tablet 1    lisinopriL (PRINIVIL,ZESTRIL) 40 MG tablet TAKE 1 TABLET BY MOUTH ONE TIME DAILY 90 tablet 4    MULTIVITAMIN ORAL Take 1 capsule by mouth once daily. 1  By mouth Every morning      nitroGLYCERIN (NITROSTAT) 0.4 MG SL tablet Place 1 tablet (0.4 mg total) under the tongue every 5 (five) minutes as needed for Chest pain. 1 Tablet, Sublingual Sublingual 100 tablet 3    omega-3 fatty acids 500 mg Cap Take 1 capsule by mouth once daily. 1 Capsule Oral       sertraline (ZOLOFT) 25 MG tablet TAKE 1 TABLET BY MOUTH EVERY DAY 90 tablet 0    [DISCONTINUED] ALPRAZolam (XANAX) 0.5 MG tablet Take 1 tablet (0.5 mg total) by mouth daily as needed for Anxiety. 30  tablet 5     Current Facility-Administered Medications on File Prior to Visit   Medication Dose Route Frequency Provider Last Rate Last Admin    lidocaine (PF) 10 mg/ml (1%) injection 10 mg  1 mL Intradermal Once Xavi Cornelius MD         Review of Systems   Constitutional: Negative for activity change, appetite change, chills, fatigue, fever and unexpected weight change.   HENT: Negative for congestion, hearing loss, rhinorrhea, sore throat and trouble swallowing.    Eyes: Negative for discharge and visual disturbance.   Respiratory: Negative for cough, chest tightness, shortness of breath and wheezing.    Cardiovascular: Negative for chest pain, palpitations and leg swelling.   Gastrointestinal: Negative for abdominal pain, blood in stool, constipation, diarrhea and vomiting.   Endocrine: Negative for polydipsia and polyuria.   Genitourinary: Negative for difficulty urinating, dysuria, hematuria and urgency.   Musculoskeletal: Negative for arthralgias, joint swelling, myalgias and neck pain.   Skin: Negative for rash and wound.   Neurological: Negative for dizziness, weakness and headaches.   Psychiatric/Behavioral: Negative for behavioral problems, confusion and dysphoric mood. The patient is nervous/anxious.      There were no vitals filed for this visit.    Wt Readings from Last 3 Encounters:   11/16/21 104.3 kg (230 lb)   08/12/21 103 kg (227 lb 1.2 oz)   01/07/21 101 kg (222 lb 10.6 oz)       Physical Exam  Vitals reviewed.   Constitutional:       General: He is awake. He is not in acute distress.     Appearance: Normal appearance. He is not ill-appearing.   HENT:      Head: Normocephalic and atraumatic.      Right Ear: External ear normal.      Left Ear: External ear normal.   Eyes:      Extraocular Movements: Extraocular movements intact.      Conjunctiva/sclera: Conjunctivae normal.   Pulmonary:      Effort: Pulmonary effort is normal. No tachypnea, accessory muscle usage or respiratory distress.    Skin:     Coloration: Skin is not pale.      Findings: No rash.   Neurological:      Mental Status: He is alert and oriented to person, place, and time. Mental status is at baseline.   Psychiatric:         Attention and Perception: Attention normal.         Mood and Affect: Mood normal. Mood is not anxious.         Speech: Speech normal.         Behavior: Behavior normal. Behavior is cooperative.         Thought Content: Thought content normal. Thought content does not include homicidal or suicidal ideation.         Health Maintenance   Topic Date Due    LDCT Lung Screen  09/25/2019    PROSTATE-SPECIFIC ANTIGEN  07/09/2022    High Dose Statin  11/16/2022    TETANUS VACCINE  11/23/2025    Lipid Panel  08/18/2026    Hepatitis C Screening  Completed

## 2022-02-25 DIAGNOSIS — F17.210 LIGHT CIGARETTE SMOKER (1-9 CIGS/DAY): ICD-10-CM

## 2022-02-25 RX ORDER — BUPROPION HYDROCHLORIDE 150 MG/1
TABLET, EXTENDED RELEASE ORAL
Qty: 60 TABLET | Refills: 0 | Status: SHIPPED | OUTPATIENT
Start: 2022-02-25 | End: 2022-03-30

## 2022-02-25 NOTE — TELEPHONE ENCOUNTER
No new care gaps identified.  Powered by [employer]. Reference number: [id number].   2/25/2022 11:32:49 AM CST

## 2022-03-27 DIAGNOSIS — F17.210 LIGHT CIGARETTE SMOKER (1-9 CIGS/DAY): ICD-10-CM

## 2022-03-30 RX ORDER — BUPROPION HYDROCHLORIDE 150 MG/1
TABLET, EXTENDED RELEASE ORAL
Qty: 60 TABLET | Refills: 0 | Status: SHIPPED | OUTPATIENT
Start: 2022-03-30 | End: 2022-09-12

## 2022-04-04 ENCOUNTER — PATIENT MESSAGE (OUTPATIENT)
Dept: CARDIOLOGY | Facility: CLINIC | Age: 62
End: 2022-04-04
Payer: COMMERCIAL

## 2022-04-05 ENCOUNTER — OFFICE VISIT (OUTPATIENT)
Dept: CARDIOLOGY | Facility: CLINIC | Age: 62
End: 2022-04-05
Payer: COMMERCIAL

## 2022-04-05 VITALS
WEIGHT: 237 LBS | DIASTOLIC BLOOD PRESSURE: 78 MMHG | BODY MASS INDEX: 33.93 KG/M2 | HEIGHT: 70 IN | SYSTOLIC BLOOD PRESSURE: 120 MMHG | HEART RATE: 85 BPM

## 2022-04-05 DIAGNOSIS — E78.2 MIXED HYPERLIPIDEMIA: Chronic | ICD-10-CM

## 2022-04-05 DIAGNOSIS — R55 SYNCOPE, UNSPECIFIED SYNCOPE TYPE: ICD-10-CM

## 2022-04-05 DIAGNOSIS — G47.30 SLEEP APNEA, UNSPECIFIED TYPE: ICD-10-CM

## 2022-04-05 DIAGNOSIS — R07.89 ATYPICAL CHEST PAIN: Primary | ICD-10-CM

## 2022-04-05 DIAGNOSIS — I25.10 CORONARY ARTERY DISEASE INVOLVING NATIVE CORONARY ARTERY OF NATIVE HEART WITHOUT ANGINA PECTORIS: ICD-10-CM

## 2022-04-05 DIAGNOSIS — I73.9 PERIPHERAL VASCULAR DISEASE OF EXTREMITY: ICD-10-CM

## 2022-04-05 DIAGNOSIS — I10 ESSENTIAL HYPERTENSION: Chronic | ICD-10-CM

## 2022-04-05 DIAGNOSIS — Z01.30 ENCOUNTER FOR EXAMINATION OF BLOOD PRESSURE WITHOUT ABNORMAL FINDINGS: ICD-10-CM

## 2022-04-05 PROCEDURE — 99999 PR PBB SHADOW E&M-EST. PATIENT-LVL III: ICD-10-PCS | Mod: PBBFAC,,, | Performed by: INTERNAL MEDICINE

## 2022-04-05 PROCEDURE — 99214 OFFICE O/P EST MOD 30 MIN: CPT | Mod: S$GLB,,, | Performed by: INTERNAL MEDICINE

## 2022-04-05 PROCEDURE — 99999 PR PBB SHADOW E&M-EST. PATIENT-LVL III: CPT | Mod: PBBFAC,,, | Performed by: INTERNAL MEDICINE

## 2022-04-05 PROCEDURE — 99214 PR OFFICE/OUTPT VISIT, EST, LEVL IV, 30-39 MIN: ICD-10-PCS | Mod: S$GLB,,, | Performed by: INTERNAL MEDICINE

## 2022-04-05 NOTE — PROGRESS NOTES
Subjective:    Patient ID:  Jose G Shafer is a 62 y.o. male who presents for follow-up of Follow-up (9mth f/u), Shortness of Breath, and Fatigue      Problem List Items Addressed This Visit        Cardiac/Vascular    Essential hypertension - Primary (Chronic)    Mixed hyperlipidemia (Chronic)    Coronary artery disease involving native coronary artery of native heart without angina pectoris    Overview     Prev History: 6-2005 he had an inferior STEMI-->DAVID to pRCA.  LVEF preserved             Peripheral vascular disease of extremity: s/p R. SFA DCB 10/19/2015          HPI    Patient was last seen on 7/2/21 at which time he was doing OK from a Cardiac standpoint.    On assessment today, the patient states that he feels decent, but still short of breath on exertion.    No chest pain.  Some shortness of breath with activity.  Also some chest pressure with activity  Back to smoking    Had an episode of syncope 3 months ago    Symptoms of sleep apnea: Snores, stops breathing at night    Seen by Dr. Arzola for legs - Yes, doing better    Review of Systems   Constitutional: Negative for decreased appetite, fever and malaise/fatigue.   Eyes: Negative for blurred vision.   Cardiovascular: Negative for chest pain, dyspnea on exertion, irregular heartbeat and leg swelling.   Respiratory: Negative for cough, hemoptysis, shortness of breath and wheezing.    Endocrine: Negative for cold intolerance and heat intolerance.   Hematologic/Lymphatic: Negative for bleeding problem.   Musculoskeletal: Negative for muscle weakness and myalgias.   Gastrointestinal: Negative for abdominal pain, constipation and diarrhea.   Genitourinary: Negative for bladder incontinence.   Neurological: Negative for dizziness and weakness.   Psychiatric/Behavioral: Negative for depression.        Objective:     Vitals:    04/05/22 1120   BP: 120/78   BP Location: Right arm   Patient Position: Sitting   BP Method: Large (Automatic)   Pulse: 85   Weight:  "107.5 kg (236 lb 15.9 oz)   Height: 5' 10" (1.778 m)        Physical Exam  Constitutional:       Appearance: He is well-developed.   HENT:      Head: Normocephalic and atraumatic.   Neck:      Vascular: No JVD.   Cardiovascular:      Rate and Rhythm: Normal rate and regular rhythm.      Heart sounds: Normal heart sounds. No murmur heard.    No friction rub. No gallop.   Pulmonary:      Effort: Pulmonary effort is normal. No respiratory distress.      Breath sounds: Normal breath sounds. No wheezing or rales.   Abdominal:      General: Bowel sounds are normal.      Palpations: Abdomen is soft.      Tenderness: There is no abdominal tenderness. There is no guarding or rebound.   Musculoskeletal:      Cervical back: Normal range of motion and neck supple.   Skin:     General: Skin is warm and dry.   Neurological:      Mental Status: He is alert and oriented to person, place, and time.   Psychiatric:         Behavior: Behavior normal.             Current Outpatient Medications on File Prior to Visit   Medication Sig    ALPRAZolam (XANAX) 0.5 MG tablet Take 1 tablet (0.5 mg total) by mouth daily as needed for Anxiety.    ascorbic acid (VITAMIN C) 500 MG tablet Take 500 mg by mouth 2 (two) times daily.    aspirin (ECOTRIN) 81 MG EC tablet Take 1 tablet (81 mg total) by mouth once daily.    aspirin 81 MG Chew Take 81 mg by mouth once daily. 1 Tablet, Chewable Oral Every day    atorvastatin (LIPITOR) 80 MG tablet TAKE 1 TABLET BY MOUTH ONE TIME DAILY (Patient taking differently: Take 80 mg by mouth once daily. TAKE 1 TABLET BY MOUTH ONE TIME DAILY)    buPROPion (WELLBUTRIN SR) 150 MG TBSR 12 hr tablet TAKE 1 TABLET BY MOUTH TWICE A DAY    carvediloL (COREG) 25 MG tablet TAKE 1 TABLET BY MOUTH TWICE A DAY WITH FOOD (Patient taking differently: Take 25 mg by mouth 2 (two) times daily with meals.)    cilostazoL (PLETAL) 50 MG Tab TAKE 1 TABLET BY MOUTH TWICE A DAY (Patient taking differently: 50 mg 2 (two) times " daily.)    clopidogreL (PLAVIX) 75 mg tablet TAKE 1 TABLET BY MOUTH EVERY DAY    esomeprazole (NEXIUM) 20 MG capsule Take 20 mg by mouth before breakfast.    ezetimibe (ZETIA) 10 mg tablet TAKE 1 TABLET BY MOUTH ONE TIME DAILY (Patient taking differently: Take 10 mg by mouth once daily. TAKE 1 TABLET BY MOUTH ONE TIME DAILY)    fluticasone propionate (FLONASE) 50 mcg/actuation nasal spray USE 1 SPRAY (50 MCG TOTAL) BY EACH NARE ROUTE ONCE DAILY. (Patient taking differently: 1 spray by Each Nostril route daily as needed. USE 1 SPRAY (50 MCG TOTAL) BY EACH NARE ROUTE ONCE DAILY.)    HYDROcodone-acetaminophen (NORCO) 5-325 mg per tablet Take 1 tablet by mouth every 4 to 6 hours as needed for Pain.    ibuprofen (ADVIL,MOTRIN) 800 MG tablet Take 1 tablet (800 mg total) by mouth every 6 (six) hours as needed for Pain.    lisinopriL (PRINIVIL,ZESTRIL) 40 MG tablet TAKE 1 TABLET BY MOUTH ONE TIME DAILY    MULTIVITAMIN ORAL Take 1 capsule by mouth once daily. 1  By mouth Every morning    nitroGLYCERIN (NITROSTAT) 0.4 MG SL tablet Place 1 tablet (0.4 mg total) under the tongue every 5 (five) minutes as needed for Chest pain. 1 Tablet, Sublingual Sublingual    omega-3 fatty acids 500 mg Cap Take 1 capsule by mouth once daily. 1 Capsule Oral     sertraline (ZOLOFT) 25 MG tablet TAKE 1 TABLET BY MOUTH EVERY DAY     Current Facility-Administered Medications on File Prior to Visit   Medication    lidocaine (PF) 10 mg/ml (1%) injection 10 mg       Lipid Panel:   Lab Results   Component Value Date    CHOL 117 (L) 08/18/2021    HDL 30 (L) 08/18/2021    LDLCALC 54.0 (L) 08/18/2021    TRIG 165 (H) 08/18/2021    CHOLHDL 25.6 08/18/2021       The ASCVD Risk score (Javan PATRICIO Jr., et al., 2013) failed to calculate for the following reasons:    The valid total cholesterol range is 130 to 320 mg/dL    All pertinent labs, imaging, and EKGs reviewed.  Patient's most recent EKG tracing was personally interpreted by this  provider.    Assessment:       1. Essential hypertension    2. Mixed hyperlipidemia    3. Coronary artery disease involving native coronary artery of native heart without angina pectoris    4. Peripheral vascular disease of extremity: s/p R. SFA DCB 10/19/2015         Plan:     Symptoms of atypical chest pain  BP/Pulse OK today  Most recent echocardiogram reviewed personally   Symptoms of sleep apnea: Snores, stops breathing at night    Echocardiogram   Nuclear stress test   30 day event monitor   Sleep study/sleep referral   Continue aspirin 81 mg PO Daily  Continue Plavix 75 mg PO Daily  Continue atorvastatin 80 mg PO Daily  Continue current antihypertensive regimen   Minimize sodium    If shortness of breath persists, will consider pulmonary referral considering smoking history  Also requesting new PCP    Continue other cardiac medications  Mediterranean Diet/Cardiovascular Exercise Program    Patient queried and all questions were answered.    F/u in 3 months to reassess      Signed:    Harjinder Perez MD  4/5/2022 8:30 PM

## 2022-04-10 DIAGNOSIS — Z79.899 ENCOUNTER FOR LONG-TERM (CURRENT) USE OF MEDICATIONS: ICD-10-CM

## 2022-04-10 DIAGNOSIS — Z76.0 MEDICATION REFILL: ICD-10-CM

## 2022-04-10 NOTE — TELEPHONE ENCOUNTER
No new care gaps identified.  Powered by "[employer] by [employer]. Reference number: [id number].   4/10/2022 8:37:45 AM CDT

## 2022-04-11 RX ORDER — SERTRALINE HYDROCHLORIDE 25 MG/1
25 TABLET, FILM COATED ORAL DAILY
Qty: 90 TABLET | Refills: 4 | Status: SHIPPED | OUTPATIENT
Start: 2022-04-11 | End: 2022-09-12

## 2022-04-14 ENCOUNTER — PROCEDURE VISIT (OUTPATIENT)
Dept: SLEEP MEDICINE | Facility: HOSPITAL | Age: 62
End: 2022-04-14
Attending: INTERNAL MEDICINE
Payer: COMMERCIAL

## 2022-04-14 DIAGNOSIS — G47.30 SLEEP APNEA, UNSPECIFIED TYPE: ICD-10-CM

## 2022-04-14 PROCEDURE — 95806 SLEEP STUDY UNATT&RESP EFFT: CPT

## 2022-04-20 ENCOUNTER — PATIENT MESSAGE (OUTPATIENT)
Dept: CARDIOLOGY | Facility: CLINIC | Age: 62
End: 2022-04-20
Payer: COMMERCIAL

## 2022-05-05 ENCOUNTER — HOSPITAL ENCOUNTER (OUTPATIENT)
Dept: RADIOLOGY | Facility: HOSPITAL | Age: 62
Discharge: HOME | End: 2022-05-05
Attending: INTERNAL MEDICINE
Payer: COMMERCIAL

## 2022-05-05 ENCOUNTER — CLINICAL SUPPORT (OUTPATIENT)
Dept: CARDIOLOGY | Facility: HOSPITAL | Age: 62
End: 2022-05-05
Attending: INTERNAL MEDICINE
Payer: COMMERCIAL

## 2022-05-05 ENCOUNTER — PATIENT MESSAGE (OUTPATIENT)
Dept: FAMILY MEDICINE | Facility: CLINIC | Age: 62
End: 2022-05-05
Payer: COMMERCIAL

## 2022-05-05 ENCOUNTER — PATIENT MESSAGE (OUTPATIENT)
Dept: CARDIOLOGY | Facility: CLINIC | Age: 62
End: 2022-05-05

## 2022-05-05 VITALS
BODY MASS INDEX: 33.79 KG/M2 | DIASTOLIC BLOOD PRESSURE: 78 MMHG | WEIGHT: 236 LBS | HEIGHT: 70 IN | WEIGHT: 236 LBS | SYSTOLIC BLOOD PRESSURE: 120 MMHG | BODY MASS INDEX: 33.79 KG/M2 | HEIGHT: 70 IN

## 2022-05-05 DIAGNOSIS — R07.89 ATYPICAL CHEST PAIN: ICD-10-CM

## 2022-05-05 DIAGNOSIS — R55 SYNCOPE, UNSPECIFIED SYNCOPE TYPE: ICD-10-CM

## 2022-05-05 DIAGNOSIS — Z01.30 ENCOUNTER FOR EXAMINATION OF BLOOD PRESSURE WITHOUT ABNORMAL FINDINGS: ICD-10-CM

## 2022-05-05 LAB
ASCENDING AORTA: 3.53 CM
AV INDEX (PROSTH): 0.66
AV MEAN GRADIENT: 6 MMHG
AV PEAK GRADIENT: 14 MMHG
AV VALVE AREA: 2.3 CM2
AV VELOCITY RATIO: 0.62
BSA FOR ECHO PROCEDURE: 2.3 M2
CV ECHO LV RWT: 0.44 CM
CV PHARM DOSE: 0.4 MG
CV STRESS BASE HR: 70 BPM
DIASTOLIC BLOOD PRESSURE: 75 MMHG
DOP CALC AO PEAK VEL: 1.85 M/S
DOP CALC AO VTI: 31.25 CM
DOP CALC LVOT AREA: 3.5 CM2
DOP CALC LVOT DIAMETER: 2.11 CM
DOP CALC LVOT PEAK VEL: 1.15 M/S
DOP CALC LVOT STROKE VOLUME: 71.89 CM3
DOP CALCLVOT PEAK VEL VTI: 20.57 CM
E WAVE DECELERATION TIME: 189.75 MSEC
E/A RATIO: 1.12
E/E' RATIO: 11.06 M/S
ECHO LV POSTERIOR WALL: 1.04 CM (ref 0.6–1.1)
EJECTION FRACTION: 65 %
FRACTIONAL SHORTENING: 35 % (ref 28–44)
INTERVENTRICULAR SEPTUM: 1.1 CM (ref 0.6–1.1)
LA MAJOR: 5.35 CM
LA MINOR: 4.8 CM
LA WIDTH: 3.73 CM
LEFT ATRIUM SIZE: 3.73 CM
LEFT ATRIUM VOLUME INDEX: 26.7 ML/M2
LEFT ATRIUM VOLUME: 59.84 CM3
LEFT INTERNAL DIMENSION IN SYSTOLE: 3.08 CM (ref 2.1–4)
LEFT VENTRICLE DIASTOLIC VOLUME INDEX: 46.29 ML/M2
LEFT VENTRICLE DIASTOLIC VOLUME: 103.68 ML
LEFT VENTRICLE MASS INDEX: 81 G/M2
LEFT VENTRICLE SYSTOLIC VOLUME INDEX: 16.7 ML/M2
LEFT VENTRICLE SYSTOLIC VOLUME: 37.45 ML
LEFT VENTRICULAR INTERNAL DIMENSION IN DIASTOLE: 4.73 CM (ref 3.5–6)
LEFT VENTRICULAR MASS: 182.32 G
LV LATERAL E/E' RATIO: 9.4 M/S
LV SEPTAL E/E' RATIO: 13.43 M/S
MV A" WAVE DURATION": 7.8 MSEC
MV PEAK A VEL: 0.84 M/S
MV PEAK E VEL: 0.94 M/S
MV STENOSIS PRESSURE HALF TIME: 55.03 MS
MV VALVE AREA P 1/2 METHOD: 4 CM2
NUC REST EJECTION FRACTION: 61
OHS CV CPX 1 MINUTE RECOVERY HEART RATE: 97 BPM
OHS CV CPX 85 PERCENT MAX PREDICTED HEART RATE MALE: 134
OHS CV CPX MAX PREDICTED HEART RATE: 158
OHS CV CPX PATIENT IS FEMALE: 0
OHS CV CPX PATIENT IS MALE: 1
OHS CV CPX PEAK DIASTOLIC BLOOD PRESSURE: 68 MMHG
OHS CV CPX PEAK HEAR RATE: 117 BPM
OHS CV CPX PEAK RATE PRESSURE PRODUCT: NORMAL
OHS CV CPX PEAK SYSTOLIC BLOOD PRESSURE: 122 MMHG
OHS CV CPX PERCENT MAX PREDICTED HEART RATE ACHIEVED: 74
OHS CV CPX RATE PRESSURE PRODUCT PRESENTING: 8400
OHS CV PHARM TIME: 910 MIN
PISA TR MAX VEL: 2.19 M/S
PULM VEIN S/D RATIO: 1.27
PV PEAK D VEL: 0.52 M/S
PV PEAK S VEL: 0.66 M/S
RA MAJOR: 4.06 CM
RA PRESSURE: 3 MMHG
RA WIDTH: 3.36 CM
RIGHT VENTRICULAR END-DIASTOLIC DIMENSION: 3.03 CM
RV TISSUE DOPPLER FREE WALL SYSTOLIC VELOCITY 1 (APICAL 4 CHAMBER VIEW): 11.48 CM/S
SINUS: 3.28 CM
STJ: 3.06 CM
SYSTOLIC BLOOD PRESSURE: 120 MMHG
TDI LATERAL: 0.1 M/S
TDI SEPTAL: 0.07 M/S
TDI: 0.09 M/S
TR MAX PG: 19 MMHG
TRICUSPID ANNULAR PLANE SYSTOLIC EXCURSION: 1.88 CM
TV REST PULMONARY ARTERY PRESSURE: 22 MMHG

## 2022-05-05 PROCEDURE — A9502 TC99M TETROFOSMIN: HCPCS | Mod: PO

## 2022-05-05 PROCEDURE — 93017 CV STRESS TEST TRACING ONLY: CPT | Mod: PO

## 2022-05-05 PROCEDURE — 78452 STRESS TEST WITH MYOCARDIAL PERFUSION (CUPID ONLY): ICD-10-PCS | Mod: 26,,, | Performed by: INTERNAL MEDICINE

## 2022-05-05 PROCEDURE — 93018 PR CARDIAC STRESS TST,INTERP/REPT ONLY: ICD-10-PCS | Mod: ,,, | Performed by: INTERNAL MEDICINE

## 2022-05-05 PROCEDURE — 93306 TTE W/DOPPLER COMPLETE: CPT | Mod: 26,,, | Performed by: INTERNAL MEDICINE

## 2022-05-05 PROCEDURE — 63600175 PHARM REV CODE 636 W HCPCS: Mod: PO | Performed by: INTERNAL MEDICINE

## 2022-05-05 PROCEDURE — 93016 CV STRESS TEST SUPVJ ONLY: CPT | Mod: ,,, | Performed by: INTERNAL MEDICINE

## 2022-05-05 PROCEDURE — 93016 STRESS TEST WITH MYOCARDIAL PERFUSION (CUPID ONLY): ICD-10-PCS | Mod: ,,, | Performed by: INTERNAL MEDICINE

## 2022-05-05 PROCEDURE — 93018 CV STRESS TEST I&R ONLY: CPT | Mod: ,,, | Performed by: INTERNAL MEDICINE

## 2022-05-05 PROCEDURE — 78452 HT MUSCLE IMAGE SPECT MULT: CPT | Mod: 26,,, | Performed by: INTERNAL MEDICINE

## 2022-05-05 PROCEDURE — 93306 TTE W/DOPPLER COMPLETE: CPT | Mod: PO

## 2022-05-05 PROCEDURE — 93306 ECHO (CUPID ONLY): ICD-10-PCS | Mod: 26,,, | Performed by: INTERNAL MEDICINE

## 2022-05-05 RX ORDER — REGADENOSON 0.08 MG/ML
0.4 INJECTION, SOLUTION INTRAVENOUS ONCE
Status: COMPLETED | OUTPATIENT
Start: 2022-05-05 | End: 2022-05-05

## 2022-05-05 RX ADMIN — REGADENOSON 0.4 MG: 0.08 INJECTION, SOLUTION INTRAVENOUS at 09:05

## 2022-05-10 ENCOUNTER — TELEPHONE (OUTPATIENT)
Dept: CARDIOLOGY | Facility: CLINIC | Age: 62
End: 2022-05-10
Payer: COMMERCIAL

## 2022-05-10 DIAGNOSIS — R94.39 POSITIVE CARDIAC STRESS TEST: Primary | ICD-10-CM

## 2022-05-10 RX ORDER — SODIUM CHLORIDE 9 MG/ML
INJECTION, SOLUTION INTRAVENOUS ONCE
Status: CANCELLED | OUTPATIENT
Start: 2022-05-10 | End: 2022-05-10

## 2022-05-10 RX ORDER — SODIUM CHLORIDE 0.9 % (FLUSH) 0.9 %
10 SYRINGE (ML) INJECTION
Status: DISCONTINUED | OUTPATIENT
Start: 2022-05-10 | End: 2022-05-23 | Stop reason: CLARIF

## 2022-05-10 NOTE — TELEPHONE ENCOUNTER
----- Message from Kar Fitzgerald sent at 5/10/2022  8:24 AM CDT -----  Regarding: advice  Contact: wife  Type: Needs Medical Advice  Who Called:  wife- Mayte  Symptoms (please be specific):    How long has patient had these symptoms:    Pharmacy name and phone #:    Best Call Back Number: [phone]  Additional Information: Jose G Juniorkermit  wife calling regarding clarification for recent test results.

## 2022-05-10 NOTE — TELEPHONE ENCOUNTER
Angiogram    Arrive for procedure at: [facility] on 5/16/22. Your procedure is at 8 am with Dr Christa Gunn    You will receive a phone call from [facility] Pre-Op Department with further instructions prior to your scheduled procedure.    Notify the nurse if you are ALLERGIC TO IODINE.    FASTING: You MAY NOT have anything to eat or drink AFTER MIDNIGHT the day before your procedure. If your procedure is scheduled in the afternoon, you may have a LIGHT BREAKFAST 6-8 hours prior to your procedure.  For example: Two slices of toast; black coffee or black tea.    MEDICATIONS: You may take your regular morning medications with water. If there are any medications that you should not take, you will be instructed to hold them for that morning.    ? CARDIOLOGY PRE-PROCEDURE MEDICATION ORDERS:  ** Please hold any medications that are checked below:    HOLD   # OF DAYS TO HOLD  Nitrostat                     Do not use the day before or the day of procedure    WHAT TO EXPECT:    How long will the procedure take?  The procedure will take an average of 1 - 2 hours to perform.  After the procedure, you will need to lay flat for around 4 - 6 hours to minimize bleeding from the puncture site. If the wrist is accessed you will need to keep your arm still as instructed by the nurse.    When can I go home?  You may be able to be discharged home that same afternoon if there were no complications.  If you have one of the following: balloon; stent; pacemaker or defibrillator procedures, you may spend one night for observation.  Your doctor will determine your discharge based upon your progress.  The results of your procedure will be discussed with you before you are discharged.  Any further testing or procedures will be scheduled for you either before you leave or you will be instructed to call for a future appointment.      TRANSPORTATION:  PLEASE ARRANGE TO HAVE SOMEONE DRIVE YOU HOME FOLLOWING YOUR PROCEDURE, YOU WILL NOT  BE ALLOWED TO DRIVE.

## 2022-05-12 ENCOUNTER — PATIENT MESSAGE (OUTPATIENT)
Dept: CARDIOLOGY | Facility: CLINIC | Age: 62
End: 2022-05-12
Payer: COMMERCIAL

## 2022-05-12 ENCOUNTER — TELEPHONE (OUTPATIENT)
Dept: CARDIOLOGY | Facility: CLINIC | Age: 62
End: 2022-05-12
Payer: COMMERCIAL

## 2022-05-12 DIAGNOSIS — G47.30 SLEEP APNEA, UNSPECIFIED TYPE: Primary | ICD-10-CM

## 2022-05-12 NOTE — TELEPHONE ENCOUNTER
Please advise     Alyce CAI Staff  Caller: Unspecified (Today, 11:00 AM)  Good Morning,   Per [employer] automated system, auth was denied, a peer to peer can be scheduled by calling [phone]   Case# [id number]     Dos 5/16

## 2022-05-12 NOTE — TELEPHONE ENCOUNTER
----- Message from Alyce Shine sent at 5/12/2022 11:00 AM CDT -----  Regarding: Peer to peer  Good Morning,   Per [employer] automated system, auth was denied, a peer to peer can be scheduled by calling [phone]  Case# [id number]    Dos 5/16

## 2022-05-13 ENCOUNTER — TELEPHONE (OUTPATIENT)
Dept: CARDIOLOGY | Facility: CLINIC | Age: 62
End: 2022-05-13
Payer: COMMERCIAL

## 2022-05-13 NOTE — TELEPHONE ENCOUNTER
----- Message from Kar Fitzgerald sent at 5/13/2022  4:07 PM CDT -----  Regarding: advice  Contact: representative  Type: Needs Medical Advice  Who Called:  [facility]is Lipscomb  Symptoms (please be specific):    How long has patient had these symptoms:    Pharmacy name and phone #:   Best Call Back Number: [phone]  Additional Information: [facility] called stating the authorization was denied for patient  surgery

## 2022-05-13 NOTE — TELEPHONE ENCOUNTER
Angiogram was reschedule for  5/26/22 at 11 am . PT VU     Arrive for procedure at: [facility]    You will receive a phone call from [facility] Pre-Op Department with further instructions prior to your scheduled procedure.    Notify the nurse if you are ALLERGIC TO IODINE.    FASTING: You MAY NOT have anything to eat or drink AFTER MIDNIGHT the day before your procedure. If your procedure is scheduled in the afternoon, you may have a LIGHT BREAKFAST 6-8 hours prior to your procedure.  For example: Two slices of toast; black coffee or black tea.    MEDICATIONS: You may take your regular morning medications with water. If there are any medications that you should not take, you will be instructed to hold them for that morning.    ? CARDIOLOGY PRE-PROCEDURE MEDICATION ORDERS:  ** Please hold any medications that are checked below:    HOLD   # OF DAYS TO HOLD  ? Coumadin   Consult with Coumadin Clinic   ? Xarelto    _DAY BEFORE & DAY OF_  ? Pradaxa  _ DAY BEFORE & DAY OF _  ? Eliquis   _ DAY BEFORE & DAY OF _  ? Metformin    Day before procedure & morning of procedure  ? Short acting insulin   Morning of procedure    CONTINUE the Following Medications   ? Plavix      ? Effient     ? Aspirin    WHAT TO EXPECT:    How long will the procedure take?  The procedure will take an average of 1 - 2 hours to perform.  After the procedure, you will need to lay flat for around 4 - 6 hours to minimize bleeding from the puncture site. If the wrist is accessed you will need to keep your arm still as instructed by the nurse.    When can I go home?  You may be able to be discharged home that same afternoon if there were no complications.  If you have one of the following: balloon; stent; pacemaker or defibrillator procedures, you may spend one night for observation.  Your doctor will determine your discharge based upon your progress.  The results of your procedure will be discussed with you before you are discharged.  Any  further testing or procedures will be scheduled for you either before you leave or you will be instructed to call for a future appointment.      TRANSPORTATION:  PLEASE ARRANGE TO HAVE SOMEONE DRIVE YOU HOME FOLLOWING YOUR PROCEDURE, YOU WILL NOT BE ALLOWED TO DRIVE.

## 2022-05-16 NOTE — TELEPHONE ENCOUNTER
Pt aware that procedure was approved and that he is schedule for 5/26/22 at 8 am . At [facility] dr garcia. PT LEO

## 2022-05-19 ENCOUNTER — CLINICAL SUPPORT (OUTPATIENT)
Dept: SMOKING CESSATION | Facility: CLINIC | Age: 62
End: 2022-05-19
Payer: COMMERCIAL

## 2022-05-19 DIAGNOSIS — F17.200 NICOTINE DEPENDENCE: Primary | ICD-10-CM

## 2022-05-19 PROCEDURE — 99407 BEHAV CHNG SMOKING > 10 MIN: CPT | Mod: S$GLB,,,

## 2022-05-19 PROCEDURE — 99407 PR TOBACCO USE CESSATION INTENSIVE >10 MINUTES: ICD-10-PCS | Mod: S$GLB,,,

## 2022-05-19 NOTE — PROGRESS NOTES
Spoke with patient today in regard to smoking cessation progress for 6 month telephone follow up, he states not tobacco free. Patient states not ready to schedule an appointment at this time but will call soon after surgery on his heart. Informed patient of benefit period, future follow up, and contact information if any further help or support is needed. Will resolve episode and complete smart form for Quit attempt #1 along with 3 and 6 month follow up on Quit #2.

## 2022-05-24 ENCOUNTER — PROCEDURE VISIT (OUTPATIENT)
Dept: SLEEP MEDICINE | Facility: HOSPITAL | Age: 62
End: 2022-05-24
Attending: INTERNAL MEDICINE
Payer: COMMERCIAL

## 2022-05-24 DIAGNOSIS — G47.30 SLEEP APNEA, UNSPECIFIED TYPE: ICD-10-CM

## 2022-05-24 PROCEDURE — 95806 SLEEP STUDY UNATT&RESP EFFT: CPT

## 2022-05-25 ENCOUNTER — TELEPHONE (OUTPATIENT)
Dept: CARDIOLOGY | Facility: CLINIC | Age: 62
End: 2022-05-25
Payer: COMMERCIAL

## 2022-05-25 NOTE — PATIENT INSTRUCTIONS
Pt received HST device and was informed to call his referring physician Dr. Harjinder Perez in 3 to 5 business days to make a f/u appt

## 2022-05-27 ENCOUNTER — PATIENT MESSAGE (OUTPATIENT)
Dept: CARDIOLOGY | Facility: CLINIC | Age: 62
End: 2022-05-27
Payer: COMMERCIAL

## 2022-05-31 ENCOUNTER — PATIENT MESSAGE (OUTPATIENT)
Dept: FAMILY MEDICINE | Facility: CLINIC | Age: 62
End: 2022-05-31
Payer: COMMERCIAL

## 2022-05-31 ENCOUNTER — TELEPHONE (OUTPATIENT)
Dept: CARDIOLOGY | Facility: CLINIC | Age: 62
End: 2022-05-31
Payer: COMMERCIAL

## 2022-05-31 NOTE — TELEPHONE ENCOUNTER
Please advise: pt had episode yesterday while cutting grass on the riding mower; was getting off the mower and had blank look on his face, tripped and fell over wheel of mower; was shaking for a few seconds and urinated on himself; when he sat back up still had blank look on his face, wife was yelling at him then eventually he came around; this happened at around three in the afternoon; wife states he was taking plenty of breaks and drinking water

## 2022-05-31 NOTE — TELEPHONE ENCOUNTER
----- Message from Bisi Wick sent at 5/31/2022  8:13 AM CDT -----  Type: Needs Medical Advice  Who Called:  pt wife, Mayte Wagoner Call Back Number: [phone]  Additional Information: pt had a recent episode and his wife wants to follow up with doctor.

## 2022-05-31 NOTE — TELEPHONE ENCOUNTER
He was not wearing the monitor, he has been taking it off at night and forgot to put it on that morning

## 2022-06-07 ENCOUNTER — PATIENT MESSAGE (OUTPATIENT)
Dept: CARDIOLOGY | Facility: CLINIC | Age: 62
End: 2022-06-07
Payer: COMMERCIAL

## 2022-06-15 ENCOUNTER — PATIENT MESSAGE (OUTPATIENT)
Dept: CARDIOLOGY | Facility: CLINIC | Age: 62
End: 2022-06-15
Payer: COMMERCIAL

## 2022-07-04 PROBLEM — Z98.61 CAD S/P PERCUTANEOUS CORONARY ANGIOPLASTY: Status: ACTIVE | Noted: 2022-07-04

## 2022-07-04 PROBLEM — I25.10 CAD S/P PERCUTANEOUS CORONARY ANGIOPLASTY: Status: ACTIVE | Noted: 2022-07-04

## 2022-07-04 PROBLEM — R07.9 CHEST PAIN AT REST: Status: RESOLVED | Noted: 2019-07-18 | Resolved: 2022-07-04

## 2022-07-05 ENCOUNTER — OFFICE VISIT (OUTPATIENT)
Dept: CARDIOLOGY | Facility: CLINIC | Age: 62
End: 2022-07-05
Payer: COMMERCIAL

## 2022-07-05 VITALS
BODY MASS INDEX: 34.24 KG/M2 | SYSTOLIC BLOOD PRESSURE: 119 MMHG | WEIGHT: 239.19 LBS | HEART RATE: 75 BPM | HEIGHT: 70 IN | DIASTOLIC BLOOD PRESSURE: 81 MMHG

## 2022-07-05 DIAGNOSIS — I87.2 VENOUS INSUFFICIENCY OF BOTH LOWER EXTREMITIES: ICD-10-CM

## 2022-07-05 DIAGNOSIS — I10 ESSENTIAL HYPERTENSION: Primary | Chronic | ICD-10-CM

## 2022-07-05 DIAGNOSIS — I25.10 CORONARY ARTERY DISEASE INVOLVING NATIVE CORONARY ARTERY OF NATIVE HEART WITHOUT ANGINA PECTORIS: ICD-10-CM

## 2022-07-05 DIAGNOSIS — I73.9 PERIPHERAL VASCULAR DISEASE OF EXTREMITY: ICD-10-CM

## 2022-07-05 DIAGNOSIS — E78.2 MIXED HYPERLIPIDEMIA: Chronic | ICD-10-CM

## 2022-07-05 DIAGNOSIS — I25.10 CAD S/P PERCUTANEOUS CORONARY ANGIOPLASTY: ICD-10-CM

## 2022-07-05 DIAGNOSIS — Z98.61 CAD S/P PERCUTANEOUS CORONARY ANGIOPLASTY: ICD-10-CM

## 2022-07-05 PROCEDURE — 3008F BODY MASS INDEX DOCD: CPT | Mod: CPTII,S$GLB,, | Performed by: INTERNAL MEDICINE

## 2022-07-05 PROCEDURE — 1160F PR REVIEW ALL MEDS BY PRESCRIBER/CLIN PHARMACIST DOCUMENTED: ICD-10-PCS | Mod: CPTII,S$GLB,, | Performed by: INTERNAL MEDICINE

## 2022-07-05 PROCEDURE — 99214 PR OFFICE/OUTPT VISIT, EST, LEVL IV, 30-39 MIN: ICD-10-PCS | Mod: S$GLB,,, | Performed by: INTERNAL MEDICINE

## 2022-07-05 PROCEDURE — 4010F PR ACE/ARB THEARPY RXD/TAKEN: ICD-10-PCS | Mod: CPTII,S$GLB,, | Performed by: INTERNAL MEDICINE

## 2022-07-05 PROCEDURE — 1159F PR MEDICATION LIST DOCUMENTED IN MEDICAL RECORD: ICD-10-PCS | Mod: CPTII,S$GLB,, | Performed by: INTERNAL MEDICINE

## 2022-07-05 PROCEDURE — 3079F PR MOST RECENT DIASTOLIC BLOOD PRESSURE 80-89 MM HG: ICD-10-PCS | Mod: CPTII,S$GLB,, | Performed by: INTERNAL MEDICINE

## 2022-07-05 PROCEDURE — 99999 PR PBB SHADOW E&M-EST. PATIENT-LVL III: ICD-10-PCS | Mod: PBBFAC,,, | Performed by: INTERNAL MEDICINE

## 2022-07-05 PROCEDURE — 3074F PR MOST RECENT SYSTOLIC BLOOD PRESSURE < 130 MM HG: ICD-10-PCS | Mod: CPTII,S$GLB,, | Performed by: INTERNAL MEDICINE

## 2022-07-05 PROCEDURE — 3074F SYST BP LT 130 MM HG: CPT | Mod: CPTII,S$GLB,, | Performed by: INTERNAL MEDICINE

## 2022-07-05 PROCEDURE — 99214 OFFICE O/P EST MOD 30 MIN: CPT | Mod: S$GLB,,, | Performed by: INTERNAL MEDICINE

## 2022-07-05 PROCEDURE — 1159F MED LIST DOCD IN RCRD: CPT | Mod: CPTII,S$GLB,, | Performed by: INTERNAL MEDICINE

## 2022-07-05 PROCEDURE — 4010F ACE/ARB THERAPY RXD/TAKEN: CPT | Mod: CPTII,S$GLB,, | Performed by: INTERNAL MEDICINE

## 2022-07-05 PROCEDURE — 99999 PR PBB SHADOW E&M-EST. PATIENT-LVL III: CPT | Mod: PBBFAC,,, | Performed by: INTERNAL MEDICINE

## 2022-07-05 PROCEDURE — 3008F PR BODY MASS INDEX (BMI) DOCUMENTED: ICD-10-PCS | Mod: CPTII,S$GLB,, | Performed by: INTERNAL MEDICINE

## 2022-07-05 PROCEDURE — 1160F RVW MEDS BY RX/DR IN RCRD: CPT | Mod: CPTII,S$GLB,, | Performed by: INTERNAL MEDICINE

## 2022-07-05 PROCEDURE — 3079F DIAST BP 80-89 MM HG: CPT | Mod: CPTII,S$GLB,, | Performed by: INTERNAL MEDICINE

## 2022-07-05 NOTE — PROGRESS NOTES
Subjective:    Patient ID:  Jose G Shafer is a 62 y.o. male who presents for follow-up of No chief complaint on file.      Problem List Items Addressed This Visit        Cardiac/Vascular    Essential hypertension - Primary (Chronic)    Mixed hyperlipidemia (Chronic)    CAD S/P percutaneous coronary angioplasty    Overview     5/26/22 - Luis A    · The Prox LAD lesion was 99% stenosed with 50% stenosis post-intervention, with balloon angioplasty  · The PCI was partially successful.               Coronary artery disease involving native coronary artery of native heart without angina pectoris    Overview     Prev History: 6-2005 he had an inferior STEMI-->DAVID to pRCA.  LVEF preserved             Peripheral vascular disease of extremity: s/p R. SFA DCB 10/19/2015    Venous insufficiency of both lower extremities    Overview     Chronic.  Patient has varicose veins bilaterally.  Had vein stripping performed previously.  Swelling in the left leg worse than the right.  Patient cannot wear compression stockings daily because of his work.                 HPI    Patient was last seen on 04/05/2022 at which time he was evaluated for atypical chest pain and sleep apnea.  Had gotten back to smoking.  Echocardiogram, nuclear stress test, event monitor, and sleep study ordered.  Nuclear stress test showed concern for ischemia.  Angiogram was performed with only balloon angioplasty to 99% proximal LAD.  Event monitor was negative.  Sleep study was positive.  Referral to sleep doctor was placed.    On assessment today, the patient states that he is feeling a little better.    Some chest pain from broken ribs    Feeling better after angiogram - A little better but broke ribs, so cannot really tell  Have you seen the sleep doctor - Gets fitted for CPAP today at 2  Arranged with new PCP - Yes, appt made       Objective:     Vitals:    07/05/22 1002   BP: 119/81   BP Location: Left arm   Patient Position: Sitting   BP Method: Large  "(Automatic)   Pulse: 75   Weight: 108.5 kg (239 lb 3.2 oz)   Height: 5' 10" (1.778 m)        Physical Exam  Constitutional:       Appearance: He is well-developed.   HENT:      Head: Normocephalic and atraumatic.   Neck:      Vascular: No JVD.   Cardiovascular:      Rate and Rhythm: Normal rate and regular rhythm.      Heart sounds: Normal heart sounds. No murmur heard.    No friction rub. No gallop.   Pulmonary:      Effort: Pulmonary effort is normal. No respiratory distress.      Breath sounds: Normal breath sounds. No wheezing or rales.   Abdominal:      General: Bowel sounds are normal.      Palpations: Abdomen is soft.      Tenderness: There is no abdominal tenderness. There is no guarding or rebound.   Musculoskeletal:      Cervical back: Normal range of motion and neck supple.   Skin:     General: Skin is warm and dry.   Neurological:      Mental Status: He is alert and oriented to person, place, and time.   Psychiatric:         Behavior: Behavior normal.             Current Outpatient Medications on File Prior to Visit   Medication Sig    ALPRAZolam (XANAX) 0.5 MG tablet Take 1 tablet (0.5 mg total) by mouth daily as needed for Anxiety.    ascorbic acid (VITAMIN C) 500 MG tablet Take 500 mg by mouth 2 (two) times daily.    aspirin (ECOTRIN) 81 MG EC tablet Take 1 tablet (81 mg total) by mouth once daily.    atorvastatin (LIPITOR) 80 MG tablet TAKE 1 TABLET BY MOUTH ONE TIME DAILY (Patient taking differently: Take 80 mg by mouth once daily. TAKE 1 TABLET BY MOUTH ONE TIME DAILY)    buPROPion (WELLBUTRIN SR) 150 MG TBSR 12 hr tablet TAKE 1 TABLET BY MOUTH TWICE A DAY    carvediloL (COREG) 25 MG tablet TAKE 1 TABLET BY MOUTH TWICE A DAY WITH FOOD (Patient taking differently: Take 25 mg by mouth 2 (two) times daily with meals.)    cilostazoL (PLETAL) 50 MG Tab TAKE 1 TABLET BY MOUTH TWICE A DAY (Patient taking differently: 50 mg 2 (two) times daily.)    clopidogreL (PLAVIX) 75 mg tablet Take 1 tablet " (75 mg total) by mouth once daily.    esomeprazole (NEXIUM) 20 MG capsule Take 20 mg by mouth before breakfast.    ezetimibe (ZETIA) 10 mg tablet TAKE 1 TABLET BY MOUTH ONE TIME DAILY (Patient taking differently: Take 10 mg by mouth once daily. TAKE 1 TABLET BY MOUTH ONE TIME DAILY)    fluticasone propionate (FLONASE) 50 mcg/actuation nasal spray USE 1 SPRAY (50 MCG TOTAL) BY EACH NARE ROUTE ONCE DAILY. (Patient taking differently: 1 spray by Each Nostril route daily as needed. USE 1 SPRAY (50 MCG TOTAL) BY EACH NARE ROUTE ONCE DAILY.)    HYDROcodone-acetaminophen (NORCO) 5-325 mg per tablet Take 1 tablet by mouth every 6 (six) hours as needed for Pain.    ibuprofen (ADVIL,MOTRIN) 800 MG tablet Take 1 tablet (800 mg total) by mouth every 6 (six) hours as needed for Pain.    isosorbide mononitrate (IMDUR) 30 MG 24 hr tablet Take 1 tablet (30 mg total) by mouth once daily.    LIDOcaine (LIDODERM) 5 % Place 1 patch onto the skin once daily. Remove & Discard patch within 12 hours or as directed by MD    lisinopriL (PRINIVIL,ZESTRIL) 40 MG tablet TAKE 1 TABLET BY MOUTH ONE TIME DAILY    MULTIVITAMIN ORAL Take 1 capsule by mouth once daily. 1  By mouth Every morning    nitroGLYCERIN (NITROSTAT) 0.4 MG SL tablet Place 1 tablet (0.4 mg total) under the tongue every 5 (five) minutes as needed for Chest pain. 1 Tablet, Sublingual Sublingual    omega-3 fatty acids 500 mg Cap Take 1 capsule by mouth once daily. 1 Capsule Oral     sertraline (ZOLOFT) 25 MG tablet Take 1 tablet (25 mg total) by mouth once daily.     Current Facility-Administered Medications on File Prior to Visit   Medication    lidocaine (PF) 10 mg/ml (1%) injection 10 mg       Lipid Panel:   Lab Results   Component Value Date    CHOL 117 (L) 08/18/2021    HDL 30 (L) 08/18/2021    LDLCALC 54.0 (L) 08/18/2021    TRIG 165 (H) 08/18/2021    CHOLHDL 25.6 08/18/2021       The ASCVD Risk score (Javangeri CURRY Jr., et al., 2013) failed to calculate for the  following reasons:    The valid total cholesterol range is 130 to 320 mg/dL    All pertinent labs, imaging, and EKGs reviewed.  Patient's most recent EKG tracing was personally interpreted by this provider.    Assessment:       1. Essential hypertension    2. Mixed hyperlipidemia    3. Coronary artery disease involving native coronary artery of native heart without angina pectoris    4. Peripheral vascular disease of extremity: s/p R. SFA DCB 10/19/2015    5. Venous insufficiency of both lower extremities    6. CAD S/P percutaneous coronary angioplasty         Plan:     Symptoms OK today  BP/Pulse OK today  Most recent echocardiogram, nuclear stress test, 30 day event monitor, and sleep study reviewed personally       Continue aspirin 81 mg PO Daily  Continue Plavix 75 mg PO Daily  Continue atorvastatin 80 mg PO Daily  Continue current antihypertensive regimen   Minimize sodium  Emphasized CPAP use, getting it today  Monitor closely for further anginal symptoms, will need repeat angiogram if evident   Nuclear Stress test in May of 2023    Continue other cardiac medications  Mediterranean Diet/Cardiovascular Exercise Program    Patient queried and all questions were answered.    F/u in 4 months to reassess      Signed:    Harjinder Perez MD  7/5/2022 8:50 PM

## 2022-09-12 ENCOUNTER — LAB VISIT (OUTPATIENT)
Dept: LAB | Facility: HOSPITAL | Age: 62
End: 2022-09-12
Attending: INTERNAL MEDICINE
Payer: COMMERCIAL

## 2022-09-12 ENCOUNTER — OFFICE VISIT (OUTPATIENT)
Dept: FAMILY MEDICINE | Facility: CLINIC | Age: 62
End: 2022-09-12
Payer: COMMERCIAL

## 2022-09-12 VITALS
HEIGHT: 70 IN | SYSTOLIC BLOOD PRESSURE: 122 MMHG | DIASTOLIC BLOOD PRESSURE: 80 MMHG | WEIGHT: 239.44 LBS | HEART RATE: 77 BPM | OXYGEN SATURATION: 96 % | BODY MASS INDEX: 34.28 KG/M2

## 2022-09-12 DIAGNOSIS — I73.9 PERIPHERAL VASCULAR DISEASE OF EXTREMITY: ICD-10-CM

## 2022-09-12 DIAGNOSIS — Z98.61 CAD S/P PERCUTANEOUS CORONARY ANGIOPLASTY: ICD-10-CM

## 2022-09-12 DIAGNOSIS — E78.2 MIXED HYPERLIPIDEMIA: Chronic | ICD-10-CM

## 2022-09-12 DIAGNOSIS — Z12.11 SCREENING FOR COLON CANCER: ICD-10-CM

## 2022-09-12 DIAGNOSIS — I25.10 CORONARY ARTERY DISEASE INVOLVING NATIVE CORONARY ARTERY OF NATIVE HEART WITHOUT ANGINA PECTORIS: ICD-10-CM

## 2022-09-12 DIAGNOSIS — Z12.5 PROSTATE CANCER SCREENING: ICD-10-CM

## 2022-09-12 DIAGNOSIS — Z12.12 SCREENING FOR COLORECTAL CANCER: ICD-10-CM

## 2022-09-12 DIAGNOSIS — R73.03 PREDIABETES: ICD-10-CM

## 2022-09-12 DIAGNOSIS — Z00.00 PHYSICAL EXAM, ROUTINE: Primary | ICD-10-CM

## 2022-09-12 DIAGNOSIS — Z12.2 ENCOUNTER FOR SCREENING FOR LUNG CANCER: ICD-10-CM

## 2022-09-12 DIAGNOSIS — F41.9 ANXIETY: ICD-10-CM

## 2022-09-12 DIAGNOSIS — G47.33 OSA ON CPAP: ICD-10-CM

## 2022-09-12 DIAGNOSIS — I25.10 CAD S/P PERCUTANEOUS CORONARY ANGIOPLASTY: ICD-10-CM

## 2022-09-12 DIAGNOSIS — Z12.11 SCREENING FOR COLORECTAL CANCER: ICD-10-CM

## 2022-09-12 DIAGNOSIS — I10 ESSENTIAL HYPERTENSION: Chronic | ICD-10-CM

## 2022-09-12 DIAGNOSIS — F17.200 TOBACCO USE DISORDER: ICD-10-CM

## 2022-09-12 PROBLEM — Z79.899 ENCOUNTER FOR LONG-TERM (CURRENT) USE OF MEDICATIONS: Chronic | Status: RESOLVED | Noted: 2019-07-18 | Resolved: 2022-09-12

## 2022-09-12 PROBLEM — R53.83 FATIGUE: Status: RESOLVED | Noted: 2019-08-16 | Resolved: 2022-09-12

## 2022-09-12 PROBLEM — Z23 NEED FOR PNEUMOCOCCAL VACCINATION: Status: RESOLVED | Noted: 2019-08-16 | Resolved: 2022-09-12

## 2022-09-12 PROBLEM — Z76.0 MEDICATION REFILL: Chronic | Status: RESOLVED | Noted: 2019-07-18 | Resolved: 2022-09-12

## 2022-09-12 PROBLEM — Z76.89 ENCOUNTER TO ESTABLISH CARE: Status: RESOLVED | Noted: 2019-07-18 | Resolved: 2022-09-12

## 2022-09-12 LAB
CHOLEST SERPL-MCNC: 112 MG/DL (ref 120–199)
CHOLEST/HDLC SERPL: 5.1 {RATIO} (ref 2–5)
COMPLEXED PSA SERPL-MCNC: 0.48 NG/ML (ref 0–4)
ESTIMATED AVG GLUCOSE: 140 MG/DL (ref 68–131)
HBA1C MFR BLD: 6.5 % (ref 4–5.6)
HDLC SERPL-MCNC: 22 MG/DL (ref 40–75)
HDLC SERPL: 19.6 % (ref 20–50)
LDLC SERPL CALC-MCNC: 34.4 MG/DL (ref 63–159)
NONHDLC SERPL-MCNC: 90 MG/DL
TRIGL SERPL-MCNC: 278 MG/DL (ref 30–150)

## 2022-09-12 PROCEDURE — 3008F PR BODY MASS INDEX (BMI) DOCUMENTED: ICD-10-PCS | Mod: CPTII,S$GLB,, | Performed by: INTERNAL MEDICINE

## 2022-09-12 PROCEDURE — 99396 PREV VISIT EST AGE 40-64: CPT | Mod: S$GLB,,, | Performed by: INTERNAL MEDICINE

## 2022-09-12 PROCEDURE — 1160F PR REVIEW ALL MEDS BY PRESCRIBER/CLIN PHARMACIST DOCUMENTED: ICD-10-PCS | Mod: CPTII,S$GLB,, | Performed by: INTERNAL MEDICINE

## 2022-09-12 PROCEDURE — 3074F PR MOST RECENT SYSTOLIC BLOOD PRESSURE < 130 MM HG: ICD-10-PCS | Mod: CPTII,S$GLB,, | Performed by: INTERNAL MEDICINE

## 2022-09-12 PROCEDURE — 99396 PR PREVENTIVE VISIT,EST,40-64: ICD-10-PCS | Mod: S$GLB,,, | Performed by: INTERNAL MEDICINE

## 2022-09-12 PROCEDURE — 83036 HEMOGLOBIN GLYCOSYLATED A1C: CPT | Performed by: INTERNAL MEDICINE

## 2022-09-12 PROCEDURE — 3008F BODY MASS INDEX DOCD: CPT | Mod: CPTII,S$GLB,, | Performed by: INTERNAL MEDICINE

## 2022-09-12 PROCEDURE — 84153 ASSAY OF PSA TOTAL: CPT | Performed by: INTERNAL MEDICINE

## 2022-09-12 PROCEDURE — 1159F PR MEDICATION LIST DOCUMENTED IN MEDICAL RECORD: ICD-10-PCS | Mod: CPTII,S$GLB,, | Performed by: INTERNAL MEDICINE

## 2022-09-12 PROCEDURE — 4010F PR ACE/ARB THEARPY RXD/TAKEN: ICD-10-PCS | Mod: CPTII,S$GLB,, | Performed by: INTERNAL MEDICINE

## 2022-09-12 PROCEDURE — 99999 PR PBB SHADOW E&M-EST. PATIENT-LVL V: ICD-10-PCS | Mod: PBBFAC,,, | Performed by: INTERNAL MEDICINE

## 2022-09-12 PROCEDURE — 36415 COLL VENOUS BLD VENIPUNCTURE: CPT | Mod: PO | Performed by: INTERNAL MEDICINE

## 2022-09-12 PROCEDURE — 3074F SYST BP LT 130 MM HG: CPT | Mod: CPTII,S$GLB,, | Performed by: INTERNAL MEDICINE

## 2022-09-12 PROCEDURE — 4010F ACE/ARB THERAPY RXD/TAKEN: CPT | Mod: CPTII,S$GLB,, | Performed by: INTERNAL MEDICINE

## 2022-09-12 PROCEDURE — 3079F PR MOST RECENT DIASTOLIC BLOOD PRESSURE 80-89 MM HG: ICD-10-PCS | Mod: CPTII,S$GLB,, | Performed by: INTERNAL MEDICINE

## 2022-09-12 PROCEDURE — 80061 LIPID PANEL: CPT | Performed by: INTERNAL MEDICINE

## 2022-09-12 PROCEDURE — 1159F MED LIST DOCD IN RCRD: CPT | Mod: CPTII,S$GLB,, | Performed by: INTERNAL MEDICINE

## 2022-09-12 PROCEDURE — 1160F RVW MEDS BY RX/DR IN RCRD: CPT | Mod: CPTII,S$GLB,, | Performed by: INTERNAL MEDICINE

## 2022-09-12 PROCEDURE — 99999 PR PBB SHADOW E&M-EST. PATIENT-LVL V: CPT | Mod: PBBFAC,,, | Performed by: INTERNAL MEDICINE

## 2022-09-12 PROCEDURE — 3079F DIAST BP 80-89 MM HG: CPT | Mod: CPTII,S$GLB,, | Performed by: INTERNAL MEDICINE

## 2022-09-12 RX ORDER — CLOPIDOGREL BISULFATE 75 MG/1
75 TABLET ORAL DAILY
Qty: 90 TABLET | Refills: 1 | Status: SHIPPED | OUTPATIENT
Start: 2022-09-12 | End: 2023-04-20 | Stop reason: SDUPTHER

## 2022-09-12 RX ORDER — SERTRALINE HYDROCHLORIDE 50 MG/1
50 TABLET, FILM COATED ORAL DAILY
Qty: 90 TABLET | Refills: 3 | Status: SHIPPED | OUTPATIENT
Start: 2022-09-12 | End: 2023-06-02 | Stop reason: SDUPTHER

## 2022-09-12 RX ORDER — CARVEDILOL 25 MG/1
25 TABLET ORAL 2 TIMES DAILY WITH MEALS
Qty: 180 TABLET | Refills: 1 | Status: SHIPPED | OUTPATIENT
Start: 2022-09-12 | End: 2023-04-20 | Stop reason: SDUPTHER

## 2022-09-12 RX ORDER — ALPRAZOLAM 0.5 MG/1
0.5 TABLET ORAL DAILY PRN
Qty: 20 TABLET | Refills: 2 | Status: SHIPPED | OUTPATIENT
Start: 2022-09-12 | End: 2023-04-04 | Stop reason: SDUPTHER

## 2022-09-12 RX ORDER — ATORVASTATIN CALCIUM 80 MG/1
80 TABLET, FILM COATED ORAL NIGHTLY
Qty: 90 TABLET | Refills: 1 | Status: SHIPPED | OUTPATIENT
Start: 2022-09-12 | End: 2023-04-20 | Stop reason: SDUPTHER

## 2022-09-12 RX ORDER — CILOSTAZOL 50 MG/1
50 TABLET ORAL 2 TIMES DAILY
Qty: 180 TABLET | Refills: 1 | Status: SHIPPED | OUTPATIENT
Start: 2022-09-12 | End: 2023-06-02 | Stop reason: SDUPTHER

## 2022-09-12 RX ORDER — EZETIMIBE 10 MG/1
TABLET ORAL
Qty: 90 TABLET | Refills: 1 | Status: SHIPPED | OUTPATIENT
Start: 2022-09-12 | End: 2023-04-20 | Stop reason: SDUPTHER

## 2022-09-12 RX ORDER — LISINOPRIL 40 MG/1
TABLET ORAL
Qty: 90 TABLET | Refills: 1 | Status: SHIPPED | OUTPATIENT
Start: 2022-09-12 | End: 2023-04-20 | Stop reason: SDUPTHER

## 2022-09-12 NOTE — PROGRESS NOTES
Subjective     Jose G Shafer is a 62 y.o. old, male here for Annual Exam    63 y/o with PMH of CAD s/p stents, PAD, HTN, HLD, prediabetes, LEAH on CPAP, GERD, tobacco use, anxiety.    Here today with his wife.  CAD: No recent CP, stable JUAREZ, reviewed cardiology records. Job sedentary for the most part with some exceptions.  PAD: on appropriate meds  HTN: Controlled.  HLD: Due for labs, on statin therapy plus zetia.  Prediabetes: Due for labs  Wt Readings from Last 3 Encounters:   09/12/22 0756 108.6 kg (239 lb 6.7 oz)   07/05/22 1002 108.5 kg (239 lb 3.2 oz)   06/10/22 1031 106.6 kg (235 lb)   LEAH: on CPAP, relatively new dx, doing well on therapy, not getting sleepy while driving.  GERD: stable on OTC PPI daily.  Tobacco use: wife quit, he is down to 1/2 ppd, stress seems to trigger him to smoke more.  Anxiety: He is on very low dose zoloft which has never been increased and takes xanax 1/2 tab on several days out of the week before he goes to work.      He has been doing prostate cancer screen and agrees to low dose CT scan lung cancer screening. He is also due for CRC screening.    Review of Systems   Respiratory:  Positive for shortness of breath.    Psychiatric/Behavioral:  The patient is nervous/anxious.    All other systems reviewed and are negative.  Medications     Outpatient Medications Marked as Taking for the 9/12/22 encounter (Office Visit) with Christiano Guy MD   Medication Sig Dispense Refill    ascorbic acid (VITAMIN C) 500 MG tablet Take 500 mg by mouth 2 (two) times daily.      aspirin (ECOTRIN) 81 MG EC tablet Take 1 tablet (81 mg total) by mouth once daily. 1 tablet 0    esomeprazole (NEXIUM) 20 MG capsule Take 20 mg by mouth before breakfast.      fluticasone propionate (FLONASE) 50 mcg/actuation nasal spray USE 1 SPRAY (50 MCG TOTAL) BY EACH NARE ROUTE ONCE DAILY. (Patient taking differently: 1 spray by Each Nostril route daily as needed. USE 1 SPRAY (50 MCG TOTAL) BY EACH NARE  ROUTE ONCE DAILY.) 16 mL 11    ibuprofen (ADVIL,MOTRIN) 800 MG tablet Take 1 tablet (800 mg total) by mouth every 6 (six) hours as needed for Pain. 30 tablet 1    isosorbide mononitrate (IMDUR) 30 MG 24 hr tablet TAKE 1 TABLET BY MOUTH EVERY DAY 90 tablet 0    MULTIVITAMIN ORAL Take 1 capsule by mouth once daily. 1  By mouth Every morning      nitroGLYCERIN (NITROSTAT) 0.4 MG SL tablet Place 1 tablet (0.4 mg total) under the tongue every 5 (five) minutes as needed for Chest pain. 1 Tablet, Sublingual Sublingual 100 tablet 3    omega-3 fatty acids 500 mg Cap Take 1 capsule by mouth once daily. 1 Capsule Oral       [DISCONTINUED] ALPRAZolam (XANAX) 0.5 MG tablet Take 1 tablet (0.5 mg total) by mouth daily as needed for Anxiety. 30 tablet 5    [DISCONTINUED] atorvastatin (LIPITOR) 80 MG tablet TAKE 1 TABLET BY MOUTH ONE TIME DAILY (Patient taking differently: Take 80 mg by mouth once daily. TAKE 1 TABLET BY MOUTH ONE TIME DAILY) 90 tablet 4    [DISCONTINUED] buPROPion (WELLBUTRIN SR) 150 MG TBSR 12 hr tablet TAKE 1 TABLET BY MOUTH TWICE A DAY 60 tablet 0    [DISCONTINUED] carvediloL (COREG) 25 MG tablet TAKE 1 TABLET BY MOUTH TWICE A DAY WITH FOOD (Patient taking differently: Take 25 mg by mouth 2 (two) times daily with meals.) 180 tablet 4    [DISCONTINUED] cilostazoL (PLETAL) 50 MG Tab TAKE 1 TABLET BY MOUTH TWICE A  tablet 4    [DISCONTINUED] clopidogreL (PLAVIX) 75 mg tablet Take 1 tablet (75 mg total) by mouth once daily. 90 tablet 1    [DISCONTINUED] ezetimibe (ZETIA) 10 mg tablet TAKE 1 TABLET BY MOUTH EVERY DAY 90 tablet 4    [DISCONTINUED] HYDROcodone-acetaminophen (NORCO) 5-325 mg per tablet Take 1 tablet by mouth every 6 (six) hours as needed for Pain. 12 tablet 0    [DISCONTINUED] lisinopriL (PRINIVIL,ZESTRIL) 40 MG tablet TAKE 1 TABLET BY MOUTH ONE TIME DAILY 90 tablet 4    [DISCONTINUED] sertraline (ZOLOFT) 25 MG tablet Take 1 tablet (25 mg total) by mouth once daily. 90 tablet 4     Objective  "    /80   Pulse 77   Ht 5' 10" (1.778 m)   Wt 108.6 kg (239 lb 6.7 oz)   SpO2 96%   BMI 34.35 kg/m²   Physical Exam  Constitutional:       General: He is not in acute distress.     Appearance: Normal appearance. He is well-developed. He is obese.   HENT:      Head: Normocephalic and atraumatic.   Eyes:      Conjunctiva/sclera: Conjunctivae normal.      Pupils: Pupils are equal, round, and reactive to light.   Neck:      Thyroid: No thyroid mass or thyromegaly.   Cardiovascular:      Rate and Rhythm: Normal rate and regular rhythm.      Pulses:           Dorsalis pedis pulses are 2+ on the right side and 2+ on the left side.        Posterior tibial pulses are 2+ on the right side and 2+ on the left side.      Heart sounds: Normal heart sounds. No murmur heard.  Pulmonary:      Effort: No respiratory distress.      Breath sounds: Normal breath sounds.   Abdominal:      General: Bowel sounds are normal.      Palpations: Abdomen is soft.      Tenderness: There is no abdominal tenderness.   Musculoskeletal:         General: No deformity.      Cervical back: Neck supple.   Lymphadenopathy:      Cervical: No cervical adenopathy.      Upper Body:      Right upper body: No supraclavicular adenopathy.      Left upper body: No supraclavicular adenopathy.   Skin:     General: Skin is warm and dry.      Findings: No rash.   Neurological:      Mental Status: He is alert and oriented to person, place, and time.   Psychiatric:         Behavior: Behavior normal.     Assessment and Plan     Physical exam, routine    CAD S/P percutaneous coronary angioplasty    Coronary artery disease involving native coronary artery of native heart without angina pectoris  -     Lipid Panel; Future; Expected date: 09/12/2022  -     clopidogreL (PLAVIX) 75 mg tablet; Take 1 tablet (75 mg total) by mouth once daily.  Dispense: 90 tablet; Refill: 1    Essential hypertension  -     carvediloL (COREG) 25 MG tablet; Take 1 tablet (25 mg total) " by mouth 2 (two) times daily with meals.  Dispense: 180 tablet; Refill: 1  -     lisinopriL (PRINIVIL,ZESTRIL) 40 MG tablet; TAKE 1 TABLET BY MOUTH ONE TIME DAILY  Dispense: 90 tablet; Refill: 1    Mixed hyperlipidemia  -     atorvastatin (LIPITOR) 80 MG tablet; Take 1 tablet (80 mg total) by mouth every evening.  Dispense: 90 tablet; Refill: 1  -     ezetimibe (ZETIA) 10 mg tablet; TAKE 1 TABLET BY MOUTH EVERY DAY  Dispense: 90 tablet; Refill: 1    Prediabetes  -     Hemoglobin A1C; Future; Expected date: 09/12/2022    Screening for colorectal cancer    Prostate cancer screening  -     PSA, Screening; Future; Expected date: 09/12/2022    LEAH on CPAP    Tobacco use disorder  -     Ambulatory referral/consult to Smoking Cessation Program; Future; Expected date: 09/19/2022  -     CT Chest Lung Screening Low Dose; Future; Expected date: 09/12/2022    Encounter for screening for lung cancer  -     CT Chest Lung Screening Low Dose; Future; Expected date: 09/12/2022    Anxiety  -     sertraline (ZOLOFT) 50 MG tablet; Take 1 tablet (50 mg total) by mouth once daily.  Dispense: 90 tablet; Refill: 3  -     ALPRAZolam (XANAX) 0.5 MG tablet; Take 1 tablet (0.5 mg total) by mouth daily as needed for Anxiety.  Dispense: 20 tablet; Refill: 2    Peripheral vascular disease of extremity: s/p R. SFA ORB 10/19/2015  -     cilostazoL (PLETAL) 50 MG Tab; Take 1 tablet (50 mg total) by mouth 2 (two) times daily.  Dispense: 180 tablet; Refill: 1  -     clopidogreL (PLAVIX) 75 mg tablet; Take 1 tablet (75 mg total) by mouth once daily.  Dispense: 90 tablet; Refill: 1    Screening for colon cancer  -     Case Request Endoscopy: COLONOSCOPY    Age appropriate screening recommended today.  Health maintenance reviewed and recommendations made based on USPTF recommendations.   Encourage healthy diet and exercise.  Increase zoloft, work on weaning down on xanax some.  Smoking cessation encouraged.  Continue other meds as above.  Labs  today.    Follow up in about 6 months (around 3/12/2023).  ___________________  Christiano Guy MD  Internal Medicine and Pediatrics

## 2022-09-19 ENCOUNTER — HOSPITAL ENCOUNTER (OUTPATIENT)
Dept: RADIOLOGY | Facility: HOSPITAL | Age: 62
Discharge: HOME | End: 2022-09-19
Attending: INTERNAL MEDICINE
Payer: COMMERCIAL

## 2022-09-19 DIAGNOSIS — F17.200 TOBACCO USE DISORDER: ICD-10-CM

## 2022-09-19 DIAGNOSIS — Z12.2 ENCOUNTER FOR SCREENING FOR LUNG CANCER: ICD-10-CM

## 2022-09-19 PROCEDURE — 71271 CT THORAX LUNG CANCER SCR C-: CPT | Mod: TC,PO

## 2022-09-19 PROCEDURE — 71271 CT THORAX LUNG CANCER SCR C-: CPT | Mod: 26,,, | Performed by: RADIOLOGY

## 2022-09-19 PROCEDURE — 71271 CT CHEST LUNG SCREENING LOW DOSE: ICD-10-PCS | Mod: 26,,, | Performed by: RADIOLOGY

## 2022-09-28 ENCOUNTER — TELEPHONE (OUTPATIENT)
Dept: CARDIOLOGY | Facility: CLINIC | Age: 62
End: 2022-09-28
Payer: COMMERCIAL

## 2022-09-28 ENCOUNTER — TELEPHONE (OUTPATIENT)
Dept: GASTROENTEROLOGY | Facility: CLINIC | Age: 62
End: 2022-09-28
Payer: COMMERCIAL

## 2022-09-28 NOTE — TELEPHONE ENCOUNTER
Colonoscopy 11/28 at 730am arrive for 630am per patient request for Monday procedure  instructions reviewed and patient states understanding.  Copy to portal and clearance for Plavix and Pletal sent to cardiologist

## 2022-09-28 NOTE — TELEPHONE ENCOUNTER
Left message for patient to call office unable to hold plavix colonoscopy cancelled and recall placed for 5/2023.

## 2022-09-28 NOTE — TELEPHONE ENCOUNTER
----- Message from Jason Hagan LPN sent at 9/28/2022 10:38 AM CDT -----  Regarding: clearance  Mr. Shafer has a colonoscopy scheduled with Dr. Ferro on 11/28/22. Please send clearance to hold Plavix and Pletal for 5 days?      If you have any questions or concerns, please don't hesitate to call.    Sincerely,  JENNIFER Gomez  [phone]

## 2022-09-29 ENCOUNTER — TELEPHONE (OUTPATIENT)
Dept: GASTROENTEROLOGY | Facility: CLINIC | Age: 62
End: 2022-09-29
Payer: COMMERCIAL

## 2022-09-29 NOTE — TELEPHONE ENCOUNTER
Spoke with patient notified unable to hold blood thinners until 5/23 recall in chart and colonoscopy cancelled at this time.

## 2022-10-20 ENCOUNTER — CLINICAL SUPPORT (OUTPATIENT)
Dept: SMOKING CESSATION | Facility: CLINIC | Age: 62
End: 2022-10-20

## 2022-10-20 DIAGNOSIS — F17.200 NICOTINE DEPENDENCE: Primary | ICD-10-CM

## 2022-10-20 PROCEDURE — 99407 PR TOBACCO USE CESSATION INTENSIVE >10 MINUTES: ICD-10-PCS | Mod: S$GLB,,,

## 2022-10-20 PROCEDURE — 99407 BEHAV CHNG SMOKING > 10 MIN: CPT | Mod: S$GLB,,,

## 2022-10-20 PROCEDURE — 99999 PR PBB SHADOW E&M-EST. PATIENT-LVL I: CPT | Mod: PBBFAC,,,

## 2022-10-20 PROCEDURE — 99999 PR PBB SHADOW E&M-EST. PATIENT-LVL I: ICD-10-PCS | Mod: PBBFAC,,,

## 2022-11-30 ENCOUNTER — TELEPHONE (OUTPATIENT)
Dept: SMOKING CESSATION | Facility: CLINIC | Age: 62
End: 2022-11-30
Payer: COMMERCIAL

## 2022-11-30 NOTE — TELEPHONE ENCOUNTER
Attempted to contact patient with offer to reschedule his cancelled intake appointment with smoking cessation. I was able to leave a detailed message with my contact information. I requested a return call.

## 2022-12-10 PROBLEM — Z71.89 ADVANCE CARE PLANNING: Status: ACTIVE | Noted: 2022-12-10

## 2022-12-10 PROBLEM — J10.1 INFLUENZA A: Status: ACTIVE | Noted: 2022-12-10

## 2022-12-11 PROBLEM — J09.X1 INFLUENZA A WITH PNEUMONIA: Status: ACTIVE | Noted: 2022-12-10

## 2022-12-13 ENCOUNTER — TELEPHONE (OUTPATIENT)
Dept: FAMILY MEDICINE | Facility: CLINIC | Age: 62
End: 2022-12-13
Payer: COMMERCIAL

## 2022-12-13 NOTE — TELEPHONE ENCOUNTER
Looked in chart doesn't see no call from anyone. Patient does not know who called them or what they are calling for.

## 2022-12-14 ENCOUNTER — OFFICE VISIT (OUTPATIENT)
Dept: PULMONOLOGY | Facility: CLINIC | Age: 62
End: 2022-12-14
Payer: COMMERCIAL

## 2022-12-14 VITALS
BODY MASS INDEX: 34.09 KG/M2 | OXYGEN SATURATION: 93 % | DIASTOLIC BLOOD PRESSURE: 92 MMHG | HEIGHT: 70 IN | SYSTOLIC BLOOD PRESSURE: 153 MMHG | WEIGHT: 238.13 LBS | TEMPERATURE: 98 F | HEART RATE: 65 BPM

## 2022-12-14 DIAGNOSIS — F17.211 CIGARETTE NICOTINE DEPENDENCE IN REMISSION: ICD-10-CM

## 2022-12-14 DIAGNOSIS — J61 ASBESTOSIS: ICD-10-CM

## 2022-12-14 DIAGNOSIS — J44.9 CHRONIC OBSTRUCTIVE PULMONARY DISEASE, UNSPECIFIED COPD TYPE: ICD-10-CM

## 2022-12-14 DIAGNOSIS — R91.1 SOLITARY PULMONARY NODULE: Primary | ICD-10-CM

## 2022-12-14 DIAGNOSIS — Z77.090 ASBESTOS EXPOSURE: ICD-10-CM

## 2022-12-14 DIAGNOSIS — J44.89 ASTHMA-COPD OVERLAP SYNDROME: ICD-10-CM

## 2022-12-14 DIAGNOSIS — J10.1 INFLUENZA A: ICD-10-CM

## 2022-12-14 PROCEDURE — 4010F ACE/ARB THERAPY RXD/TAKEN: CPT | Mod: CPTII,S$GLB,, | Performed by: INTERNAL MEDICINE

## 2022-12-14 PROCEDURE — 99205 OFFICE O/P NEW HI 60 MIN: CPT | Mod: S$GLB,,, | Performed by: INTERNAL MEDICINE

## 2022-12-14 PROCEDURE — 1159F PR MEDICATION LIST DOCUMENTED IN MEDICAL RECORD: ICD-10-PCS | Mod: CPTII,S$GLB,, | Performed by: INTERNAL MEDICINE

## 2022-12-14 PROCEDURE — 3077F SYST BP >= 140 MM HG: CPT | Mod: CPTII,S$GLB,, | Performed by: INTERNAL MEDICINE

## 2022-12-14 PROCEDURE — 3080F PR MOST RECENT DIASTOLIC BLOOD PRESSURE >= 90 MM HG: ICD-10-PCS | Mod: CPTII,S$GLB,, | Performed by: INTERNAL MEDICINE

## 2022-12-14 PROCEDURE — 3077F PR MOST RECENT SYSTOLIC BLOOD PRESSURE >= 140 MM HG: ICD-10-PCS | Mod: CPTII,S$GLB,, | Performed by: INTERNAL MEDICINE

## 2022-12-14 PROCEDURE — 4010F PR ACE/ARB THEARPY RXD/TAKEN: ICD-10-PCS | Mod: CPTII,S$GLB,, | Performed by: INTERNAL MEDICINE

## 2022-12-14 PROCEDURE — 99205 PR OFFICE/OUTPT VISIT, NEW, LEVL V, 60-74 MIN: ICD-10-PCS | Mod: S$GLB,,, | Performed by: INTERNAL MEDICINE

## 2022-12-14 PROCEDURE — 1159F MED LIST DOCD IN RCRD: CPT | Mod: CPTII,S$GLB,, | Performed by: INTERNAL MEDICINE

## 2022-12-14 PROCEDURE — 3008F PR BODY MASS INDEX (BMI) DOCUMENTED: ICD-10-PCS | Mod: CPTII,S$GLB,, | Performed by: INTERNAL MEDICINE

## 2022-12-14 PROCEDURE — 3044F PR MOST RECENT HEMOGLOBIN A1C LEVEL <7.0%: ICD-10-PCS | Mod: CPTII,S$GLB,, | Performed by: INTERNAL MEDICINE

## 2022-12-14 PROCEDURE — 3044F HG A1C LEVEL LT 7.0%: CPT | Mod: CPTII,S$GLB,, | Performed by: INTERNAL MEDICINE

## 2022-12-14 PROCEDURE — 3080F DIAST BP >= 90 MM HG: CPT | Mod: CPTII,S$GLB,, | Performed by: INTERNAL MEDICINE

## 2022-12-14 PROCEDURE — 3008F BODY MASS INDEX DOCD: CPT | Mod: CPTII,S$GLB,, | Performed by: INTERNAL MEDICINE

## 2022-12-14 PROCEDURE — 99999 PR PBB SHADOW E&M-EST. PATIENT-LVL V: ICD-10-PCS | Mod: PBBFAC,,, | Performed by: INTERNAL MEDICINE

## 2022-12-14 PROCEDURE — 99999 PR PBB SHADOW E&M-EST. PATIENT-LVL V: CPT | Mod: PBBFAC,,, | Performed by: INTERNAL MEDICINE

## 2022-12-14 RX ORDER — PREDNISONE 20 MG/1
TABLET ORAL
Qty: 12 TABLET | Refills: 0 | Status: SHIPPED | OUTPATIENT
Start: 2022-12-14 | End: 2023-04-20

## 2022-12-14 RX ORDER — FLUTICASONE FUROATE, UMECLIDINIUM BROMIDE AND VILANTEROL TRIFENATATE 200; 62.5; 25 UG/1; UG/1; UG/1
1 POWDER RESPIRATORY (INHALATION) DAILY
Qty: 60 EACH | Refills: 11 | Status: SHIPPED | OUTPATIENT
Start: 2022-12-14 | End: 2022-12-14 | Stop reason: SDUPTHER

## 2022-12-14 RX ORDER — FLUTICASONE FUROATE, UMECLIDINIUM BROMIDE AND VILANTEROL TRIFENATATE 200; 62.5; 25 UG/1; UG/1; UG/1
1 POWDER RESPIRATORY (INHALATION) DAILY
Qty: 180 EACH | Refills: 3 | Status: SHIPPED | OUTPATIENT
Start: 2023-01-13

## 2022-12-14 RX ORDER — ALBUTEROL SULFATE 90 UG/1
2 AEROSOL, METERED RESPIRATORY (INHALATION) EVERY 4 HOURS PRN
Qty: 54 G | Refills: 3 | Status: SHIPPED | OUTPATIENT
Start: 2022-12-14

## 2022-12-14 RX ORDER — AZITHROMYCIN 500 MG/1
TABLET, FILM COATED ORAL
Qty: 3 TABLET | Refills: 3 | Status: SHIPPED | OUTPATIENT
Start: 2022-12-14 | End: 2023-08-15 | Stop reason: CLARIF

## 2022-12-14 NOTE — PROGRESS NOTES
12/14/2022    Jose G Shafer  New Patient Consult    Chief Complaint   Patient presents with    COPD     Pt states that it was a long time ago.      Shortness of Breath     Pt states everyday.     Wheezing     Pt states that he is wheezing everyday     Cough     Coughs up clear mucus       HPI: pt worked [employer] since 78.  Pt non diabetic, stopped smokes last wk.  Pt has had cough and wheezes in past-- seasonal in past with weather changes.  Occ sinus drainage and stuffy.     Pt still working as  in ship construction industry.  Noted zhao/fatigue recent yrs.  Had acute illness and felt extreme distress ppt er visit. Cta done, had had 5mm lung nodule seen on prior ct chest screening.    Has cpap with nasal mask -- 4 hrs /night used -- had had eds driving which resolved.           PFSH:  Past Medical History:   Diagnosis Date    Anticoagulant long-term use     Anxiety     BCC (basal cell carcinoma)     CAD (coronary artery disease)     Erectile dysfunction     GERD (gastroesophageal reflux disease)     HLD (hyperlipidemia)     HTN (hypertension)     Impaired fasting glucose     Lateral epicondylitis of left elbow 07/18/2019    Myocardial infarction     PONV (postoperative nausea and vomiting)          Past Surgical History:   Procedure Laterality Date    ANGIOGRAM, CORONARY, WITH LEFT HEART CATHETERIZATION Left 05/26/2022    Procedure: Angiogram, Coronary, with Left Heart Cath;  Surgeon: Christa Gunn MD;  Location: [facility] CATH;  Service: Cardiology;  Laterality: Left;    ANGIOPLASTY      AORTOGRAPHY WITH EXTREMITY RUNOFF  11/16/2021    Procedure: AORTOGRAM, WITH EXTREMITY RUNOFF;  Surgeon: Blanca Arzola MD;  Location: [facility] CATH;  Service: Cardiovascular;;    AORTOGRAPHY WITH SERIALOGRAPHY N/A 11/22/2019    Procedure: AORTOGRAM, WITH SERIALOGRAPHY;  Surgeon: Blanca Arzola MD;  [facility];  Service: Cardiovascular;  Laterality: N/A;    AORTOGRAPHY WITH SERIALOGRAPHY N/A 12/13/2019    Procedure:  AORTOGRAM, WITH SERIALOGRAPHY;  Surgeon: Blanca Arzola MD;  Location: [facility] CATH;  Service: Cardiovascular;  Laterality: N/A;    AORTOGRAPHY WITH SERIALOGRAPHY  2020    Procedure: AORTOGRAM, WITH SERIALOGRAPHY;  Surgeon: Blanca Arzola MD;  Location: [facility] CATH;  Service: Cardiovascular;;    CARDIAC SURGERY  2005    stents placed    COLONOSCOPY N/A 2017    Procedure: COLONOSCOPY Miralax;  Surgeon: Jeremiah Syed Jr., MD;  Location: [facility] ENDO;  Service: Endoscopy;  Laterality: N/A;    KNEE ARTHROSCOPY W/ MENISCECTOMY      PERCUTANEOUS TRANSLUMINAL ANGIOPLASTY N/A 2019    Procedure: Pta (Angioplasty, Percutaneous, Transluminal);  Surgeon: Blanca Arzola MD;  Location: [facility] CATH;  Service: Cardiovascular;  Laterality: N/A;    PERCUTANEOUS TRANSLUMINAL ANGIOPLASTY N/A 2019    Procedure: Pta (Angioplasty, Percutaneous, Transluminal);  Surgeon: Blanca Arzola MD;  Location: [facility] CATH;  Service: Cardiovascular;  Laterality: N/A;    PERCUTANEOUS TRANSLUMINAL ANGIOPLASTY  2021    Procedure: PTA (ANGIOPLASTY, PERCUTANEOUS, TRANSLUMINAL);  Surgeon: Blanca Arzola MD;  Location: [facility] CATH;  Service: Cardiovascular;;    VARICOSE VEIN SURGERY      left saphenous     Social History     Tobacco Use    Smoking status: Former     Packs/day: 0.50     Years: 44.00     Pack years: 22.00     Types: Cigarettes     Start date: 10/10/1984     Quit date: 2022     Years since quittin.0    Smokeless tobacco: Never   Substance Use Topics    Alcohol use: Yes     Alcohol/week: 20.0 standard drinks     Types: 20 Cans of beer per week     Comment: every now and then per pt    Drug use: Never     Family History   Problem Relation Age of Onset    Diabetes Brother     Coronary artery disease Brother         premature    Heart disease Brother     Hypertension Brother     Stomach cancer Father     No Known Problems Mother     No Known Problems Sister     No Known Problems Maternal Grandmother     No Known Problems Maternal  "Grandfather     No Known Problems Paternal Grandmother     No Known Problems Paternal Grandfather     No Known Problems Maternal Aunt     No Known Problems Maternal Uncle     No Known Problems Paternal Aunt     No Known Problems Paternal Uncle     Prostate cancer Neg Hx     Colon cancer Neg Hx     Anemia Neg Hx     Arrhythmia Neg Hx     Asthma Neg Hx     Clotting disorder Neg Hx     Fainting Neg Hx     Heart attack Neg Hx     Heart disease Neg Hx     Heart failure Neg Hx     Hyperlipidemia Neg Hx     Hypertension Neg Hx     Stroke Neg Hx     Atrial Septal Defect Neg Hx      Review of patient's allergies indicates:  No Known Allergies       Review of Systems:  a review of eleven systems covering constitutional, Eye, HEENT, Psych, Respiratory, Cardiac, GI, , Musculoskeletal, Endocrine, Dermatologic was negative except for pertinent findings as listed ABOVE and below: rest good     Exam:Comprehensive exam done. BP (!) 153/92 (BP Location: Left arm, Patient Position: Sitting, BP Method: Medium (Automatic))   Pulse 65   Temp 98 °F (36.7 °C)   Ht 5' 10" (1.778 m)   Wt 108 kg (238 lb 1.6 oz)   SpO2 (!) 93% Comment: room air at rest  BMI 34.16 kg/m²   Exam included Vitals as listed, and patient's appearance and affect and alertness and mood, oral exam for yeast and hygiene and pharynx lesions and Mallapatti (M) score, neck with inspection for jvd and masses and thyroid abnormalities and lymph nodes (supraclavicular and infraclavicular nodes and axillary also examined and noted if abn), chest exam included symmetry and effort and fremitus and percussion and auscultation, cardiac exam included rhythm and gallops and murmur and rubs and jvd and edema, abdominal exam for mass and hepatosplenomegaly and tenderness and hernias and bowel sounds, Musculoskeletal exam with muscle tone and posture and mobility/gait and  strength, and skin for rashes and cyanosis and pallor and turgor, extremity for clubbing.  Findings " were normal except for pertinent findings listed below:  M4, min rales left lower lung, no edema          Radiographs (ct chest and cxr) reviewed: view by direct vision  no asbesto markings with min ggo peripherally cw flu pneumonia.  Ct chest from 9/2022 with 5 mm nodule seen, prom bronchi    Labs reviewed           PFT will be done and results to be reviewed       Plan:  Clinical impression is apparently straight forward and impression with management as below.    Jose G was seen today for copd, shortness of breath, wheezing and cough.    Diagnoses and all orders for this visit:    Solitary pulmonary nodule  -     CT Chest Lung Screening Low Dose; Future    Chronic obstructive pulmonary disease, unspecified COPD type  -     Complete PFT with bronchodilator; Future    Influenza A  -     Ambulatory referral/consult to Pulmonology    Asthma-COPD overlap syndrome  -     albuterol (VENTOLIN HFA) 90 mcg/actuation inhaler; Inhale 2 puffs into the lungs every 4 (four) hours as needed for Wheezing or Shortness of Breath. Rescue  -     Discontinue: fluticasone-umeclidin-vilanter (TRELEGY ELLIPTA) 200-62.5-25 mcg inhaler; Inhale 1 puff into the lungs once daily.  -     azithromycin (ZITHROMAX) 500 MG tablet; One daily for yellow mucous, repeat if needed  -     predniSONE (DELTASONE) 20 MG tablet; Take one daily for 3 days and may repeat for shortness of breath.  -     Complete PFT with bronchodilator; Future  -     CT Chest Lung Screening Low Dose; Future  -     fluticasone-umeclidin-vilanter (TRELEGY ELLIPTA) 200-62.5-25 mcg inhaler; Inhale 1 puff into the lungs once daily.    Asbestos exposure    Asbestosis  -     Complete PFT with bronchodilator; Future    Cigarette nicotine dependence in remission  -     CT Chest Lung Screening Low Dose; Future        Follow up in about 1 year (around 12/14/2023), or if symptoms worsen or fail to improve.    Discussed with patient above for education the following:      Patient  Instructions   Sleep study with 30 episodes an hour stopping/slowing breathing with oxygen low 1/2 time.    Would need to review compliance report from cpap.     Xanax may help cpap compliance ?          You have had seasonal bronchial problems-- expect copd with ? Asthma   Use trelegy  for 24/7 prevention/controller-- if lung capacity over 50% would use if helps regular basis, if less than 50% need to use.  Should use seasonally at least.     Use albuterol 2 puff ( up to 3-4 ) as needed every 4 hrs for short breath or cough or activity     Use prednisone - one daily for 3 days in future if lungs worsen      May use azithromycin for sinus/bronchial infection.      Could check lung capacity.    Would do screening ct chest yearly with asbestos/smoking      Jayden took 62 min

## 2022-12-14 NOTE — PATIENT INSTRUCTIONS
Sleep study with 30 episodes an hour stopping/slowing breathing with oxygen low 1/2 time.    Would need to review compliance report from cpap.     Xanax may help cpap compliance ?          You have had seasonal bronchial problems-- expect copd with ? Asthma   Use trelegy  for 24/7 prevention/controller-- if lung capacity over 50% would use if helps regular basis, if less than 50% need to use.  Should use seasonally at least.     Use albuterol 2 puff ( up to 3-4 ) as needed every 4 hrs for short breath or cough or activity     Use prednisone - one daily for 3 days in future if lungs worsen      May use azithromycin for sinus/bronchial infection.      Could check lung capacity.    Would do screening ct chest yearly with asbestos/smoking

## 2022-12-15 ENCOUNTER — PATIENT MESSAGE (OUTPATIENT)
Dept: PULMONOLOGY | Facility: CLINIC | Age: 62
End: 2022-12-15
Payer: COMMERCIAL

## 2023-01-08 NOTE — PROGRESS NOTES
"Subjective:    Patient ID:  Jose G Shafer is a 63 y.o. male who presents for follow-up of Hyperlipidemia, Hypertension, and Coronary Artery Disease      Problem List Items Addressed This Visit          Cardiac/Vascular    Essential hypertension (Chronic)    Mixed hyperlipidemia (Chronic)    CAD S/P percutaneous coronary angioplasty - Primary    Overview     5/26/22 - Luis A    The Prox LAD lesion was 99% stenosed with 50% stenosis post-intervention, with balloon angioplasty  The PCI was partially successful.             Coronary artery disease involving native coronary artery of native heart without angina pectoris    Overview     Prev History: 6-2005 he had an inferior STEMI-->DAVID to pRCA.  LVEF preserved           Peripheral vascular disease of extremity: s/p R. SFA DCB 10/19/2015    Venous insufficiency of both lower extremities    Overview     Chronic.  Patient has varicose veins bilaterally.  Had vein stripping performed previously.  Swelling in the left leg worse than the right.  Patient cannot wear compression stockings daily because of his work.            HPI    Patient was last seen on 7/5/2022 at which time he was dealing with chest pain from broken ribs.    On assessment today, the patient states that he feels OK today.     No chest pain.  No significant shortness of breath.    Still smoking - Quit!!!  CPAP use - using it, just feels its OK       Objective:     Vitals:    01/09/23 1025   BP: (!) 163/97   BP Location: Right arm   Patient Position: Sitting   BP Method: Large (Automatic)   Pulse: 70   Weight: 108.1 kg (238 lb 5.1 oz)   Height: 5' 10" (1.778 m)        Physical Exam  Constitutional:       Appearance: He is well-developed.   HENT:      Head: Normocephalic and atraumatic.   Neck:      Vascular: No JVD.   Cardiovascular:      Rate and Rhythm: Normal rate and regular rhythm.      Heart sounds: Normal heart sounds. No murmur heard.    No friction rub. No gallop.   Pulmonary:      Effort: " Pulmonary effort is normal. No respiratory distress.      Breath sounds: Normal breath sounds. No wheezing or rales.   Abdominal:      General: Bowel sounds are normal.      Palpations: Abdomen is soft.      Tenderness: There is no abdominal tenderness. There is no guarding or rebound.   Musculoskeletal:      Cervical back: Normal range of motion and neck supple.   Skin:     General: Skin is warm and dry.   Neurological:      Mental Status: He is alert and oriented to person, place, and time.   Psychiatric:         Behavior: Behavior normal.           Current Outpatient Medications   Medication Instructions    albuterol (VENTOLIN HFA) 90 mcg/actuation inhaler 2 puffs, Inhalation, Every 4 hours PRN, Rescue    ALPRAZolam (XANAX) 0.5 mg, Oral, Daily PRN    ascorbic acid (vitamin C) (VITAMIN C) 500 mg, Oral, 2 times daily    aspirin (ECOTRIN) 81 mg, Oral, Daily    atorvastatin (LIPITOR) 80 mg, Oral, Nightly    azithromycin (ZITHROMAX) 500 MG tablet One daily for yellow mucous, repeat if needed    carvediloL (COREG) 25 mg, Oral, 2 times daily with meals    cilostazoL (PLETAL) 50 mg, Oral, 2 times daily    clopidogreL (PLAVIX) 75 mg, Oral, Daily    ezetimibe (ZETIA) 10 mg tablet TAKE 1 TABLET BY MOUTH EVERY DAY    fluticasone propionate (FLONASE) 50 mcg/actuation nasal spray USE 1 SPRAY (50 MCG TOTAL) BY EACH NARE ROUTE ONCE DAILY.    [START ON 1/13/2023] fluticasone-umeclidin-vilanter (TRELEGY ELLIPTA) 200-62.5-25 mcg inhaler 1 puff, Inhalation, Daily    isosorbide mononitrate (IMDUR) 30 MG 24 hr tablet TAKE 1 TABLET BY MOUTH EVERY DAY    lisinopriL (PRINIVIL,ZESTRIL) 40 MG tablet TAKE 1 TABLET BY MOUTH ONE TIME DAILY    MULTIVITAMIN ORAL 1 capsule, Oral, Daily    nitroGLYCERIN (NITROSTAT) 0.4 mg, Sublingual, Every 5 min PRN, 1 Tablet, Sublingual Sublingual    omega-3 fatty acids 500 mg Cap 1 capsule, Oral, Daily, 1 Capsule Oral    predniSONE (DELTASONE) 20 MG tablet Take one daily for 3 days and may repeat for shortness  of breath.    sertraline (ZOLOFT) 50 mg, Oral, Daily       Lipid Panel:   Lab Results   Component Value Date    CHOL 112 (L) 09/12/2022    HDL 22 (L) 09/12/2022    LDLCALC 34.4 (L) 09/12/2022    TRIG 278 (H) 09/12/2022    CHOLHDL 19.6 (L) 09/12/2022       The ASCVD Risk score (Mili KOCH, et al., 2019) failed to calculate for the following reasons:    The valid total cholesterol range is 130 to 320 mg/dL    All pertinent labs, imaging, and EKGs reviewed.  Patient's most recent EKG tracing was personally interpreted by this provider.    Most Recent Echocardiogram Results  Results for orders placed in visit on 05/05/22    Echo    Interpretation Summary  · The left ventricle is normal in size with concentric remodeling and normal systolic function.  · The estimated ejection fraction is 65%.  · Normal left ventricular diastolic function.  · Normal right ventricular size with normal right ventricular systolic function.  · Normal central venous pressure (3 mmHg).  · The estimated PA systolic pressure is 22 mmHg.        Most Recent EKG  Results for orders placed or performed during the hospital encounter of 12/09/22   EKG 12-lead    Collection Time: 12/09/22 10:55 PM    Narrative    Test Reason : R06.02,    Vent. Rate : 102 BPM     Atrial Rate : 102 BPM     P-R Int : 148 ms          QRS Dur : 100 ms      QT Int : 344 ms       P-R-T Axes : 021 -75 053 degrees     QTc Int : 448 ms    Sinus tachycardia  Left axis deviation  Low voltage QRS  Incomplete right bundle branch block  Abnormal ECG  When compared with ECG of 10-PALMIRA-2022 10:22,  No significant change was found  Confirmed by Christa Gunn MD ([id number]) on 12/10/2022 2:56:00 PM    Referred By: [email or web address]   SELF           Confirmed By:Christa Gunn MD       No valid procedures specified.   Results for orders placed during the hospital encounter of 05/05/22    Nuclear Stress - Cardiology Interpreted    Interpretation Summary    Abnormal myocardial perfusion scan.    There is a mild  to moderate intensity, small sized, reversible perfusion abnormality that is consistent with ischemia in the inferior wall(s).    There are no other significant perfusion abnormalities.    The gated perfusion images showed an ejection fraction of 61% at rest.    There is normal wall motion at rest.    The EKG portion of this study is negative for ischemia.    When compared to the previous study from 1/20/2020, ischemia appreciated.    No valid procedures specified.      Assessment:       1. CAD S/P percutaneous coronary angioplasty    2. Coronary artery disease involving native coronary artery of native heart without angina pectoris    3. Essential hypertension    4. Mixed hyperlipidemia    5. Peripheral vascular disease of extremity: s/p R. SFA DCB 10/19/2015    6. Venous insufficiency of both lower extremities         Plan:     Symptoms OK today  BP elevated today  Most recent echocardiogram reviewed personally     Emphasized CPAP use   Monitor closely for further anginal symptoms, will need repeat angiogram if evident   Continue aspirin 81 mg PO Daily  Continue atorvastatin 80 mg PO Daily  Continue carvedilol 25 mg PO BID  Continue Plavix 75 mg PO Daily   Continue Zetia 10 mg PO Daily   Continue Imdur 30 mg PO Daily   Continue lisinopril 40 mg PO Daily  Minimize sodium  Home BP log, if remains elevated, can consider amlodipine    Continue other cardiac medications  Mediterranean Diet/Cardiovascular Exercise Program    Patient queried and all questions were answered.    F/u in 6-9 months to reassess      Signed:    Harjinder Perez MD  1/9/2023 1:53 PM

## 2023-01-09 ENCOUNTER — OFFICE VISIT (OUTPATIENT)
Dept: CARDIOLOGY | Facility: CLINIC | Age: 63
End: 2023-01-09
Payer: COMMERCIAL

## 2023-01-09 VITALS
HEIGHT: 70 IN | BODY MASS INDEX: 34.12 KG/M2 | DIASTOLIC BLOOD PRESSURE: 78 MMHG | HEART RATE: 70 BPM | SYSTOLIC BLOOD PRESSURE: 148 MMHG | WEIGHT: 238.31 LBS

## 2023-01-09 DIAGNOSIS — I73.9 PERIPHERAL VASCULAR DISEASE OF EXTREMITY: ICD-10-CM

## 2023-01-09 DIAGNOSIS — I87.2 VENOUS INSUFFICIENCY OF BOTH LOWER EXTREMITIES: ICD-10-CM

## 2023-01-09 DIAGNOSIS — E78.2 MIXED HYPERLIPIDEMIA: Chronic | ICD-10-CM

## 2023-01-09 DIAGNOSIS — I25.10 CAD S/P PERCUTANEOUS CORONARY ANGIOPLASTY: Primary | ICD-10-CM

## 2023-01-09 DIAGNOSIS — I10 ESSENTIAL HYPERTENSION: Chronic | ICD-10-CM

## 2023-01-09 DIAGNOSIS — Z98.61 CAD S/P PERCUTANEOUS CORONARY ANGIOPLASTY: Primary | ICD-10-CM

## 2023-01-09 DIAGNOSIS — I25.10 CORONARY ARTERY DISEASE INVOLVING NATIVE CORONARY ARTERY OF NATIVE HEART WITHOUT ANGINA PECTORIS: ICD-10-CM

## 2023-01-09 PROCEDURE — 1159F PR MEDICATION LIST DOCUMENTED IN MEDICAL RECORD: ICD-10-PCS | Mod: CPTII,S$GLB,, | Performed by: INTERNAL MEDICINE

## 2023-01-09 PROCEDURE — 3008F BODY MASS INDEX DOCD: CPT | Mod: CPTII,S$GLB,, | Performed by: INTERNAL MEDICINE

## 2023-01-09 PROCEDURE — 3008F PR BODY MASS INDEX (BMI) DOCUMENTED: ICD-10-PCS | Mod: CPTII,S$GLB,, | Performed by: INTERNAL MEDICINE

## 2023-01-09 PROCEDURE — 99999 PR PBB SHADOW E&M-EST. PATIENT-LVL III: ICD-10-PCS | Mod: PBBFAC,,, | Performed by: INTERNAL MEDICINE

## 2023-01-09 PROCEDURE — 99214 PR OFFICE/OUTPT VISIT, EST, LEVL IV, 30-39 MIN: ICD-10-PCS | Mod: S$GLB,,, | Performed by: INTERNAL MEDICINE

## 2023-01-09 PROCEDURE — 1160F PR REVIEW ALL MEDS BY PRESCRIBER/CLIN PHARMACIST DOCUMENTED: ICD-10-PCS | Mod: CPTII,S$GLB,, | Performed by: INTERNAL MEDICINE

## 2023-01-09 PROCEDURE — 99214 OFFICE O/P EST MOD 30 MIN: CPT | Mod: S$GLB,,, | Performed by: INTERNAL MEDICINE

## 2023-01-09 PROCEDURE — 1160F RVW MEDS BY RX/DR IN RCRD: CPT | Mod: CPTII,S$GLB,, | Performed by: INTERNAL MEDICINE

## 2023-01-09 PROCEDURE — 3077F SYST BP >= 140 MM HG: CPT | Mod: CPTII,S$GLB,, | Performed by: INTERNAL MEDICINE

## 2023-01-09 PROCEDURE — 3077F PR MOST RECENT SYSTOLIC BLOOD PRESSURE >= 140 MM HG: ICD-10-PCS | Mod: CPTII,S$GLB,, | Performed by: INTERNAL MEDICINE

## 2023-01-09 PROCEDURE — 1159F MED LIST DOCD IN RCRD: CPT | Mod: CPTII,S$GLB,, | Performed by: INTERNAL MEDICINE

## 2023-01-09 PROCEDURE — 99999 PR PBB SHADOW E&M-EST. PATIENT-LVL III: CPT | Mod: PBBFAC,,, | Performed by: INTERNAL MEDICINE

## 2023-01-09 PROCEDURE — 3078F DIAST BP <80 MM HG: CPT | Mod: CPTII,S$GLB,, | Performed by: INTERNAL MEDICINE

## 2023-01-09 PROCEDURE — 3078F PR MOST RECENT DIASTOLIC BLOOD PRESSURE < 80 MM HG: ICD-10-PCS | Mod: CPTII,S$GLB,, | Performed by: INTERNAL MEDICINE

## 2023-01-13 ENCOUNTER — PATIENT MESSAGE (OUTPATIENT)
Dept: PRIMARY CARE CLINIC | Facility: CLINIC | Age: 63
End: 2023-01-13
Payer: COMMERCIAL

## 2023-04-04 DIAGNOSIS — E78.2 MIXED HYPERLIPIDEMIA: Chronic | ICD-10-CM

## 2023-04-04 DIAGNOSIS — I10 ESSENTIAL HYPERTENSION: Chronic | ICD-10-CM

## 2023-04-04 DIAGNOSIS — I25.10 CORONARY ARTERY DISEASE INVOLVING NATIVE CORONARY ARTERY OF NATIVE HEART WITHOUT ANGINA PECTORIS: ICD-10-CM

## 2023-04-04 DIAGNOSIS — I73.9 PERIPHERAL VASCULAR DISEASE OF EXTREMITY: ICD-10-CM

## 2023-04-04 RX ORDER — CARVEDILOL 25 MG/1
TABLET ORAL
Qty: 180 TABLET | Refills: 1 | OUTPATIENT
Start: 2023-04-04

## 2023-04-04 RX ORDER — ATORVASTATIN CALCIUM 80 MG/1
TABLET, FILM COATED ORAL
Qty: 90 TABLET | Refills: 1 | OUTPATIENT
Start: 2023-04-04

## 2023-04-04 RX ORDER — LISINOPRIL 40 MG/1
TABLET ORAL
Qty: 90 TABLET | Refills: 1 | OUTPATIENT
Start: 2023-04-04

## 2023-04-04 RX ORDER — CLOPIDOGREL BISULFATE 75 MG/1
TABLET ORAL
Qty: 90 TABLET | Refills: 1 | OUTPATIENT
Start: 2023-04-04

## 2023-04-04 NOTE — TELEPHONE ENCOUNTER
Pt is now established with Christiano Guy MD. Pt scheduled for an appointment on 04/06/2023 with Christiano Guy MD. PCP updated.

## 2023-04-04 NOTE — TELEPHONE ENCOUNTER
No new care gaps identified.  [facility] Embedded Care Gaps. Reference number: [id number]. 4/04/2023   4:49:38 AM MARYT

## 2023-04-20 ENCOUNTER — OFFICE VISIT (OUTPATIENT)
Dept: FAMILY MEDICINE | Facility: CLINIC | Age: 63
End: 2023-04-20
Payer: COMMERCIAL

## 2023-04-20 DIAGNOSIS — I25.10 CORONARY ARTERY DISEASE INVOLVING NATIVE CORONARY ARTERY OF NATIVE HEART WITHOUT ANGINA PECTORIS: ICD-10-CM

## 2023-04-20 DIAGNOSIS — E78.2 MIXED HYPERLIPIDEMIA: Chronic | ICD-10-CM

## 2023-04-20 DIAGNOSIS — I10 ESSENTIAL HYPERTENSION: Chronic | ICD-10-CM

## 2023-04-20 DIAGNOSIS — I73.9 PERIPHERAL VASCULAR DISEASE OF EXTREMITY: ICD-10-CM

## 2023-04-20 PROCEDURE — 1159F MED LIST DOCD IN RCRD: CPT | Mod: CPTII,95,, | Performed by: NURSE PRACTITIONER

## 2023-04-20 PROCEDURE — 1159F PR MEDICATION LIST DOCUMENTED IN MEDICAL RECORD: ICD-10-PCS | Mod: CPTII,95,, | Performed by: NURSE PRACTITIONER

## 2023-04-20 PROCEDURE — 99213 PR OFFICE/OUTPT VISIT, EST, LEVL III, 20-29 MIN: ICD-10-PCS | Mod: 95,,, | Performed by: NURSE PRACTITIONER

## 2023-04-20 PROCEDURE — 1160F PR REVIEW ALL MEDS BY PRESCRIBER/CLIN PHARMACIST DOCUMENTED: ICD-10-PCS | Mod: CPTII,95,, | Performed by: NURSE PRACTITIONER

## 2023-04-20 PROCEDURE — 99213 OFFICE O/P EST LOW 20 MIN: CPT | Mod: 95,,, | Performed by: NURSE PRACTITIONER

## 2023-04-20 PROCEDURE — 4010F ACE/ARB THERAPY RXD/TAKEN: CPT | Mod: CPTII,95,, | Performed by: NURSE PRACTITIONER

## 2023-04-20 PROCEDURE — 4010F PR ACE/ARB THEARPY RXD/TAKEN: ICD-10-PCS | Mod: CPTII,95,, | Performed by: NURSE PRACTITIONER

## 2023-04-20 PROCEDURE — 1160F RVW MEDS BY RX/DR IN RCRD: CPT | Mod: CPTII,95,, | Performed by: NURSE PRACTITIONER

## 2023-04-20 RX ORDER — CLOPIDOGREL BISULFATE 75 MG/1
75 TABLET ORAL DAILY
Qty: 90 TABLET | Refills: 0 | Status: SHIPPED | OUTPATIENT
Start: 2023-04-20 | End: 2023-06-02 | Stop reason: SDUPTHER

## 2023-04-20 RX ORDER — LISINOPRIL 40 MG/1
TABLET ORAL
Qty: 90 TABLET | Refills: 0 | Status: SHIPPED | OUTPATIENT
Start: 2023-04-20 | End: 2023-06-02 | Stop reason: SDUPTHER

## 2023-04-20 RX ORDER — CARVEDILOL 25 MG/1
25 TABLET ORAL 2 TIMES DAILY WITH MEALS
Qty: 180 TABLET | Refills: 0 | Status: SHIPPED | OUTPATIENT
Start: 2023-04-20 | End: 2023-06-02 | Stop reason: SDUPTHER

## 2023-04-20 RX ORDER — EZETIMIBE 10 MG/1
TABLET ORAL
Qty: 90 TABLET | Refills: 0 | Status: SHIPPED | OUTPATIENT
Start: 2023-04-20 | End: 2023-06-02 | Stop reason: SDUPTHER

## 2023-04-20 RX ORDER — ATORVASTATIN CALCIUM 80 MG/1
80 TABLET, FILM COATED ORAL NIGHTLY
Qty: 90 TABLET | Refills: 0 | Status: SHIPPED | OUTPATIENT
Start: 2023-04-20 | End: 2023-06-02 | Stop reason: SDUPTHER

## 2023-04-20 NOTE — PROGRESS NOTES
Dictation #1  MRN:[id number]  CSN:[id number] Subjective     Patient ID: Jose G Shafer is a 63 y.o. male.    Chief Complaint: No chief complaint on file.    The patient location is: [place], [place]  The chief complaint leading to consultation is: meds    Visit type: audiovisual    Face to Face time with patient: 12  15 minutes of total time spent on the encounter, which includes face to face time and non-face to face time preparing to see the patient (eg, review of tests), Obtaining and/or reviewing separately obtained history, Documenting clinical information in the electronic or other health record, Independently interpreting results (not separately reported) and communicating results to the patient/family/caregiver, or Care coordination (not separately reported).         Each patient to whom he or she provides medical services by telemedicine is:  (1) informed of the relationship between the physician and patient and the respective role of any other health care provider with respect to management of the patient; and (2) notified that he or she may decline to receive medical services by telemedicine and may withdraw from such care at any time.    Notes:    Patient says he is about to run out of several of his cardiac medications and refills were denies. He was last seen by PCP in September, he has an appointment scheduled in June, can not get in earlier [place] and his PCP appointment availability is limited. Requesting refills.     Review of Systems   Constitutional:  Negative for activity change and unexpected weight change.   HENT:  Negative for hearing loss, rhinorrhea and trouble swallowing.    Eyes:  Negative for discharge and visual disturbance.   Respiratory:  Negative for chest tightness and wheezing.    Cardiovascular:  Negative for chest pain and palpitations.   Gastrointestinal:  Negative for blood in stool, constipation, diarrhea and vomiting.   Endocrine: Negative for polydipsia and  polyuria.   Genitourinary:  Negative for difficulty urinating, hematuria and urgency.   Musculoskeletal:  Negative for joint swelling and neck pain.   Neurological:  Negative for weakness and headaches.   Psychiatric/Behavioral:  Negative for confusion and dysphoric mood.         Objective     Physical Exam  Constitutional:       Appearance: Normal appearance.   Pulmonary:      Effort: Pulmonary effort is normal. No respiratory distress.   Neurological:      General: No focal deficit present.      Mental Status: He is alert and oriented to person, place, and time.   Psychiatric:         Mood and Affect: Mood normal.         Behavior: Behavior normal.          Assessment and Plan     Problem List Items Addressed This Visit          Unprioritized    Essential hypertension (Chronic)    Relevant Medications    lisinopriL (PRINIVIL,ZESTRIL) 40 MG tablet    carvediloL (COREG) 25 MG tablet    Mixed hyperlipidemia (Chronic)    Relevant Medications    atorvastatin (LIPITOR) 80 MG tablet    ezetimibe (ZETIA) 10 mg tablet    Coronary artery disease involving native coronary artery of native heart without angina pectoris    Relevant Medications    clopidogreL (PLAVIX) 75 mg tablet    Peripheral vascular disease of extremity: s/p R. [facility] 10/19/2015    Relevant Medications    clopidogreL (PLAVIX) 75 mg tablet     Refill of 90 days sent, advised important to keep appointment with PCP as scheduled in June.   1. Coronary artery disease involving native coronary artery of native heart without angina pectoris    - clopidogreL (PLAVIX) 75 mg tablet; Take 1 tablet (75 mg total) by mouth once daily.  Dispense: 90 tablet; Refill: 0    2. [facility] disease of [facility]    - clopidogreL (PLAVIX) 75 mg tablet; Take 1 tablet (75 mg total) by mouth once daily.  Dispense: 90 tablet; Refill: 0    3. Essential hypertension    - lisinopriL (PRINIVIL,ZESTRIL) 40 MG tablet; TAKE 1 TABLET BY MOUTH ONE TIME  DAILY  Dispense: 90 tablet; Refill: 0  - carvediloL (COREG) 25 MG tablet; Take 1 tablet (25 mg total) by mouth 2 (two) times daily with meals.  Dispense: 180 tablet; Refill: 0    4. Mixed hyperlipidemia  - atorvastatin (LIPITOR) 80 MG tablet; Take 1 tablet (80 mg total) by mouth every evening.  Dispense: 90 tablet; Refill: 0  - ezetimibe (ZETIA) 10 mg tablet; TAKE 1 TABLET BY MOUTH EVERY DAY  Dispense: 90 tablet; Refill: 0

## 2023-04-20 NOTE — PROGRESS NOTES
Answers submitted by the patient for this visit:  Review of Systems Questionnaire (Submitted on 4/19/2023)  activity change: No  unexpected weight change: No  neck pain: No  hearing loss: No  rhinorrhea: No  trouble swallowing: No  eye discharge: No  visual disturbance: No  chest tightness: No  wheezing: No  chest pain: No  palpitations: No  blood in stool: No  constipation: No  vomiting: No  diarrhea: No  polydipsia: No  polyuria: No  difficulty urinating: No  urgency: No  hematuria: No  joint swelling: No  headaches: No  weakness: No  confusion: No  dysphoric mood: No

## 2023-06-02 ENCOUNTER — OFFICE VISIT (OUTPATIENT)
Dept: FAMILY MEDICINE | Facility: CLINIC | Age: 63
End: 2023-06-02
Payer: COMMERCIAL

## 2023-06-02 ENCOUNTER — LAB VISIT (OUTPATIENT)
Dept: LAB | Facility: HOSPITAL | Age: 63
End: 2023-06-02
Attending: INTERNAL MEDICINE
Payer: COMMERCIAL

## 2023-06-02 VITALS
HEIGHT: 70 IN | WEIGHT: 248.69 LBS | SYSTOLIC BLOOD PRESSURE: 134 MMHG | DIASTOLIC BLOOD PRESSURE: 80 MMHG | OXYGEN SATURATION: 96 % | HEART RATE: 79 BPM | BODY MASS INDEX: 35.6 KG/M2

## 2023-06-02 DIAGNOSIS — R29.818 OTHER SYMPTOMS AND SIGNS INVOLVING THE NERVOUS SYSTEM: ICD-10-CM

## 2023-06-02 DIAGNOSIS — I10 ESSENTIAL HYPERTENSION: Chronic | ICD-10-CM

## 2023-06-02 DIAGNOSIS — R91.8 GROUND GLASS OPACITY PRESENT ON IMAGING OF LUNG: ICD-10-CM

## 2023-06-02 DIAGNOSIS — R91.8 GROUND GLASS OPACITY PRESENT ON IMAGING OF LUNG: Primary | ICD-10-CM

## 2023-06-02 DIAGNOSIS — R47.81 SLURRED SPEECH: ICD-10-CM

## 2023-06-02 DIAGNOSIS — I73.9 PERIPHERAL VASCULAR DISEASE OF EXTREMITY: ICD-10-CM

## 2023-06-02 DIAGNOSIS — F41.9 ANXIETY: ICD-10-CM

## 2023-06-02 DIAGNOSIS — Z12.11 SCREENING FOR COLON CANCER: ICD-10-CM

## 2023-06-02 DIAGNOSIS — Z85.828 HISTORY OF SKIN CANCER: ICD-10-CM

## 2023-06-02 DIAGNOSIS — I25.10 CORONARY ARTERY DISEASE INVOLVING NATIVE CORONARY ARTERY OF NATIVE HEART WITHOUT ANGINA PECTORIS: ICD-10-CM

## 2023-06-02 DIAGNOSIS — E78.2 MIXED HYPERLIPIDEMIA: Chronic | ICD-10-CM

## 2023-06-02 PROBLEM — Z71.89 ADVANCE CARE PLANNING: Status: RESOLVED | Noted: 2022-12-10 | Resolved: 2023-06-02

## 2023-06-02 PROBLEM — Z12.12 SCREENING FOR COLORECTAL CANCER: Status: RESOLVED | Noted: 2017-07-17 | Resolved: 2023-06-02

## 2023-06-02 PROBLEM — J10.1 INFLUENZA A: Status: RESOLVED | Noted: 2022-12-10 | Resolved: 2023-06-02

## 2023-06-02 LAB
CREAT SERPL-MCNC: 0.8 MG/DL (ref 0.5–1.4)
EST. GFR  (NO RACE VARIABLE): >60 ML/MIN/1.73 M^2

## 2023-06-02 PROCEDURE — 3008F PR BODY MASS INDEX (BMI) DOCUMENTED: ICD-10-PCS | Mod: CPTII,S$GLB,, | Performed by: INTERNAL MEDICINE

## 2023-06-02 PROCEDURE — 3079F PR MOST RECENT DIASTOLIC BLOOD PRESSURE 80-89 MM HG: ICD-10-PCS | Mod: CPTII,S$GLB,, | Performed by: INTERNAL MEDICINE

## 2023-06-02 PROCEDURE — 36415 COLL VENOUS BLD VENIPUNCTURE: CPT | Mod: PO | Performed by: INTERNAL MEDICINE

## 2023-06-02 PROCEDURE — 4010F PR ACE/ARB THEARPY RXD/TAKEN: ICD-10-PCS | Mod: CPTII,S$GLB,, | Performed by: INTERNAL MEDICINE

## 2023-06-02 PROCEDURE — 3075F PR MOST RECENT SYSTOLIC BLOOD PRESS GE 130-139MM HG: ICD-10-PCS | Mod: CPTII,S$GLB,, | Performed by: INTERNAL MEDICINE

## 2023-06-02 PROCEDURE — 99214 OFFICE O/P EST MOD 30 MIN: CPT | Mod: S$GLB,,, | Performed by: INTERNAL MEDICINE

## 2023-06-02 PROCEDURE — 99214 PR OFFICE/OUTPT VISIT, EST, LEVL IV, 30-39 MIN: ICD-10-PCS | Mod: S$GLB,,, | Performed by: INTERNAL MEDICINE

## 2023-06-02 PROCEDURE — 82565 ASSAY OF CREATININE: CPT | Performed by: INTERNAL MEDICINE

## 2023-06-02 PROCEDURE — 99999 PR PBB SHADOW E&M-EST. PATIENT-LVL V: ICD-10-PCS | Mod: PBBFAC,,, | Performed by: INTERNAL MEDICINE

## 2023-06-02 PROCEDURE — 1160F PR REVIEW ALL MEDS BY PRESCRIBER/CLIN PHARMACIST DOCUMENTED: ICD-10-PCS | Mod: CPTII,S$GLB,, | Performed by: INTERNAL MEDICINE

## 2023-06-02 PROCEDURE — 1159F PR MEDICATION LIST DOCUMENTED IN MEDICAL RECORD: ICD-10-PCS | Mod: CPTII,S$GLB,, | Performed by: INTERNAL MEDICINE

## 2023-06-02 PROCEDURE — 4010F ACE/ARB THERAPY RXD/TAKEN: CPT | Mod: CPTII,S$GLB,, | Performed by: INTERNAL MEDICINE

## 2023-06-02 PROCEDURE — 1160F RVW MEDS BY RX/DR IN RCRD: CPT | Mod: CPTII,S$GLB,, | Performed by: INTERNAL MEDICINE

## 2023-06-02 PROCEDURE — 1159F MED LIST DOCD IN RCRD: CPT | Mod: CPTII,S$GLB,, | Performed by: INTERNAL MEDICINE

## 2023-06-02 PROCEDURE — 3008F BODY MASS INDEX DOCD: CPT | Mod: CPTII,S$GLB,, | Performed by: INTERNAL MEDICINE

## 2023-06-02 PROCEDURE — 3075F SYST BP GE 130 - 139MM HG: CPT | Mod: CPTII,S$GLB,, | Performed by: INTERNAL MEDICINE

## 2023-06-02 PROCEDURE — 3079F DIAST BP 80-89 MM HG: CPT | Mod: CPTII,S$GLB,, | Performed by: INTERNAL MEDICINE

## 2023-06-02 PROCEDURE — 99999 PR PBB SHADOW E&M-EST. PATIENT-LVL V: CPT | Mod: PBBFAC,,, | Performed by: INTERNAL MEDICINE

## 2023-06-02 RX ORDER — LISINOPRIL 40 MG/1
TABLET ORAL
Qty: 90 TABLET | Refills: 3 | Status: SHIPPED | OUTPATIENT
Start: 2023-06-02

## 2023-06-02 RX ORDER — EZETIMIBE 10 MG/1
TABLET ORAL
Qty: 90 TABLET | Refills: 3 | Status: SHIPPED | OUTPATIENT
Start: 2023-06-02

## 2023-06-02 RX ORDER — ISOSORBIDE MONONITRATE 30 MG/1
30 TABLET, EXTENDED RELEASE ORAL DAILY
Qty: 90 TABLET | Refills: 3 | Status: SHIPPED | OUTPATIENT
Start: 2023-06-02

## 2023-06-02 RX ORDER — CARVEDILOL 25 MG/1
25 TABLET ORAL 2 TIMES DAILY WITH MEALS
Qty: 180 TABLET | Refills: 3 | Status: SHIPPED | OUTPATIENT
Start: 2023-06-02

## 2023-06-02 RX ORDER — ATORVASTATIN CALCIUM 80 MG/1
80 TABLET, FILM COATED ORAL NIGHTLY
Qty: 90 TABLET | Refills: 3 | Status: SHIPPED | OUTPATIENT
Start: 2023-06-02

## 2023-06-02 RX ORDER — CILOSTAZOL 50 MG/1
50 TABLET ORAL 2 TIMES DAILY
Qty: 180 TABLET | Refills: 3 | Status: SHIPPED | OUTPATIENT
Start: 2023-06-02

## 2023-06-02 RX ORDER — SERTRALINE HYDROCHLORIDE 50 MG/1
50 TABLET, FILM COATED ORAL DAILY
Qty: 90 TABLET | Refills: 3 | Status: SHIPPED | OUTPATIENT
Start: 2023-06-02

## 2023-06-02 RX ORDER — CLOPIDOGREL BISULFATE 75 MG/1
75 TABLET ORAL DAILY
Qty: 90 TABLET | Refills: 3 | Status: SHIPPED | OUTPATIENT
Start: 2023-06-02

## 2023-06-02 NOTE — Clinical Note
PCI last year, started having non-anginal CP, think its anxiety, just wanted to tell you before he sees you in a month.

## 2023-06-02 NOTE — PROGRESS NOTES
Subjective     Jose G Shafer is a 63 y.o. old, male here for Medication Refill    64 y/o with PMH of CAD s/p stents, PAD, HTN, HLD, prediabetes, LEAH on CPAP, GERD, former tobacco use, anxiety    Patient complains of recent 3 episodes of chest tightness the past month. It lasts about 10 minutes, seems to get better if he coughs. It doesn't seem to happen with exertion but happens at rest each time.  He has had a LHC and PCI one year ago.    About 6 weeks ago for about a week he was slurring his words at work and at home. Multiple people had noticed. He thought he may have had a mini-stroke.    Tobacco use: recently quit after infection in December    Anxiety somewhat improved with increase in zoloft, no SE's, still significant anxiety regarding certain parts of his job.    Answers submitted by the patient for this visit:  Review of Systems Questionnaire (Submitted on 5/30/2023)  activity change: No  unexpected weight change: No  rhinorrhea: No  trouble swallowing: No  visual disturbance: No  chest tightness: Yes  polyuria: No  difficulty urinating: No  joint swelling: No  arthralgias: No  confusion: Yes  dysphoric mood: No    Review of Systems   HENT:  Negative for hearing loss.    Eyes:  Negative for discharge.   Respiratory:  Negative for wheezing.    Cardiovascular:  Negative for chest pain and palpitations.   Gastrointestinal:  Negative for blood in stool, constipation, diarrhea and vomiting.   Genitourinary:  Negative for hematuria and urgency.   Musculoskeletal:  Negative for neck pain.   Neurological:  Positive for weakness. Negative for headaches.   Endo/Heme/Allergies:  Negative for polydipsia.   Medications     Outpatient Medications Marked as Taking for the 6/2/23 encounter (Office Visit) with Christiano Guy MD   Medication Sig Dispense Refill    albuterol (VENTOLIN HFA) 90 mcg/actuation inhaler Inhale 2 puffs into the lungs every 4 (four) hours as needed for Wheezing or Shortness of Breath.  Rescue 54 g 3    ALPRAZolam (XANAX) 0.5 MG tablet Take 1 tablet (0.5 mg total) by mouth daily as needed for Anxiety. 20 tablet 0    ascorbic acid (VITAMIN C) 500 MG tablet Take 500 mg by mouth 2 (two) times daily.      aspirin (ECOTRIN) 81 MG EC tablet Take 1 tablet (81 mg total) by mouth once daily. 1 tablet 0    fluticasone propionate (FLONASE) 50 mcg/actuation nasal spray USE 1 SPRAY (50 MCG TOTAL) BY EACH NARE ROUTE ONCE DAILY. (Patient taking differently: 1 spray by Each Nostril route daily as needed for Allergies.) 16 mL 11    fluticasone-umeclidin-vilanter (TRELEGY ELLIPTA) 200-62.5-25 mcg inhaler Inhale 1 puff into the lungs once daily. 180 each 3    MULTIVITAMIN ORAL Take 1 capsule by mouth once daily.      nitroGLYCERIN (NITROSTAT) 0.4 MG SL tablet Place 1 tablet (0.4 mg total) under the tongue every 5 (five) minutes as needed for Chest pain. 1 Tablet, Sublingual Sublingual 100 tablet 3    omega-3 fatty acids 500 mg Cap Take 1 capsule by mouth once daily. 1 Capsule Oral       [DISCONTINUED] atorvastatin (LIPITOR) 80 MG tablet Take 1 tablet (80 mg total) by mouth every evening. 90 tablet 0    [DISCONTINUED] carvediloL (COREG) 25 MG tablet Take 1 tablet (25 mg total) by mouth 2 (two) times daily with meals. 180 tablet 0    [DISCONTINUED] cilostazoL (PLETAL) 50 MG Tab Take 1 tablet (50 mg total) by mouth 2 (two) times daily. 180 tablet 1    [DISCONTINUED] clopidogreL (PLAVIX) 75 mg tablet Take 1 tablet (75 mg total) by mouth once daily. 90 tablet 0    [DISCONTINUED] ezetimibe (ZETIA) 10 mg tablet TAKE 1 TABLET BY MOUTH EVERY DAY 90 tablet 0    [DISCONTINUED] isosorbide mononitrate (IMDUR) 30 MG 24 hr tablet TAKE 1 TABLET BY MOUTH EVERY DAY 90 tablet 1    [DISCONTINUED] lisinopriL (PRINIVIL,ZESTRIL) 40 MG tablet TAKE 1 TABLET BY MOUTH ONE TIME DAILY 90 tablet 0    [DISCONTINUED] sertraline (ZOLOFT) 50 MG tablet Take 1 tablet (50 mg total) by mouth once daily. 90 tablet 3     Objective     /80   Pulse 79  "  Ht 5' 10" (1.778 m)   Wt 112.8 kg (248 lb 10.9 oz)   SpO2 96%   BMI 35.68 kg/m²   Physical Exam  Constitutional:       General: He is not in acute distress.     Appearance: Normal appearance. He is well-developed.   Neurological:      Mental Status: He is alert.     Assessment and Plan     Ground glass opacity present on imaging of lung  -     CT Chest With Contrast; Future; Expected date: 06/02/2023  -     Creatinine, serum; Future; Expected date: 06/02/2023    Slurred speech    History of skin cancer  -     Ambulatory referral/consult to Dermatology; Future; Expected date: 06/09/2023    Other symptoms and signs involving the nervous system  -     MRI Brain Without Contrast; Future; Expected date: 06/02/2023    Screening for colon cancer  -     Case Request Endoscopy: COLONOSCOPY    Mixed hyperlipidemia  -     atorvastatin (LIPITOR) 80 MG tablet; Take 1 tablet (80 mg total) by mouth every evening.  Dispense: 90 tablet; Refill: 3  -     ezetimibe (ZETIA) 10 mg tablet; TAKE 1 TABLET BY MOUTH EVERY DAY  Dispense: 90 tablet; Refill: 3    Essential hypertension  -     carvediloL (COREG) 25 MG tablet; Take 1 tablet (25 mg total) by mouth 2 (two) times daily with meals.  Dispense: 180 tablet; Refill: 3  -     lisinopriL (PRINIVIL,ZESTRIL) 40 MG tablet; TAKE 1 TABLET BY MOUTH ONE TIME DAILY  Dispense: 90 tablet; Refill: 3    Peripheral vascular disease of extremity: s/p R. [facility] 10/19/2015  -     cilostazoL (PLETAL) 50 MG Tab; Take 1 tablet (50 mg total) by mouth 2 (two) times daily.  Dispense: 180 tablet; Refill: 3  -     clopidogreL (PLAVIX) 75 mg tablet; Take 1 tablet (75 mg total) by mouth once daily.  Dispense: 90 tablet; Refill: 3    Coronary artery disease involving native coronary artery of native heart without angina pectoris  -     clopidogreL (PLAVIX) 75 mg tablet; Take 1 tablet (75 mg total) by mouth once daily.  Dispense: 90 tablet; Refill: 3  -     isosorbide mononitrate (IMDUR) 30 MG 24 hr tablet; " Take 1 tablet (30 mg total) by mouth once daily.  Dispense: 90 tablet; Refill: 3    Anxiety  -     sertraline (ZOLOFT) 50 MG tablet; Take 1 tablet (50 mg total) by mouth once daily.  Dispense: 90 tablet; Refill: 3    Non-anginal CP, f/u with cardiology.  Possible CVA?  Continue medical management as above.    Follow up in about 1 year (around 6/2/2024).  ___________________  Christiano Guy MD  Internal Medicine and Pediatrics

## 2023-06-05 ENCOUNTER — TELEPHONE (OUTPATIENT)
Dept: DERMATOLOGY | Facility: CLINIC | Age: 63
End: 2023-06-05
Payer: COMMERCIAL

## 2023-06-15 ENCOUNTER — TELEPHONE (OUTPATIENT)
Dept: GASTROENTEROLOGY | Facility: CLINIC | Age: 63
End: 2023-06-15
Payer: COMMERCIAL

## 2023-06-15 NOTE — TELEPHONE ENCOUNTER
Colonoscopy is scheduled on 9/29 with Dr. Saldaña at [address] [facility] [address] in [place] as requested by patient. Prep instructions has been sent via [facility] and via mail. Address is confirmed. Patient is informed the preop Nurse will call him/her with the arrival time a day or two prior to procedure. Patient verbalizes understanding.

## 2023-06-16 ENCOUNTER — HOSPITAL ENCOUNTER (OUTPATIENT)
Dept: RADIOLOGY | Facility: HOSPITAL | Age: 63
Discharge: HOME | End: 2023-06-16
Attending: INTERNAL MEDICINE
Payer: COMMERCIAL

## 2023-06-16 DIAGNOSIS — R29.818 OTHER SYMPTOMS AND SIGNS INVOLVING THE NERVOUS SYSTEM: ICD-10-CM

## 2023-06-16 DIAGNOSIS — R91.8 GROUND GLASS OPACITY PRESENT ON IMAGING OF LUNG: ICD-10-CM

## 2023-06-16 PROCEDURE — 70551 MRI BRAIN STEM W/O DYE: CPT | Mod: 26,,, | Performed by: RADIOLOGY

## 2023-06-16 PROCEDURE — 71260 CT THORAX DX C+: CPT | Mod: 26,,, | Performed by: RADIOLOGY

## 2023-06-16 PROCEDURE — 25500020 PHARM REV CODE 255: Mod: PO | Performed by: INTERNAL MEDICINE

## 2023-06-16 PROCEDURE — 71260 CT CHEST WITH CONTRAST: ICD-10-PCS | Mod: 26,,, | Performed by: RADIOLOGY

## 2023-06-16 PROCEDURE — 70551 MRI BRAIN WITHOUT CONTRAST: ICD-10-PCS | Mod: 26,,, | Performed by: RADIOLOGY

## 2023-06-16 PROCEDURE — 70551 MRI BRAIN STEM W/O DYE: CPT | Mod: TC,PO

## 2023-06-16 PROCEDURE — 71260 CT THORAX DX C+: CPT | Mod: TC,PO

## 2023-06-16 RX ADMIN — IOHEXOL 75 ML: 350 INJECTION, SOLUTION INTRAVENOUS at 02:06

## 2023-07-16 DIAGNOSIS — F41.9 ANXIETY: ICD-10-CM

## 2023-07-16 NOTE — TELEPHONE ENCOUNTER
Care Due:                  Date            Visit Type   Department     Provider  --------------------------------------------------------------------------------                                MYCHART                              FOLLOWUP/OF  [facility] FAMILY  Last Visit: 06-      FICE VISIT   MEDICINE       Christiano Guy  Next Visit: None Scheduled  None         None Found                                                            Last  Test          Frequency    Reason                     Performed    Due Date  --------------------------------------------------------------------------------    Lipid Panel.  12 months..  atorvastatin, ezetimibe..  09- 09-    [facility] Embedded Care Due Messages. Reference number: [id number].   7/16/2023 5:08:14 AM CDT

## 2023-07-17 RX ORDER — ALPRAZOLAM 0.5 MG/1
TABLET ORAL
Qty: 20 TABLET | Refills: 0 | Status: SHIPPED | OUTPATIENT
Start: 2023-07-17 | End: 2023-11-13 | Stop reason: SDUPTHER

## 2023-07-19 NOTE — PROGRESS NOTES
"Subjective:    Patient ID:  Jose G Shafer is a 63 y.o. male who presents for follow-up of Hypertension      Problem List Items Addressed This Visit          Cardiac/Vascular    Essential hypertension (Chronic)    Mixed hyperlipidemia (Chronic)    CAD S/P percutaneous coronary angioplasty - Primary    Overview     5/26/22 - Luis A    The Prox LAD lesion was 99% stenosed with 50% stenosis post-intervention, with balloon angioplasty  The PCI was partially successful.             Coronary artery disease involving native coronary artery of native heart without angina pectoris    Overview     Prev History: 6-2005 he had an inferior STEMI-->DAVID to pRCA.  LVEF preserved           Peripheral vascular disease of extremity: s/p R. SFA DCB 10/19/2015    Venous insufficiency of both lower extremities    Overview     Chronic.  Patient has varicose veins bilaterally.  Had vein stripping performed previously.  Swelling in the left leg worse than the right.  Patient cannot wear compression stockings daily because of his work.            HPI    Patient was last seen on 01/09/2023 at which time he was doing okay from a cardiac standpoint.  Dealing with chest pain for broken ribs.  Had quit smoking..    On assessment today, the patient states that he is having a lot of fatigue.     Some stable chest pain.    Still not smoking  CPAP use - Not using CPAP. Has not seen sleep MD in some time, still snores while wearing it       Objective:     Vitals:    07/20/23 1320   BP: (!) 145/92   BP Location: Left arm   Patient Position: Sitting   BP Method: Large (Automatic)   Pulse: 77   Weight: 111.6 kg (246 lb 0.5 oz)   Height: 5' 10" (1.778 m)       BP Readings from Last 5 Encounters:   07/20/23 (!) 145/92   06/02/23 134/80   01/09/23 (!) 148/78   12/14/22 (!) 153/92   12/11/22 93/69        Physical Exam  Constitutional:       Appearance: He is well-developed.   HENT:      Head: Normocephalic and atraumatic.   Neck:      Vascular: No JVD. "   Cardiovascular:      Rate and Rhythm: Normal rate and regular rhythm.      Heart sounds: Normal heart sounds. No murmur heard.    No friction rub. No gallop.   Pulmonary:      Effort: Pulmonary effort is normal. No respiratory distress.      Breath sounds: Normal breath sounds. No wheezing or rales.   Abdominal:      General: Bowel sounds are normal.      Palpations: Abdomen is soft.      Tenderness: There is no abdominal tenderness. There is no guarding or rebound.   Musculoskeletal:      Cervical back: Normal range of motion and neck supple.   Skin:     General: Skin is warm and dry.   Neurological:      Mental Status: He is alert and oriented to person, place, and time.   Psychiatric:         Behavior: Behavior normal.           Current Outpatient Medications   Medication Instructions    albuterol (VENTOLIN HFA) 90 mcg/actuation inhaler 2 puffs, Inhalation, Every 4 hours PRN, Rescue    ALPRAZolam (XANAX) 0.5 MG tablet TAKE 1 TABLET BY MOUTH EVERY DAY AS NEEDED FOR ANXIETY    ascorbic acid (vitamin C) (VITAMIN C) 500 mg, Oral, 2 times daily    aspirin (ECOTRIN) 81 mg, Oral, Daily    atorvastatin (LIPITOR) 80 mg, Oral, Nightly    azithromycin (ZITHROMAX) 500 MG tablet One daily for yellow mucous, repeat if needed    carvediloL (COREG) 25 mg, Oral, 2 times daily with meals    cilostazoL (PLETAL) 50 mg, Oral, 2 times daily    clopidogreL (PLAVIX) 75 mg, Oral, Daily    ezetimibe (ZETIA) 10 mg tablet TAKE 1 TABLET BY MOUTH EVERY DAY    fluticasone propionate (FLONASE) 50 mcg/actuation nasal spray USE 1 SPRAY (50 MCG TOTAL) BY EACH NARE ROUTE ONCE DAILY.    fluticasone-umeclidin-vilanter (TRELEGY ELLIPTA) 200-62.5-25 mcg inhaler 1 puff, Inhalation, Daily    isosorbide mononitrate (IMDUR) 30 mg, Oral, Daily    lisinopriL (PRINIVIL,ZESTRIL) 40 MG tablet TAKE 1 TABLET BY MOUTH ONE TIME DAILY    MULTIVITAMIN ORAL 1 capsule, Oral, Daily    nitroGLYCERIN (NITROSTAT) 0.4 mg, Sublingual, Every 5 min PRN, 1 Tablet, Sublingual  Sublingual    omega-3 fatty acids 500 mg Cap 1 capsule, Oral, Daily, 1 Capsule Oral    sertraline (ZOLOFT) 50 mg, Oral, Daily       Lipid Panel:   Lab Results   Component Value Date    CHOL 112 (L) 09/12/2022    HDL 22 (L) 09/12/2022    LDLCALC 34.4 (L) 09/12/2022    TRIG 278 (H) 09/12/2022    CHOLHDL 19.6 (L) 09/12/2022       The ASCVD Risk score (Mili DK, et al., 2019) failed to calculate for the following reasons:    The valid total cholesterol range is 130 to 320 mg/dL    All pertinent labs, imaging, and EKGs reviewed.  Patient's most recent EKG tracing was personally interpreted by this provider.    Most Recent EKG Results  Results for orders placed or performed during the hospital encounter of 12/09/22   EKG 12-lead    Collection Time: 12/09/22 10:55 PM    Narrative    Test Reason : R06.02,    Vent. Rate : 102 BPM     Atrial Rate : 102 BPM     P-R Int : 148 ms          QRS Dur : 100 ms      QT Int : 344 ms       P-R-T Axes : 021 -75 053 degrees     QTc Int : 448 ms    Sinus tachycardia  Left axis deviation  Low voltage QRS  Incomplete right bundle branch block  Abnormal ECG  When compared with ECG of 10-PALMIRA-2022 10:22,  No significant change was found  Confirmed by Christa Gunn MD ([id number]) on 12/10/2022 2:56:00 PM    Referred By: [email or web address]   SELF           Confirmed By:Christa Gunn MD       Most Recent Echocardiogram Results  Results for orders placed in visit on 05/05/22    Echo    Interpretation Summary  · The left ventricle is normal in size with concentric remodeling and normal systolic function.  · The estimated ejection fraction is 65%.  · Normal left ventricular diastolic function.  · Normal right ventricular size with normal right ventricular systolic function.  · Normal central venous pressure (3 mmHg).  · The estimated PA systolic pressure is 22 mmHg.      Most Recent Nuclear Stress Test Results  Results for orders placed during the hospital encounter of 05/05/22    Nuclear Stress - Cardiology  Interpreted    Interpretation Summary    Abnormal myocardial perfusion scan.    There is a mild to moderate intensity, small sized, reversible perfusion abnormality that is consistent with ischemia in the inferior wall(s).    There are no other significant perfusion abnormalities.    The gated perfusion images showed an ejection fraction of 61% at rest.    There is normal wall motion at rest.    The EKG portion of this study is negative for ischemia.    When compared to the previous study from 1/20/2020, ischemia appreciated.      Most Recent Cardiac PET Stress Test Results  No results found for this or any previous visit.      Most Recent Cardiovascular Angiogram results  Results for orders placed during the hospital encounter of 05/26/22    Cardiac catheterization    Conclusion  · The left ventricular end diastolic pressure was elevated.  · The Dist LAD lesion was 70% stenosed.  · The 1st Mrg lesion was 60-70% stenosed.  · The distal RCA/streak are has long 60% stenosis  · The estimated blood loss was none.  · The pre-procedure left ventricular end diastolic pressure was 22.  · The Prox LAD lesion was 99% stenosed with 50% stenosis post-intervention, with balloon angioplasty  · The PCI was partially successful.  · A stent was not successfully placed.  · If disease progress of the patient developed more symptoms restenoses need to consider CABG.  · Meanwhile medical treatment continue dual antiplatelet treatment in addition to tobacco cessation and risk factors modification.    The procedure log was documented by No documenter listed and verified by Chrisat Gunn MD.    Date: 5/26/2022  Time: 7:37 PM      Other Most Recent Cardiology Results  Results for orders placed during the hospital encounter of 12/09/22    CARDIAC MONITORING STRIPS        Assessment:       1. CAD S/P percutaneous coronary angioplasty    2. Coronary artery disease involving native coronary artery of native heart without angina pectoris    3.  Essential hypertension    4. Mixed hyperlipidemia    5. Peripheral vascular disease of extremity: s/p R. SFA DCB 10/19/2015    6. Venous insufficiency of both lower extremities         Plan:     Symptoms of stable angina  BP borderline today  Most recent echocardiogram reviewed personally     Schedule [facility] with coronary angiography with Dr. Rivera considering complex disease  Extensive discussion regarding risks, benefits, and alternative approaches to the procedure.  The patient voices understanding and all questions have been answered.  The patient would like to proceed as planned.   Emphasized CPAP use and needs adjustment, will refer back to sleep medicine  Minimize sodium   Home BP log    Continue aspirin 81 mg PO Daily  Continue plavix 75mg PO Daily  Continue atorvastatin 80 mg PO Daily  Continue carvedilol 25 mg PO BID  Continue Zetia 10 mg PO Daily   Continue Imdur 30mg PO Daily  Continue lisinopril 40 mg PO Daily    Greater than 8 minutes were spent in counseling/evaluation of high-risk behavior and/or discussion of available laboratory/diagnostic results.    Continue other cardiac medications  Mediterranean Diet/Cardiovascular Exercise Program    Patient queried and all questions were answered.    F/u in 3-4 months to reassess chest pain and sleep      Signed:    Harjinder Perez MD  7/20/2023 8:36 AM

## 2023-07-20 ENCOUNTER — OFFICE VISIT (OUTPATIENT)
Dept: CARDIOLOGY | Facility: CLINIC | Age: 63
End: 2023-07-20
Payer: COMMERCIAL

## 2023-07-20 VITALS
SYSTOLIC BLOOD PRESSURE: 145 MMHG | WEIGHT: 246.06 LBS | DIASTOLIC BLOOD PRESSURE: 92 MMHG | BODY MASS INDEX: 35.23 KG/M2 | HEART RATE: 77 BPM | HEIGHT: 70 IN

## 2023-07-20 DIAGNOSIS — I73.9 PERIPHERAL VASCULAR DISEASE OF EXTREMITY: ICD-10-CM

## 2023-07-20 DIAGNOSIS — I25.10 CORONARY ARTERY DISEASE INVOLVING NATIVE CORONARY ARTERY OF NATIVE HEART WITHOUT ANGINA PECTORIS: ICD-10-CM

## 2023-07-20 DIAGNOSIS — E78.2 MIXED HYPERLIPIDEMIA: Chronic | ICD-10-CM

## 2023-07-20 DIAGNOSIS — G47.30 SLEEP APNEA, UNSPECIFIED TYPE: ICD-10-CM

## 2023-07-20 DIAGNOSIS — Z98.61 CAD S/P PERCUTANEOUS CORONARY ANGIOPLASTY: Primary | ICD-10-CM

## 2023-07-20 DIAGNOSIS — I25.10 CAD S/P PERCUTANEOUS CORONARY ANGIOPLASTY: Primary | ICD-10-CM

## 2023-07-20 DIAGNOSIS — I87.2 VENOUS INSUFFICIENCY OF BOTH LOWER EXTREMITIES: ICD-10-CM

## 2023-07-20 DIAGNOSIS — I10 ESSENTIAL HYPERTENSION: Chronic | ICD-10-CM

## 2023-07-20 PROCEDURE — 1159F PR MEDICATION LIST DOCUMENTED IN MEDICAL RECORD: ICD-10-PCS | Mod: CPTII,S$GLB,, | Performed by: INTERNAL MEDICINE

## 2023-07-20 PROCEDURE — 99999 PR PBB SHADOW E&M-EST. PATIENT-LVL IV: CPT | Mod: PBBFAC,,, | Performed by: INTERNAL MEDICINE

## 2023-07-20 PROCEDURE — 1160F PR REVIEW ALL MEDS BY PRESCRIBER/CLIN PHARMACIST DOCUMENTED: ICD-10-PCS | Mod: CPTII,S$GLB,, | Performed by: INTERNAL MEDICINE

## 2023-07-20 PROCEDURE — 99215 OFFICE O/P EST HI 40 MIN: CPT | Mod: S$GLB,,, | Performed by: INTERNAL MEDICINE

## 2023-07-20 PROCEDURE — 1160F RVW MEDS BY RX/DR IN RCRD: CPT | Mod: CPTII,S$GLB,, | Performed by: INTERNAL MEDICINE

## 2023-07-20 PROCEDURE — 3080F DIAST BP >= 90 MM HG: CPT | Mod: CPTII,S$GLB,, | Performed by: INTERNAL MEDICINE

## 2023-07-20 PROCEDURE — 3008F PR BODY MASS INDEX (BMI) DOCUMENTED: ICD-10-PCS | Mod: CPTII,S$GLB,, | Performed by: INTERNAL MEDICINE

## 2023-07-20 PROCEDURE — 4010F ACE/ARB THERAPY RXD/TAKEN: CPT | Mod: CPTII,S$GLB,, | Performed by: INTERNAL MEDICINE

## 2023-07-20 PROCEDURE — 1159F MED LIST DOCD IN RCRD: CPT | Mod: CPTII,S$GLB,, | Performed by: INTERNAL MEDICINE

## 2023-07-20 PROCEDURE — 99215 PR OFFICE/OUTPT VISIT, EST, LEVL V, 40-54 MIN: ICD-10-PCS | Mod: S$GLB,,, | Performed by: INTERNAL MEDICINE

## 2023-07-20 PROCEDURE — 3080F PR MOST RECENT DIASTOLIC BLOOD PRESSURE >= 90 MM HG: ICD-10-PCS | Mod: CPTII,S$GLB,, | Performed by: INTERNAL MEDICINE

## 2023-07-20 PROCEDURE — 3008F BODY MASS INDEX DOCD: CPT | Mod: CPTII,S$GLB,, | Performed by: INTERNAL MEDICINE

## 2023-07-20 PROCEDURE — 3077F PR MOST RECENT SYSTOLIC BLOOD PRESSURE >= 140 MM HG: ICD-10-PCS | Mod: CPTII,S$GLB,, | Performed by: INTERNAL MEDICINE

## 2023-07-20 PROCEDURE — 99999 PR PBB SHADOW E&M-EST. PATIENT-LVL IV: ICD-10-PCS | Mod: PBBFAC,,, | Performed by: INTERNAL MEDICINE

## 2023-07-20 PROCEDURE — 3077F SYST BP >= 140 MM HG: CPT | Mod: CPTII,S$GLB,, | Performed by: INTERNAL MEDICINE

## 2023-07-20 PROCEDURE — 4010F PR ACE/ARB THEARPY RXD/TAKEN: ICD-10-PCS | Mod: CPTII,S$GLB,, | Performed by: INTERNAL MEDICINE

## 2023-07-20 RX ORDER — SODIUM CHLORIDE 0.9 % (FLUSH) 0.9 %
10 SYRINGE (ML) INJECTION
Status: DISCONTINUED | OUTPATIENT
Start: 2023-07-20 | End: 2023-08-15 | Stop reason: CLARIF

## 2023-07-20 RX ORDER — SODIUM CHLORIDE 9 MG/ML
INJECTION, SOLUTION INTRAVENOUS ONCE
Status: CANCELLED | OUTPATIENT
Start: 2023-07-20 | End: 2023-07-20

## 2023-07-20 NOTE — PATIENT INSTRUCTIONS
Angiogram 8/16 at 8 am    Arrive for procedure at: [facility]    You will receive a phone call from [facility] Pre-Op Department with further instructions prior to your scheduled procedure.    Notify the nurse if you are ALLERGIC TO IODINE.    FASTING: You MAY NOT have anything to eat or drink AFTER MIDNIGHT the day before your procedure. If your procedure is scheduled in the afternoon, you may have a LIGHT BREAKFAST 6-8 hours prior to your procedure.  For example: Two slices of toast; black coffee or black tea.    MEDICATIONS: You may take your regular morning medications with water. If there are any medications that you should not take, you will be instructed to hold them for that morning.    CARDIOLOGY PRE-PROCEDURE MEDICATION ORDERS:  ** Please hold any medications that are checked below:    HOLD   # OF DAYS TO HOLD  Coumadin   Consult with Coumadin Clinic   Xarelto    _DAY BEFORE & DAY OF_  Pradaxa  _ DAY BEFORE & DAY OF _  Eliquis   _ DAY BEFORE & DAY OF _  Metformin    Day before procedure & morning of procedure  Short acting insulin   Morning of procedure    CONTINUE the Following Medications   Plavix      Effient     Aspirin    WHAT TO EXPECT:    How long will the procedure take?  The procedure will take an average of 1 - 2 hours to perform.  After the procedure, you will need to lay flat for around 4 - 6 hours to minimize bleeding from the puncture site. If the wrist is accessed you will need to keep your arm still as instructed by the nurse.    When can I go home?  You may be able to be discharged home that same afternoon if there were no complications.  If you have one of the following: balloon; stent; pacemaker or defibrillator procedures, you may spend one night for observation.  Your doctor will determine your discharge based upon your progress.  The results of your procedure will be discussed with you before you are discharged.  Any further testing or procedures will be scheduled for  you either before you leave or you will be instructed to call for a future appointment.      TRANSPORTATION:  PLEASE ARRANGE TO HAVE SOMEONE DRIVE YOU HOME FOLLOWING YOUR PROCEDURE, YOU WILL NOT BE ALLOWED TO DRIVE.

## 2023-08-16 PROBLEM — Z95.5 S/P DRUG ELUTING CORONARY STENT PLACEMENT: Status: ACTIVE | Noted: 2023-08-16

## 2023-08-21 ENCOUNTER — TELEPHONE (OUTPATIENT)
Dept: CARDIOLOGY | Facility: CLINIC | Age: 63
End: 2023-08-21
Payer: COMMERCIAL

## 2023-08-21 DIAGNOSIS — Z95.5 S/P DRUG ELUTING CORONARY STENT PLACEMENT: Primary | ICD-10-CM

## 2023-08-21 NOTE — LETTER
August 21, 2023      [facility]  [address]  [place] [address]  Phone: [phone]       Patient: Jose G Shafer   YOB: 1960  Date of Visit: 08/16/2023    To Whom It May Concern:    Juan Shafer  was at [facility] on 08/16/2023. The patient may return to work 08/21/2023 with no restrictions. If you have any questions or concerns, or if I can be of further assistance, please do not hesitate to contact me.    Sincerely,    Harjinder Perez MD

## 2023-08-21 NOTE — TELEPHONE ENCOUNTER
1: okay to order cardiac rehab?    2: okay to write return to work letter? (He went back today but they are needing it in writing)

## 2023-09-08 ENCOUNTER — OFFICE VISIT (OUTPATIENT)
Dept: CARDIOLOGY | Facility: CLINIC | Age: 63
End: 2023-09-08
Payer: COMMERCIAL

## 2023-09-08 VITALS
HEIGHT: 70 IN | DIASTOLIC BLOOD PRESSURE: 90 MMHG | BODY MASS INDEX: 35.6 KG/M2 | RESPIRATION RATE: 18 BRPM | WEIGHT: 248.69 LBS | SYSTOLIC BLOOD PRESSURE: 142 MMHG | HEART RATE: 82 BPM

## 2023-09-08 DIAGNOSIS — I73.9 PERIPHERAL VASCULAR DISEASE OF EXTREMITY: ICD-10-CM

## 2023-09-08 DIAGNOSIS — I87.2 VENOUS INSUFFICIENCY OF BOTH LOWER EXTREMITIES: ICD-10-CM

## 2023-09-08 DIAGNOSIS — I25.10 CAD S/P PERCUTANEOUS CORONARY ANGIOPLASTY: ICD-10-CM

## 2023-09-08 DIAGNOSIS — Z98.61 CAD S/P PERCUTANEOUS CORONARY ANGIOPLASTY: ICD-10-CM

## 2023-09-08 DIAGNOSIS — I10 ESSENTIAL HYPERTENSION: Chronic | ICD-10-CM

## 2023-09-08 DIAGNOSIS — F41.9 ANXIETY: ICD-10-CM

## 2023-09-08 DIAGNOSIS — E78.2 MIXED HYPERLIPIDEMIA: Chronic | ICD-10-CM

## 2023-09-08 PROCEDURE — 3077F SYST BP >= 140 MM HG: CPT | Mod: CPTII,S$GLB,,

## 2023-09-08 PROCEDURE — 99999 PR PBB SHADOW E&M-EST. PATIENT-LVL IV: CPT | Mod: PBBFAC,,,

## 2023-09-08 PROCEDURE — 4010F PR ACE/ARB THEARPY RXD/TAKEN: ICD-10-PCS | Mod: CPTII,S$GLB,,

## 2023-09-08 PROCEDURE — 99214 PR OFFICE/OUTPT VISIT, EST, LEVL IV, 30-39 MIN: ICD-10-PCS | Mod: S$GLB,,,

## 2023-09-08 PROCEDURE — 3008F BODY MASS INDEX DOCD: CPT | Mod: CPTII,S$GLB,,

## 2023-09-08 PROCEDURE — 4010F ACE/ARB THERAPY RXD/TAKEN: CPT | Mod: CPTII,S$GLB,,

## 2023-09-08 PROCEDURE — 99214 OFFICE O/P EST MOD 30 MIN: CPT | Mod: S$GLB,,,

## 2023-09-08 PROCEDURE — 1159F PR MEDICATION LIST DOCUMENTED IN MEDICAL RECORD: ICD-10-PCS | Mod: CPTII,S$GLB,,

## 2023-09-08 PROCEDURE — 1159F MED LIST DOCD IN RCRD: CPT | Mod: CPTII,S$GLB,,

## 2023-09-08 PROCEDURE — 3080F PR MOST RECENT DIASTOLIC BLOOD PRESSURE >= 90 MM HG: ICD-10-PCS | Mod: CPTII,S$GLB,,

## 2023-09-08 PROCEDURE — 99999 PR PBB SHADOW E&M-EST. PATIENT-LVL IV: ICD-10-PCS | Mod: PBBFAC,,,

## 2023-09-08 PROCEDURE — 3080F DIAST BP >= 90 MM HG: CPT | Mod: CPTII,S$GLB,,

## 2023-09-08 PROCEDURE — 3008F PR BODY MASS INDEX (BMI) DOCUMENTED: ICD-10-PCS | Mod: CPTII,S$GLB,,

## 2023-09-08 PROCEDURE — 3077F PR MOST RECENT SYSTOLIC BLOOD PRESSURE >= 140 MM HG: ICD-10-PCS | Mod: CPTII,S$GLB,,

## 2023-09-08 NOTE — PROGRESS NOTES
Subjective:    Patient ID:  Jose G Shafer is a 63 y.o. male patient here for evaluation Post-op Evaluation (08/16 stent placed)    History of Present Illness:     Mr. Araiza is a 63 year old M who follows with Dr. Perez here today for a [facility] follow up. He had complex PCI of the LAD requiring 3 overlapping DAVID. His angina (arm pain and weakness) is much improved. No other chest pains SOB dizziness syncope or CV complaints. He is start cardiac rehab soon. BP has been okay at home. Complaint with all his medications. Femoral access well healed.       Most Recent Echocardiogram Results  Results for orders placed in visit on 05/05/22    Echo    Interpretation Summary  · The left ventricle is normal in size with concentric remodeling and normal systolic function.  · The estimated ejection fraction is 65%.  · Normal left ventricular diastolic function.  · Normal right ventricular size with normal right ventricular systolic function.  · Normal central venous pressure (3 mmHg).  · The estimated PA systolic pressure is 22 mmHg.      Most Recent Nuclear Stress Test Results  Results for orders placed during the hospital encounter of 05/05/22    Nuclear Stress - Cardiology Interpreted    Interpretation Summary    Abnormal myocardial perfusion scan.    There is a mild to moderate intensity, small sized, reversible perfusion abnormality that is consistent with ischemia in the inferior wall(s).    There are no other significant perfusion abnormalities.    The gated perfusion images showed an ejection fraction of 61% at rest.    There is normal wall motion at rest.    The EKG portion of this study is negative for ischemia.    When compared to the previous study from 1/20/2020, ischemia appreciated.      Most Recent Cardiac PET Stress Test Results  No results found for this or any previous visit.      Most Recent Cardiovascular Angiogram results  Results for orders placed during the hospital encounter of 08/16/23    Cardiac  catheterization    Conclusion  Procedures:  Moderate sedation  Left heart cath  Coronary angiogram  IVUS of LAD  Rotational atherectomy, angioplasty and stenting of the LAD  Modifier 22    Conclusions:  Successful complex PCI of the LAD from mid to distal with 3 overlaping DAVID with IVUS guidance and rotational atherectomy as detailed below  Otherwise nonobstructive disease  Elevated left filling pressure    Recommendations:  Longterm DAPT  Cardiac rehab  Observe overnight  High intensity stain  BB  No more fluids  4 hrs bed hours    Description of procedure:  The patient presented to the Cath Lab in a(n) elective fashion.  The patient was prepped and draped in a sterile manner.  Timeout, airway and ASA assessment were completed.  Local anesthesia with 2% lidocaine was administered and sedation/analgesia were administered as above.    Access was obtained using the modified Seldinger technique and a micropuncture needle in the RFA with [id number] sheath. A limited angiogram confirmed appropriate access site. Diagnostic angiography was performed using JL4 and JR4 catheter(s).    Coronary anatomy:  Dominance: codominant  Left main: minimal luminal irregularities  Left anterior descending: large vessel with severe tortuosity and calcification; D1 is large. The mid LAD, past D1, has a 95% severely calcified, severely angulated stenosis (unsuccessful prior PCI May 2022). The distal LAD has an 80% segmental heavily calcified stenosis as well.  There was JIE 3 flow at baseline throughout  Ramus intermedius: absent  Left circumflex: large codominant vessel with mild diffuse disease and retroflexed origin. OM1, OM2 are large and there is a moderate sized LPL as well. Prox LCx has an eccentric calcific 50% stenosis.  Right coronary artery: moderate size vessel with moderate diffuse disease and small RPDA. The prox RCA has a widely patent stent.    Percutaneous coronary intervention:  Anticoagulation: IA/IV heparin with ACT>250s.  DAPT status confirmed.  Guiding catheter: 7F CLS 3.5    Target lesions were the mid and distal LAD described above.    The LAD was wired with a William Blue wire with a Corsair Pro microcatheter with some difficulty. The William Blue was exchanged for a Rota Floppy wire. There was extensive rotation atherectomy of the LAD from mid to distal using a 1.5mm karson at 160 k rpm with severe initial decelerations. The Rota Floppy was then exchanged back to the William Blue using the microcatheter.    A 7F Guidezilla was used. Balloon predilation was performed using a 3.5 NC balloon with good expansion.  The distal through mid LAD was stented using a 2.25x38 and 3.0x38 Synergy XD DAVID in overlapping fashion. The two stents were postdilated with 2.5 nad 3.5 NC balloons at high pressure. The mid through prox LAD was then stented with a 3.5x20  Synergy Megatron DAVID in an overlapping fashion, jailing D1. The prox LAD was postdilated using a 4.0 NC balloon at high pressure. IVUS was performed pre and post PCI for optimization with excellent results.    There was 0% residual stenosis and JIE 3 flow at the end of procedure.    The patient tolerated the procedure well and left the cath lab in stable condition.    Total contrast 230 cc  Air kerma: 2208 mGy    Hemodynamics:  LVEDP 22    Hemostasis:  6F Angioseal    Complications: none  Estimated blood loss: <50 mL    Renetta Rivera MD, [facility]  Interventional Cardiology/Structural Heart Disease  [facility]  Office: [phone]    The clinically important portion of this report has been dictated in the section above. Our Epic reporting system does not allow us to provide a clinically meaningful report without this dictation. Please beware that there may be errors and discrepancies in the computer transcription and all of the clicks required to finalize the report.  The procedure log was documented by Documenter: Jose G Mccracken RT and verified by Renetta  MD Miguel.    Date: 8/19/2023  Time: 12:47 PM      Other Most Recent Cardiology Results  Results for orders placed during the hospital encounter of 08/16/23    CARDIAC MONITORING STRIPS      REVIEW OF SYSTEMS: As noted in HPI   CARDIOVASCULAR: No recent chest pain, palpitations, arm/neck/jaw pain, or edema.  RESPIRATORY: No recent fever, cough, SOB.  : No blood in the urine  GI: No reflux, nausea, vomiting, or blood in stool.   MUSCULOSKELETAL: No falls.   NEURO: No headaches, syncope, or dizziness.  EYES: No sudden changes in vision.     Past Medical History:   Diagnosis Date    Anticoagulant long-term use     Anxiety     BCC (basal cell carcinoma)     CAD (coronary artery disease)     Erectile dysfunction     GERD (gastroesophageal reflux disease)     History of kidney stones     History of pneumonia 12/2022    HLD (hyperlipidemia)     HTN (hypertension)     Impaired fasting glucose     Lateral epicondylitis of left elbow 07/18/2019    Myocardial infarction     PONV (postoperative nausea and vomiting)     Sleep apnea     uses cpap     Past Surgical History:   Procedure Laterality Date    ANGIOGRAM, CORONARY, WITH LEFT HEART CATHETERIZATION Left 05/26/2022    Procedure: Angiogram, Coronary, with Left Heart Cath;  Surgeon: Christa Gunn MD;  Location: [facility] CATH;  Service: Cardiology;  Laterality: Left;    ANGIOGRAM, CORONARY, WITH LEFT HEART CATHETERIZATION  8/16/2023    Procedure: Left Heart Cath;  Surgeon: Renetta Rivera MD;  Location: [facility] CATH;  Service: Cardiology;;    ANGIOPLASTY      AORTOGRAPHY WITH EXTREMITY RUNOFF  11/16/2021    Procedure: AORTOGRAM, WITH EXTREMITY RUNOFF;  Surgeon: Blanca Arzola MD;  Location: [facility] CATH;  Service: Cardiovascular;;    AORTOGRAPHY WITH SERIALOGRAPHY N/A 11/22/2019    Procedure: AORTOGRAM, WITH SERIALOGRAPHY;  Surgeon: Blanca Arzola MD;  Location: [facility] CATH;  Service: Cardiovascular;  Laterality: N/A;    AORTOGRAPHY WITH SERIALOGRAPHY N/A 12/13/2019    Procedure: AORTOGRAM, WITH  SERIALOGRAPHY;  Surgeon: Blanca Arzola MD;  Location: [facility] CATH;  Service: Cardiovascular;  Laterality: N/A;    AORTOGRAPHY WITH SERIALOGRAPHY  2020    Procedure: AORTOGRAM, WITH SERIALOGRAPHY;  Surgeon: Blanca Arzola MD;  Location: [facility] CATH;  Service: Cardiovascular;;    ATHERECTOMY, CORONARY  2023    Procedure: Atherectomy LAD (Rotoblator);  Surgeon: Renetta Rivera MD;  Location: [facility] CATH;  Service: Cardiology;;    CARDIAC SURGERY  2005    stents placed    COLONOSCOPY N/A 2017    Procedure: COLONOSCOPY Miralax;  Surgeon: Jeremiah Syed Jr., MD;  Location: [facility] ENDO;  Service: Endoscopy;  Laterality: N/A;    IVUS, CORONARY  2023    Procedure: IVUS LAD;  Surgeon: Renetta Rivera MD;  Location: [facility] CATH;  Service: Cardiology;;    KNEE ARTHROSCOPY W/ MENISCECTOMY      PERCUTANEOUS CORONARY INTERVENTION, ARTERY  2023    Procedure: DAVID LAD;  Surgeon: Renetta Rivera MD;  Location: [facility] CATH;  Service: Cardiology;;    PERCUTANEOUS TRANSLUMINAL ANGIOPLASTY N/A 2019    Procedure: Pta (Angioplasty, Percutaneous, Transluminal);  Surgeon: Blanca Arzola MD;  Location: [facility] CATH;  Service: Cardiovascular;  Laterality: N/A;    PERCUTANEOUS TRANSLUMINAL ANGIOPLASTY N/A 2019    Procedure: Pta (Angioplasty, Percutaneous, Transluminal);  Surgeon: Blanca Arzola MD;  Location: [facility] CATH;  Service: Cardiovascular;  Laterality: N/A;    PERCUTANEOUS TRANSLUMINAL ANGIOPLASTY  2021    Procedure: PTA (ANGIOPLASTY, PERCUTANEOUS, TRANSLUMINAL);  Surgeon: Blanca Arzola MD;  Location: [facility] CATH;  Service: Cardiovascular;;    VARICOSE VEIN SURGERY      left saphenous     Social History     Tobacco Use    Smoking status: Former     Current packs/day: 0.00     Average packs/day: 0.5 packs/day for 44.0 years (22.0 ttl pk-yrs)     Types: Cigarettes     Start date: 10/10/1984     Quit date: 2023     Years since quittin.6    Smokeless tobacco: Never   Substance Use Topics    Alcohol use: Yes     Alcohol/week: 20.0  standard drinks of alcohol     Types: 20 Cans of beer per week     Comment: every now and then per pt    Drug use: Never         Objective      Vitals:    09/08/23 1358   BP: (!) 142/90   Pulse: 82   Resp: 18       LAST EKG  Results for orders placed or performed during the hospital encounter of 08/16/23   EKG 12-LEAD on arrival to floor    Collection Time: 08/16/23 10:53 AM    Narrative    Test Reason : Z95.5,    Vent. Rate : 073 BPM     Atrial Rate : 073 BPM     P-R Int : 166 ms          QRS Dur : 106 ms      QT Int : 430 ms       P-R-T Axes : 021 -67 -10 degrees     QTc Int : 473 ms    Normal sinus rhythm  Left axis deviation  Incomplete right bundle branch block  Septal infarct ,age undetermined  Inferior infarct (cited on or before 16-AUG-2023)  Abnormal ECG  When compared with ECG of 16-AUG-2023 06:37,  Incomplete right bundle branch block has replaced Right bundle branch block  Septal infarct is now Present  Confirmed by MARILOU ELAM MD ([id number]) on 8/17/2023 9:54:18 AM    Referred By:  STACEY           Confirmed By:MARILOU ELAM MD     LIPIDS - LAST 2   Lab Results   Component Value Date    CHOL 112 (L) 09/12/2022    CHOL 117 (L) 08/18/2021    HDL 22 (L) 09/12/2022    HDL 30 (L) 08/18/2021    LDLCALC 34.4 (L) 09/12/2022    LDLCALC 54.0 (L) 08/18/2021    TRIG 278 (H) 09/12/2022    TRIG 165 (H) 08/18/2021    CHOLHDL 19.6 (L) 09/12/2022    CHOLHDL 25.6 08/18/2021     CARDIAC PROFILE - LAST 2  Lab Results   Component Value Date     (H) 06/22/2005     (H) 06/21/2005    TROPONINI 1.060 (HH) 08/17/2023    TROPONINI <0.012 12/10/2022      CBC - LAST 2  Lab Results   Component Value Date    WBC 10.72 08/17/2023    WBC 10.60 08/16/2023    RBC 4.00 (L) 08/17/2023    RBC 4.23 (L) 08/16/2023    HGB 13.0 (L) 08/17/2023    HGB 13.7 (L) 08/16/2023    HCT 37.7 (L) 08/17/2023    HCT 40.5 08/16/2023     08/17/2023     08/16/2023     Lab Results   Component Value Date    LABPT 12.8 11/16/2021    LABPT 12.8  12/17/2020    INR 1.0 11/16/2021    INR 1.0 12/17/2020    APTT 24.8 11/16/2021    APTT 27.4 12/17/2020     CHEMISTRY - LAST 2  Lab Results   Component Value Date     08/17/2023     08/16/2023    K 3.8 08/17/2023    K 4.0 08/16/2023     08/17/2023     08/16/2023    CO2 26 08/17/2023    CO2 26 08/16/2023    ANIONGAP 9 08/17/2023    ANIONGAP 11 08/16/2023    BUN 10 08/17/2023    BUN 14 08/16/2023    CREATININE 0.63 08/17/2023    CREATININE 0.79 08/16/2023     (H) 08/17/2023     (H) 08/16/2023    CALCIUM 8.8 08/17/2023    CALCIUM 9.0 08/16/2023    PH 7.39 12/10/2022    MG 1.4 (L) 12/09/2022    MG 2.4 06/23/2005    ALBUMIN 4.4 12/09/2022    ALBUMIN 4.3 06/10/2022    PROT 7.5 12/09/2022    PROT 7.0 06/10/2022    ALKPHOS 71 12/09/2022    ALKPHOS 84 06/10/2022    ALT 51 (H) 12/09/2022    ALT 41 06/10/2022    AST 54 12/09/2022    AST 42 06/10/2022    BILITOT 0.9 12/09/2022    BILITOT 0.6 06/10/2022      ENDOCRINE - LAST 2  Lab Results   Component Value Date    HGBA1C 6.5 (H) 09/12/2022    HGBA1C 5.9 (H) 08/18/2021    TSH 1.022 08/18/2021    TSH 0.834 07/16/2020        PHYSICAL EXAM  CONSTITUTIONAL: Well built, well nourished in no apparent distress  NECK: no carotid bruit, no JVD  LUNGS: CTA  CHEST WALL: no tenderness  HEART: regular rate and rhythm, S1, S2 normal, no murmur, click, rub or gallop   ABDOMEN: soft, non-tender; bowel sounds normal; no masses,  no organomegaly  EXTREMITIES: Extremities normal, no edema, no calf tenderness noted  NEURO: AAO X 3    I HAVE REVIEWED :    The vital signs, most recent cardiac testing, and most recent pertinent non-cardiology provider notes.    Current Outpatient Medications   Medication Instructions    albuterol (VENTOLIN HFA) 90 mcg/actuation inhaler 2 puffs, Inhalation, Every 4 hours PRN, Rescue    ALPRAZolam (XANAX) 0.5 MG tablet TAKE 1 TABLET BY MOUTH EVERY DAY AS NEEDED FOR ANXIETY    ascorbic acid (vitamin C) (VITAMIN C) 500 mg, Oral, 2  times daily    aspirin (ECOTRIN) 81 mg, Oral, Daily    atorvastatin (LIPITOR) 80 mg, Oral, Nightly    carvediloL (COREG) 25 mg, Oral, 2 times daily with meals    cilostazoL (PLETAL) 50 mg, Oral, 2 times daily    clopidogreL (PLAVIX) 75 mg, Oral, Daily    ezetimibe (ZETIA) 10 mg tablet TAKE 1 TABLET BY MOUTH EVERY DAY    fluticasone propionate (FLONASE) 50 mcg/actuation nasal spray USE 1 SPRAY (50 MCG TOTAL) BY EACH NARE ROUTE ONCE DAILY.    fluticasone-umeclidin-vilanter (TRELEGY ELLIPTA) 200-62.5-25 mcg inhaler 1 puff, Inhalation, Daily    isosorbide mononitrate (IMDUR) 30 mg, Oral, Daily    lisinopriL (PRINIVIL,ZESTRIL) 40 MG tablet TAKE 1 TABLET BY MOUTH ONE TIME DAILY    MULTIVITAMIN ORAL 1 capsule, Oral, Daily    nitroGLYCERIN (NITROSTAT) 0.4 mg, Sublingual, Every 5 min PRN, 1 Tablet, Sublingual Sublingual    omega-3 fatty acids 500 mg Cap 1 capsule, Oral, Daily, 1 Capsule Oral    sertraline (ZOLOFT) 50 mg, Oral, Daily      Assessment & Plan     Peripheral vascular disease of extremity: s/p R. SFA DCB 10/19/2015  Stable   Very mild claudication symptoms   Is starting cardiac rehab and exercise programs     Venous insufficiency of both lower extremities  Stable    CAD S/P percutaneous coronary angioplasty  LAD DAVID x3  Continue DAPT -- no bleeding tendencies noted   Continue BB statin lisinopril     Mixed hyperlipidemia  LDL at/below goal   Continue HIS  Is starting at the gym and cardiac rehab soon     Essential hypertension  BP well controlled   Continue coreg 25 mg BID   Continue imdur 30 mg daily   Continue lisinopril 40 mg daily           Follow up in about 4 months (around 1/8/2024).     Charline Chávez PA-C   [facility]   Office: [phone]

## 2023-09-08 NOTE — ASSESSMENT & PLAN NOTE
BP well controlled   Continue coreg 25 mg BID   Continue imdur 30 mg daily   Continue lisinopril 40 mg daily

## 2023-09-22 ENCOUNTER — TELEPHONE (OUTPATIENT)
Dept: SURGERY | Facility: CLINIC | Age: 63
End: 2023-09-22
Payer: COMMERCIAL

## 2023-09-22 NOTE — TELEPHONE ENCOUNTER
----- Message from Shea Borjas sent at 9/22/2023 10:38 AM CDT -----  Type: Needs Medical Advice  Who Called:  pt's spouse   Best Call Back Number: [phone]    Additional Information: pt stated needs to cancel procedure appt please advise asap thanks!

## 2023-10-03 ENCOUNTER — TELEPHONE (OUTPATIENT)
Dept: CARDIOLOGY | Facility: CLINIC | Age: 63
End: 2023-10-03
Payer: COMMERCIAL

## 2023-10-03 DIAGNOSIS — Z95.5 S/P DRUG ELUTING CORONARY STENT PLACEMENT: Primary | ICD-10-CM

## 2023-10-03 NOTE — TELEPHONE ENCOUNTER
Pt had stint put in yesterday and is having rehab at [facility] and stays 1 hour away .   Pt would like to have rehab switched to [facility] in [place] which is closer to his job     Please Advise

## 2023-10-03 NOTE — TELEPHONE ENCOUNTER
----- Message from Misty Stack sent at 10/3/2023 11:36 AM CDT -----  Contact: patient wife  Type:  Needs Medical Advice    Who Called: patient's wifeGloria     Would the patient rather a call back or a response via [facility]? Call     Best Call Back Number:  [phone]    Additional Information: patient's wifegloria would like to speak with the nurse in regards to orders.     Please call to advise

## 2023-10-20 ENCOUNTER — PATIENT MESSAGE (OUTPATIENT)
Dept: DERMATOLOGY | Facility: CLINIC | Age: 63
End: 2023-10-20
Payer: COMMERCIAL

## 2023-10-23 ENCOUNTER — OFFICE VISIT (OUTPATIENT)
Dept: DERMATOLOGY | Facility: CLINIC | Age: 63
End: 2023-10-23
Payer: COMMERCIAL

## 2023-10-23 DIAGNOSIS — L72.9 CYST OF SKIN: ICD-10-CM

## 2023-10-23 DIAGNOSIS — L90.5 SCAR: ICD-10-CM

## 2023-10-23 DIAGNOSIS — Z85.828 HISTORY OF NONMELANOMA SKIN CANCER: ICD-10-CM

## 2023-10-23 DIAGNOSIS — L57.0 ACTINIC KERATOSIS: ICD-10-CM

## 2023-10-23 DIAGNOSIS — D48.5 NEOPLASM OF UNCERTAIN BEHAVIOR OF SKIN: Primary | ICD-10-CM

## 2023-10-23 DIAGNOSIS — L82.1 SEBORRHEIC KERATOSES: ICD-10-CM

## 2023-10-23 PROCEDURE — 1160F PR REVIEW ALL MEDS BY PRESCRIBER/CLIN PHARMACIST DOCUMENTED: ICD-10-PCS | Mod: CPTII,S$GLB,, | Performed by: STUDENT IN AN ORGANIZED HEALTH CARE EDUCATION/TRAINING PROGRAM

## 2023-10-23 PROCEDURE — 11102 PR TANGENTIAL BIOPSY, SKIN, SINGLE LESION: ICD-10-PCS | Mod: S$GLB,,, | Performed by: STUDENT IN AN ORGANIZED HEALTH CARE EDUCATION/TRAINING PROGRAM

## 2023-10-23 PROCEDURE — 17000 PR DESTRUCTION(LASER SURGERY,CRYOSURGERY,CHEMOSURGERY),PREMALIGNANT LESIONS,FIRST LESION: ICD-10-PCS | Mod: XS,S$GLB,, | Performed by: STUDENT IN AN ORGANIZED HEALTH CARE EDUCATION/TRAINING PROGRAM

## 2023-10-23 PROCEDURE — 88305 TISSUE EXAM BY PATHOLOGIST: CPT | Performed by: PATHOLOGY

## 2023-10-23 PROCEDURE — 17000 DESTRUCT PREMALG LESION: CPT | Mod: XS,S$GLB,, | Performed by: STUDENT IN AN ORGANIZED HEALTH CARE EDUCATION/TRAINING PROGRAM

## 2023-10-23 PROCEDURE — 99203 OFFICE O/P NEW LOW 30 MIN: CPT | Mod: 25,S$GLB,, | Performed by: STUDENT IN AN ORGANIZED HEALTH CARE EDUCATION/TRAINING PROGRAM

## 2023-10-23 PROCEDURE — 99203 PR OFFICE/OUTPT VISIT, NEW, LEVL III, 30-44 MIN: ICD-10-PCS | Mod: 25,S$GLB,, | Performed by: STUDENT IN AN ORGANIZED HEALTH CARE EDUCATION/TRAINING PROGRAM

## 2023-10-23 PROCEDURE — 1159F MED LIST DOCD IN RCRD: CPT | Mod: CPTII,S$GLB,, | Performed by: STUDENT IN AN ORGANIZED HEALTH CARE EDUCATION/TRAINING PROGRAM

## 2023-10-23 PROCEDURE — 1159F PR MEDICATION LIST DOCUMENTED IN MEDICAL RECORD: ICD-10-PCS | Mod: CPTII,S$GLB,, | Performed by: STUDENT IN AN ORGANIZED HEALTH CARE EDUCATION/TRAINING PROGRAM

## 2023-10-23 PROCEDURE — 17003 DESTRUCT PREMALG LES 2-14: CPT | Mod: S$GLB,,, | Performed by: STUDENT IN AN ORGANIZED HEALTH CARE EDUCATION/TRAINING PROGRAM

## 2023-10-23 PROCEDURE — 1160F RVW MEDS BY RX/DR IN RCRD: CPT | Mod: CPTII,S$GLB,, | Performed by: STUDENT IN AN ORGANIZED HEALTH CARE EDUCATION/TRAINING PROGRAM

## 2023-10-23 PROCEDURE — 88305 TISSUE EXAM BY PATHOLOGIST: CPT | Mod: 26,,, | Performed by: PATHOLOGY

## 2023-10-23 PROCEDURE — 11102 TANGNTL BX SKIN SINGLE LES: CPT | Mod: S$GLB,,, | Performed by: STUDENT IN AN ORGANIZED HEALTH CARE EDUCATION/TRAINING PROGRAM

## 2023-10-23 PROCEDURE — 17003 DESTRUCTION, PREMALIGNANT LESIONS; SECOND THROUGH 14 LESIONS: ICD-10-PCS | Mod: S$GLB,,, | Performed by: STUDENT IN AN ORGANIZED HEALTH CARE EDUCATION/TRAINING PROGRAM

## 2023-10-23 PROCEDURE — 4010F PR ACE/ARB THEARPY RXD/TAKEN: ICD-10-PCS | Mod: CPTII,S$GLB,, | Performed by: STUDENT IN AN ORGANIZED HEALTH CARE EDUCATION/TRAINING PROGRAM

## 2023-10-23 PROCEDURE — 88305 TISSUE EXAM BY PATHOLOGIST: ICD-10-PCS | Mod: 26,,, | Performed by: PATHOLOGY

## 2023-10-23 PROCEDURE — 4010F ACE/ARB THERAPY RXD/TAKEN: CPT | Mod: CPTII,S$GLB,, | Performed by: STUDENT IN AN ORGANIZED HEALTH CARE EDUCATION/TRAINING PROGRAM

## 2023-10-23 NOTE — PATIENT INSTRUCTIONS
Shave Biopsy Wound Care    Your doctor has performed a shave biopsy today.  A band aid and vaseline ointment has been placed over the site.  This should remain in place for 24 hours.  It is recommended that you keep the area dry for the first 24 hours.  After 24 hours, you may remove the band aid and wash the area with warm soap and water and apply Vaseline jelly.  Many patients prefer to use Neosporin or Bacitracin ointment.  This is acceptable; however, know that you can develop an allergy to this medication even if you have used it safely for years.  It is important to keep the area moist.  Letting it dry out and get air slows healing time, and will worsen the scar.  Band aid is optional after first 24 hours.      If you notice increasing redness, tenderness, pain, or yellow drainage at the biopsy site, please notify your doctor.  These are signs of an infection.    If your biopsy site is bleeding, apply firm pressure for 15 minutes straight.  Repeat for another 15 minutes, if it is still bleeding.   If the surgical site continues to bleed, then please contact your doctor.      [facility] [place] [address]/ [phone] FAX/ [email or web address]     CRYOSURGERY      Your doctor has used a method called cryosurgery to treat your skin condition. Cryosurgery refers to the use of very cold substances to treat a variety of skin conditions such as warts, pre-skin cancers, molluscum contagiosum, sun spots, and several benign growths. The substance we use in cryosurgery is liquid nitrogen and is so cold (-195 degrees Celsius) that is burns when administered.     Following treatment in the office, the skin may immediately burn and become red. You may find the area around the lesion is affected as well. It is sometimes necessary to treat not only the lesion, but a small area of the surrounding normal skin to achieve a good response.     A blister, and even a blood filled blister, may form  after treatment.   This is a normal response. If the blister is painful, it is acceptable to sterilize a needle and with rubbing alcohol and gently pop the blister. It is important that you gently wash the area with soap and warm water as the blister fluid may contain wart virus if a wart was treated. Do no remove the roof of the blister.     The area treated can take anywhere from 1-3 weeks to heal. Healing time depends on the kind skin lesion treated, the location, and how aggressively the lesion was treated. It is recommended that the areas treated are covered with Vaseline or bacitracin ointment and a band-aid. If a band-aid is not practical, just ointment applied several times per day will do. Keeping these areas moist will speed the healing time.    Treatment with liquid nitrogen can leave a scar. In dark skin, it may be a light or dark scar, in light skin it may be a white or pink scar. These will generally fade with time, but may never go away completely.     If you have any concerns after your treatment, please feel free to call the office.       [facility] [place] [address]/ [phone] FAX/ [email or web address]

## 2023-10-23 NOTE — PROGRESS NOTES
Subjective:      Patient ID:  Jose G Shafer is a 63 y.o. male who presents for   Chief Complaint   Patient presents with    Skin Check     UBSC     New Patient    Patient is coming in today for an UBSC. States he has a spot on his back, chest and face that he is concerned about.     *Plavix    Derm Hx  Pmh of NMSC on chest and nose- treated by Dr. Mckeon  Patient also had blue light therapy on his arms.   Denies Fhx MM      Review of Systems   Constitutional:  Negative for fever, chills and fatigue.   Respiratory:  Negative for cough and shortness of breath.    Gastrointestinal:  Negative for nausea, vomiting and diarrhea.   Skin:  Positive for activity-related sunscreen use and wears hat.   Hematologic/Lymphatic: Bruises/bleeds easily (asa, plavix).       Objective:   Physical Exam   Constitutional: He appears well-developed and well-nourished. No distress.   Neurological: He is alert and oriented to person, place, and time. He is not disoriented.   Psychiatric: He has a normal mood and affect.   Skin:   Areas Examined (abnormalities noted in diagram):   Scalp / Hair Palpated and Inspected  Head / Face Inspection Performed  Neck Inspection Performed  Chest / Axilla Inspection Performed  Abdomen Inspection Performed  Back Inspection Performed  RUE Inspected  LUE Inspection Performed  Nails and Digits Inspection Performed                 Diagram Legend     Erythematous scaling macule/papule c/w actinic keratosis       Vascular papule c/w angioma      Pigmented verrucoid papule/plaque c/w seborrheic keratosis      Yellow umbilicated papule c/w sebaceous hyperplasia      Irregularly shaped tan macule c/w lentigo     1-2 mm smooth white papules consistent with Milia      Movable subcutaneous cyst with punctum c/w epidermal inclusion cyst      Subcutaneous movable cyst c/w pilar cyst      Firm pink to brown papule c/w dermatofibroma      Pedunculated fleshy papule(s) c/w skin tag(s)      Evenly pigmented  macule c/w junctional nevus     Mildly variegated pigmented, slightly irregular-bordered macule c/w mildly atypical nevus      Flesh colored to evenly pigmented papule c/w intradermal nevus       Pink pearly papule/plaque c/w basal cell carcinoma      Erythematous hyperkeratotic cursted plaque c/w SCC      Surgical scar with no sign of skin cancer recurrence      Open and closed comedones      Inflammatory papules and pustules      Verrucoid papule consistent consistent with wart     Erythematous eczematous patches and plaques     Dystrophic onycholytic nail with subungual debris c/w onychomycosis     Umbilicated papule    Erythematous-base heme-crusted tan verrucoid plaque consistent with inflamed seborrheic keratosis     Erythematous Silvery Scaling Plaque c/w Psoriasis     See annotation        Assessment / Plan:      Pathology Orders:       Normal Orders This Visit    Specimen to Pathology, Dermatology     Questions:    Procedure Type: Dermatology and skin neoplasms    Number of Specimens: 1    ------------------------: -------------------------    Spec 1 Procedure: Biopsy    Spec 1 Clinical Impression: ISK vs HAK vs. SCC    Spec 1 Source: right forearm    Release to patient:           Neoplasm of uncertain behavior of skin  -     Specimen to Pathology, Dermatology  Shave biopsy procedure note:    Shave biopsy performed after verbal consent including risk of infection, scar, recurrence, need for additional treatment of site. Area prepped with alcohol, anesthetized with approximately 1.0cc of 2% lidocaine with epinephrine. Lesional tissue shaved with razor blade. Hemostasis achieved with application of aluminum chloride followed by hyfrecation. No complications. Dressing applied. Wound care explained.    Actinic keratosis  Cryosurgery Procedure Note    Verbal consent from the patient is obtained and the patient is aware of the precancerous quality and need for treatment of these lesions. Liquid nitrogen  cryosurgery is applied to the 12 actinic keratoses, as detailed in the physical exam, to produce a freeze injury. The patient is aware that blisters may form and is instructed on wound care with gentle cleansing and use of vaseline ointment to keep moist until healed. The patient is supplied a handout on cryosurgery and is instructed to call if lesions do not completely resolve.    Seborrheic keratoses  These are benign inherited growths without a malignant potential. Reassurance given to patient. No treatment is necessary.     Cyst of skin  Reassurance given to patient. No treatment is necessary.   Discussed treatment options - excision vs observation. Cysts may recur with excision. Pt will defer treatment at this time.     History of nonmelanoma skin cancer  Scar  Area(s) of previous NMSC evaluated with no signs of recurrence.  Upper body skin examination performed today including at least 9 points as noted in physical examination. Suspicious lesions noted.  Patient instructed in importance in daily broad spectrum sun protection of at least spf 30. Mineral sunscreen ingredients preferred (Zinc +/- Titanium) and can be found OTC.   Patient encouraged to wear hat for all outdoor exposure.   Also discussed sun avoidance and use of protective clothing.           6 months  No follow-ups on file.

## 2023-10-26 LAB
FINAL PATHOLOGIC DIAGNOSIS: NORMAL
GROSS: NORMAL
Lab: NORMAL
MICROSCOPIC EXAM: NORMAL

## 2023-11-13 DIAGNOSIS — F41.9 ANXIETY: ICD-10-CM

## 2023-11-13 RX ORDER — ALPRAZOLAM 0.5 MG/1
0.5 TABLET ORAL DAILY PRN
Qty: 20 TABLET | Refills: 0 | Status: SHIPPED | OUTPATIENT
Start: 2023-11-13

## 2023-11-13 NOTE — TELEPHONE ENCOUNTER
Care Due:                  Date            Visit Type   Department     Provider  --------------------------------------------------------------------------------                                MYCHART                              FOLLOWUP/OF  [facility]  Last Visit: 06-      FICE VISIT   MEDICINE       Christiano Guy  Next Visit: None Scheduled  None         None Found                                                            Last  Test          Frequency    Reason                     Performed    Due Date  --------------------------------------------------------------------------------    CMP.........  12 months..  atorvastatin, ezetimibe..  12-   12-    Lipid Panel.  12 months..  atorvastatin, ezetimibe..  09- 09-    [facility] Embedded Care Due Messages. Reference number: [id number].   11/13/2023 7:28:57 AM CST

## 2023-12-01 ENCOUNTER — HOSPITAL ENCOUNTER (OUTPATIENT)
Dept: RADIOLOGY | Facility: HOSPITAL | Age: 63
Discharge: HOME | End: 2023-12-01
Attending: INTERNAL MEDICINE
Payer: COMMERCIAL

## 2023-12-01 ENCOUNTER — HOSPITAL ENCOUNTER (OUTPATIENT)
Dept: PULMONOLOGY | Facility: HOSPITAL | Age: 63
Discharge: HOME | End: 2023-12-01
Attending: INTERNAL MEDICINE
Payer: COMMERCIAL

## 2023-12-01 DIAGNOSIS — R91.1 SOLITARY PULMONARY NODULE: ICD-10-CM

## 2023-12-01 DIAGNOSIS — F17.211 CIGARETTE NICOTINE DEPENDENCE IN REMISSION: ICD-10-CM

## 2023-12-01 DIAGNOSIS — J44.9 CHRONIC OBSTRUCTIVE PULMONARY DISEASE, UNSPECIFIED COPD TYPE: ICD-10-CM

## 2023-12-01 DIAGNOSIS — J44.89 ASTHMA-COPD OVERLAP SYNDROME: ICD-10-CM

## 2023-12-01 DIAGNOSIS — J61 ASBESTOSIS: ICD-10-CM

## 2023-12-01 PROCEDURE — 94060 EVALUATION OF WHEEZING: CPT

## 2023-12-01 PROCEDURE — 94729 DIFFUSING CAPACITY: CPT

## 2023-12-01 PROCEDURE — 94010 BREATHING CAPACITY TEST: CPT

## 2023-12-01 PROCEDURE — 71271 CT THORAX LUNG CANCER SCR C-: CPT | Mod: TC,PO

## 2023-12-01 PROCEDURE — 94727 GAS DIL/WSHOT DETER LNG VOL: CPT

## 2023-12-04 DIAGNOSIS — R91.1 SOLITARY PULMONARY NODULE: Primary | ICD-10-CM

## 2023-12-12 NOTE — TELEPHONE ENCOUNTER
----- Message from Janelle Garcia sent at 12/12/2023  1:14 PM CST -----  Contact: cristi jimenez/[facility]  Type: Needs Medical Advice  Who Called:  cristi jimenez/[facility]  Symptoms (please be specific):  high blood pressure it's over 200  How long has patient had these symptoms:  not sure  Best Call Back Number: [phone]  Additional Information: please call pt is home but cannot rehab

## 2023-12-12 NOTE — TELEPHONE ENCOUNTER
Please advise: [facility] called: pt hs not been able to do rehab due to his blood pressure being so high. Today it's 200/105. Pt reports the best he has gotten is 175/90

## 2023-12-13 RX ORDER — AMLODIPINE BESYLATE 2.5 MG/1
2.5 TABLET ORAL DAILY
Qty: 30 TABLET | Refills: 11 | Status: SHIPPED | OUTPATIENT
Start: 2023-12-13 | End: 2024-02-19

## 2024-02-16 NOTE — PROGRESS NOTES
"Subjective:    Patient ID:  Jose G Shafer is a 64 y.o. male who presents for follow-up of Coronary Artery Disease      Problem List Items Addressed This Visit          Cardiac/Vascular    Essential hypertension (Chronic)    Mixed hyperlipidemia (Chronic)    CAD S/P percutaneous coronary angioplasty - Primary    Overview     5/26/22 - Luis A    The Prox LAD lesion was 99% stenosed with 50% stenosis post-intervention, with balloon angioplasty  The PCI was partially successful.    8/16/2023  Successful complex PCI of the LAD from mid to distal with 3 overlaping DAVID with IVUS guidance and rotational atherectomy as detailed below             Coronary artery disease involving native coronary artery of native heart without angina pectoris    Overview     Prev History: 6-2005 he had an inferior STEMI-->DAVID to pRCA.  LVEF preserved           Peripheral vascular disease of extremity: s/p R. SFA DCB 10/19/2015    S/P drug eluting coronary stent placement    Venous insufficiency of both lower extremities    Overview     Chronic.  Patient has varicose veins bilaterally.  Had vein stripping performed previously.  Swelling in the left leg worse than the right.  Patient cannot wear compression stockings daily because of his work.            HPI    Patient was last seen on 07/20/2023 at which time left heart catheterization with coronary angiography was recommended which resulted in stenting as detailed above.    On assessment today, the patient states that he feels OK.    No chest pain.  No shortness of breath.    Still not smoking - Rare cigarettes, working on stopping them  CPAP use - Being more consistent now       Objective:     Vitals:    02/19/24 1309   BP: (!) 159/90   BP Location: Right arm   Patient Position: Sitting   BP Method: Large (Automatic)   Pulse: 85   Weight: 110.2 kg (242 lb 15.2 oz)   Height: 5' 10" (1.778 m)       BP Readings from Last 5 Encounters:   02/19/24 (!) 159/90   09/08/23 (!) 142/90   08/17/23 " (!) 146/92   07/20/23 (!) 145/92   06/02/23 134/80        Physical Exam  Constitutional:       Appearance: He is well-developed.   HENT:      Head: Normocephalic and atraumatic.   Neck:      Vascular: No JVD.   Cardiovascular:      Rate and Rhythm: Normal rate and regular rhythm.      Heart sounds: Normal heart sounds. No murmur heard.     No friction rub. No gallop.   Pulmonary:      Effort: Pulmonary effort is normal. No respiratory distress.      Breath sounds: Normal breath sounds. No wheezing or rales.   Abdominal:      General: Bowel sounds are normal.      Palpations: Abdomen is soft.      Tenderness: There is no abdominal tenderness. There is no guarding or rebound.   Musculoskeletal:      Cervical back: Normal range of motion and neck supple.   Skin:     General: Skin is warm and dry.   Neurological:      Mental Status: He is alert and oriented to person, place, and time.   Psychiatric:         Behavior: Behavior normal.             Current Outpatient Medications   Medication Instructions    albuterol (VENTOLIN HFA) 90 mcg/actuation inhaler 2 puffs, Inhalation, Every 4 hours PRN, Rescue    ALPRAZolam (XANAX) 0.5 mg, Oral, Daily PRN    amLODIPine (NORVASC) 2.5 mg, Oral, Daily    ascorbic acid (vitamin C) (VITAMIN C) 500 mg, Oral, 2 times daily    aspirin (ECOTRIN) 81 mg, Oral, Daily    atorvastatin (LIPITOR) 80 mg, Oral, Nightly    carvediloL (COREG) 25 mg, Oral, 2 times daily with meals    cilostazoL (PLETAL) 50 mg, Oral, 2 times daily    clopidogreL (PLAVIX) 75 mg, Oral, Daily    ezetimibe (ZETIA) 10 mg tablet TAKE 1 TABLET BY MOUTH EVERY DAY    fluticasone propionate (FLONASE) 50 mcg/actuation nasal spray USE 1 SPRAY (50 MCG TOTAL) BY EACH NARE ROUTE ONCE DAILY.    fluticasone-umeclidin-vilanter (TRELEGY ELLIPTA) 200-62.5-25 mcg inhaler 1 puff, Inhalation, Daily    isosorbide mononitrate (IMDUR) 30 mg, Oral, Daily    lisinopriL (PRINIVIL,ZESTRIL) 40 MG tablet TAKE 1 TABLET BY MOUTH ONE TIME DAILY     MULTIVITAMIN ORAL 1 capsule, Oral, Daily    nitroGLYCERIN (NITROSTAT) 0.4 mg, Sublingual, Every 5 min PRN, 1 Tablet, Sublingual Sublingual    omega-3 fatty acids 500 mg Cap 1 capsule, Oral, Daily, 1 Capsule Oral    sertraline (ZOLOFT) 50 mg, Oral, Daily       Lipid Panel:   Lab Results   Component Value Date    CHOL 112 (L) 09/12/2022    HDL 22 (L) 09/12/2022    LDLCALC 34.4 (L) 09/12/2022    TRIG 278 (H) 09/12/2022    CHOLHDL 19.6 (L) 09/12/2022       The ASCVD Risk score (Mili DK, et al., 2019) failed to calculate for the following reasons:    The patient has a prior MI or stroke diagnosis    Most Recent EKG Results  Results for orders placed or performed during the hospital encounter of 08/16/23   EKG 12-LEAD on arrival to floor    Collection Time: 08/16/23 10:53 AM    Narrative    Test Reason : Z95.5,    Vent. Rate : 073 BPM     Atrial Rate : 073 BPM     P-R Int : 166 ms          QRS Dur : 106 ms      QT Int : 430 ms       P-R-T Axes : 021 -67 -10 degrees     QTc Int : 473 ms    Normal sinus rhythm  Left axis deviation  Incomplete right bundle branch block  Septal infarct ,age undetermined  Inferior infarct (cited on or before 16-AUG-2023)  Abnormal ECG  When compared with ECG of 16-AUG-2023 06:37,  Incomplete right bundle branch block has replaced Right bundle branch block  Septal infarct is now Present  Confirmed by MARILOU ELAM MD ([id number]) on 8/17/2023 9:54:18 AM    Referred By:  STACEY           Confirmed By:MARILOU ELAM MD       Most Recent Echocardiogram Results  Results for orders placed in visit on 05/05/22    Echo    Interpretation Summary  · The left ventricle is normal in size with concentric remodeling and normal systolic function.  · The estimated ejection fraction is 65%.  · Normal left ventricular diastolic function.  · Normal right ventricular size with normal right ventricular systolic function.  · Normal central venous pressure (3 mmHg).  · The estimated PA systolic pressure is 22 mmHg.      Most  Recent Nuclear Stress Test Results  Results for orders placed during the hospital encounter of 05/05/22    Nuclear Stress - Cardiology Interpreted    Interpretation Summary    Abnormal myocardial perfusion scan.    There is a mild to moderate intensity, small sized, reversible perfusion abnormality that is consistent with ischemia in the inferior wall(s).    There are no other significant perfusion abnormalities.    The gated perfusion images showed an ejection fraction of 61% at rest.    There is normal wall motion at rest.    The EKG portion of this study is negative for ischemia.    When compared to the previous study from 1/20/2020, ischemia appreciated.      Most Recent Cardiac PET Stress Test Results  No results found for this or any previous visit.      Most Recent Cardiovascular Angiogram results  Results for orders placed during the hospital encounter of 08/16/23    Cardiac catheterization    Conclusion  Procedures:  Moderate sedation  Left heart cath  Coronary angiogram  IVUS of LAD  Rotational atherectomy, angioplasty and stenting of the LAD  Modifier 22    Conclusions:  Successful complex PCI of the LAD from mid to distal with 3 overlaping DAVID with IVUS guidance and rotational atherectomy as detailed below  Otherwise nonobstructive disease  Elevated left filling pressure    Recommendations:  Longterm DAPT  Cardiac rehab  Observe overnight  High intensity stain  BB  No more fluids  4 hrs bed hours        Description of procedure:  The patient presented to the Cath Lab in a(n) elective fashion.  The patient was prepped and draped in a sterile manner.  Timeout, airway and ASA assessment were completed.  Local anesthesia with 2% lidocaine was administered and sedation/analgesia were administered as above.    Access was obtained using the modified Seldinger technique and a micropuncture needle in the RFA with [id number] sheath. A limited angiogram confirmed appropriate access site. Diagnostic angiography was  performed using JL4 and JR4 catheter(s).      Coronary anatomy:  Dominance: codominant  Left main: minimal luminal irregularities  Left anterior descending: large vessel with severe tortuosity and calcification; D1 is large. The mid LAD, past D1, has a 95% severely calcified, severely angulated stenosis (unsuccessful prior PCI May 2022). The distal LAD has an 80% segmental heavily calcified stenosis as well.  There was JIE 3 flow at baseline throughout  Ramus intermedius: absent  Left circumflex: large codominant vessel with mild diffuse disease and retroflexed origin. OM1, OM2 are large and there is a moderate sized LPL as well. Prox LCx has an eccentric calcific 50% stenosis.  Right coronary artery: moderate size vessel with moderate diffuse disease and small RPDA. The prox RCA has a widely patent stent.      Percutaneous coronary intervention:  Anticoagulation: IA/IV heparin with ACT>250s. DAPT status confirmed.  Guiding catheter: 7F CLS 3.5    Target lesions were the mid and distal LAD described above.    The LAD was wired with a William Blue wire with a Corsair Pro microcatheter with some difficulty. The William Blue was exchanged for a Rota Floppy wire. There was extensive rotation atherectomy of the LAD from mid to distal using a 1.5mm krason at 160 k rpm with severe initial decelerations. The Rota Floppy was then exchanged back to the William Blue using the microcatheter.    A 7F Guidezilla was used. Balloon predilation was performed using a 3.5 NC balloon with good expansion.  The distal through mid LAD was stented using a 2.25x38 and 3.0x38 Synergy XD DAVID in overlapping fashion. The two stents were postdilated with 2.5 nad 3.5 NC balloons at high pressure. The mid through prox LAD was then stented with a 3.5x20  Synergy Megatron DAVID in an overlapping fashion, jailing D1. The prox LAD was postdilated using a 4.0 NC balloon at high pressure. IVUS was performed pre and post PCI for optimization with excellent  results.    There was 0% residual stenosis and JIE 3 flow at the end of procedure.    The patient tolerated the procedure well and left the cath lab in stable condition.      Total contrast 230 cc  Air kerma: 2208 mGy    Hemodynamics:  LVEDP 22      Hemostasis:  6F Angioseal    Complications: none  Estimated blood loss: <50 mL    Renetta Rivera MD, [facility]  Interventional Cardiology/Structural Heart Disease  [facility]  Office: [phone]    The clinically important portion of this report has been dictated in the section above. Our Epic reporting system does not allow us to provide a clinically meaningful report without this dictation. Please beware that there may be errors and discrepancies in the computer transcription and all of the clicks required to finalize the report.  The procedure log was documented by Documenter: Jose G Mccracken RT and verified by Renetta Rivera MD.    Date: 8/19/2023  Time: 12:47 PM      Other Most Recent Cardiology Results  Results for orders placed during the hospital encounter of 08/16/23    CARDIAC MONITORING STRIPS        All pertinent data including labs, imaging, EKGs, and studies listed above were reviewed.  Patient's most recent EKG tracing was personally interpreted by this provider.    Assessment:       1. CAD S/P percutaneous coronary angioplasty    2. Coronary artery disease involving native coronary artery of native heart without angina pectoris    3. Essential hypertension    4. Mixed hyperlipidemia    5. Peripheral vascular disease of extremity: s/p R. SFA DCB 10/19/2015    6. S/P drug eluting coronary stent placement    7. Venous insufficiency of both lower extremities         Plan for treatment of the above diagnoses:     Symptoms decent today  BP elevated today  Most recent echocardiogram reviewed personally     Increase amlodipine to 5 mg PO Daily - Educated on risks/benefits of medication   Continue aspirin 81 mg PO  Daily  Continue atorvastatin 80 mg PO Daily  Continue carvedilol 25 mg PO BID  Continue Plavix 75 mg PO Daily for least 1 year status post stenting (08/17/2024)   Continue Zetia 10 mg PO Daily   Continue Imdur 30 mg PO Daily   Continue lisinopril 40 mg PO Daily    Assistance with smoking cessation was offered, including:  []  Medications  [x]  Counseling  []  Printed Information on Smoking Cessation  []  Referral to a Smoking Cessation Program    Patient was counseled regarding smoking for 3-10 minutes.      Continue other cardiac medications  Mediterranean Diet/Cardiovascular Exercise Program    Patient queried and all questions were answered.    F/u in 6 months to reassess      Signed:    Harjinder Perez MD  2/19/2024 11:42 AM

## 2024-02-19 ENCOUNTER — OFFICE VISIT (OUTPATIENT)
Dept: CARDIOLOGY | Facility: CLINIC | Age: 64
End: 2024-02-19
Payer: COMMERCIAL

## 2024-02-19 VITALS
WEIGHT: 242.94 LBS | HEIGHT: 70 IN | DIASTOLIC BLOOD PRESSURE: 90 MMHG | BODY MASS INDEX: 34.78 KG/M2 | HEART RATE: 85 BPM | SYSTOLIC BLOOD PRESSURE: 159 MMHG

## 2024-02-19 DIAGNOSIS — Z95.5 S/P DRUG ELUTING CORONARY STENT PLACEMENT: ICD-10-CM

## 2024-02-19 DIAGNOSIS — I25.10 CORONARY ARTERY DISEASE INVOLVING NATIVE CORONARY ARTERY OF NATIVE HEART WITHOUT ANGINA PECTORIS: ICD-10-CM

## 2024-02-19 DIAGNOSIS — Z98.61 CAD S/P PERCUTANEOUS CORONARY ANGIOPLASTY: Primary | ICD-10-CM

## 2024-02-19 DIAGNOSIS — I87.2 VENOUS INSUFFICIENCY OF BOTH LOWER EXTREMITIES: ICD-10-CM

## 2024-02-19 DIAGNOSIS — I10 ESSENTIAL HYPERTENSION: Chronic | ICD-10-CM

## 2024-02-19 DIAGNOSIS — I25.10 CAD S/P PERCUTANEOUS CORONARY ANGIOPLASTY: Primary | ICD-10-CM

## 2024-02-19 DIAGNOSIS — E78.2 MIXED HYPERLIPIDEMIA: Chronic | ICD-10-CM

## 2024-02-19 DIAGNOSIS — I73.9 PERIPHERAL VASCULAR DISEASE OF EXTREMITY: ICD-10-CM

## 2024-02-19 PROCEDURE — 1159F MED LIST DOCD IN RCRD: CPT | Mod: CPTII,S$GLB,, | Performed by: INTERNAL MEDICINE

## 2024-02-19 PROCEDURE — 1160F RVW MEDS BY RX/DR IN RCRD: CPT | Mod: CPTII,S$GLB,, | Performed by: INTERNAL MEDICINE

## 2024-02-19 PROCEDURE — 99999 PR PBB SHADOW E&M-EST. PATIENT-LVL IV: CPT | Mod: PBBFAC,,, | Performed by: INTERNAL MEDICINE

## 2024-02-19 PROCEDURE — 3008F BODY MASS INDEX DOCD: CPT | Mod: CPTII,S$GLB,, | Performed by: INTERNAL MEDICINE

## 2024-02-19 PROCEDURE — 3080F DIAST BP >= 90 MM HG: CPT | Mod: CPTII,S$GLB,, | Performed by: INTERNAL MEDICINE

## 2024-02-19 PROCEDURE — 3077F SYST BP >= 140 MM HG: CPT | Mod: CPTII,S$GLB,, | Performed by: INTERNAL MEDICINE

## 2024-02-19 PROCEDURE — 4010F ACE/ARB THERAPY RXD/TAKEN: CPT | Mod: CPTII,S$GLB,, | Performed by: INTERNAL MEDICINE

## 2024-02-19 PROCEDURE — 99214 OFFICE O/P EST MOD 30 MIN: CPT | Mod: S$GLB,,, | Performed by: INTERNAL MEDICINE

## 2024-02-19 RX ORDER — AMLODIPINE BESYLATE 5 MG/1
5 TABLET ORAL DAILY
Qty: 90 TABLET | Refills: 3 | Status: SHIPPED | OUTPATIENT
Start: 2024-02-19 | End: 2025-02-18

## 2024-04-04 ENCOUNTER — PATIENT MESSAGE (OUTPATIENT)
Dept: ADMINISTRATIVE | Facility: HOSPITAL | Age: 64
End: 2024-04-04
Payer: COMMERCIAL

## 2024-04-04 ENCOUNTER — HOSPITAL ENCOUNTER (OUTPATIENT)
Dept: RADIOLOGY | Facility: HOSPITAL | Age: 64
Discharge: HOME | End: 2024-04-04
Attending: INTERNAL MEDICINE
Payer: COMMERCIAL

## 2024-04-04 DIAGNOSIS — R91.1 SOLITARY PULMONARY NODULE: ICD-10-CM

## 2024-04-04 PROCEDURE — 71250 CT THORAX DX C-: CPT | Mod: TC,PO

## 2024-04-22 ENCOUNTER — TELEPHONE (OUTPATIENT)
Dept: DERMATOLOGY | Facility: CLINIC | Age: 64
End: 2024-04-22

## 2024-04-22 ENCOUNTER — OFFICE VISIT (OUTPATIENT)
Dept: DERMATOLOGY | Facility: CLINIC | Age: 64
End: 2024-04-22
Payer: COMMERCIAL

## 2024-04-22 DIAGNOSIS — L57.8 ACTINIC SKIN DAMAGE: ICD-10-CM

## 2024-04-22 DIAGNOSIS — L57.0 AK (ACTINIC KERATOSIS): ICD-10-CM

## 2024-04-22 DIAGNOSIS — Z85.828 HISTORY OF NONMELANOMA SKIN CANCER: ICD-10-CM

## 2024-04-22 DIAGNOSIS — D22.9 BENIGN NEVUS: ICD-10-CM

## 2024-04-22 DIAGNOSIS — L73.8 SEBACEOUS HYPERPLASIA: ICD-10-CM

## 2024-04-22 DIAGNOSIS — D48.5 NEOPLASM OF UNCERTAIN BEHAVIOR OF SKIN: Primary | ICD-10-CM

## 2024-04-22 DIAGNOSIS — L82.1 SEBORRHEIC KERATOSIS: ICD-10-CM

## 2024-04-22 DIAGNOSIS — L90.5 SCAR: ICD-10-CM

## 2024-04-22 DIAGNOSIS — L81.4 LENTIGO: ICD-10-CM

## 2024-04-22 DIAGNOSIS — L72.9 CYST OF SKIN: ICD-10-CM

## 2024-04-22 PROCEDURE — 88305 TISSUE EXAM BY PATHOLOGIST: CPT | Mod: 26,,, | Performed by: PATHOLOGY

## 2024-04-22 PROCEDURE — 1160F RVW MEDS BY RX/DR IN RCRD: CPT | Mod: CPTII,S$GLB,, | Performed by: STUDENT IN AN ORGANIZED HEALTH CARE EDUCATION/TRAINING PROGRAM

## 2024-04-22 PROCEDURE — 1159F MED LIST DOCD IN RCRD: CPT | Mod: CPTII,S$GLB,, | Performed by: STUDENT IN AN ORGANIZED HEALTH CARE EDUCATION/TRAINING PROGRAM

## 2024-04-22 PROCEDURE — 99213 OFFICE O/P EST LOW 20 MIN: CPT | Mod: 25,S$GLB,, | Performed by: STUDENT IN AN ORGANIZED HEALTH CARE EDUCATION/TRAINING PROGRAM

## 2024-04-22 PROCEDURE — 4010F ACE/ARB THERAPY RXD/TAKEN: CPT | Mod: CPTII,S$GLB,, | Performed by: STUDENT IN AN ORGANIZED HEALTH CARE EDUCATION/TRAINING PROGRAM

## 2024-04-22 PROCEDURE — 11102 TANGNTL BX SKIN SINGLE LES: CPT | Mod: S$GLB,,, | Performed by: STUDENT IN AN ORGANIZED HEALTH CARE EDUCATION/TRAINING PROGRAM

## 2024-04-22 PROCEDURE — 88305 TISSUE EXAM BY PATHOLOGIST: CPT | Performed by: PATHOLOGY

## 2024-04-22 PROCEDURE — 17000 DESTRUCT PREMALG LESION: CPT | Mod: XS,S$GLB,, | Performed by: STUDENT IN AN ORGANIZED HEALTH CARE EDUCATION/TRAINING PROGRAM

## 2024-04-22 PROCEDURE — 17003 DESTRUCT PREMALG LES 2-14: CPT | Mod: S$GLB,,, | Performed by: STUDENT IN AN ORGANIZED HEALTH CARE EDUCATION/TRAINING PROGRAM

## 2024-04-22 NOTE — PATIENT INSTRUCTIONS
Shave Biopsy Wound Care    Your doctor has performed a shave biopsy today.  A band aid and vaseline ointment has been placed over the site.  This should remain in place for 24 hours.  It is recommended that you keep the area dry for the first 24 hours.  After 24 hours, you may remove the band aid and wash the area with warm soap and water and apply Vaseline jelly.  Many patients prefer to use Neosporin or Bacitracin ointment.  This is acceptable; however, know that you can develop an allergy to this medication even if you have used it safely for years.  It is important to keep the area moist.  Letting it dry out and get air slows healing time, and will worsen the scar.  Band aid is optional after first 24 hours.      If you notice increasing redness, tenderness, pain, or yellow drainage at the biopsy site, please notify your doctor.  These are signs of an infection.    If your biopsy site is bleeding, apply firm pressure for 15 minutes straight.  Repeat for another 15 minutes, if it is still bleeding.   If the surgical site continues to bleed, then please contact your doctor.      [facility] [place] [address]/ [phone] FAX/ [email or web address]

## 2024-04-22 NOTE — TELEPHONE ENCOUNTER
----- Message from Susi Mendoza sent at 4/22/2024  1:44 PM CDT -----  Type: Needs Medical Advice  Who Called:  pt wife  Symptoms (please be specific):    How long has patient had these symptoms:    Pharmacy name and phone #:    Best Call Back Number: [phone]  Additional Information: Pt wife is calling the office today to schedule photodynamic therapy and cyst removal of the face on the same day.  Please call back to advise

## 2024-04-22 NOTE — PROGRESS NOTES
Subjective:      Patient ID:  Jose G Shafer is a 64 y.o. male who presents for   Chief Complaint   Patient presents with    Skin Check     TBSC     LOV 10/23/23    Patient is coming in today for TBSC. States he has a red spot on his face.     *Plavix      Final Pathologic Diagnosis 1. Skin, right forearm, shave biopsy:  - ACTINIC KERATOSIS, INFLAMED.    This lesion is atypical and further treatment may be required. You will be contacted by your provider's office.  Comment: Interp By Ari Pereira M.D., Signed on 10/26/2023 at 15:15      Derm Hx  Pmh of NMSC on chest and nose- treated by Dr. Mckeon  Patient also had blue light therapy on his arms.   Denies Fhx MM          Review of Systems   Constitutional:  Negative for fever, chills and fatigue.   Respiratory:  Negative for cough and shortness of breath.    Gastrointestinal:  Negative for nausea, vomiting and diarrhea.   Skin:  Positive for activity-related sunscreen use and wears hat.   Hematologic/Lymphatic: Bruises/bleeds easily (asa, plavix).       Objective:   Physical Exam   Constitutional: He appears well-developed and well-nourished. No distress.   Neurological: He is alert and oriented to person, place, and time. He is not disoriented.   Psychiatric: He has a normal mood and affect.   Skin:   Areas Examined (abnormalities noted in diagram):   Scalp / Hair Palpated and Inspected  Head / Face Inspection Performed  Neck Inspection Performed  Chest / Axilla Inspection Performed  Abdomen Inspection Performed  Genitals / Buttocks / Groin Inspection Performed  Back Inspection Performed  RUE Inspected  LUE Inspection Performed  RLE Inspected  LLE Inspection Performed  Nails and Digits Inspection Performed                 Diagram Legend     Erythematous scaling macule/papule c/w actinic keratosis       Vascular papule c/w angioma      Pigmented verrucoid papule/plaque c/w seborrheic keratosis      Yellow umbilicated papule c/w sebaceous hyperplasia       Irregularly shaped tan macule c/w lentigo     1-2 mm smooth white papules consistent with Milia      Movable subcutaneous cyst with punctum c/w epidermal inclusion cyst      Subcutaneous movable cyst c/w pilar cyst      Firm pink to brown papule c/w dermatofibroma      Pedunculated fleshy papule(s) c/w skin tag(s)      Evenly pigmented macule c/w junctional nevus     Mildly variegated pigmented, slightly irregular-bordered macule c/w mildly atypical nevus      Flesh colored to evenly pigmented papule c/w intradermal nevus       Pink pearly papule/plaque c/w basal cell carcinoma      Erythematous hyperkeratotic cursted plaque c/w SCC      Surgical scar with no sign of skin cancer recurrence      Open and closed comedones      Inflammatory papules and pustules      Verrucoid papule consistent consistent with wart     Erythematous eczematous patches and plaques     Dystrophic onycholytic nail with subungual debris c/w onychomycosis     Umbilicated papule    Erythematous-base heme-crusted tan verrucoid plaque consistent with inflamed seborrheic keratosis     Erythematous Silvery Scaling Plaque c/w Psoriasis     See annotation      Assessment / Plan:      Pathology Orders:       Normal Orders This Visit    Specimen to Pathology, Dermatology     Questions:    Procedure Type: Dermatology and skin neoplasms    Number of Specimens: 1    ------------------------: -------------------------    Spec 1 Procedure: Biopsy    Spec 1 Clinical Impression: SCC vs. AK vs. ISK    Spec 1 Source: right forearm    Release to patient:           Neoplasm of uncertain behavior of skin  -     Specimen to Pathology, Dermatology  Shave biopsy procedure note:    Shave biopsy performed after verbal consent including risk of infection, scar, recurrence, need for additional treatment of site. Area prepped with alcohol, anesthetized with approximately 1.0cc of 1% lidocaine with epinephrine. Lesional tissue shaved with razor blade. Hemostasis  achieved with application of aluminum chloride followed by hyfrecation. No complications. Dressing applied. Wound care explained.    Actinic skin damage  -     Photodynamic Therapy; Future  Face    Cyst of skin  Right posterior neck and left cheek  Reassurance given to patient. No treatment is necessary.   Discussed treatment options - excision vs observation. Cysts may recur with excision.  Will schedule neck, refer to Dr. BASS for cheek    Seborrheic keratosis  These are benign inherited growths without a malignant potential. Reassurance given to patient. No treatment is necessary.     AK (actinic keratosis)  Cryosurgery Procedure Note    Verbal consent from the patient is obtained and the patient is aware of the precancerous quality and need for treatment of these lesions. Liquid nitrogen cryosurgery is applied to the 14 actinic keratoses, as detailed in the physical exam, to produce a freeze injury. The patient is aware that blisters may form and is instructed on wound care with gentle cleansing and use of vaseline ointment to keep moist until healed. The patient is supplied a handout on cryosurgery and is instructed to call if lesions do not completely resolve.    Lentigo  This is a benign hyperpigmented sun induced lesion. Daily sun protection will reduce the number of new lesions. Treatment of these benign lesions are considered cosmetic.    Sebaceous hyperplasia  This is a common condition representing benign enlargement of the sebaceous lobule. It typically occurs in adulthood. Reassurance given to patient.     History of nonmelanoma skin cancer  Scar  Area of previous NMSC examined. Site well healed with no signs of recurrence.  Total body skin examination performed today including at least 12 points as noted in physical examination. Suspicious lesions noted.  Patient instructed in importance in daily broad spectrum sun protection of at least spf 30. Mineral sunscreen ingredients preferred (Zinc +/- Titanium)  and can be found OTC.   Patient encouraged to wear hat for all outdoor exposure.   Also discussed sun avoidance and use of protective clothing.    Benign nevus  Careful dermoscopy evaluation of nevi performed   Monitor for new mole or moles that are becoming bigger, darker, irritated, or developing irregular borders.          6 months    No follow-ups on file.

## 2024-04-22 NOTE — Clinical Note
Would like to have him scheduled for PDT of face.  Also has cyst on left cheek - would like to refer to Dr. BASS for removal. Thanks!

## 2024-04-23 ENCOUNTER — TELEPHONE (OUTPATIENT)
Dept: DERMATOLOGY | Facility: CLINIC | Age: 64
End: 2024-04-23
Payer: COMMERCIAL

## 2024-04-23 NOTE — TELEPHONE ENCOUNTER
----- Message from Twila Tellez MD sent at 4/22/2024  9:03 AM CDT -----  Would like to have him scheduled for PDT of face.   Also has cyst on left cheek - would like to refer to Dr. BASS for removal. Thanks!

## 2024-04-26 LAB
FINAL PATHOLOGIC DIAGNOSIS: NORMAL
GROSS: NORMAL
Lab: NORMAL
MICROSCOPIC EXAM: NORMAL

## 2024-05-01 ENCOUNTER — PROCEDURE VISIT (OUTPATIENT)
Dept: DERMATOLOGY | Facility: CLINIC | Age: 64
End: 2024-05-01
Payer: COMMERCIAL

## 2024-05-01 VITALS
DIASTOLIC BLOOD PRESSURE: 73 MMHG | BODY MASS INDEX: 35.07 KG/M2 | HEIGHT: 70 IN | HEART RATE: 83 BPM | WEIGHT: 245 LBS | SYSTOLIC BLOOD PRESSURE: 108 MMHG

## 2024-05-01 DIAGNOSIS — D48.5 NEOPLASM OF UNCERTAIN BEHAVIOR OF SKIN: Primary | ICD-10-CM

## 2024-05-01 PROCEDURE — 13131 CMPLX RPR F/C/C/M/N/AX/G/H/F: CPT | Mod: 79,S$GLB,, | Performed by: DERMATOLOGY

## 2024-05-01 PROCEDURE — 88304 TISSUE EXAM BY PATHOLOGIST: CPT | Mod: PO | Performed by: PATHOLOGY

## 2024-05-01 PROCEDURE — 88304 TISSUE EXAM BY PATHOLOGIST: CPT | Mod: 26,,, | Performed by: PATHOLOGY

## 2024-05-01 PROCEDURE — 11442 EXC FACE-MM B9+MARG 1.1-2 CM: CPT | Mod: 51,79,S$GLB, | Performed by: DERMATOLOGY

## 2024-05-01 NOTE — PROGRESS NOTES
EXCISION WITH LINEAR CLOSURE OPERATIVE NOTE  Name: Jose G Shafer   Date: 2024    Patient : 1960    Attending Surgeon: Rocco Brown M.D.  Assistants: Jennifer Thurman - Surgical Technician  Anesthetic Agent: 1% lidocaine with 1:100,000 epinephrine  Clinical Diagnosis: cyst  Operations: Excision of suspected epidermal inclusion cyst  Indication: Lesion has ruptured recurrently causing abscess formation and pain for the patient   Location: left medial cheek  Surgical Preparation: povidone-iodine    Description of Operation:  The nature and purpose of the procedure, associated risks and alternative treatments were explained to the patient in detail. All patient questions were answered completely. An informed operative consent and photography permit were obtained. The tumor location was then identified and marked with agreement by the patient of the correct location. The patient was positioned, prepped, and draped in the usual sterile manner. Local anesthesia was obtained in a ring block fashion around the lesion and underneath.  6 cc(s) of 1% lidocaine with 1:100,000 epinephrine. The lesion pre-operatively measures 1.2 by 1.0 cm.    A superficial incision was made with a #15 blade until the cyst wall was visualized. Adson forceps and supercut curved iris scissors were utilized to dissect out the cyst wall and remove it in toto along with a narrow elipse of skin. The specimen was placed in formalin and sent for formal histopathology. This resulted in a final defect measuring 1.9 x 0.3 cm.     The defect edges were debeveled with a #15 scalpel blade. The defect was widely undermined in the deep subcutaneous plane at least 100% of the defect diameter to allow for maximum tissue movement. Hemostasis was achieved with spot electrodessication. The defect was closed first utilizing multiple 5-0 Vicryl interrupted buried subcutaneous sutures; these sutures were also used to approximate the wound bed to the  underside of the dermis and prevent hematoma/seroma formation. This resulted in excellent apposition of the dermis and epidermis, The skin edges throughout were further fastened superficially with 6-0 Nylon. The final length of the repair was 1.9 cm.  The patient tolerated the procedure well and there were no complications.  Polysporin, non-adherent gauze and a pressure dressing were applied to wound after gentle cleansing with saline.    Patient instructed in and provided with instructions for post-op care, will RTC in 7 days for suture removal  Post op meds: None    Photos:                [facility] Jose Elias

## 2024-05-06 LAB
FINAL PATHOLOGIC DIAGNOSIS: NORMAL
GROSS: NORMAL
Lab: NORMAL
MICROSCOPIC EXAM: NORMAL

## 2024-05-07 NOTE — TELEPHONE ENCOUNTER
Call returned to pt's wife.  Mrs. Hu was informed that script has been faxed over to the correct pharmacy.   Patient aware and voiced understanding, no concerns voiced at this time.

## 2024-05-08 ENCOUNTER — TELEPHONE (OUTPATIENT)
Dept: DERMATOLOGY | Facility: CLINIC | Age: 64
End: 2024-05-08
Payer: COMMERCIAL

## 2024-05-08 ENCOUNTER — CLINICAL SUPPORT (OUTPATIENT)
Dept: DERMATOLOGY | Facility: CLINIC | Age: 64
End: 2024-05-08
Payer: COMMERCIAL

## 2024-05-08 DIAGNOSIS — Z48.02 VISIT FOR SUTURE REMOVAL: Primary | ICD-10-CM

## 2024-05-08 PROCEDURE — 99999 PR PBB SHADOW E&M-EST. PATIENT-LVL I: CPT | Mod: PBBFAC,,,

## 2024-05-08 NOTE — PROGRESS NOTES
Mohs Surgery Suture Removal Note   Patient Name: Jose G Shafer  Patient : 1960  Date of Service: 2024    CC: Pt. Presents for removal of sutures on the left cheek today  Subjective: patient reports wound healing as expected     Objective: Wound well healed, sutures clean/dry/intact. No evidence of any complications noted.     Visit For Suture Removal:  - Suture removal today  - Steri strips/mastisol were not applied  - Patient counseled on silicone gel/silicone sheeting and their use in the management of post-operative scars  - Handout on silicone gel/silicone gel sheeting was provided  - Patient to follow up with their referring provider in 3 months for further skin evaluation    Kelby Irving

## 2024-05-08 NOTE — TELEPHONE ENCOUNTER
Attempted to call patient on 5/7/24 to discuss results of excision procedure. No answer. Left voicemail for patient to return call at their earliest convenience.     ----- Message from Kelby Irving RN sent at 5/7/2024  1:17 PM CDT -----    ----- Message -----  From: Rocco Brown MD  Sent: 5/7/2024  11:54 AM CDT  To: Stephanie Valiente Staff    Benign, pleasure reassure patient.

## 2024-05-23 ENCOUNTER — PROCEDURE VISIT (OUTPATIENT)
Dept: DERMATOLOGY | Facility: CLINIC | Age: 64
End: 2024-05-23
Payer: COMMERCIAL

## 2024-05-23 DIAGNOSIS — R22.9 SUBCUTANEOUS NODULE: Primary | ICD-10-CM

## 2024-05-23 DIAGNOSIS — F41.9 ANXIETY: ICD-10-CM

## 2024-05-23 PROCEDURE — 11421 EXC H-F-NK-SP B9+MARG 0.6-1: CPT | Mod: S$GLB,,, | Performed by: STUDENT IN AN ORGANIZED HEALTH CARE EDUCATION/TRAINING PROGRAM

## 2024-05-23 PROCEDURE — 88304 TISSUE EXAM BY PATHOLOGIST: CPT | Performed by: DERMATOLOGY

## 2024-05-23 PROCEDURE — 99499 UNLISTED E&M SERVICE: CPT | Mod: S$GLB,,, | Performed by: STUDENT IN AN ORGANIZED HEALTH CARE EDUCATION/TRAINING PROGRAM

## 2024-05-23 PROCEDURE — 12041 INTMD RPR N-HF/GENIT 2.5CM/<: CPT | Mod: 51,S$GLB,, | Performed by: STUDENT IN AN ORGANIZED HEALTH CARE EDUCATION/TRAINING PROGRAM

## 2024-05-23 PROCEDURE — 88304 TISSUE EXAM BY PATHOLOGIST: CPT | Mod: 26,,, | Performed by: DERMATOLOGY

## 2024-05-23 NOTE — PROGRESS NOTES
Subjective:      Patient ID:  Jose G Shafer is a 64 y.o. male who presents for   Chief Complaint   Patient presents with    Cyst     Back of neck     Patient is coming in today for cyst removal on back of his neck.     Patient is on ASA and Plavix.      Review of Systems   Constitutional:  Negative for fever, chills and fatigue.   Respiratory:  Negative for cough and shortness of breath.    Gastrointestinal:  Negative for nausea, vomiting and diarrhea.   Skin:  Positive for activity-related sunscreen use and wears hat.   Hematologic/Lymphatic: Bruises/bleeds easily (asa, plavix).       Objective:   Physical Exam   Constitutional: He appears well-developed and well-nourished.   Neurological: He is alert and oriented to person, place, and time.   Psychiatric: He has a normal mood and affect.   Skin:   Areas Examined (abnormalities noted in diagram):   Neck Inspection Performed            Diagram Legend     Erythematous scaling macule/papule c/w actinic keratosis       Vascular papule c/w angioma      Pigmented verrucoid papule/plaque c/w seborrheic keratosis      Yellow umbilicated papule c/w sebaceous hyperplasia      Irregularly shaped tan macule c/w lentigo     1-2 mm smooth white papules consistent with Milia      Movable subcutaneous cyst with punctum c/w epidermal inclusion cyst      Subcutaneous movable cyst c/w pilar cyst      Firm pink to brown papule c/w dermatofibroma      Pedunculated fleshy papule(s) c/w skin tag(s)      Evenly pigmented macule c/w junctional nevus     Mildly variegated pigmented, slightly irregular-bordered macule c/w mildly atypical nevus      Flesh colored to evenly pigmented papule c/w intradermal nevus       Pink pearly papule/plaque c/w basal cell carcinoma      Erythematous hyperkeratotic cursted plaque c/w SCC      Surgical scar with no sign of skin cancer recurrence      Open and closed comedones      Inflammatory papules and pustules      Verrucoid papule consistent  consistent with wart     Erythematous eczematous patches and plaques     Dystrophic onycholytic nail with subungual debris c/w onychomycosis     Umbilicated papule    Erythematous-base heme-crusted tan verrucoid plaque consistent with inflamed seborrheic keratosis     Erythematous Silvery Scaling Plaque c/w Psoriasis     See annotation      Assessment / Plan:      Pathology Orders:       Normal Orders This Visit    Specimen to Pathology, Dermatology     Questions:    Procedure Type: Dermatology and skin neoplasms    Number of Specimens: 1    ------------------------: -------------------------    Spec 1 Procedure: Excision >2cm    Spec 1 Clinical Impression: cyst vs lipoma    Spec 1 Source: right posterior neck    Release to patient:           Subcutaneous nodule  -     Specimen to Pathology, Dermatology  PROCEDURE: Elliptical excision with intermediate layered repair in order to decrease dead space, decrease tension, and close large gap.    ANESTHETIC: 6 cc 1% Xylocaine with Epinephrine 1:100,000, buffered    SURGEON: Twila Tellez MD  ASSISTANTS: Coni Rm MA    PREOPERATIVE DIAGNOSIS:   cyst vs lipoma    POSTOPERATIVE DIAGNOSIS:  Same as preoperative diagnosis    PATHOLOGIC DIAGNOSIS: Pending    LOCATION: right posterior neck    INITIAL LESION SIZE: 1.0 cm    EXCISED DIAMETER: 1.0 cm    PREPARATION: The diagnosis, procedure, alternatives, benefits and risks, including but not limited to: infection, bleeding/bruising, drug reactions, pain, scar or cosmetic defect, local sensation disturbances, wound dehiscence (separation of wound edges after sutures removed) and/or recurrence of present condition were explained to the patient. The patient elected to proceed.  Patient's identity was verified using 2 patient identifiers and the side and site was verified.  Time out period with surgeon, assistant and patient in surgical suite was taken.    PROCEDURE: The location noted above was prepped and draped in the usual  sterile fashion. The area was anesthetized with intradermal buffered xylocaine. Lesional tissue was carefully marked. A fusiform elliptical excision was done with #15 blade carried down completely through the dermis into the subcutaneous tissues to the level of the subcutaneous fat, and dissection was carried out in that plane.  Electrocoagulation was used to obtain hemostasis. Blood loss was minimal. The wound was then approximated in a layered fashion with subcutaneous and intradermal sutures of 3.0 Monocryl, approximately 3 in number, and the wound was then superficially closed with running sutures of 3.0 Prolene.    The patient tolerated the procedure well.    The area was cleaned and dressed appropriately and the patient was given wound care instructions, as well as an appointment for follow-up evaluation.    LENGTH OF REPAIR: 2.0 cm               No follow-ups on file.

## 2024-05-23 NOTE — PATIENT INSTRUCTIONS

## 2024-05-23 NOTE — TELEPHONE ENCOUNTER
Care Due:                  Date            Visit Type   Department     Provider  --------------------------------------------------------------------------------                                MYCHART                              FOLLOWUP/OF  [facility]  Last Visit: 06-      FICE VISIT   MEDICINE       Christiano Guy  Next Visit: None Scheduled  None         None Found                                                            Last  Test          Frequency    Reason                     Performed    Due Date  --------------------------------------------------------------------------------    CBC.........  12 months..  clopidogreL..............  08- 08-    CMP.........  12 months..  atorvastatin, ezetimibe,   12-   12-                             lisinopriL...............    Lipid Panel.  12 months..  atorvastatin, ezetimibe..  09- 09-    [employer] Embedded Care Due Messages. Reference number: [id number].   5/23/2024 10:56:09 AM CDT

## 2024-05-24 RX ORDER — ALPRAZOLAM 0.5 MG/1
0.5 TABLET ORAL DAILY PRN
Qty: 20 TABLET | Refills: 0 | Status: SHIPPED | OUTPATIENT
Start: 2024-05-24 | End: 2024-06-03 | Stop reason: SDUPTHER

## 2024-05-27 LAB
FINAL PATHOLOGIC DIAGNOSIS: NORMAL
GROSS: NORMAL
Lab: NORMAL
MICROSCOPIC EXAM: NORMAL

## 2024-05-30 ENCOUNTER — PROCEDURE VISIT (OUTPATIENT)
Dept: DERMATOLOGY | Facility: CLINIC | Age: 64
End: 2024-05-30
Payer: COMMERCIAL

## 2024-05-30 ENCOUNTER — TELEPHONE (OUTPATIENT)
Dept: PULMONOLOGY | Facility: CLINIC | Age: 64
End: 2024-05-30
Payer: COMMERCIAL

## 2024-05-30 DIAGNOSIS — L57.0 ACTINIC KERATOSES: Primary | ICD-10-CM

## 2024-05-30 DIAGNOSIS — L57.8 ACTINIC SKIN DAMAGE: ICD-10-CM

## 2024-05-30 PROCEDURE — 96574 DBRDMT PRMLG LES W/PDT: CPT | Mod: S$GLB,,, | Performed by: DERMATOLOGY

## 2024-05-30 NOTE — TELEPHONE ENCOUNTER
Spoke to patient.  Informed him that he needs an appt.  He has a virtual appt scheduled tomorrow.  REMY

## 2024-05-30 NOTE — TELEPHONE ENCOUNTER
----- Message from Irena Stroud sent at 5/30/2024 10:51 AM CDT -----  Type:  RX Refill Request    Who Called: pt    Refill or New Rx:refill    RX Name and Strength:fluticasone-umeclidin-vilanter (TRELEGY ELLIPTA) 200-62.5-25 mcg inhaler  Azithromycin 500 mg    How is the patient currently taking it? (ex. 1XDay):as directed    Is this a 30 day or 90 day RX:30    Preferred Pharmacy with phone number:   [facility]/pharmacy #[id number] - [place] - [address]  [address]  [place] [address]  Phone: [phone] Fax: [phone]    Local or Mail Order:local    Ordering Provider:[place]    Would the patient rather a call back or a response via [facility]? Call back    Best Call Back Number:[phone]      Additional Information:       Please call Back to advise. Thanks!

## 2024-06-03 ENCOUNTER — OFFICE VISIT (OUTPATIENT)
Dept: PULMONOLOGY | Facility: CLINIC | Age: 64
End: 2024-06-03
Payer: COMMERCIAL

## 2024-06-03 VITALS — HEART RATE: 100 BPM

## 2024-06-03 DIAGNOSIS — J44.1 COPD EXACERBATION: Primary | ICD-10-CM

## 2024-06-03 DIAGNOSIS — R91.1 LUNG NODULE: ICD-10-CM

## 2024-06-03 DIAGNOSIS — F41.9 ANXIETY: ICD-10-CM

## 2024-06-03 DIAGNOSIS — F17.210 NICOTINE DEPENDENCE, CIGARETTES, UNCOMPLICATED: ICD-10-CM

## 2024-06-03 DIAGNOSIS — G47.33 OSA ON CPAP: ICD-10-CM

## 2024-06-03 DIAGNOSIS — J44.89 ASTHMA-COPD OVERLAP SYNDROME: ICD-10-CM

## 2024-06-03 PROCEDURE — 99213 OFFICE O/P EST LOW 20 MIN: CPT | Mod: 95,,, | Performed by: INTERNAL MEDICINE

## 2024-06-03 PROCEDURE — 1159F MED LIST DOCD IN RCRD: CPT | Mod: CPTII,95,, | Performed by: INTERNAL MEDICINE

## 2024-06-03 PROCEDURE — 4010F ACE/ARB THERAPY RXD/TAKEN: CPT | Mod: CPTII,95,, | Performed by: INTERNAL MEDICINE

## 2024-06-03 RX ORDER — ALBUTEROL SULFATE 90 UG/1
2 AEROSOL, METERED RESPIRATORY (INHALATION) EVERY 4 HOURS PRN
Qty: 13.4 EACH | Refills: 3 | Status: SHIPPED | OUTPATIENT
Start: 2024-06-03

## 2024-06-03 RX ORDER — AZITHROMYCIN 500 MG/1
TABLET, FILM COATED ORAL
Qty: 3 EACH | Refills: 3 | Status: SHIPPED | OUTPATIENT
Start: 2024-06-03

## 2024-06-03 RX ORDER — ALPRAZOLAM 0.5 MG/1
0.5 TABLET ORAL DAILY PRN
Qty: 20 TABLET | Refills: 0 | Status: SHIPPED | OUTPATIENT
Start: 2024-06-03

## 2024-06-03 RX ORDER — FLUTICASONE FUROATE, UMECLIDINIUM BROMIDE AND VILANTEROL TRIFENATATE 200; 62.5; 25 UG/1; UG/1; UG/1
1 POWDER RESPIRATORY (INHALATION) DAILY
Qty: 60 EACH | Refills: 3 | Status: SHIPPED | OUTPATIENT
Start: 2024-06-03

## 2024-06-03 RX ORDER — PREDNISONE 20 MG/1
TABLET ORAL
Qty: 12 TABLET | Refills: 1 | Status: SHIPPED | OUTPATIENT
Start: 2024-06-03

## 2024-06-03 NOTE — PATIENT INSTRUCTIONS
You still smoke occasionally, would follow up ct chest soon-- once cleared  of exacerbation.  Ct chest from 12/2023 compared to 6/2023 -- 11 mm nodule stable    Use trelegy daily  Use albuterol as needed    May use prednisone 20mg daily for 3 days if exacerbates.  May use azithromyicn if any concerns re infection      Stiop smokes     Continue cpap.

## 2024-06-03 NOTE — PROGRESS NOTES
The patient location is: [facility]  The chief complaint leading to consultation is: copd exacerbation    Visit type: audiovisual    Face to Face time with patient:  10 min  15 minutes of total time spent on the encounter, which includes face to face time and non-face to face time preparing to see the patient (eg, review of tests), Obtaining and/or reviewing separately obtained history, Documenting clinical information in the electronic or other health record, Independently interpreting results (not separately reported) and communicating results to the patient/family/caregiver, or Care coordination (not separately reported).         Each patient to whom he or she provides medical services by telemedicine is:  (1) informed of the relationship between the physician and patient and the respective role of any other health care provider with respect to management of the patient; and (2) notified that he or she may decline to receive medical services by telemedicine and may withdraw from such care at any time.    Notes:        6/3/2024    Jose G Shafer  New Patient Consult    Chief Complaint   Patient presents with    Follow-up     Annual f/u - need trelegy refill, coughing and sputum is colored green and yellow.       HPI:       6/3/2024 pt ran out trelegy and had yg mucous. Did well since last seen but use up prednisone and azithro. Uses prn albuterol ,  occ smokes but min    Had stent placed  Uses cpap, not needing xanax for cpap but has anxiety.  Still off smokes with occ smokes.  Needs ot work 2.5 yrs more..  .      12/14/2022pt worked [employer] since 78.  Pt non diabetic, stopped smokes last wk.  Pt has had cough and wheezes in past-- seasonal in past with weather changes.  Occ sinus drainage and stuffy.     Pt still working as  in [employer].  Noted zhao/fatigue recent yrs.  Had acute illness and felt extreme distress ppt er visit. Cta done, had had 5mm lung nodule seen  on prior ct chest screening.    Has cpap with nasal mask -- 4 hrs /night used -- had had eds driving which resolved.      Patient Instructions   Sleep study with 30 episodes an hour stopping/slowing breathing with oxygen low 1/2 time.    Would need to review compliance report from cpap.     Xanax may help cpap compliance ?          You have had seasonal bronchial problems-- expect copd with ? Asthma   Use trelegy  for 24/7 prevention/controller-- if lung capacity over 50% would use if helps regular basis, if less than 50% need to use.  Should use seasonally at least.     Use albuterol 2 puff ( up to 3-4 ) as needed every 4 hrs for short breath or cough or activity     Use prednisone - one daily for 3 days in future if lungs worsen      May use azithromycin for sinus/bronchial infection.      Could check lung capacity.    Would do screening ct chest yearly with asbestos/smoking     PFSH:  Past Medical History:   Diagnosis Date    Anticoagulant long-term use     Anxiety     BCC (basal cell carcinoma)     CAD (coronary artery disease)     Erectile dysfunction     GERD (gastroesophageal reflux disease)     History of kidney stones     History of pneumonia 12/2022    HLD (hyperlipidemia)     HTN (hypertension)     Impaired fasting glucose     Lateral epicondylitis of left elbow 07/18/2019    Myocardial infarction     PONV (postoperative nausea and vomiting)     Sleep apnea     uses cpap         Past Surgical History:   Procedure Laterality Date    ANGIOGRAM, CORONARY, WITH LEFT HEART CATHETERIZATION Left 05/26/2022    Procedure: Angiogram, Coronary, with Left Heart Cath;  Surgeon: Christa Gunn MD;  Location: [facility] CATH;  Service: Cardiology;  Laterality: Left;    ANGIOGRAM, CORONARY, WITH LEFT HEART CATHETERIZATION  8/16/2023    Procedure: Left Heart Cath;  Surgeon: Renetta Rivera MD;  Location: [facility] CATH;  Service: Cardiology;;    ANGIOPLASTY      AORTOGRAPHY WITH EXTREMITY RUNOFF  11/16/2021    Procedure: AORTOGRAM, WITH  EXTREMITY RUNOFF;  Surgeon: Blanca Arzola MD;  Location: [facility] CATH;  Service: Cardiovascular;;    AORTOGRAPHY WITH SERIALOGRAPHY N/A 11/22/2019    Procedure: AORTOGRAM, WITH SERIALOGRAPHY;  Surgeon: Blanca Arzola MD;  Location: [facility] CATH;  Service: Cardiovascular;  Laterality: N/A;    AORTOGRAPHY WITH SERIALOGRAPHY N/A 12/13/2019    Procedure: AORTOGRAM, WITH SERIALOGRAPHY;  Surgeon: Blanca Arzola MD;  Location: [facility] CATH;  Service: Cardiovascular;  Laterality: N/A;    AORTOGRAPHY WITH SERIALOGRAPHY  12/17/2020    Procedure: AORTOGRAM, WITH SERIALOGRAPHY;  Surgeon: Blanca Arzola MD;  Location: [facility] CATH;  Service: Cardiovascular;;    ATHERECTOMY, CORONARY  8/16/2023    Procedure: Atherectomy LAD (Rotoblator);  Surgeon: Renetta Rivera MD;  Location: [facility] CATH;  Service: Cardiology;;    CARDIAC SURGERY  2005    stents placed    COLONOSCOPY N/A 07/17/2017    Procedure: COLONOSCOPY Miralax;  Surgeon: Jeremiah Syed Jr., MD;  Location: [facility] ENDO;  Service: Endoscopy;  Laterality: N/A;    IVUS, CORONARY  8/16/2023    Procedure: IVUS LAD;  Surgeon: Renetta Rivera MD;  Location: [facility] CATH;  Service: Cardiology;;    KNEE ARTHROSCOPY W/ MENISCECTOMY      PERCUTANEOUS CORONARY INTERVENTION, ARTERY  8/16/2023    Procedure: DAVID LAD;  Surgeon: Renetta Rivera MD;  Location: [facility] CATH;  Service: Cardiology;;    PERCUTANEOUS TRANSLUMINAL ANGIOPLASTY N/A 11/22/2019    Procedure: Pta (Angioplasty, Percutaneous, Transluminal);  Surgeon: Blanca Arzola MD;  Location: [facility] CATH;  Service: Cardiovascular;  Laterality: N/A;    PERCUTANEOUS TRANSLUMINAL ANGIOPLASTY N/A 12/13/2019    Procedure: Pta (Angioplasty, Percutaneous, Transluminal);  Surgeon: Blanca Arzola MD;  Location: [facility] CATH;  Service: Cardiovascular;  Laterality: N/A;    PERCUTANEOUS TRANSLUMINAL ANGIOPLASTY  11/16/2021    Procedure: PTA (ANGIOPLASTY, PERCUTANEOUS, TRANSLUMINAL);  Surgeon: Blanca Arzola MD;  Location: [facility] CATH;  Service: Cardiovascular;;    VARICOSE VEIN SURGERY      left  saphenous     Social History     Tobacco Use    Smoking status: Every Day     Current packs/day: 0.00     Average packs/day: 0.5 packs/day for 44.0 years (22.0 ttl pk-yrs)     Types: Cigarettes     Start date: 10/10/1984     Last attempt to quit: 2023     Years since quittin.4    Smokeless tobacco: Never   Substance Use Topics    Alcohol use: Yes     Alcohol/week: 20.0 standard drinks of alcohol     Types: 20 Cans of beer per week     Comment: every now and then per pt    Drug use: Never     Family History   Problem Relation Name Age of Onset    Diabetes Brother 2     Coronary artery disease Brother 2         premature    Heart disease Brother 2     Hypertension Brother 2     Stomach cancer Father      No Known Problems Mother      No Known Problems Sister      No Known Problems Maternal Grandmother      No Known Problems Maternal Grandfather      No Known Problems Paternal Grandmother      No Known Problems Paternal Grandfather      No Known Problems Maternal Aunt      No Known Problems Maternal Uncle      No Known Problems Paternal Aunt      No Known Problems Paternal Uncle      Prostate cancer Neg Hx      Colon cancer Neg Hx      Anemia Neg Hx      Arrhythmia Neg Hx      Asthma Neg Hx      Clotting disorder Neg Hx      Fainting Neg Hx      Heart attack Neg Hx      Heart disease Neg Hx      Heart failure Neg Hx      Hyperlipidemia Neg Hx      Hypertension Neg Hx      Stroke Neg Hx      Atrial Septal Defect Neg Hx       Review of patient's allergies indicates:  No Known Allergies       Review of Systems:  a review of eleven systems covering constitutional, Eye, HEENT, Psych, Respiratory, Cardiac, GI, , Musculoskeletal, Endocrine, Dermatologic was negative except for pertinent findings as listed ABOVE and below: rest good     Exam:Comprehensive exam done. Pulse 100   Exam included Vitals as listed, and patient's appearance and affect and alertness and mood, oral exam for yeast and hygiene and pharynx  lesions and Mallapatti (M) score, neck with inspection for jvd and masses and thyroid abnormalities and lymph nodes (supraclavicular and infraclavicular nodes and axillary also examined and noted if abn), chest exam included symmetry and effort and fremitus and percussion and auscultation, cardiac exam included rhythm and gallops and murmur and rubs and jvd and edema, abdominal exam for mass and hepatosplenomegaly and tenderness and hernias and bowel sounds, Musculoskeletal exam with muscle tone and posture and mobility/gait and  strength, and skin for rashes and cyanosis and pallor and turgor, extremity for clubbing.  Findings were normal except for pertinent findings listed below:  M4, min rales left lower lung, no edema          Radiographs (ct chest and cxr) reviewed: view by direct vision  no asbesto markings with min ggo peripherally cw flu pneumonia.  Ct chest from 9/2022 with 5 mm nodule seen, prom bronchi    Labs reviewed           PFT will be done and results to be reviewed       Plan:  Clinical impression is apparently straight forward and impression with management as below.    Jose G was seen today for follow-up.    Diagnoses and all orders for this visit:    COPD exacerbation    Asthma-COPD overlap syndrome  -     azithromycin (ZITHROMAX) 500 MG tablet; One daily for yellow mucous, repeat if needed  -     predniSONE (DELTASONE) 20 MG tablet; Take one daily for 3 days and may repeat for shortness of breath.  -     fluticasone-umeclidin-vilanter (TRELEGY ELLIPTA) 200-62.5-25 mcg inhaler; Inhale 1 puff into the lungs once daily.  -     albuterol (VENTOLIN HFA) 90 mcg/actuation inhaler; Inhale 2 puffs into the lungs every 4 (four) hours as needed for Wheezing or Shortness of Breath. Rescue    Lung nodule    Anxiety  -     ALPRAZolam (XANAX) 0.5 MG tablet; Take 1 tablet (0.5 mg total) by mouth daily as needed.    Nicotine dependence, cigarettes, uncomplicated  -     CT Chest Lung Screening Low Dose;  Standing    LEAH on CPAP          No follow-ups on file.    Discussed with patient above for education the following:      Patient Instructions   You still smoke occasionally, would follow up ct chest soon-- once cleared  of exacerbation.  Ct chest from 12/2023 compared to 6/2023 -- 11 mm nodule stable    Use trelegy daily  Use albuterol as needed    May use prednisone 20mg daily for 3 days if exacerbates.  May use azithromyicn if any concerns re infection      Stiop smokes     Continue cpap.

## 2024-06-04 NOTE — PROGRESS NOTES
PHOTODYNAMIC THERAPY PROCEDURE NOTE  Patient: Jose G Shafer  YOB: 1960  ATTENDING PHYSICIAN: Rocco Brown MD    PROCEDURE: photodynamic therapy for the treatment of refractory actinic keratosis    SKIN PREP: Acetone was applied to the skin vigorously with roughly woven gauze to degrease the epidermis and allow penetration of the aminolevulinic acid solution. This gauze was used to debride the skin surface until actinic keratoses were de-roofed with subsequent bleeding of heavily sun damaged areas.    LOCATION: FACE    PDT solution: 1 Levulan Kerastick(s) was(were) applied to the area, NDC: 58090-739-53     INCUBATION TIME: 90 minutes allowed for PDT solution to penetrate into actinic keratoses    PROCEDURE:     After discussion of risks, benefits, and limitations of photodynamic therapy, patient placed in seated position with Krzysztof-U light approximately 4 inches from the treatment area. Protective goggles/glasses were placed on the patient to protect the eyes from the intense light of the device. The timer was set to 17:30 and the physician activated the device. The patient was given a fan to assist with burning during the procedure. Any intensely burning areas were dabbed with cool saline gauze. They were also given a call button to summon assistance from the nursing station if any issues were experienced during treatment.     After treatment, they were provided with a broad brimmed hat if they did not bring one. The patient tolerated the procedure well, and will follow up in 2 weeks for a post PDT wound check, and assessment of weather a second PDT treatment would be beneficial for the area. Patient instructed not to get any additional sun exposure for 2 days post-op. Patient instructed to keep aloe or vaseline in the fridge and apply liberally as needed for discomfort.    Patient provided with written discharge instructions and instructed to call clinic for any concerns.       Rocco  MD Stephanie

## 2024-06-12 DIAGNOSIS — F41.9 ANXIETY: ICD-10-CM

## 2024-06-12 RX ORDER — SERTRALINE HYDROCHLORIDE 50 MG/1
50 TABLET, FILM COATED ORAL
Qty: 90 TABLET | Refills: 0 | Status: SHIPPED | OUTPATIENT
Start: 2024-06-12

## 2024-06-12 NOTE — TELEPHONE ENCOUNTER
Provider Staff:  Action required for this patient     Please see care gap opportunities below in Care Due Message.    Thanks!  [facility]     Appointments      Date Provider   Last Visit   6/2/2023 Christiano Guy MD   Next Visit   Visit date not found Christiano Guy MD      Refill Decision Note   Jose G Shafer  is requesting a refill authorization.  Brief Assessment and Rationale for Refill:  Approve     Medication Therapy Plan:         Comments:     Note composed:2:49 AM 06/12/2024

## 2024-06-12 NOTE — TELEPHONE ENCOUNTER
Care Due:                  Date            Visit Type   Department     Provider  --------------------------------------------------------------------------------                                MYCHART                              FOLLOWUP/OF  [facility] FAMILY  Last Visit: 06-      FICE VISIT   MEDICINE       Christiano Guy  Next Visit: None Scheduled  None         None Found                                                            Last  Test          Frequency    Reason                     Performed    Due Date  --------------------------------------------------------------------------------    Office Visit  15 months..  atorvastatin, carvediloL,   06- 08-                             clopidogreL, ezetimibe,                             isosorbide, lisinopriL,                             sertraline...............    [employer] Embedded Care Due Messages. Reference number: [id number].   6/12/2024 1:29:59 AM CDT

## 2024-06-18 ENCOUNTER — PROCEDURE VISIT (OUTPATIENT)
Dept: DERMATOLOGY | Facility: CLINIC | Age: 64
End: 2024-06-18
Payer: COMMERCIAL

## 2024-06-18 DIAGNOSIS — C44.622 SQUAMOUS CELL CARCINOMA, ARM, RIGHT: Primary | ICD-10-CM

## 2024-06-18 PROCEDURE — 11603 EXC TR-EXT MAL+MARG 2.1-3 CM: CPT | Mod: S$GLB,,, | Performed by: STUDENT IN AN ORGANIZED HEALTH CARE EDUCATION/TRAINING PROGRAM

## 2024-06-18 PROCEDURE — 12032 INTMD RPR S/A/T/EXT 2.6-7.5: CPT | Mod: 51,S$GLB,, | Performed by: STUDENT IN AN ORGANIZED HEALTH CARE EDUCATION/TRAINING PROGRAM

## 2024-06-18 PROCEDURE — 99499 UNLISTED E&M SERVICE: CPT | Mod: S$GLB,,, | Performed by: STUDENT IN AN ORGANIZED HEALTH CARE EDUCATION/TRAINING PROGRAM

## 2024-06-18 PROCEDURE — 88305 TISSUE EXAM BY PATHOLOGIST: CPT | Performed by: PATHOLOGY

## 2024-06-18 NOTE — PATIENT INSTRUCTIONS

## 2024-06-18 NOTE — PROGRESS NOTES
Subjective:      Patient ID:  Jose G Shafer is a 64 y.o. male who presents for   Chief Complaint   Patient presents with    Skin Cancer     E&S     Patient here today for E&S of SCC on right forearm.  Denies pacemaker.  He is on asa and plavix.    Final Pathologic Diagnosis 1. Skin, right forearm, shave biopsy:  - INVASIVE SQUAMOUS CELL CARCINOMA, WELL-DIFFERENTIATED.  - THE TUMOR CLOSELY APPROACHES THE DEEP BIOPSY MARGIN.            Review of Systems   Constitutional:  Negative for fever, chills and fatigue.   Respiratory:  Negative for cough and shortness of breath.    Gastrointestinal:  Negative for nausea, vomiting and diarrhea.   Skin:  Positive for activity-related sunscreen use and wears hat.   Hematologic/Lymphatic: Bruises/bleeds easily (asa, plavix).       Objective:   Physical Exam   Constitutional: He appears well-developed and well-nourished.   Neurological: He is alert and oriented to person, place, and time.   Psychiatric: He has a normal mood and affect.   Skin:   Areas Examined (abnormalities noted in diagram):   RUE Inspected            Diagram Legend     Erythematous scaling macule/papule c/w actinic keratosis       Vascular papule c/w angioma      Pigmented verrucoid papule/plaque c/w seborrheic keratosis      Yellow umbilicated papule c/w sebaceous hyperplasia      Irregularly shaped tan macule c/w lentigo     1-2 mm smooth white papules consistent with Milia      Movable subcutaneous cyst with punctum c/w epidermal inclusion cyst      Subcutaneous movable cyst c/w pilar cyst      Firm pink to brown papule c/w dermatofibroma      Pedunculated fleshy papule(s) c/w skin tag(s)      Evenly pigmented macule c/w junctional nevus     Mildly variegated pigmented, slightly irregular-bordered macule c/w mildly atypical nevus      Flesh colored to evenly pigmented papule c/w intradermal nevus       Pink pearly papule/plaque c/w basal cell carcinoma      Erythematous hyperkeratotic cursted plaque  c/w SCC      Surgical scar with no sign of skin cancer recurrence      Open and closed comedones      Inflammatory papules and pustules      Verrucoid papule consistent consistent with wart     Erythematous eczematous patches and plaques     Dystrophic onycholytic nail with subungual debris c/w onychomycosis     Umbilicated papule    Erythematous-base heme-crusted tan verrucoid plaque consistent with inflamed seborrheic keratosis     Erythematous Silvery Scaling Plaque c/w Psoriasis     See annotation      Assessment / Plan:      Pathology Orders:       Normal Orders This Visit    Specimen to Pathology, Dermatology     Questions:    Procedure Type: Dermatology and skin neoplasms    Number of Specimens: 1    ------------------------: -------------------------    Spec 1 Procedure: Excision >2cm    Spec 1 Clinical Impression: biopsy proven SCC, please check margins    Spec 1 Source: right forearm    Release to patient:           Squamous cell carcinoma, arm, right  -     Specimen to Pathology, Dermatology  PROCEDURE: Elliptical excision with intermediate layered repair in order to decrease dead space, decrease tension, and close large gap.    ANESTHETIC: 9 cc 1% Xylocaine with Epinephrine 1:100,000, buffered    SURGEON: Twila Tellez MD  ASSISTANTS: Susi Woodson MA    PREOPERATIVE DIAGNOSIS:  Biopsy-proven Squamous Cell Carcinoma    POSTOPERATIVE DIAGNOSIS:  Same as preoperative diagnosis    PATHOLOGIC DIAGNOSIS: Pending    LOCATION: right forearm    INITIAL LESION SIZE: 1.5 cm    EXCISED DIAMETER: 2.5 cm    PREPARATION: The diagnosis, procedure, alternatives, benefits and risks, including but not limited to: infection, bleeding/bruising, drug reactions, pain, scar or cosmetic defect, local sensation disturbances, wound dehiscence (separation of wound edges after sutures removed) and/or recurrence of present condition were explained to the patient. The patient elected to proceed.  Patient's identity was verified using  2 patient identifiers and the side and site was verified.  Time out period with surgeon, assistant and patient in surgical suite was taken.    PROCEDURE: The location noted above was prepped and draped in the usual sterile fashion. The area was anesthetized with intradermal buffered xylocaine. Lesional tissue was carefully marked with at least 5 mm margins of clinically normal skin in all directions. A fusiform elliptical excision was done with #15 blade carried down completely through the dermis into the subcutaneous tissues to the level of the subcutaneous fat, and dissection was carried out in that plane.  Electrocoagulation was used to obtain hemostasis. Blood loss was minimal. The wound was then approximated in a layered fashion with subcutaneous and intradermal sutures of 3.0 Monocryl, approximately 6 in number, and the wound was then superficially closed with running sutures of 3.0 Prolene.    The patient tolerated the procedure well.    The area was cleaned and dressed appropriately and the patient was given wound care instructions, as well as an appointment for follow-up evaluation.    LENGTH OF REPAIR: 7.0 cm           2 weeks    No follow-ups on file.

## 2024-06-22 LAB
FINAL PATHOLOGIC DIAGNOSIS: NORMAL
GROSS: NORMAL
Lab: NORMAL
MICROSCOPIC EXAM: NORMAL

## 2024-07-02 ENCOUNTER — CLINICAL SUPPORT (OUTPATIENT)
Dept: DERMATOLOGY | Facility: CLINIC | Age: 64
End: 2024-07-02
Payer: COMMERCIAL

## 2024-07-02 DIAGNOSIS — C44.622 SQUAMOUS CELL CARCINOMA, ARM, RIGHT: Primary | ICD-10-CM

## 2024-07-02 PROCEDURE — 99024 POSTOP FOLLOW-UP VISIT: CPT | Mod: S$GLB,,, | Performed by: STUDENT IN AN ORGANIZED HEALTH CARE EDUCATION/TRAINING PROGRAM

## 2024-07-05 ENCOUNTER — TELEPHONE (OUTPATIENT)
Dept: PULMONOLOGY | Facility: CLINIC | Age: 64
End: 2024-07-05
Payer: COMMERCIAL

## 2024-07-05 NOTE — TELEPHONE ENCOUNTER
----- Message from Adriana Hodge sent at 7/5/2024 10:15 AM CDT -----  Regarding: RE: CT Chest Lung Low Dose Diagnostic  Yes Ma'am the standing order is for a CT Screening , Looks like patients is coming back for a 4 Month Follow up per report ,We would need a CT Diagnostic Low Dose to get him scheduled for the 4 Month Follow up..  ----- Message -----  From: Susi Barber LPN  Sent: 7/5/2024  10:09 AM CDT  To: Adriana Hodge  Subject: RE: CT Chest Lung Low Dose Diagnostic            There is a standing order in the system for this order in the patient's chart.    Susi Patrick LPN  ----- Message -----  From: Adriana Hodge  Sent: 7/5/2024  10:01 AM CDT  To: [facility] Staff  Subject: CT Chest Lung Low Dose Diagnostic                Good Afternoon ,  We have received some orders for patient  for a CT Chest Lung Screening Low Dose, This looks like a follow up from An Abnormal Screening on 4/4/2024 . Can you place an order for CT Chest Lung Low Dose Diagnostic ? Once received we will call the patient to get her scheduled .    Thank You ,  [facility]   Centralized Scheduling

## 2024-07-08 ENCOUNTER — TELEPHONE (OUTPATIENT)
Dept: PULMONOLOGY | Facility: CLINIC | Age: 64
End: 2024-07-08
Payer: COMMERCIAL

## 2024-07-08 DIAGNOSIS — R91.1 LUNG NODULE: Primary | ICD-10-CM

## 2024-07-08 NOTE — TELEPHONE ENCOUNTER
I see the standing order for ct lung low dose and I pended the order that is being requested by scheduling.  If you agree please sign or if otherwise please advise

## 2024-07-08 NOTE — TELEPHONE ENCOUNTER
----- Message from Adriana Naveen sent at 7/8/2024 10:31 AM CDT -----  Regarding: CT Chest Lung Low Dose Diagnostic  Good Afternoon ,  We have received some orders for patient  for a CT Chest Lung Screening Low Dose, This looks like a follow up from An Abnormal Screening On 4/4/2024 . Can you place an order for CT Chest Lung Low Dose Diagnostic ? Once received we will call the patient to get her scheduled .    Thank You ,  [facility]   Centralized Scheduling

## 2024-07-09 ENCOUNTER — PATIENT MESSAGE (OUTPATIENT)
Dept: ADMINISTRATIVE | Facility: HOSPITAL | Age: 64
End: 2024-07-09
Payer: COMMERCIAL

## 2024-07-18 ENCOUNTER — PROCEDURE VISIT (OUTPATIENT)
Dept: DERMATOLOGY | Facility: CLINIC | Age: 64
End: 2024-07-18
Payer: COMMERCIAL

## 2024-07-18 DIAGNOSIS — L57.0 ACTINIC KERATOSES: Primary | ICD-10-CM

## 2024-07-26 NOTE — PROGRESS NOTES
PHOTODYNAMIC THERAPY PROCEDURE NOTE  Patient: Jose G Shafer  YOB: 1960  ATTENDING PHYSICIAN: Rocco Brown MD    PROCEDURE: photodynamic therapy for the treatment of refractory actinic keratosis    SKIN PREP: Acetone was applied to the skin vigorously with roughly woven gauze to degrease the epidermis and allow penetration of the aminolevulinic acid solution. This gauze was used to debride the skin surface until actinic keratoses were de-roofed with subsequent bleeding of heavily sun damaged areas.    LOCATION: FACE    PDT solution: 1 Levulan Kerastick(s) was(were) applied to the area, NDC: 28261-448-88     INCUBATION TIME: 90 minutes allowed for PDT solution to penetrate into actinic keratoses    PROCEDURE:     After discussion of risks, benefits, and limitations of photodynamic therapy, patient placed in seated position with Krzysztof-U light approximately 4 inches from the treatment area. Protective goggles/glasses were placed on the patient to protect the eyes from the intense light of the device. The timer was set to 17:30 and the physician activated the device. The patient was given a fan to assist with burning during the procedure. Any intensely burning areas were dabbed with cool saline gauze. They were also given a call button to summon assistance from the nursing station if any issues were experienced during treatment.     After treatment, they were provided with a broad brimmed hat if they did not bring one. The patient tolerated the procedure well, and will follow up in 2 weeks for a post PDT wound check, and assessment of weather a second PDT treatment would be beneficial for the area. Patient instructed not to get any additional sun exposure for 2 days post-op. Patient instructed to keep aloe or vaseline in the fridge and apply liberally as needed for discomfort.    Patient provided with written discharge instructions and instructed to call clinic for any concerns.       Rocco  MD Stephanie

## 2024-08-08 ENCOUNTER — OFFICE VISIT (OUTPATIENT)
Dept: FAMILY MEDICINE | Facility: CLINIC | Age: 64
End: 2024-08-08
Payer: COMMERCIAL

## 2024-08-08 ENCOUNTER — TELEPHONE (OUTPATIENT)
Dept: FAMILY MEDICINE | Facility: CLINIC | Age: 64
End: 2024-08-08

## 2024-08-08 ENCOUNTER — CLINICAL SUPPORT (OUTPATIENT)
Dept: DERMATOLOGY | Facility: CLINIC | Age: 64
End: 2024-08-08
Payer: COMMERCIAL

## 2024-08-08 ENCOUNTER — HOSPITAL ENCOUNTER (OUTPATIENT)
Dept: RADIOLOGY | Facility: HOSPITAL | Age: 64
Discharge: HOME | End: 2024-08-08
Attending: INTERNAL MEDICINE
Payer: COMMERCIAL

## 2024-08-08 VITALS
DIASTOLIC BLOOD PRESSURE: 76 MMHG | HEART RATE: 92 BPM | OXYGEN SATURATION: 97 % | SYSTOLIC BLOOD PRESSURE: 118 MMHG | WEIGHT: 233.44 LBS | BODY MASS INDEX: 33.5 KG/M2

## 2024-08-08 DIAGNOSIS — R91.1 LUNG NODULE: ICD-10-CM

## 2024-08-08 DIAGNOSIS — I25.10 CORONARY ARTERY DISEASE INVOLVING NATIVE CORONARY ARTERY OF NATIVE HEART WITHOUT ANGINA PECTORIS: ICD-10-CM

## 2024-08-08 DIAGNOSIS — Z98.61 CAD S/P PERCUTANEOUS CORONARY ANGIOPLASTY: ICD-10-CM

## 2024-08-08 DIAGNOSIS — L57.0 AK (ACTINIC KERATOSIS): Primary | ICD-10-CM

## 2024-08-08 DIAGNOSIS — G47.33 OSA ON CPAP: ICD-10-CM

## 2024-08-08 DIAGNOSIS — F41.9 ANXIETY: Chronic | ICD-10-CM

## 2024-08-08 DIAGNOSIS — I25.10 CAD S/P PERCUTANEOUS CORONARY ANGIOPLASTY: ICD-10-CM

## 2024-08-08 DIAGNOSIS — F17.210 NICOTINE DEPENDENCE, CIGARETTES, UNCOMPLICATED: ICD-10-CM

## 2024-08-08 DIAGNOSIS — Z00.00 ANNUAL PHYSICAL EXAM: Primary | ICD-10-CM

## 2024-08-08 DIAGNOSIS — I10 ESSENTIAL HYPERTENSION: Chronic | ICD-10-CM

## 2024-08-08 DIAGNOSIS — E78.2 MIXED HYPERLIPIDEMIA: Chronic | ICD-10-CM

## 2024-08-08 DIAGNOSIS — J44.9 CHRONIC OBSTRUCTIVE PULMONARY DISEASE, UNSPECIFIED COPD TYPE: ICD-10-CM

## 2024-08-08 DIAGNOSIS — F17.210 CIGARETTE SMOKER: Chronic | ICD-10-CM

## 2024-08-08 PROCEDURE — 71250 CT THORAX DX C-: CPT | Mod: TC,PO

## 2024-08-08 PROCEDURE — 3074F SYST BP LT 130 MM HG: CPT | Mod: CPTII,S$GLB,,

## 2024-08-08 PROCEDURE — 4010F ACE/ARB THERAPY RXD/TAKEN: CPT | Mod: CPTII,S$GLB,,

## 2024-08-08 PROCEDURE — 99396 PREV VISIT EST AGE 40-64: CPT | Mod: S$GLB,,,

## 2024-08-08 PROCEDURE — 71250 CT THORAX DX C-: CPT | Mod: 26,,, | Performed by: RADIOLOGY

## 2024-08-08 PROCEDURE — 3008F BODY MASS INDEX DOCD: CPT | Mod: CPTII,S$GLB,,

## 2024-08-08 PROCEDURE — 99999 PR PBB SHADOW E&M-EST. PATIENT-LVL IV: CPT | Mod: PBBFAC,,,

## 2024-08-08 PROCEDURE — 3078F DIAST BP <80 MM HG: CPT | Mod: CPTII,S$GLB,,

## 2024-08-08 PROCEDURE — 1159F MED LIST DOCD IN RCRD: CPT | Mod: CPTII,S$GLB,,

## 2024-08-08 RX ORDER — ISOSORBIDE MONONITRATE 30 MG/1
30 TABLET, EXTENDED RELEASE ORAL DAILY
Qty: 90 TABLET | Refills: 3 | Status: SHIPPED | OUTPATIENT
Start: 2024-08-08

## 2024-08-08 RX ORDER — ATORVASTATIN CALCIUM 80 MG/1
80 TABLET, FILM COATED ORAL NIGHTLY
Qty: 90 TABLET | Refills: 3 | Status: SHIPPED | OUTPATIENT
Start: 2024-08-08

## 2024-08-17 DIAGNOSIS — E78.2 MIXED HYPERLIPIDEMIA: Chronic | ICD-10-CM

## 2024-08-17 NOTE — TELEPHONE ENCOUNTER
Refill Routing Note   Medication(s) are not appropriate for processing by [facility] for the following reason(s):      Required labs outdated    [facility] action(s):  Defer Care Due:  Appointment due  Labs due            Appointments  past 12m or future 3m with PCP    Date Provider   Last Visit   6/2/2023 Christiano Guy MD   Next Visit   2/10/2025 Christiano Guy MD   ED visits in past 90 days: 0        Note composed:2:23 PM 08/17/2024

## 2024-08-17 NOTE — TELEPHONE ENCOUNTER
Care Due:                  Date            Visit Type   Department     Provider  --------------------------------------------------------------------------------                                MYCHART                              FOLLOWUP/OF  [facility]  Last Visit: 06-      FICE VISIT   MEDICINE       Christiano Guy                               -                              PRIMARY      [facility]  Next Visit: 02-      CARE ([facility])   MEDICINE       Christiano Guy                                                            Last  Test          Frequency    Reason                     Performed    Due Date  --------------------------------------------------------------------------------    Office Visit  15 months..  carvediloL, clopidogreL,   06- 08-                             ezetimibe, lisinopriL,                             sertraline...............    CBC.........  12 months..  clopidogreL..............  08- 08-    CMP.........  12 months..  ezetimibe, lisinopriL....  12-   12-    Lipid Panel.  12 months..  ezetimibe................  09- 09-    Health [facility] Embedded Care Due Messages. Reference number: [id number].   8/17/2024 1:39:20 AM CDT

## 2024-08-19 RX ORDER — EZETIMIBE 10 MG/1
TABLET ORAL
Qty: 90 TABLET | Refills: 0 | Status: SHIPPED | OUTPATIENT
Start: 2024-08-19

## 2024-08-25 NOTE — PROGRESS NOTES
"Subjective:    Patient ID:  Jose G Shafer is a 64 y.o. male who presents for follow-up of Follow-up (6 month./C/O waking up in the middle of the night drenched in sweat from "chest up", been happening for quite some time now and wanted to mention it.)      Problem List Items Addressed This Visit          Cardiac/Vascular    Essential hypertension (Chronic)    Mixed hyperlipidemia (Chronic)    CAD S/P percutaneous coronary angioplasty - Primary    Overview     5/26/22 - Luis A    The Prox LAD lesion was 99% stenosed with 50% stenosis post-intervention, with balloon angioplasty  The PCI was partially successful.    8/16/2023  Successful complex PCI of the LAD from mid to distal with 3 overlaping DAVID with IVUS guidance and rotational atherectomy as detailed below             Coronary artery disease involving native coronary artery of native heart without angina pectoris    Overview     Prev History: 6-2005 he had an inferior STEMI-->DAVID to pRCA.  LVEF preserved           Peripheral vascular disease of extremity: s/p R. SFA DCB 10/19/2015    S/P drug eluting coronary stent placement    Venous insufficiency of both lower extremities    Overview     Chronic.  Patient has varicose veins bilaterally.  Had vein stripping performed previously.  Swelling in the left leg worse than the right.  Patient cannot wear compression stockings daily because of his work.            HPI    Patient was last seen on 02/19/2024 at which time patient was status post stent.  Amlodipine was optimized.    On assessment today, the patient states that he is doing decent, but having some issues with sweating at night.     No chest pain.  Some JUAREZ  Not leg pain, but not building up muscle there.    Still not smoking - Rare cigarettes, working on stopping them - Still a pack a day  CPAP use - Trying to use it more       Objective:     Vitals:    08/29/24 0857   BP: 131/82   BP Location: Left arm   Weight: 105.4 kg (232 lb 5.8 oz)       BP " Readings from Last 5 Encounters:   08/29/24 131/82   08/08/24 118/76   05/01/24 108/73   02/19/24 (!) 159/90   09/08/23 (!) 142/90        Physical Exam  Constitutional:       Appearance: He is well-developed.   HENT:      Head: Normocephalic and atraumatic.   Neck:      Vascular: No JVD.   Cardiovascular:      Rate and Rhythm: Normal rate and regular rhythm.      Heart sounds: Normal heart sounds. No murmur heard.     No friction rub. No gallop.   Pulmonary:      Effort: Pulmonary effort is normal. No respiratory distress.      Breath sounds: Normal breath sounds. No wheezing or rales.   Abdominal:      General: Bowel sounds are normal.      Palpations: Abdomen is soft.      Tenderness: There is no abdominal tenderness. There is no guarding or rebound.   Musculoskeletal:      Cervical back: Normal range of motion and neck supple.   Skin:     General: Skin is warm and dry.   Neurological:      Mental Status: He is alert and oriented to person, place, and time.   Psychiatric:         Behavior: Behavior normal.             Current Outpatient Medications   Medication Instructions    albuterol (VENTOLIN HFA) 90 mcg/actuation inhaler 2 puffs, Inhalation, Every 4 hours PRN, Rescue    ALPRAZolam (XANAX) 0.5 mg, Oral, Daily PRN    amLODIPine (NORVASC) 5 mg, Oral, Daily    ascorbic acid (vitamin C) (VITAMIN C) 500 mg, Oral, 2 times daily    aspirin (ECOTRIN) 81 mg, Oral, Daily    atorvastatin (LIPITOR) 80 mg, Oral, Nightly    azithromycin (ZITHROMAX) 500 MG tablet One daily for yellow mucous, repeat if needed    carvediloL (COREG) 25 mg, Oral, 2 times daily with meals    cilostazoL (PLETAL) 50 mg, Oral, 2 times daily    clopidogreL (PLAVIX) 75 mg, Oral    ezetimibe (ZETIA) 10 mg tablet TAKE 1 TABLET BY MOUTH EVERY DAY    fluticasone propionate (FLONASE) 50 mcg/actuation nasal spray USE 1 SPRAY (50 MCG TOTAL) BY EACH NARE ROUTE ONCE DAILY.    fluticasone-umeclidin-vilanter (TRELEGY ELLIPTA) 200-62.5-25 mcg inhaler 1 puff,  Inhalation, Daily    isosorbide mononitrate (IMDUR) 30 mg, Oral, Daily    lisinopriL (PRINIVIL,ZESTRIL) 40 MG tablet TAKE 1 TABLET BY MOUTH EVERY DAY    MULTIVITAMIN ORAL 1 capsule, Oral, Daily    nitroGLYCERIN (NITROSTAT) 0.4 mg, Sublingual, Every 5 min PRN, 1 Tablet, Sublingual Sublingual    omega-3 fatty acids 500 mg Cap 1 capsule, Oral, Daily, 1 Capsule Oral    predniSONE (DELTASONE) 20 MG tablet Take one daily for 3 days and may repeat for shortness of breath.    sertraline (ZOLOFT) 50 mg, Oral       Lipid Panel:   Lab Results   Component Value Date    CHOL 112 (L) 09/12/2022    HDL 22 (L) 09/12/2022    LDLCALC 34.4 (L) 09/12/2022    TRIG 278 (H) 09/12/2022    CHOLHDL 19.6 (L) 09/12/2022       The ASCVD Risk score (Mili DK, et al., 2019) failed to calculate for the following reasons:    The valid total cholesterol range is 130 to 320 mg/dL    Most Recent EKG Results  Results for orders placed or performed during the hospital encounter of 08/16/23   EKG 12-LEAD on arrival to floor    Collection Time: 08/16/23 10:53 AM    Narrative    Test Reason : Z95.5,    Vent. Rate : 073 BPM     Atrial Rate : 073 BPM     P-R Int : 166 ms          QRS Dur : 106 ms      QT Int : 430 ms       P-R-T Axes : 021 -67 -10 degrees     QTc Int : 473 ms    Normal sinus rhythm  Left axis deviation  Incomplete right bundle branch block  Septal infarct ,age undetermined  Inferior infarct (cited on or before 16-AUG-2023)  Abnormal ECG  When compared with ECG of 16-AUG-2023 06:37,  Incomplete right bundle branch block has replaced Right bundle branch block  Septal infarct is now Present  Confirmed by MARILOU ELAM MD ([id number]) on 8/17/2023 9:54:18 AM    Referred By:  STACEY           Confirmed By:MARILOU ELAM MD       Most Recent Echocardiogram Results  Results for orders placed in visit on 05/05/22    Echo    Interpretation Summary  · The left ventricle is normal in size with concentric remodeling and normal systolic function.  · The estimated  ejection fraction is 65%.  · Normal left ventricular diastolic function.  · Normal right ventricular size with normal right ventricular systolic function.  · Normal central venous pressure (3 mmHg).  · The estimated PA systolic pressure is 22 mmHg.      Most Recent Nuclear Stress Test Results  Results for orders placed during the hospital encounter of 05/05/22    Nuclear Stress - Cardiology Interpreted    Interpretation Summary    Abnormal myocardial perfusion scan.    There is a mild to moderate intensity, small sized, reversible perfusion abnormality that is consistent with ischemia in the inferior wall(s).    There are no other significant perfusion abnormalities.    The gated perfusion images showed an ejection fraction of 61% at rest.    There is normal wall motion at rest.    The EKG portion of this study is negative for ischemia.    When compared to the previous study from 1/20/2020, ischemia appreciated.      Most Recent Cardiac PET Stress Test Results  No results found for this or any previous visit.      Most Recent Cardiovascular Angiogram results  Results for orders placed during the hospital encounter of 08/16/23    Cardiac catheterization    Conclusion  Procedures:  Moderate sedation  Left heart cath  Coronary angiogram  IVUS of LAD  Rotational atherectomy, angioplasty and stenting of the LAD  Modifier 22    Conclusions:  Successful complex PCI of the LAD from mid to distal with 3 overlaping DAVID with IVUS guidance and rotational atherectomy as detailed below  Otherwise nonobstructive disease  Elevated left filling pressure    Recommendations:  Longterm DAPT  Cardiac rehab  Observe overnight  High intensity stain  BB  No more fluids  4 hrs bed hours        Description of procedure:  The patient presented to the Cath Lab in a(n) elective fashion.  The patient was prepped and draped in a sterile manner.  Timeout, airway and ASA assessment were completed.  Local anesthesia with 2% lidocaine was  administered and sedation/analgesia were administered as above.    Access was obtained using the modified Seldinger technique and a micropuncture needle in the RFA with [id number] sheath. A limited angiogram confirmed appropriate access site. Diagnostic angiography was performed using JL4 and JR4 catheter(s).      Coronary anatomy:  Dominance: codominant  Left main: minimal luminal irregularities  Left anterior descending: large vessel with severe tortuosity and calcification; D1 is large. The mid LAD, past D1, has a 95% severely calcified, severely angulated stenosis (unsuccessful prior PCI May 2022). The distal LAD has an 80% segmental heavily calcified stenosis as well.  There was JIE 3 flow at baseline throughout  Ramus intermedius: absent  Left circumflex: large codominant vessel with mild diffuse disease and retroflexed origin. OM1, OM2 are large and there is a moderate sized LPL as well. Prox LCx has an eccentric calcific 50% stenosis.  Right coronary artery: moderate size vessel with moderate diffuse disease and small RPDA. The prox RCA has a widely patent stent.      Percutaneous coronary intervention:  Anticoagulation: IA/IV heparin with ACT>250s. DAPT status confirmed.  Guiding catheter: 7F CLS 3.5    Target lesions were the mid and distal LAD described above.    The LAD was wired with a William Blue wire with a Corsair Pro microcatheter with some difficulty. The William Blue was exchanged for a Rota Floppy wire. There was extensive rotation atherectomy of the LAD from mid to distal using a 1.5mm karson at 160 k rpm with severe initial decelerations. The Rota Floppy was then exchanged back to the William Blue using the microcatheter.    A 7F Guidezilla was used. Balloon predilation was performed using a 3.5 NC balloon with good expansion.  The distal through mid LAD was stented using a 2.25x38 and 3.0x38 Synergy XD DAVID in overlapping fashion. The two stents were postdilated with 2.5 nad 3.5 NC balloons at high  pressure. The mid through prox LAD was then stented with a 3.5x20  Synergy Megatron DAVID in an overlapping fashion, jailing D1. The prox LAD was postdilated using a 4.0 NC balloon at high pressure. IVUS was performed pre and post PCI for optimization with excellent results.    There was 0% residual stenosis and JIE 3 flow at the end of procedure.    The patient tolerated the procedure well and left the cath lab in stable condition.      Total contrast 230 cc  Air kerma: 2208 mGy    Hemodynamics:  LVEDP 22      Hemostasis:  6F Angioseal    Complications: none  Estimated blood loss: <50 mL    Renetta Rivera MD, [facility]  Interventional Cardiology/Structural Heart Disease  [facility]  Office: [phone]    The clinically important portion of this report has been dictated in the section above. Our Epic reporting system does not allow us to provide a clinically meaningful report without this dictation. Please beware that there may be errors and discrepancies in the computer transcription and all of the clicks required to finalize the report.  The procedure log was documented by Documenter: Jose G Mccracken RT and verified by Renetta Rivera MD.    Date: 8/19/2023  Time: 12:47 PM      Other Most Recent Cardiology Results  Results for orders placed during the hospital encounter of 08/16/23    CARDIAC MONITORING STRIPS        All pertinent data including labs, imaging, EKGs, and studies listed above were reviewed.  Patient's most recent EKG tracing was personally interpreted by this provider.    Assessment:       1. CAD S/P percutaneous coronary angioplasty    2. Coronary artery disease involving native coronary artery of native heart without angina pectoris    3. Essential hypertension    4. Mixed hyperlipidemia    5. Peripheral vascular disease of extremity: s/p R. SFA DCB 10/19/2015    6. S/P drug eluting coronary stent placement    7. Venous insufficiency of both lower extremities          Plan for treatment of the above diagnoses:     Symptoms of atypical chest pain  BP/Pulse OK today  Most recent echocardiogram reviewed personally   Having issues with persistent loose BM    Lipid panel    Echocardiogram   Nuclear stress test   Continue amlodipine 5 mg PO Daily   Continue aspirin 81 mg PO Daily  Continue atorvastatin 80 mg PO Daily   Continue carvedilol 25 mg PO BID   Continue Plavix 75 mg PO Daily    Continue Zetia 10 mg PO Daily   Continue Imdur 30 mg PO Daily   Continue lisinopril 40 mg PO Daily  GI referral for diarrhea/loose and frequent BMs  Also with concern for for gallbladder disease on exam  RUQ U/S    Assistance with smoking cessation was offered, including:  []  Medications  [x]  Counseling  []  Printed Information on Smoking Cessation  []  Referral to a Smoking Cessation Program    Patient was counseled regarding smoking for 3-10 minutes.     Continue other cardiac medications  Mediterranean Diet/Cardiovascular Exercise Program    Visit today included increased complexity associated with the care of the episodic problem(s) addressed above in addition to managing the longitudinal care of the patient due to the serious and/or complex managed problem(s) listed above.    Patient queried and all questions were answered.    F/u in 1 year to reassess      Signed:    Harjinder Perez MD  8/29/2024 4:05 PM

## 2024-08-29 ENCOUNTER — LAB VISIT (OUTPATIENT)
Dept: LAB | Facility: HOSPITAL | Age: 64
End: 2024-08-29
Payer: COMMERCIAL

## 2024-08-29 ENCOUNTER — OFFICE VISIT (OUTPATIENT)
Dept: CARDIOLOGY | Facility: CLINIC | Age: 64
End: 2024-08-29
Payer: COMMERCIAL

## 2024-08-29 VITALS
SYSTOLIC BLOOD PRESSURE: 131 MMHG | WEIGHT: 232.38 LBS | BODY MASS INDEX: 33.34 KG/M2 | DIASTOLIC BLOOD PRESSURE: 82 MMHG

## 2024-08-29 DIAGNOSIS — R10.11 RIGHT UPPER QUADRANT PAIN: ICD-10-CM

## 2024-08-29 DIAGNOSIS — Z98.61 CAD S/P PERCUTANEOUS CORONARY ANGIOPLASTY: Primary | ICD-10-CM

## 2024-08-29 DIAGNOSIS — Z00.00 ANNUAL PHYSICAL EXAM: ICD-10-CM

## 2024-08-29 DIAGNOSIS — Z95.5 S/P DRUG ELUTING CORONARY STENT PLACEMENT: ICD-10-CM

## 2024-08-29 DIAGNOSIS — I25.10 CAD S/P PERCUTANEOUS CORONARY ANGIOPLASTY: ICD-10-CM

## 2024-08-29 DIAGNOSIS — I25.10 CORONARY ARTERY DISEASE INVOLVING NATIVE CORONARY ARTERY OF NATIVE HEART WITHOUT ANGINA PECTORIS: ICD-10-CM

## 2024-08-29 DIAGNOSIS — R19.5 LOOSE STOOLS: ICD-10-CM

## 2024-08-29 DIAGNOSIS — R07.89 ATYPICAL CHEST PAIN: ICD-10-CM

## 2024-08-29 DIAGNOSIS — E78.2 MIXED HYPERLIPIDEMIA: Chronic | ICD-10-CM

## 2024-08-29 DIAGNOSIS — I73.9 PERIPHERAL VASCULAR DISEASE OF EXTREMITY: ICD-10-CM

## 2024-08-29 DIAGNOSIS — I25.10 CAD S/P PERCUTANEOUS CORONARY ANGIOPLASTY: Primary | ICD-10-CM

## 2024-08-29 DIAGNOSIS — Z98.61 CAD S/P PERCUTANEOUS CORONARY ANGIOPLASTY: ICD-10-CM

## 2024-08-29 DIAGNOSIS — R19.5 LOOSE BOWEL MOVEMENT: ICD-10-CM

## 2024-08-29 DIAGNOSIS — I10 ESSENTIAL HYPERTENSION: Chronic | ICD-10-CM

## 2024-08-29 DIAGNOSIS — I87.2 VENOUS INSUFFICIENCY OF BOTH LOWER EXTREMITIES: ICD-10-CM

## 2024-08-29 LAB
ALBUMIN SERPL BCP-MCNC: 3.8 G/DL (ref 3.5–5.2)
ALP SERPL-CCNC: 80 U/L (ref 55–135)
ALT SERPL W/O P-5'-P-CCNC: 47 U/L (ref 10–44)
ANION GAP SERPL CALC-SCNC: 14 MMOL/L (ref 8–16)
AST SERPL-CCNC: 49 U/L (ref 10–40)
BILIRUB SERPL-MCNC: 0.5 MG/DL (ref 0.1–1)
BUN SERPL-MCNC: 13 MG/DL (ref 8–23)
CALCIUM SERPL-MCNC: 10.2 MG/DL (ref 8.7–10.5)
CHLORIDE SERPL-SCNC: 98 MMOL/L (ref 95–110)
CHOLEST SERPL-MCNC: 197 MG/DL (ref 120–199)
CHOLEST/HDLC SERPL: 9 {RATIO} (ref 2–5)
CO2 SERPL-SCNC: 20 MMOL/L (ref 23–29)
COMPLEXED PSA SERPL-MCNC: 0.43 NG/ML (ref 0–4)
CREAT SERPL-MCNC: 1.2 MG/DL (ref 0.5–1.4)
EST. GFR  (NO RACE VARIABLE): >60 ML/MIN/1.73 M^2
ESTIMATED AVG GLUCOSE: 269 MG/DL (ref 68–131)
GLUCOSE SERPL-MCNC: 391 MG/DL (ref 70–110)
HBA1C MFR BLD: 11 % (ref 4–5.6)
HDLC SERPL-MCNC: 22 MG/DL (ref 40–75)
HDLC SERPL: 11.2 % (ref 20–50)
LDLC SERPL CALC-MCNC: ABNORMAL MG/DL (ref 63–159)
NONHDLC SERPL-MCNC: 175 MG/DL
POTASSIUM SERPL-SCNC: 4.7 MMOL/L (ref 3.5–5.1)
PROT SERPL-MCNC: 7.2 G/DL (ref 6–8.4)
SODIUM SERPL-SCNC: 132 MMOL/L (ref 136–145)
TRIGL SERPL-MCNC: 1352 MG/DL (ref 30–150)
TSH SERPL DL<=0.005 MIU/L-ACNC: 1.33 UIU/ML (ref 0.4–4)
TSH SERPL DL<=0.005 MIU/L-ACNC: 1.33 UIU/ML (ref 0.4–4)

## 2024-08-29 PROCEDURE — 84153 ASSAY OF PSA TOTAL: CPT

## 2024-08-29 PROCEDURE — 84443 ASSAY THYROID STIM HORMONE: CPT

## 2024-08-29 PROCEDURE — 36415 COLL VENOUS BLD VENIPUNCTURE: CPT | Mod: PO

## 2024-08-29 PROCEDURE — 99999 PR PBB SHADOW E&M-EST. PATIENT-LVL IV: CPT | Mod: PBBFAC,,, | Performed by: INTERNAL MEDICINE

## 2024-08-29 PROCEDURE — 80061 LIPID PANEL: CPT

## 2024-08-29 PROCEDURE — 80053 COMPREHEN METABOLIC PANEL: CPT

## 2024-08-29 PROCEDURE — 83036 HEMOGLOBIN GLYCOSYLATED A1C: CPT

## 2024-08-30 DIAGNOSIS — E11.9 TYPE 2 DIABETES MELLITUS WITHOUT COMPLICATION, WITHOUT LONG-TERM CURRENT USE OF INSULIN: Primary | ICD-10-CM

## 2024-08-30 RX ORDER — METFORMIN HYDROCHLORIDE 500 MG/1
1000 TABLET ORAL 2 TIMES DAILY WITH MEALS
Qty: 360 TABLET | Refills: 3 | Status: SHIPPED | OUTPATIENT
Start: 2024-08-30 | End: 2025-08-30

## 2024-08-30 RX ORDER — DULAGLUTIDE 0.75 MG/.5ML
0.75 INJECTION, SOLUTION SUBCUTANEOUS
Qty: 4 PEN | Refills: 11 | Status: SHIPPED | OUTPATIENT
Start: 2024-08-30 | End: 2025-08-30

## 2024-09-04 ENCOUNTER — PATIENT MESSAGE (OUTPATIENT)
Dept: CARDIOLOGY | Facility: HOSPITAL | Age: 64
End: 2024-09-04
Payer: COMMERCIAL

## 2024-09-06 ENCOUNTER — OFFICE VISIT (OUTPATIENT)
Dept: GASTROENTEROLOGY | Facility: CLINIC | Age: 64
End: 2024-09-06
Payer: COMMERCIAL

## 2024-09-06 ENCOUNTER — HOSPITAL ENCOUNTER (OUTPATIENT)
Dept: RADIOLOGY | Facility: HOSPITAL | Age: 64
Discharge: HOME | End: 2024-09-06
Attending: INTERNAL MEDICINE
Payer: COMMERCIAL

## 2024-09-06 ENCOUNTER — TELEPHONE (OUTPATIENT)
Dept: CARDIOLOGY | Facility: CLINIC | Age: 64
End: 2024-09-06
Payer: COMMERCIAL

## 2024-09-06 ENCOUNTER — HOSPITAL ENCOUNTER (OUTPATIENT)
Dept: CARDIOLOGY | Facility: HOSPITAL | Age: 64
Discharge: HOME | End: 2024-09-06
Attending: INTERNAL MEDICINE
Payer: COMMERCIAL

## 2024-09-06 ENCOUNTER — OFFICE VISIT (OUTPATIENT)
Dept: FAMILY MEDICINE | Facility: CLINIC | Age: 64
End: 2024-09-06
Payer: COMMERCIAL

## 2024-09-06 VITALS — WEIGHT: 227.06 LBS | BODY MASS INDEX: 32.51 KG/M2 | HEIGHT: 70 IN

## 2024-09-06 VITALS
BODY MASS INDEX: 32.65 KG/M2 | HEART RATE: 79 BPM | OXYGEN SATURATION: 97 % | SYSTOLIC BLOOD PRESSURE: 130 MMHG | DIASTOLIC BLOOD PRESSURE: 74 MMHG | WEIGHT: 227.5 LBS

## 2024-09-06 VITALS — BODY MASS INDEX: 33.21 KG/M2 | HEIGHT: 70 IN | WEIGHT: 232 LBS

## 2024-09-06 DIAGNOSIS — E78.2 MIXED HYPERLIPIDEMIA: Chronic | ICD-10-CM

## 2024-09-06 DIAGNOSIS — R19.5 LOOSE STOOLS: ICD-10-CM

## 2024-09-06 DIAGNOSIS — Z87.898 HISTORY OF SHORTNESS OF BREATH: ICD-10-CM

## 2024-09-06 DIAGNOSIS — E11.9 TYPE 2 DIABETES MELLITUS WITHOUT COMPLICATION, WITHOUT LONG-TERM CURRENT USE OF INSULIN: Primary | ICD-10-CM

## 2024-09-06 DIAGNOSIS — R10.11 RIGHT UPPER QUADRANT PAIN: ICD-10-CM

## 2024-09-06 DIAGNOSIS — R79.89 ELEVATED LFTS: ICD-10-CM

## 2024-09-06 DIAGNOSIS — Z79.01 LONG TERM (CURRENT) USE OF ANTICOAGULANTS: Primary | ICD-10-CM

## 2024-09-06 DIAGNOSIS — Z98.61 CAD S/P PERCUTANEOUS CORONARY ANGIOPLASTY: ICD-10-CM

## 2024-09-06 DIAGNOSIS — R07.9 NONSPECIFIC CHEST PAIN: ICD-10-CM

## 2024-09-06 DIAGNOSIS — I25.10 CAD S/P PERCUTANEOUS CORONARY ANGIOPLASTY: ICD-10-CM

## 2024-09-06 DIAGNOSIS — R19.5 LOOSE BOWEL MOVEMENT: Primary | ICD-10-CM

## 2024-09-06 DIAGNOSIS — Z86.010 HISTORY OF COLON POLYPS: ICD-10-CM

## 2024-09-06 DIAGNOSIS — Z79.01 ANTICOAGULANT LONG-TERM USE: ICD-10-CM

## 2024-09-06 DIAGNOSIS — Z87.19 HISTORY OF GASTROESOPHAGEAL REFLUX (GERD): ICD-10-CM

## 2024-09-06 DIAGNOSIS — R10.11 RUQ ABDOMINAL PAIN: ICD-10-CM

## 2024-09-06 DIAGNOSIS — I10 ESSENTIAL HYPERTENSION: Chronic | ICD-10-CM

## 2024-09-06 DIAGNOSIS — R07.89 ATYPICAL CHEST PAIN: ICD-10-CM

## 2024-09-06 DIAGNOSIS — R63.4 WEIGHT LOSS: ICD-10-CM

## 2024-09-06 DIAGNOSIS — G47.33 OSA ON CPAP: ICD-10-CM

## 2024-09-06 DIAGNOSIS — D53.9 MACROCYTIC ANEMIA: ICD-10-CM

## 2024-09-06 DIAGNOSIS — Z87.898 HISTORY OF NIGHT SWEATS: ICD-10-CM

## 2024-09-06 PROCEDURE — 76705 ECHO EXAM OF ABDOMEN: CPT | Mod: TC,PO

## 2024-09-06 PROCEDURE — 93016 CV STRESS TEST SUPVJ ONLY: CPT | Mod: ,,, | Performed by: INTERNAL MEDICINE

## 2024-09-06 PROCEDURE — A9502 TC99M TETROFOSMIN: HCPCS | Mod: PO | Performed by: INTERNAL MEDICINE

## 2024-09-06 PROCEDURE — 99999 PR PBB SHADOW E&M-EST. PATIENT-LVL IV: CPT | Mod: PBBFAC,,, | Performed by: NURSE PRACTITIONER

## 2024-09-06 PROCEDURE — 63600175 PHARM REV CODE 636 W HCPCS: Mod: PO | Performed by: INTERNAL MEDICINE

## 2024-09-06 PROCEDURE — 93017 CV STRESS TEST TRACING ONLY: CPT | Mod: PO

## 2024-09-06 PROCEDURE — 99999 PR PBB SHADOW E&M-EST. PATIENT-LVL IV: CPT | Mod: PBBFAC,,,

## 2024-09-06 PROCEDURE — 78452 HT MUSCLE IMAGE SPECT MULT: CPT | Mod: 26,,, | Performed by: INTERNAL MEDICINE

## 2024-09-06 PROCEDURE — 76705 ECHO EXAM OF ABDOMEN: CPT | Mod: 26,,, | Performed by: RADIOLOGY

## 2024-09-06 PROCEDURE — 78452 HT MUSCLE IMAGE SPECT MULT: CPT | Mod: PO

## 2024-09-06 PROCEDURE — 93018 CV STRESS TEST I&R ONLY: CPT | Mod: ,,, | Performed by: INTERNAL MEDICINE

## 2024-09-06 RX ORDER — PANTOPRAZOLE SODIUM 20 MG/1
20 TABLET, DELAYED RELEASE ORAL
Qty: 90 TABLET | Refills: 3 | Status: SHIPPED | OUTPATIENT
Start: 2024-09-06

## 2024-09-06 RX ORDER — HYDROGEN PEROXIDE 3 %
20 SOLUTION, NON-ORAL MISCELLANEOUS
COMMUNITY
End: 2024-09-06 | Stop reason: ALTCHOICE

## 2024-09-06 RX ORDER — DICYCLOMINE HYDROCHLORIDE 10 MG/1
10 CAPSULE ORAL
Qty: 120 CAPSULE | Refills: 0 | Status: SHIPPED | OUTPATIENT
Start: 2024-09-06 | End: 2025-09-06

## 2024-09-06 RX ORDER — REGADENOSON 0.08 MG/ML
0.4 INJECTION, SOLUTION INTRAVENOUS
Status: COMPLETED | OUTPATIENT
Start: 2024-09-06 | End: 2024-09-06

## 2024-09-06 RX ADMIN — REGADENOSON 0.4 MG: 0.08 INJECTION, SOLUTION INTRAVENOUS at 02:09

## 2024-09-06 RX ADMIN — TETROFOSMIN 11 MILLICURIE: 1.38 INJECTION, POWDER, LYOPHILIZED, FOR SOLUTION INTRAVENOUS at 01:09

## 2024-09-06 RX ADMIN — TETROFOSMIN 33 MILLICURIE: 1.38 INJECTION, POWDER, LYOPHILIZED, FOR SOLUTION INTRAVENOUS at 01:09

## 2024-09-06 NOTE — PROGRESS NOTES
"Subjective:       Patient ID: Jose G Shafer is a 64 y.o. male Body mass index is 32.58 kg/m².    Chief Complaint: Diarrhea    This patient is new to me.  Referring Provider: Dr. Harjinder Perez for loose stools.  Established patient of Dr. Syed (last in 2017).     Patient is scheduled for limited abdominal ultrasound to be done at 11:30 am today as ordered by Dr. Perez. Patient is here with his wife, whom assisted with history.    GI Problem  The primary symptoms include weight loss (not trying to lose weight, has lost ~18 lbs since ~5/2024; has not started diabetes medications (trulicity & metformin)), abdominal pain (occasional RUQ/lower chest pain described as "feels like someone punched me in my gut"; lasts 15-20 minutes, occurs every few days; denies currently), nausea (occasional, mild) and diarrhea (CHIEF COMPLAINT). Primary symptoms do not include fever, fatigue, vomiting, melena, hematemesis, hematochezia or dysuria.   The diarrhea began more than 1 week ago (started at least 12/2023). Diarrhea characteristics: stool starts out rated 4 on bristol stool scale and then progresses to 5 and 7. The diarrhea occurs 2 to 4 times per day (triggered by eating, fecal urgency). Risk factors: metformin use (1000 mg BID)- has not started yet; antibiotic z-pack PRN last took ~7/2024; denies recent hospitalization, foreign travel, ill contacts, or suspect food intake.   The illness does not include chills, dysphagia, odynophagia or constipation. Significant associated medical issues include GERD (controlled with taking OTC nexium 20 mg once daily). Associated medical issues do not include inflammatory bowel disease or diverticulitis.       Review of Systems   Constitutional:  Positive for diaphoresis (at night sweats from chest and up; seeing cardiology for it) and weight loss (not trying to lose weight, has lost ~18 lbs since ~5/2024; has not started diabetes medications (trulicity & metformin)). " "Negative for appetite change, chills, fatigue and fever.   HENT:  Negative for sore throat and trouble swallowing.    Respiratory:  Positive for shortness of breath (seeing pulmonology; gradually worsening; occurs with activity; denies currently). Negative for cough and choking.    Gastrointestinal:  Positive for abdominal pain (occasional RUQ/lower chest pain described as "feels like someone punched me in my gut"; lasts 15-20 minutes, occurs every few days; denies currently), diarrhea (CHIEF COMPLAINT) and nausea (occasional, mild). Negative for anal bleeding, blood in stool, constipation, dysphagia, hematemesis, hematochezia, melena, rectal pain and vomiting.   Genitourinary:  Negative for difficulty urinating, dysuria and flank pain.   Neurological:  Negative for weakness.       Past Medical History:   Diagnosis Date    Anticoagulant long-term use     Anxiety     BCC (basal cell carcinoma)     CAD (coronary artery disease)     Colon polyp     Erectile dysfunction     GERD (gastroesophageal reflux disease)     Hepatic steatosis     History of kidney stones     History of pneumonia 12/2022    HLD (hyperlipidemia)     HTN (hypertension)     Impaired fasting glucose     Lateral epicondylitis of left elbow 07/18/2019    Myocardial infarction     PONV (postoperative nausea and vomiting)     Sleep apnea     uses cpap     Past Surgical History:   Procedure Laterality Date    ANGIOGRAM, CORONARY, WITH LEFT HEART CATHETERIZATION Left 05/26/2022    Procedure: Angiogram, Coronary, with Left Heart Cath;  Surgeon: Christa Gunn MD;  Location: [facility] CATH;  Service: Cardiology;  Laterality: Left;    ANGIOGRAM, CORONARY, WITH LEFT HEART CATHETERIZATION  08/16/2023    Procedure: Left Heart Cath;  Surgeon: Renetta Rivera MD;  Location: [facility] CATH;  Service: Cardiology;;    ANGIOPLASTY      AORTOGRAPHY WITH EXTREMITY RUNOFF  11/16/2021    Procedure: AORTOGRAM, WITH EXTREMITY RUNOFF;  Surgeon: Blanca Arzola MD;  Location: [facility] CATH;  " Service: Cardiovascular;;    AORTOGRAPHY WITH SERIALOGRAPHY N/A 11/22/2019    Procedure: AORTOGRAM, WITH SERIALOGRAPHY;  Surgeon: Blanca Arzola MD;  Location: [facility] CATH;  Service: Cardiovascular;  Laterality: N/A;    AORTOGRAPHY WITH SERIALOGRAPHY N/A 12/13/2019    Procedure: AORTOGRAM, WITH SERIALOGRAPHY;  Surgeon: Blanca Arzola MD;  Location: [facility] CATH;  Service: Cardiovascular;  Laterality: N/A;    AORTOGRAPHY WITH SERIALOGRAPHY  12/17/2020    Procedure: AORTOGRAM, WITH SERIALOGRAPHY;  Surgeon: Blanca Arzola MD;  Location: [facility] CATH;  Service: Cardiovascular;;    ATHERECTOMY, CORONARY  08/16/2023    Procedure: Atherectomy LAD (Rotoblator);  Surgeon: Renetta Rivera MD;  Location: [facility] CATH;  Service: Cardiology;;    CARDIAC SURGERY  2005    stents placed    COLONOSCOPY N/A 07/17/2017    Procedure: COLONOSCOPY Miralax;  Surgeon: Jeremiah Syed Jr., MD;  Location: [facility] ENDO;  Service: Endoscopy;  Laterality: N/A; repeat in 5 years for surveillance    IVUS, CORONARY  08/16/2023    Procedure: IVUS LAD;  Surgeon: Renetta Rivera MD;  Location: [facility] CATH;  Service: Cardiology;;    KNEE ARTHROSCOPY W/ MENISCECTOMY      PERCUTANEOUS CORONARY INTERVENTION, ARTERY  08/16/2023    Procedure: DAVID LAD;  Surgeon: Renetta Rivera MD;  Location: [facility] CATH;  Service: Cardiology;;    PERCUTANEOUS TRANSLUMINAL ANGIOPLASTY N/A 11/22/2019    Procedure: Pta (Angioplasty, Percutaneous, Transluminal);  Surgeon: Blanca Arzola MD;  Location: [facility] CATH;  Service: Cardiovascular;  Laterality: N/A;    PERCUTANEOUS TRANSLUMINAL ANGIOPLASTY N/A 12/13/2019    Procedure: Pta (Angioplasty, Percutaneous, Transluminal);  Surgeon: Blanca Arzola MD;  Location: [facility] CATH;  Service: Cardiovascular;  Laterality: N/A;    PERCUTANEOUS TRANSLUMINAL ANGIOPLASTY  11/16/2021    Procedure: PTA (ANGIOPLASTY, PERCUTANEOUS, TRANSLUMINAL);  Surgeon: Blanca Arzola MD;  Location: [facility] CATH;  Service: Cardiovascular;;    VARICOSE VEIN SURGERY      left saphenous     Family History    Problem Relation Name Age of Onset    No Known Problems Mother      Stomach cancer Father      No Known Problems Sister      Prostate cancer Brother 2     Diabetes Brother 2     Coronary artery disease Brother 2         premature    Heart disease Brother 2     Hypertension Brother 2     No Known Problems Maternal Aunt      No Known Problems Maternal Uncle      No Known Problems Paternal Aunt      No Known Problems Paternal Uncle      No Known Problems Maternal Grandmother      No Known Problems Maternal Grandfather      No Known Problems Paternal Grandmother      No Known Problems Paternal Grandfather      Colon cancer Neg Hx      Anemia Neg Hx      Arrhythmia Neg Hx      Asthma Neg Hx      Clotting disorder Neg Hx      Fainting Neg Hx      Heart attack Neg Hx      Heart disease Neg Hx      Heart failure Neg Hx      Hyperlipidemia Neg Hx      Hypertension Neg Hx      Stroke Neg Hx      Atrial Septal Defect Neg Hx      Ulcerative colitis Neg Hx      Crohn's disease Neg Hx      Celiac disease Neg Hx       Social History     Tobacco Use    Smoking status: Every Day     Current packs/day: 0.00     Average packs/day: 0.5 packs/day for 44.0 years (22.0 ttl pk-yrs)     Types: Cigarettes     Start date: 10/10/1984     Last attempt to quit: 2023     Years since quittin.6    Smokeless tobacco: Never   Substance Use Topics    Alcohol use: Yes     Alcohol/week: 12.0 standard drinks of alcohol     Types: 12 Cans of beer per week    Drug use: Never     Wt Readings from Last 10 Encounters:   24 103 kg (227 lb 1.2 oz)   24 103.2 kg (227 lb 8.2 oz)   24 105.4 kg (232 lb 5.8 oz)   24 105.9 kg (233 lb 7.5 oz)   24 111.1 kg (245 lb)   24 110.2 kg (242 lb 15.2 oz)   23 112.8 kg (248 lb 10.9 oz)   23 108.9 kg (240 lb)   23 111.6 kg (246 lb 0.5 oz)   23 112.8 kg (248 lb 10.9 oz)     Lab Results   Component Value Date    WBC 10.72 2023    HGB 13.0 (L) 2023     HCT 37.7 (L) 08/17/2023    MCV 94 08/17/2023     08/17/2023     CMP  Sodium   Date Value Ref Range Status   08/29/2024 132 (L) 136 - 145 mmol/L Final     Potassium   Date Value Ref Range Status   08/29/2024 4.7 3.5 - 5.1 mmol/L Final     Chloride   Date Value Ref Range Status   08/29/2024 98 95 - 110 mmol/L Final     CO2   Date Value Ref Range Status   08/29/2024 20 (L) 23 - 29 mmol/L Final     Glucose   Date Value Ref Range Status   08/29/2024 391 (H) 70 - 110 mg/dL Final     BUN   Date Value Ref Range Status   08/29/2024 13 8 - 23 mg/dL Final     Creatinine   Date Value Ref Range Status   08/29/2024 1.2 0.5 - 1.4 mg/dL Final     Calcium   Date Value Ref Range Status   08/29/2024 10.2 8.7 - 10.5 mg/dL Final     Total Protein   Date Value Ref Range Status   08/29/2024 7.2 6.0 - 8.4 g/dL Final     Albumin   Date Value Ref Range Status   08/29/2024 3.8 3.5 - 5.2 g/dL Final     Total Bilirubin   Date Value Ref Range Status   08/29/2024 0.5 0.1 - 1.0 mg/dL Final     Comment:     For infants and newborns, interpretation of results should be based  on gestational age, weight and in agreement with clinical  observations.    Premature Infant recommended reference ranges:  Up to 24 hours.............<8.0 mg/dL  Up to 48 hours............<12.0 mg/dL  3-5 days..................<15.0 mg/dL  6-29 days.................<15.0 mg/dL       Alkaline Phosphatase   Date Value Ref Range Status   08/29/2024 80 55 - 135 U/L Final     AST   Date Value Ref Range Status   08/29/2024 49 (H) 10 - 40 U/L Final     ALT   Date Value Ref Range Status   08/29/2024 47 (H) 10 - 44 U/L Final     Anion Gap   Date Value Ref Range Status   08/29/2024 14 8 - 16 mmol/L Final     eGFR if    Date Value Ref Range Status   06/10/2022 >60 >60 mL/min/1.73 m^2 Final     eGFR if non    Date Value Ref Range Status   06/10/2022 >60 >60 mL/min/1.73 m^2 Final     Comment:     Calculation used to obtain the estimated glomerular  filtration  rate (eGFR) is the CKD-EPI equation.        Lab Results   Component Value Date    TSH 1.327 08/29/2024    TSH 1.327 08/29/2024     Reviewed prior medical records including radiology report of 8/8/2024 CT chest; 8/29/2024 visit note with cardiology; & endoscopy history (see surgical history/procedures).    Objective:      Physical Exam  Vitals and nursing note reviewed.   Constitutional:       General: He is not in acute distress.     Appearance: Normal appearance. He is well-developed. He is not diaphoretic.   HENT:      Mouth/Throat:      Lips: Pink. No lesions.      Mouth: Mucous membranes are moist. No oral lesions.      Tongue: No lesions.      Pharynx: Oropharynx is clear. No pharyngeal swelling or posterior oropharyngeal erythema.   Eyes:      General: No scleral icterus.     Conjunctiva/sclera: Conjunctivae normal.   Pulmonary:      Effort: Pulmonary effort is normal. No respiratory distress.      Breath sounds: Normal breath sounds. No wheezing.   Abdominal:      General: Bowel sounds are normal. There is no distension or abdominal bruit.      Palpations: Abdomen is soft. Abdomen is not rigid. There is no mass.      Tenderness: There is abdominal tenderness (mild) in the right upper quadrant and epigastric area. There is no guarding or rebound.   Skin:     General: Skin is warm and dry.      Coloration: Skin is not jaundiced or pale.      Findings: No erythema or rash.   Neurological:      Mental Status: He is alert and oriented to person, place, and time.   Psychiatric:         Behavior: Behavior normal.         Thought Content: Thought content normal.         Judgment: Judgment normal.         Assessment:       1. Loose bowel movement    2. History of colon polyps    3. Weight loss    4. RUQ abdominal pain    5. Nonspecific chest pain    6. History of gastroesophageal reflux (GERD)    7. Elevated LFTs    8. Anticoagulant long-term use    9. Macrocytic anemia    10. History of night sweats     11. History of shortness of breath        Plan:       Loose bowel movement  - schedule Colonoscopy, discussed procedure with the patient, including risks and benefits, patient verbalized understanding  -     IgA; Future; Expected date: 09/06/2024  -     Tissue Transglutaminase, IgA; Future; Expected date: 09/06/2024  -     TSH; Future; Expected date: 09/06/2024  -     Stool Exam-Ova,Cysts,Parasites; Future; Expected date: 09/06/2024  -     Giardia / Cryptosporidum, EIA; Future; Expected date: 09/06/2024  -     Stool culture; Future; Expected date: 09/06/2024  -     Clostridium difficile EIA; Future; Expected date: 09/06/2024  -   START  dicyclomine (BENTYL) 10 MG capsule; Take 1 capsule (10 mg total) by mouth before meals and at bedtime as needed (abdominal cramping/pain).  Dispense: 120 capsule; Refill: 0    History of colon polyps  - schedule Colonoscopy, discussed procedure with the patient, including risks and benefits, patient verbalized understanding    Weight loss  -     IgA; Future; Expected date: 09/06/2024  -     Tissue Transglutaminase, IgA; Future; Expected date: 09/06/2024  -     TSH; Future; Expected date: 09/06/2024  - schedule EGD, discussed procedure with patient, including risks and benefits, patient verbalized understanding  - schedule Colonoscopy, discussed procedure with the patient, including risks and benefits, patient verbalized understanding  - encouraged PO intake and daily calorie counts to ensure adequate nutrition is taken in, recommend at least 2,000 calories a day  - recommend nutritional drinks, such as Boost, Ensure or Glucerna, to supplement nutrition needs  - Possible CT scan of abdomen and pelvis pending results of testing and if symptoms persist    RUQ abdominal pain  - schedule EGD, discussed procedure with patient, including risks and benefits, patient verbalized understanding  - avoid/minimize use of NSAIDs- since they can cause GI upset, bleeding and/or ulcers. If NSAID  must be taken, recommend take with food.  - COMPLETE ULTRASOUND AS SCHEDULED FOR LATER TODAY  - schedule EGD, discussed procedure with patient, including risks and benefits, patient verbalized understanding    Nonspecific chest pain  - follow-up with PCP &/or cardiologist for continued evaluation and management ASAP  - if experiencing symptoms of headache, chest pain, shortness of breath, and/or blurred vision, recommend going to ER for further evaluation and management    History of gastroesophageal reflux (GERD)  - DISCONTINUE Nexium due to possible drug-to-drug interaction with Plavix  -  START   pantoprazole (PROTONIX) 20 MG tablet; Take 1 tablet (20 mg total) by mouth before breakfast.  Dispense: 90 tablet; Refill: 3  - schedule EGD, discussed procedure with patient, including risks and benefits, patient verbalized understanding    Elevated LFTs  - COMPLETE ULTRASOUND AS SCHEDULED FOR LATER TODAY  - minimize/avoid alcohol and tylenol products, & follow-up with PCP for continued evaluation and management; if specialist is needed, recommend seeing hepatology.    Anticoagulant long-term use  - anticoagulant(s) will likely need to be held for endoscopy, nurse will confirm with endoscopist, cardiologist, and/or PCP.    Macrocytic anemia  Recommend follow-up with Primary Care Provider for continued evaluation and management.    History of night sweats  Recommend follow-up with Primary Care Provider for continued evaluation and management.    History of shortness of breath  - follow-up with PCP/pulmonology for continued evaluation and management ASAP  - if experiencing symptoms of headache, chest pain, severe/persistent shortness of breath, dizziness, and/or blurred vision, recommend going to ER for further evaluation and management    Follow up in about 1 month (around 10/6/2024), or if symptoms worsen or fail to improve.      If no improvement in symptoms or symptoms worsen, call/follow-up at clinic or go to  ER.        38 minutes of total time spent on the encounter, which includes face to face time and non-face to face time preparing to see the patient (e.g., review of tests), Obtaining and/or reviewing separately obtained history, Documenting clinical information in the electronic or other health record, Independently interpreting results (not separately reported) and communicating results to the patient/family/caregiver, or Care coordination (not separately reported).

## 2024-09-06 NOTE — TELEPHONE ENCOUNTER
----- Message from Harjinder Perez MD sent at 9/6/2024  2:54 PM CDT -----  No evidence of acute gall bladder disease, please reach out to PCP to discuss any follow up needs for liver findings.

## 2024-09-06 NOTE — PROGRESS NOTES
Name: Jose G Shafer  MRN: [id number]  : 1960  PCP: Christiano Guy MD    HPI    Patient follows with Dr. Guy, known to me. He presents for follow up on his lab work. His A1c was 11.0. He will be started on metformin 1000 mg BID and Trulicity 0.75 weekly. He does mention some unintentional weight loss of about 15 pounds over the last few months. His Lipid panel showed extremely elevated triglycerides. He is going for repeat lipid panel this morning per cardiology. He will follow up with Endocrine and diabetic education in the coming weeks.     Review of Systems   Constitutional:  Positive for unexpected weight change.   Respiratory:  Negative for shortness of breath.    Cardiovascular:  Negative for chest pain.   Gastrointestinal:  Negative for nausea and vomiting.       Patient Active Problem List   Diagnosis    Medial meniscus tear    Chondromalacia of right knee    S/P arthroscopic knee surgery    Essential hypertension    Mixed hyperlipidemia    Coronary artery disease involving native coronary artery of native heart without angina pectoris    Erectile dysfunction    Impaired fasting glucose    Peripheral vascular disease of extremity: s/p R. SFA DCB 10/19/2015    Venous insufficiency of both lower extremities    Left elbow pain    Lateral epicondylitis of left elbow    Cigarette smoker    Prediabetes    Encounter for prostate cancer screening    Anxiety    Other nonspecific abnormal finding of lung field    Pulmonary nodules    CAD S/P percutaneous coronary angioplasty    LEAH on CPAP    Solitary pulmonary nodule    Chronic obstructive pulmonary disease    Asbestos exposure    Asbestosis    Cigarette nicotine dependence in remission    S/P drug eluting coronary stent placement       Vitals:    24 0843   BP: 130/74   Pulse: 79       Physical Exam  Constitutional:       General: He is not in acute distress.     Appearance: He is well-developed.   HENT:      Head: Normocephalic and  atraumatic.      Right Ear: External ear normal.      Left Ear: External ear normal.   Eyes:      Conjunctiva/sclera: Conjunctivae normal.      Pupils: Pupils are equal, round, and reactive to light.   Neck:      Thyroid: No thyromegaly.   Cardiovascular:      Rate and Rhythm: Normal rate and regular rhythm.      Pulses: Normal pulses.      Heart sounds: Normal heart sounds, S1 normal and S2 normal.   Pulmonary:      Effort: Pulmonary effort is normal. No respiratory distress.      Breath sounds: Normal breath sounds.   Chest:      Chest wall: No tenderness.   Musculoskeletal:         General: No swelling or tenderness. Normal range of motion.      Cervical back: Normal range of motion and neck supple.   Skin:     General: Skin is warm and dry.      Coloration: Skin is not jaundiced or pale.   Neurological:      General: No focal deficit present.      Mental Status: He is alert and oriented to person, place, and time.      Cranial Nerves: No cranial nerve deficit.   Psychiatric:         Mood and Affect: Mood normal.         Behavior: Behavior normal.         1. Type 2 diabetes mellitus without complication, without long-term current use of insulin  -     HEMOGLOBIN A1C; Future; Expected date: 12/06/2024  He is scheduled to follow up with diabetic education and endocrine. He will start diabetic medications today. Follow up in 3 months for repeat A1c    2. Essential hypertension   Stable with current medications    3. Mixed hyperlipidemia  Going for repeat lipid panel this morning. Continue with atorvastatin 80 mg and zetia 10 mg. Follows with cardiology. Going for stress test today    4. LEAH on CPAP   Stable      Follow up in 3 months       WILLIAM Licona  09/06/2024

## 2024-09-06 NOTE — PATIENT INSTRUCTIONS
Acid Reflux and GERD in Adults Discharge Instructions   About this topic   GERD stands for gastroesophageal reflux disease. It is sometimes called reflux or acid reflux. Acid reflux happens when your stomach acid backs up into your esophagus, the tube that carries your food from your mouth to your stomach. This can be uncomfortable. You may have stomach or chest pain (heartburn), trouble swallowing, or an upset stomach. Some people have a cough or sore throat.  Most of the time, you can use over-the-counter medicines to help with this problem.       What care is needed at home?   Ask your doctor what you need to do when you go home. Make sure you ask questions if you do not understand what the doctor says.  Raise the head of your bed by 6 to 8 inches (15 to 20 cm). Use wood or rubber blocks under 2 legs or try a foam wedge under your mattress. Just sleeping with your head raised on pillows is not enough.  Avoid beer, wine, and mixed drinks and avoid caffeine.  Keep a healthy weight. If you are too heavy, lose weight.  If you smoke, try to quit. Your doctor or nurse can help.  Keep a diary of your signs. Write down what you had to eat before you had reflux. This will help you learn which foods cause you problems. For some people, they need to avoid coffee, chocolate, alcohol, spicy or fatty foods, or peppermint.  Avoid eating for 2 to 3 hours before bedtime. Lying down after you eat can make reflux worse.  Avoid belts and clothes that are too tight.  What follow-up care is needed?   Your doctor may ask you to make visits to the office to check on your progress. Be sure to keep these visits.  What drugs may be needed?   The doctor may order drugs to:  Relieve heartburn  Prevent reflux  Lessen acid production  Heal the esophageal lining  Will physical activity be limited?   Your physical activities will not be limited.  What problems could happen?   Asthma  Precancerous changes in the food pipe  Long-term cough  Dental  problems  Higher risk of cancer of the food pipe. This is esophageal cancer.  Narrowing of the food pipe. This is a stricture.  Open sore in the food pipe. This is an ulcer.  When do I need to call the doctor?   You have signs of a heart attack, which may include:  Severe chest pain, pressure, or discomfort with:  Breathing trouble, sweating, upset stomach, or cold, clammy skin.  Pain in your arms, back, or jaw.  Worse pain with activity like walking up stairs.  Fast or irregular heartbeat.  Feeling dizzy, faint, or weak.  You have sudden, severe belly pain or the belly pain is constant.  You have blood in the undigested food and acid that comes up, or stool that looks red, black, or like tar.  You feel like your food gets stuck or you have pain when you swallow.  You lose weight when you are not trying to.  You choke when you are eating.  Your reflux is very bad, very frequent, or not helped by over-the-counter medicines.  You keep throwing up.  Teach Back: Helping You Understand   The Teach Back Method helps you understand the information we are giving you. After you talk with the staff, tell them in your own words what you learned. This helps to make sure the staff has described each thing clearly. It also helps to explain things that may have been confusing. Before going home, make sure you can do these:  I can tell you about my condition.  I can tell you what changes I need to make with my eating habits to ease the reflux.  I can tell you what I will do if I am throwing up fluid that looks like blood or coffee grounds.  Where can I learn more?   American Academy of Family Physicians  [email or web address]   [facility] Choices  [email or web address]://[email or web address].uk/conditions/heartburn-and-acid-[email or web address]   Last Reviewed Date   2021-06-09  Consumer Information Use and Disclaimer   This information is not specific medical advice and does not replace information you receive from your health care provider. This  is only a brief summary of general information. It does NOT include all information about conditions, illnesses, injuries, tests, procedures, treatments, therapies, discharge instructions or life-style choices that may apply to you. You must talk with your health care provider for complete information about your health and treatment options. This information should not be used to decide whether or not to accept your health care providers advice, instructions or recommendations. Only your health care provider has the knowledge and training to provide advice that is right for you.  Copyright   Copyright © 2021 UpToDate, Inc. and its affiliates and/or licensors. All rights reserved.    Diarrhea in Adolescents and Adults   The Basics   Written by the doctors and editors at [employer]   What is diarrhea? -- Diarrhea describes bowel movements that are runny or watery, and happen 3 or more times in a day. Diarrhea is very common. Most adolescents and adults have diarrhea about 4 times a year. Just about everyone has it at some point.  What causes diarrhea? -- Diarrhea can be caused by:  Viruses  Bacteria that live in food or water  Parasites, such as tiny worms that you can catch in some countries  Side effects from some medicines  Problems digesting certain types of food  Diseases that harm the digestive system (figure 1)  Is there anything I can do on my own to get better? -- Yes. Here are some things you can try at home:  Drink a lot of liquids that have water, salt, and sugar. Good choices are water mixed with juice, flavored soda, and soup broth. If you are drinking enough fluids, your urine will be light yellow or almost clear.  Try to eat a little food. Good choices are potatoes, noodles, rice, oatmeal, crackers, bananas, soup, and boiled vegetables. Salty foods also help.  Should I see a doctor or nurse? -- See your doctor or nurse if:  You have more than 6 runny bowel movements in 24 hours  You have blood in your  "bowel movements   You have a fever higher than 101.3ºF (38.5ºC) that does not go away after a day  You have severe belly pain  You are 70 or older  Your body has lost too much water. This is called "dehydration." Signs include:  Lots of diarrhea that is very watery  Feeling very tired  Thirst  Dry mouth or tongue  Muscle cramps  Dizziness  Confusion  Urine that is very yellow, or not needing to urinate for more than 5 hours  Will I need tests? -- Many people do not need to have tests. But it's possible that your doctor will do tests to check if you are dehydrated or to figure out what is causing your diarrhea. Your doctor might do:  Blood tests  Tests on a sample of your bowel movements  How is diarrhea treated? -- That depends on what is causing your diarrhea. You might not need any treatment. If you do, your doctor might recommend:  Fluids through an "IV" - An IV is a thin tube that goes into your vein. People with a lot of diarrhea might need IV fluids to treat or prevent dehydration.  Stopping some of your medicines  Changing the foods you eat  Antibiotics - These medicines treat bacterial infections. Most people do not need antibiotics, even if they have a bacterial infection. If you are very sick with fever and blood in your bowel movements, your doctor might prescribe antibiotics to help you get better faster.   Medicines that ease diarrhea - These medicines include loperamide (brand name: Imodium), diphenoxylate-atropine (brand name: Lomotil), and bismuth subsalicylate (brand names: Pepto-Bismol, Kaopectate). You should not take loperamide or diphenoxylate-atropine if you have a fever or blood in your bowel movements. Also, taking too much loperamide has led to serious heart problems in some people. If you have health problems or already take other medicines, talk to your doctor or nurse before trying loperamide. For all of these medicines, it's important to not take more than the label tells you to.   Can " "diarrhea be prevented? -- You can reduce your chances of getting and spreading diarrhea by:  Washing your hands after changing diapers, cooking, eating, going to the bathroom, taking out the trash, touching animals, and blowing your nose.  Staying home from work or school until you feel better.  Paying attention to food safety. Tips include:  Not drinking unpasteurized milk or foods made with it  Washing fruits and vegetables well before eating them  Keeping the refrigerator colder than 40ºF and the freezer below 0ºF  Cooking meat and seafood until well done  Cooking eggs until the yolk is firm  Washing hands, knives, and cutting boards after they touch raw food  For more tips on food safety, see the table (table 1).  All topics are updated as new evidence becomes available and our peer review process is complete.  This topic retrieved from [employer] on: Sep 21, 2021.  Topic 74279 Version 14.0  Release: 29.4.2 - C29.263  © 2021 UpToDate, Inc. and/or its affiliates. All rights reserved.  figure 1: Digestive system     This drawing shows the organs in the body that process food. Together these organs are called "the digestive system," or "digestive tract." As food travels through this system, the body absorbs nutrients and water.  Graphic 21000 Version 4.0    table 1: Tips for safe food handling[1]  Purchase    Do not buy already-cooked food that is stored next to raw food, even if it is stored on ice.   Do not buy food in cans that are dented, cracked, or have a bulging lid.   Storage    Make sure meat and poultry products are refrigerated when bought.   Use plastic bags to keep juices from meat and fish from touching other foods.   Store perishable items (that can go bad quickly) in the refrigerator within an hour of buying.   Keep refrigerator temperature between 32 and 40°F (0 and 4°C) and freezer temperature at or below 0°F (-18°C).   Freeze meat and poultry that will not be cooked within 48 hours.   Freeze tuna, " bluefish, and chava-chava that will not be cooked within 24 hours. Other fish can be stored in the refrigerator for 48 hours.   Do not store eggs on the refrigerator door (since that is the warmest part of the refrigerator).   Put leftovers in the refrigerator within 2 hours of cooking them.   Divide leftovers into parts and store in small containers.   Reheat leftovers to 165°F (74°C) before eating.   Preparation    Wash hands with soap and water before cooking and after handling raw meat, poultry, fish, or raw eggs.   Thaw frozen meats and fish in the refrigerator or microwave, not by leaving them out.   Marinate foods in the refrigerator, not at room temperature.   Avoid contact of cooked foods with forks, spoons, knives, plates, or areas that might not be clean.   Wash forks, spoons, knives, plates, and cutting areas with soap and water after they have touched raw meat, poultry, fish, or eggs.   Avoid letting the juices from uncooked meat, poultry, or fish touch cooked foods or foods that will be eaten raw.   Carefully wash all fresh fruits and vegetables.   Avoid recipes that include raw eggs.   Cooking    Use a meat thermometer.  Cook beef, veal, and lamb (steaks, roasts, chops) to 145°F (63°C) and rest for 3 minutes.   Cook ground beef, pork, veal, and lamb to 160°F (71°C).   Cook poultry (chicken, turkey) to 165°F (74°C).   Cook fresh pork (roasts, chops, ham that is not precooked) to 145°F (63°C) and rest for 3 minutes.   Cook precooked ham to 140°F (60°C).   Cook fish until the flesh is firm and separates easily with a fork.   Cook shellfish until the flesh is firm.    Cook eggs until the yolk and white are firm.   Boil juices from raw meat or fish before using on cooked food.   Serving    Serve cooked foods on clean plates with clean forks, spoons, and knives.   Keep hot foods at 140°F (60°C) and cold foods below 40°F (4°C).   Never leave foods at room temperature longer than 2 hours, or 1 hour if the room  is hotter than 90°F (32°C).   Use coolers and ice packs to take perishable foods (that might go bad) away from home.   Graphic 52233 Version 7.0  Consumer Information Use and Disclaimer   This information is not specific medical advice and does not replace information you receive from your health care provider. This is only a brief summary of general information. It does NOT include all information about conditions, illnesses, injuries, tests, procedures, treatments, therapies, discharge instructions or life-style choices that may apply to you. You must talk with your health care provider for complete information about your health and treatment options. This information should not be used to decide whether or not to accept your health care provider's advice, instructions or recommendations. Only your health care provider has the knowledge and training to provide advice that is right for you. The use of this information is governed by the [employer] End User License Agreement, available at https://www.[employer]/en/solutions/[employer]/about/deb.The use of [employer] content is governed by the [employer] Terms of Use. ©2021 UpToDate, Inc. All rights reserved.  Copyright   © 2021 UpToDate, Inc. and/or its affiliates. All rights reserved.    Colon Polyps   The Basics   Written by the doctors and editors at [employer]   What are colon polyps? -- Colon polyps are tiny growths that form on the inside of the large intestine (also known as the colon) (figure 1). Polyps are very common. About one-third to one-half of all adults have them by the time they are 50 years old. They do not usually cause symptoms. But some polyps can be or become cancer, so doctors sometimes remove them.  What are the symptoms of colon polyps? -- Colon polyps do not usually cause symptoms.  How do doctors find colon polyps? -- Doctors usually find colon polyps when they are doing screening tests to check for colon or rectal cancer. Cancer screening  "tests are tests that are done to try and find cancer early, before a person has symptoms. The screening tests for colon and rectal cancer include:  Colonoscopy - Before having a colonoscopy, you will get medicine to help you relax. Then a doctor will put a thin tube into your anus and advance it into your colon (figure 2). The tube has a camera attached to it, so the doctor can look inside your colon. The tube also has tools on the end, so the doctor can remove pieces of tissue, including polyps. After polyps are removed, they usually go to a lab to be tested for cancer and other problems.  Sigmoidoscopy - A sigmoidoscopy is very similar to a colonoscopy. The only difference is that this test looks only at the first part of the colon, and a colonoscopy looks at the whole colon.  CT colonography (also known as virtual colonoscopy) - For a virtual colonoscopy, you have a special kind of X-ray taken, called a "CT scan." This test creates pictures of the colon.  Stool test - "Stool" is another word for "bowel movements." Stool tests check for blood or abnormal genes in samples of stool. If a stool test indicates that something might be wrong with the colon, doctors usually follow up with a colonoscopy. Then doctors find polyps, if they are there.  Capsule colonoscopy - Rarely, your doctor might do something called a "capsule" colonoscopy. For this test, you swallow a special capsule that contains tiny wireless video cameras.   How are colon polyps treated? -- Doctors remove polyps using the same tools they use for a colonoscopy. They can remove polyps either by snipping them off with a special cutting tool, or by catching the polyps in a noose (figure 3). Most polyps can be removed during a colonoscopy. But sometimes, large polyps need to be removed at a later time, either with another colonoscopy or with surgery.  What happens after I have polyps removed? -- You might need to have a colonoscopy every few years to check " "for more polyps. In some people polyps come back. And if you had the kind of polyps that could become cancer, your doctor will want to remove them as they appear. Also, if the polyps you had removed were the kind that could become cancer, people in your family might need to be checked for polyps and colon cancer earlier than if you did not have polyps.  Depending on your situation, your doctor might suggest genetic testing. This can show if your polyps are related to a specific gene that runs in families. If this turns out to be the case, they might recommend other tests that can be done to prevent cancer or find it early.  Can colon polyps be prevented? -- To reduce your chances of getting polyps or colon cancer:  Eat a diet that is low in fat and high in fruits, vegetables, and fiber  Lose weight, if you are overweight  Do not smoke  Limit the amount of alcohol you drink  All topics are updated as new evidence becomes available and our peer review process is complete.  This topic retrieved from [employer] on: Sep 21, 2021.  Topic 33922 Version 8.0  Release: 29.4.2 - C29.263  © 2021 UpToDate, Inc. and/or its affiliates. All rights reserved.  figure 1: Digestive system     This drawing shows the organs in the body that process food. Together these organs are called "the digestive system," or "digestive tract." As food travels through this system, the body absorbs nutrients and water.  Graphic 58239 Version 4.0    figure 2: Colonoscopy     During a colonoscopy, you lie on your side and the doctor puts a thin tube with a camera into your anus (from behind). Then the doctor advances the tube into the rectum and colon. The camera sends pictures from inside your colon to a television screen.  Graphic 65489 Version 6.0    figure 3: Removing a colon polyp     One way doctors remove colon polyps is to use a noose as a tool. They loop a wire around the polyp and squeeze the loop tight. When the polyp comes off, the doctor " sucks it up into the endoscope, so that it can go to the lab for tests.  Graphic 64055 Version 5.0    Consumer Information Use and Disclaimer   This information is not specific medical advice and does not replace information you receive from your health care provider. This is only a brief summary of general information. It does NOT include all information about conditions, illnesses, injuries, tests, procedures, treatments, therapies, discharge instructions or life-style choices that may apply to you. You must talk with your health care provider for complete information about your health and treatment options. This information should not be used to decide whether or not to accept your health care provider's advice, instructions or recommendations. Only your health care provider has the knowledge and training to provide advice that is right for you. The use of this information is governed by the [employer] End User License Agreement, available at https://www.[employer]/en/solutions/[employer]/about/deb.The use of [employer] content is governed by the [employer] Terms of Use. ©2021 UpToDate, Inc. All rights reserved.  Copyright   © 2021 UpToDate, Inc. and/or its affiliates. All rights reserved.

## 2024-09-09 ENCOUNTER — TELEPHONE (OUTPATIENT)
Dept: PHYSICAL MEDICINE AND REHAB | Facility: CLINIC | Age: 64
End: 2024-09-09
Payer: COMMERCIAL

## 2024-09-09 DIAGNOSIS — E78.2 MIXED HYPERLIPIDEMIA: Primary | Chronic | ICD-10-CM

## 2024-09-09 LAB
CV PHARM DOSE: 0.4 MG
CV STRESS BASE HR: 71 BPM
DIASTOLIC BLOOD PRESSURE: 82 MMHG
NUC REST EJECTION FRACTION: 67
OHS CV CPX 1 MINUTE RECOVERY HEART RATE: 93 BPM
OHS CV CPX 85 PERCENT MAX PREDICTED HEART RATE MALE: 133
OHS CV CPX MAX PREDICTED HEART RATE: 156
OHS CV CPX PATIENT IS FEMALE: 0
OHS CV CPX PATIENT IS MALE: 1
OHS CV CPX PEAK DIASTOLIC BLOOD PRESSURE: 82 MMHG
OHS CV CPX PEAK HEAR RATE: 108 BPM
OHS CV CPX PEAK RATE PRESSURE PRODUCT: NORMAL
OHS CV CPX PEAK SYSTOLIC BLOOD PRESSURE: 130 MMHG
OHS CV CPX PERCENT MAX PREDICTED HEART RATE ACHIEVED: 69
OHS CV CPX RATE PRESSURE PRODUCT PRESENTING: 9230
OHS CV PHARM TIME: 1402 MIN
SYSTOLIC BLOOD PRESSURE: 130 MMHG

## 2024-09-09 NOTE — TELEPHONE ENCOUNTER
----- Message from Harjinder Perez MD sent at 9/9/2024  7:33 AM CDT -----  Triglycerides remain extremely high.

## 2024-09-09 NOTE — TELEPHONE ENCOUNTER
----- Message from Shannon Donahue sent at 9/9/2024  4:18 PM CDT -----  Contact: Self  Type:  Patient Returning Call    Who Called:  Patient  Who Left Message for Patient:  Shantell  Does the patient know what this is regarding?:  no  Best Call Back Number:  [phone]  Additional Information:  Can we please call pt back to advise. Thank You

## 2024-09-10 ENCOUNTER — PATIENT MESSAGE (OUTPATIENT)
Dept: ADMINISTRATIVE | Facility: HOSPITAL | Age: 64
End: 2024-09-10
Payer: COMMERCIAL

## 2024-09-10 DIAGNOSIS — R06.09 DOE (DYSPNEA ON EXERTION): ICD-10-CM

## 2024-09-10 DIAGNOSIS — R94.39 POSITIVE CARDIAC STRESS TEST: Primary | ICD-10-CM

## 2024-09-10 DIAGNOSIS — Z98.62 S/P ANGIOPLASTY: ICD-10-CM

## 2024-09-10 DIAGNOSIS — I25.10 CORONARY ARTERY DISEASE, UNSPECIFIED VESSEL OR LESION TYPE, UNSPECIFIED WHETHER ANGINA PRESENT, UNSPECIFIED WHETHER NATIVE OR TRANSPLANTED HEART: ICD-10-CM

## 2024-09-10 DIAGNOSIS — E78.2 MIXED HYPERLIPIDEMIA: Chronic | ICD-10-CM

## 2024-09-10 RX ORDER — SODIUM CHLORIDE 0.9 % (FLUSH) 0.9 %
10 SYRINGE (ML) INJECTION
Status: SHIPPED | OUTPATIENT
Start: 2024-09-10

## 2024-09-10 RX ORDER — FENOFIBRATE 54 MG/1
54 TABLET ORAL DAILY
Qty: 30 TABLET | Refills: 11 | Status: SHIPPED | OUTPATIENT
Start: 2024-09-10 | End: 2025-09-10

## 2024-09-10 RX ORDER — SODIUM CHLORIDE 9 MG/ML
INJECTION, SOLUTION INTRAVENOUS ONCE
OUTPATIENT
Start: 2024-09-10 | End: 2024-09-10

## 2024-09-10 NOTE — PROGRESS NOTES
Angiogram    Arrive for procedure at: [facility] on 9/26/24 at 9 am. Your procedure is scheduled for 11 am with Dr Renetta Rivera. Enter through the main entrance and check in at the reception desk. They will direct you upstairs to the cath lab.    You will receive a phone call from [facility] Pre-Op Department with further instructions and exact arrival time prior to your scheduled procedure.    Notify the nurse if you are ALLERGIC TO IODINE.    FASTING: You MAY NOT have anything to eat or drink AFTER MIDNIGHT the day before your procedure.       MEDICATIONS: You may take your regular morning medications with water. If there are any medications that you should not take, you will be instructed to hold them for that morning.    CARDIOLOGY PRE-PROCEDURE MEDICATION ORDERS:  ** Please hold any medications that are checked below:    HOLD   # OF DAYS TO HOLD  Metformin  Day before and    Morning of procedure    Please hold GLP-1 Drugs such as Semiglutide, Ozempic, Wegovy, and Monjaro 1 week prior to procedure.    CONTINUE the Following Medications   Please continue all your other medications including your Plavix        WHAT TO EXPECT:    How long will the procedure take?  The procedure will take an average of 1 - 2 hours to perform.  After the procedure, you will need to lay flat for around 4 - 6 hours to minimize bleeding from the puncture site. If the wrist is accessed you will need to keep your arm still as instructed by the nurse.    When can I go home?  You may be able to be discharged home that same afternoon if there were no complications.  If you have one of the following: balloon; stent; pacemaker or defibrillator procedures, you may spend one night for observation.  Your doctor will determine your discharge based upon your progress.  The results of your procedure will be discussed with you before you are discharged.  Any further testing or procedures will be scheduled for you either before you leave  or you will be instructed to call for a future appointment.      TRANSPORTATION:  PLEASE ARRANGE TO HAVE SOMEONE DRIVE YOU HOME FOLLOWING YOUR PROCEDURE, YOU WILL NOT BE ALLOWED TO DRIVE.

## 2024-09-13 ENCOUNTER — HOSPITAL ENCOUNTER (OUTPATIENT)
Dept: CARDIOLOGY | Facility: HOSPITAL | Age: 64
Discharge: HOME | End: 2024-09-13
Attending: INTERNAL MEDICINE
Payer: COMMERCIAL

## 2024-09-13 VITALS — WEIGHT: 232 LBS | BODY MASS INDEX: 33.21 KG/M2 | HEIGHT: 70 IN

## 2024-09-13 DIAGNOSIS — Z98.61 CAD S/P PERCUTANEOUS CORONARY ANGIOPLASTY: ICD-10-CM

## 2024-09-13 DIAGNOSIS — I25.10 CAD S/P PERCUTANEOUS CORONARY ANGIOPLASTY: ICD-10-CM

## 2024-09-13 LAB
ASCENDING AORTA: 3.07 CM
AV INDEX (PROSTH): 0.65
AV MEAN GRADIENT: 7 MMHG
AV PEAK GRADIENT: 13 MMHG
AV VALVE AREA BY VELOCITY RATIO: 2.87 CM²
AV VALVE AREA: 2.8 CM²
AV VELOCITY RATIO: 0.67
BSA FOR ECHO PROCEDURE: 2.28 M2
CV ECHO LV RWT: 0.42 CM
DOP CALC AO PEAK VEL: 1.77 M/S
DOP CALC AO VTI: 34.6 CM
DOP CALC LVOT AREA: 4.3 CM2
DOP CALC LVOT DIAMETER: 2.34 CM
DOP CALC LVOT PEAK VEL: 1.18 M/S
DOP CALC LVOT STROKE VOLUME: 96.71 CM3
DOP CALCLVOT PEAK VEL VTI: 22.5 CM
E WAVE DECELERATION TIME: 184.08 MSEC
E/A RATIO: 0.91
E/E' RATIO: 8.48 M/S
ECHO LV POSTERIOR WALL: 1.09 CM (ref 0.6–1.1)
EJECTION FRACTION: 60 %
FRACTIONAL SHORTENING: 34 % (ref 28–44)
INTERVENTRICULAR SEPTUM: 1.07 CM (ref 0.6–1.1)
LEFT ATRIUM AREA SYSTOLIC (APICAL 2 CHAMBER): 16.39 CM2
LEFT ATRIUM AREA SYSTOLIC (APICAL 4 CHAMBER): 13.05 CM2
LEFT ATRIUM SIZE: 3.93 CM
LEFT ATRIUM VOLUME INDEX MOD: 18.6 ML/M2
LEFT ATRIUM VOLUME MOD: 41.36 CM3
LEFT INTERNAL DIMENSION IN SYSTOLE: 3.42 CM (ref 2.1–4)
LEFT VENTRICLE DIASTOLIC VOLUME INDEX: 57.23 ML/M2
LEFT VENTRICLE DIASTOLIC VOLUME: 127.05 ML
LEFT VENTRICLE END SYSTOLIC VOLUME APICAL 2 CHAMBER: 46.79 ML
LEFT VENTRICLE END SYSTOLIC VOLUME APICAL 4 CHAMBER: 31.84 ML
LEFT VENTRICLE MASS INDEX: 96 G/M2
LEFT VENTRICLE SYSTOLIC VOLUME INDEX: 21.7 ML/M2
LEFT VENTRICLE SYSTOLIC VOLUME: 48.09 ML
LEFT VENTRICULAR INTERNAL DIMENSION IN DIASTOLE: 5.16 CM (ref 3.5–6)
LEFT VENTRICULAR MASS: 212.62 G
LV LATERAL E/E' RATIO: 8.09 M/S
LV SEPTAL E/E' RATIO: 8.9 M/S
LVED V (TEICH): 127.05 ML
LVES V (TEICH): 48.09 ML
LVOT MG: 3 MMHG
LVOT MV: 0.8 CM/S
MV PEAK A VEL: 0.98 M/S
MV PEAK E VEL: 0.89 M/S
OHS CV RV/LV RATIO: 0.76 CM
PISA TR MAX VEL: 1.9 M/S
RA PRESSURE ESTIMATED: 3 MMHG
RIGHT VENTRICLE DIASTOLIC LENGTH: 8.4 CM
RIGHT VENTRICLE DIASTOLIC MID DIMENSION: 2.4 CM
RIGHT VENTRICULAR END-DIASTOLIC DIMENSION: 3.94 CM
RIGHT VENTRICULAR LENGTH IN DIASTOLE (APICAL 4-CHAMBER VIEW): 8.44 CM
RV MID DIAMA: 2.39 CM
RV TB RVSP: 5 MMHG
RV TISSUE DOPPLER FREE WALL SYSTOLIC VELOCITY 1 (APICAL 4 CHAMBER VIEW): 15.95 CM/S
SINUS: 3.29 CM
STJ: 2.47 CM
TDI LATERAL: 0.11 M/S
TDI SEPTAL: 0.1 M/S
TDI: 0.11 M/S
TR MAX PG: 14 MMHG
TRICUSPID ANNULAR PLANE SYSTOLIC EXCURSION: 2.52 CM
TV REST PULMONARY ARTERY PRESSURE: 17 MMHG
Z-SCORE OF LEFT VENTRICULAR DIMENSION IN END DIASTOLE: -4.07
Z-SCORE OF LEFT VENTRICULAR DIMENSION IN END SYSTOLE: -2.52

## 2024-09-13 PROCEDURE — 93306 TTE W/DOPPLER COMPLETE: CPT | Mod: PO

## 2024-09-13 PROCEDURE — 93306 TTE W/DOPPLER COMPLETE: CPT | Mod: 26,,, | Performed by: INTERNAL MEDICINE

## 2024-09-17 ENCOUNTER — CLINICAL SUPPORT (OUTPATIENT)
Dept: DIABETES | Facility: CLINIC | Age: 64
End: 2024-09-17
Payer: COMMERCIAL

## 2024-09-17 VITALS — HEIGHT: 70 IN | WEIGHT: 231.5 LBS | BODY MASS INDEX: 33.14 KG/M2

## 2024-09-17 DIAGNOSIS — E11.9 TYPE 2 DIABETES MELLITUS WITHOUT COMPLICATION, WITHOUT LONG-TERM CURRENT USE OF INSULIN: ICD-10-CM

## 2024-09-17 DIAGNOSIS — E11.9 TYPE 2 DIABETES MELLITUS WITHOUT COMPLICATION, WITHOUT LONG-TERM CURRENT USE OF INSULIN: Primary | ICD-10-CM

## 2024-09-17 PROCEDURE — 99999 PR PBB SHADOW E&M-EST. PATIENT-LVL II: CPT | Mod: PBBFAC,,, | Performed by: DIETITIAN, REGISTERED

## 2024-09-17 PROCEDURE — G0108 DIAB MANAGE TRN  PER INDIV: HCPCS | Mod: S$GLB,,, | Performed by: DIETITIAN, REGISTERED

## 2024-09-17 NOTE — TELEPHONE ENCOUNTER
Patient seen for newly diagnosed Type 2 DM---Hgb A1c 11% and placed on metformin and trulicity.  Please sign pended glucometer and test strips so patient can initiate home glucose monitoring.

## 2024-09-18 RX ORDER — INSULIN PUMP SYRINGE, 3 ML
EACH MISCELLANEOUS
Qty: 1 EACH | Refills: 0 | Status: SHIPPED | OUTPATIENT
Start: 2024-09-18 | End: 2025-09-17

## 2024-09-18 RX ORDER — LANCETS
EACH MISCELLANEOUS
Qty: 100 EACH | Refills: 6 | Status: SHIPPED | OUTPATIENT
Start: 2024-09-18

## 2024-09-19 NOTE — PROGRESS NOTES
"Diabetes Care Specialist Progress Note  Author: Laxmi More RD, CDE  Date: 9/19/2024    Intake    Program Intake  Reason for Diabetes Program Visit:: Initial Diabetes Assessment  Current diabetes risk level:: high  In the last 12 months, have you:: none  Permission to speak with others about care:: yes (Spouse Mayte who was unable to come to visit today)    Current Diabetes Treatment: Diet/Exercise, Oral Medications, DM Injectables  Diet/Exercise Type/Dose: has been decreasing portions of carbs at meals  Oral Medication Type/Dose: metformin 500 mg (2 tablets twice daily with meals)--initiated at full dose and has GI side effects (severe diarrhea).  Discussed restarting at low dose and titrating up every 4 days to see if able to tolerate max dose  DM Injectables Type/Dose: Trulicity 0.75 mg weekly--patient states this med prescribed by PCP 2-3 weeks ago but not yet approved and picked up at pharmacy    Continuous Glucose Monitoring  Patient has CGM: No    Lab Results   Component Value Date    HGBA1C 11.0 (H) 08/29/2024     Weight: 105 kg (231 lb 7.7 oz)   Height: 5' 10" (177.8 cm)   Body mass index is 33.21 kg/m².    Lifestyle Coping Support & Clinical    Lifestyle/Coping/Support  Does anyone in your family have diabetes or does anyone in your family support you in your diabetes care?: Family history of diabetes. Patient and spouse support care.  Learning Barriers:: None  Culture or Yazidism beliefs that may impact ability to access healthcare: No  Psychosocial/Coping Skills Assessment Completed: : Yes  Assessment indicates:: Adequate understanding  Area of need?: No    Problem Review  Active Comorbidities: Cardiovascular Disease, Hypertension, Hyperlipidemia/Dyslipidemia    Diabetes Self-Management Skills Assessment    Medication Skills Assessment  Patient is able to identify current diabetes medications, dosages, and appropriate timing of medications.: no  Patient reports problems or concerns with current " medication regimen.: yes  Medication regimen problems/concerns:: other (see comments), concerned about side effects (did not yet initiate Trulicity as pharmacy did not fill)  Patient is  aware that some diabetes medications can cause low blood sugar?: No  Medication Skills Assessment Completed:: Yes  Assessment indicates:: Instruction Needed  Area of need?: Yes    Diabetes Disease Process/Treatment Options  Diabetes Type?: Type II  When were you diagnosed?: Unaware was diabetic--had labs drawn 8/29/24 and was called by PCP that he was to initiate DM medications  If previous diabetes education, when/where:: n/a  What are your goals for this education session?: Understand what diabetes is and how to eat better  Is patient aware of what causes diabetes?: No  Does patient understand the pathophysiology of diabetes?: No  Diabetes Disease Process/Treatment Options: Skills Assessment Completed: Yes  Assessment indicates:: Instruction Needed  Area of need?: Yes    Nutrition/Healthy Eating  Meal Plan 24 Hour Recall - Breakfast: skips, drinks coffee (sweet n low, powdered creamer)  Meal Plan 24 Hour Recall - Lunch: pulled pork chili (beans, peppers, onions) with oyster crackers  Meal Plan 24 Hour Recall - Dinner: homemade hamburger on bun (everett, mustard, ketchup)  Meal Plan 24 Hour Recall - Snack: rarely snacks as he works long days and goes to bed early  Meal Plan 24 Hour Recall - Beverage: water, beer (2 some evenings)  Who shops/cooks?: wife shops and cooks meals  Patient can identify foods that impact blood sugar.: yes  Challenges to healthy eating:: portion control  Nutrition/Healthy Eating Skills Assessment Completed:: Yes  Assessment indicates:: Instruction Needed  Area of need?: Yes    Physical Activity/Exercise  Patient's daily activity level:: lightly active  Patient formally exercises outside of work.: no  Reasons for not exercising:: work schedule (does do yard work and reports he is able to walk on days off as he  has 9 acres)  Patient can identify forms of physical activity.: yes  Physical Activity/Exercise Skills Assessment Completed: : Yes  Assessment indicates:: Instruction Needed  Area of need?: Yes    Home Blood Glucose Monitoring  Patient states that blood sugar is checked at home daily.: no  What is your A1c Target?: <7%  Home Blood Glucose Monitoring Skills Assessment Completed: : Yes  Assessment indicates:: Instruction Needed  Area of need?: Yes    Acute Complications  Acute Complications Skills Assessment Completed: : No  Deferred due to:: Time    Chronic Complications  Chronic Complications Skills Assessment Completed: : No  Deferred due to:: Time    Assessment Summary and Plan    Based on today's diabetes care assessment, the following areas of need were identified:          9/17/2024    12:01 AM   Areas of Need   Medications/Current Diabetes Treatment Yes--see care plan   Lifestyle Coping Support No   Diabetes Disease Process/Treatment Options Yes--Discussed pathology of Type 2 diabetes, risk factors and treatment options.     Nutrition/Healthy Eating Yes--see care plan   Physical Activity/Exercise Yes--see care plan   Home Blood Glucose Monitoring Yes--see care plan     Today's interventions were provided through individual discussion, instruction, and written materials were provided.      Patient verbalized understanding of instruction and written materials.  Pt was able to return back demonstration of instructions today. Patient understood key points, needs reinforcement and further instruction.     Diabetes Self-Management Care Plan:    Today's Diabetes Self-Management Care Plan was developed with Jose G's input. Jose G has agreed to work toward the following goal(s) to improve his/her overall diabetes control.      Care Plan: Diabetes Management   Updates made since 8/20/2024 12:00 AM        Problem: Blood Glucose Self-Monitoring         Goal: Patient to initiate home glucose monitoring with new diagnosis of  Type 2 DM.  Prescription pended to [facility] for glucometer and supplies.    Start Date: 9/17/2024   Expected End Date: 12/17/2024   Priority: High   Barriers: Lack of Supplies   Glucose checked with sample glucometer today--209 mg/dL 4 hours after eating.  Discussed goal glucose of  mg/dL fasting or before meals.       Task: Provided patient with a meter today and sent Rx request to provider to send to patients pharmacy. Completed 9/19/2024        Task: Reviewed the importance of self-monitoring blood glucose and keeping logs. Completed 9/19/2024        Task: Instructed on how to self-monitor blood glucose using a home glucometer, how to properly dispose of used strips and lancets after use, and how to appropriately store meter and supplies. Completed 9/19/2024        Task: Provided patient with blood glucose logs, reviewed appropriate timing and frequency to SMBG, education on parameters on when to notify provider and advised patient to bring logs to all appts with PCP/Endocrinologist/Diabetes Care Specialist. Completed 9/19/2024        Task: Discussed ways to minimize pain when monitoring blood glucose. Completed 9/19/2024        Problem: Medications         Goal: Patient with GI upset after initiating max dose metformin all at once--he will decrease to 1 tablet daily and titrate dose up slowly to see if GI symptoms resolve.  Pending initiation of Trulicity if insurance covers.    Start Date: 9/17/2024   Expected End Date: 12/17/2024   Priority: Medium   Barriers: Other (comments)   Note:    Side effects from metformin and insurance coverage for Trulicity barriers at this time. Contacted pharmacy and PA required for Trulicity. Asked pharmacy to refax PA request.        Task: Reviewed with patient all current diabetes medications and provided basic review of the purpose, dosage, frequency, side effects, and storage of both oral and injectable diabetes medications. Completed 9/19/2024        Task:  Reviewed possible resources for acquiring cost prohibitive medication. Completed 9/19/2024        Task: Instructed patient on how to slowly titrate metformin in hopes to improve GI side effects. Completed 9/19/2024        Task: Discussed guidelines for preventing, detecting and treating hypoglycemia and hyperglycemia and reviewed the importance of meal and medication timing with diabetes mediations for prevention of hypoglycemia and maximum drug benefit. Completed 9/19/2024        Problem: Healthy Eating         Long-Range Goal: Choose balanced meals--incorporate closed fistful (45-60 grams) of carbs at each meal with lean protein and non-starchy vegetables.  Written education provided on meal planning for home use.    Start Date: 9/17/2024   Priority: High   Barriers: No Barriers Identified        Task: Reviewed the sources and role of Carbohydrate, Protein, and Fat and how each nutrient impacts blood sugar. Completed 9/19/2024        Task: Provided visual examples using dry measuring cups, food models, and other familiar objects such as computer mouse, deck or cards, tennis ball etc. to help with visualization of portions. Completed 9/19/2024        Task: Discussed strategies for choosing healthier menu options when dining out. Completed 9/19/2024        Task: Review the importance of balancing carbohydrates with each meal using portion control techniques to count servings of carbohydrate and label reading to identify serving size and amount of total carbs per serving. Completed 9/19/2024        Task: Provided Sample plate method and reviewed the use of the plate to estimate amounts of carbohydrate per meal. Completed 9/19/2024        Problem: Physical Activity and Exercise         Long-Range Goal: Patient willing to initiate walking at home and work to increase activity. Goal to get 20-30 minutes walking in most days. Completed 9/19/2024   Start Date: 9/17/2024   Priority: Low   Barriers: No Barriers Identified         Task: Discussed role of physical activity on reducing insulin resistance and improvement in overall glycemic control. Completed 9/19/2024        Task: Discussed role of physical activity as it relates to weight loss Completed 9/19/2024        Task: Offered suggestions on how patient could increase their regular physical activity Completed 9/19/2024        Follow Up Plan     Follow up in 1 week (on 9/24/2024) with phone call to obtain glucose readings since new to home glucose monitoring. Contacted pharmacy and Trulicity requiring prior authorization--asked pharmacy to send PA request again.  Patient with significant GI side effects after initiating max dose of metformin--educated to reduce dose and slowly titrate up in hopes to minimize side effects and see if able to tolerate.  Newly diagnosed Type 2 diabetes--provided patient with [facility] Diabetes Management Guide.  Will be establishing with endocrine 11/7/24.  Reviewed typical intake and how to choose balanced meals. Encouraged increase in aerobic exercise (walking).  Assess chronic and acute complications of diabetes.     Today's care plan and follow up schedule was discussed with patient.  Jose G verbalized understanding of the care plan, goals, and agrees to follow up plan.        The patient was encouraged to communicate with his/her health care provider/physician and care team regarding his/her condition(s) and treatment.  I provided the patient with my contact information today and encouraged to contact me via phone or [facility] Patient Portal as needed.     Length of Visit   Total Time: 60 Minutes

## 2024-09-25 ENCOUNTER — PATIENT OUTREACH (OUTPATIENT)
Dept: DIABETES | Facility: CLINIC | Age: 64
End: 2024-09-25
Payer: COMMERCIAL

## 2024-09-25 NOTE — PROGRESS NOTES
Contacting patient to obtain recent glucose levels since seen last week in diabetes education after newly diagnosed diabetes.  Left voicemail for patient to return call if glucose levels are not improving.

## 2024-09-27 DIAGNOSIS — Z95.5 S/P DRUG ELUTING CORONARY STENT PLACEMENT: Primary | ICD-10-CM

## 2024-09-29 DIAGNOSIS — R19.5 LOOSE BOWEL MOVEMENT: ICD-10-CM

## 2024-10-02 RX ORDER — DICYCLOMINE HYDROCHLORIDE 10 MG/1
CAPSULE ORAL
Qty: 120 CAPSULE | Refills: 0 | Status: SHIPPED | OUTPATIENT
Start: 2024-10-02

## 2024-10-03 ENCOUNTER — CLINICAL SUPPORT (OUTPATIENT)
Dept: DERMATOLOGY | Facility: CLINIC | Age: 64
End: 2024-10-03
Payer: COMMERCIAL

## 2024-10-03 DIAGNOSIS — L57.0 ACTINIC KERATOSES: Primary | ICD-10-CM

## 2024-10-03 PROCEDURE — 96574 DBRDMT PRMLG LES W/PDT: CPT | Mod: S$GLB,,, | Performed by: DERMATOLOGY

## 2024-10-08 NOTE — PROGRESS NOTES
PHOTODYNAMIC THERAPY PROCEDURE NOTE  Patient: Jose G Shafer  YOB: 1960  ATTENDING PHYSICIAN: Rocco Brown MD    PROCEDURE: photodynamic therapy for the treatment of refractory actinic keratosis    SKIN PREP: Acetone was applied to the skin vigorously with roughly woven gauze to degrease the epidermis and allow penetration of the aminolevulinic acid solution. This gauze was used to debride the skin surface until actinic keratoses were de-roofed with subsequent bleeding of heavily sun damaged areas.    LOCATION: [facility]    PDT solution: 2 Levulan Kerastick(s) was(were) applied to the area, NDC: 69146-013-41     INCUBATION TIME: 180 minutes allowed for PDT solution to penetrate into actinic keratoses    PROCEDURE:     After discussion of risks, benefits, and limitations of photodynamic therapy, patient placed in seated position with Krzysztof-U light approximately 4 inches from the treatment area. Protective goggles/glasses were placed on the patient to protect the eyes from the intense light of the device. The timer was set to 17:30 and the physician activated the device. The patient was given a fan to assist with burning during the procedure. Any intensely burning areas were dabbed with cool saline gauze. They were also given a call button to summon assistance from the nursing station if any issues were experienced during treatment.     After treatment, they were provided with a broad brimmed hat if they did not bring one. The patient tolerated the procedure well, and will follow up in 2 weeks for a post PDT wound check, and assessment of weather a second PDT treatment would be beneficial for the area. Patient instructed not to get any additional sun exposure for 2 days post-op. Patient instructed to keep aloe or vaseline in the fridge and apply liberally as needed for discomfort.    Patient provided with written discharge instructions and instructed to call clinic for any concerns.       Rocco  MD Stephanie

## 2024-10-10 DIAGNOSIS — E11.9 TYPE 2 DIABETES MELLITUS WITHOUT COMPLICATION, WITHOUT LONG-TERM CURRENT USE OF INSULIN: ICD-10-CM

## 2024-10-10 RX ORDER — LANCETS 28 GAUGE
EACH MISCELLANEOUS
Qty: 100 EACH | Refills: 6 | Status: SHIPPED | OUTPATIENT
Start: 2024-10-10

## 2024-10-10 RX ORDER — DEXTROSE 4 G
TABLET,CHEWABLE ORAL
Qty: 1 EACH | Refills: 0 | Status: SHIPPED | OUTPATIENT
Start: 2024-10-10

## 2024-10-11 ENCOUNTER — TELEPHONE (OUTPATIENT)
Dept: GASTROENTEROLOGY | Facility: CLINIC | Age: 64
End: 2024-10-11
Payer: COMMERCIAL

## 2024-10-11 NOTE — TELEPHONE ENCOUNTER
Called and spoke with the patient, patient was notified that his prep instructions would be sent to his patient portal now, patient verbalized understanding of this.      MiraLAX Prep      ALERT: Please notify the clinic if you start any blood thinners as does affect your procedure  NOTE: NO ASPIRIN or ibuprofen products for the morning of your procedure. This includes Motrin, ALEVE, Advil, or other arthritis type medications. TYLENOL IS ALLOWED.  AVOID the following foods for 7 - 10 days prior to the procedure: Beans, peas, corn, nuts, popcorn, or tomatoes. If you forget we will not cancel your procedure.      **You will need to purchase over-the-counter  -    4 Dulcolax laxative tablets - any brand is fine   -    2 Liter Bottle or 64 oz. of any clear liquid - see list below       (Noncarbonated, Nothing RED or PURPLE)   -    MiraLAX (16 oz ) bottle of powder     The evening before your prep day, at bedtime, take 2 Dulcolax tablets    ONE DAY BEFORE THE PROCEDURE (PREP DAY):   1. You will be on a clear liquid diet the entire day before the procedure.  2. At 10 am you will take 2 more Dulcolax laxative tablets  3. At 5 pm mix one 8oz. glass of clear liquid with TWO capfuls of Miralax and drink it entirely.  4. Repeat every 15 minutes until you have finished the 2 liter/ 64 oz. of clear fluid.      It's about 8 - 10 glasses of fluid. You may have some MiraLAX left over.      CLEAR LIQUIDS INCLUDE: Coffee (no cream), tea, apple juice, white grape juice, limit carbonated drinks to 2 in 24 hours, boullion, chicken broth, beef broth, Gatorade, Jose Angel-Aid, jello, popsicles, snowballs, Italian ice, and hard candy. NO ALCOHOL. NOTHING RED OR PURPLE.    It is important to drink plenty of clear fluids throughout the day prior to the procedure while doing this prep. After midnight  WATER ONLY is allowed, then nothing by mouth 4 hours prior to the procedure time.    A preop nurse will call the day prior to your procedure to  discuss medications you can and cannot take the morning of your procedure. Since sedation is used, you must have someone drive you home. It is [facility] policy that you may not take a taxi home after sedation.         NOTE:  ·         Dulaglutide (Trulicity) (weekly) - hold 8 days  ·         Exenatide extended release (Bydureon bcise) (weekly) - hold 8 days  ·         Semaglutide (Ozempic) (weekly) - hold 8 days  Tirepatide (Mounjaro) (weekly) - hold 8 days  Wegovy (weekly) - hold 8 days  ·         Exenatide (Byetta) (twice daily)- hold DAY OF PROCEDURE  ·         Liraglutide (Victoza, Saxenda) (daily)- hold DAY OF PROCEDURE  ·         Lixisenatide (Adlyxin) (daily)- hold DAY OF PROCEDURE  ·         Semaglutide (Rybelsus) (daily) -hold DAY OF PROCEDURE

## 2024-10-11 NOTE — TELEPHONE ENCOUNTER
----- Message from Gricelda sent at 10/11/2024  9:24 AM CDT -----  Type:  Needs Medical Advice    Who Called:  Pt    Would the patient rather a call back or a response via [facility]?  Call back    Best Call Back Number:  [phone]    Additional Information:  Pt is needing prep instruction or can be put in portal.  Please call back to advise. Thanks!

## 2024-10-15 ENCOUNTER — PATIENT MESSAGE (OUTPATIENT)
Dept: CARDIOLOGY | Facility: CLINIC | Age: 64
End: 2024-10-15
Payer: COMMERCIAL

## 2024-10-17 DIAGNOSIS — I10 ESSENTIAL HYPERTENSION: Chronic | ICD-10-CM

## 2024-10-17 DIAGNOSIS — F41.9 ANXIETY: ICD-10-CM

## 2024-10-17 RX ORDER — SERTRALINE HYDROCHLORIDE 50 MG/1
50 TABLET, FILM COATED ORAL DAILY
Qty: 90 TABLET | Refills: 0 | Status: SHIPPED | OUTPATIENT
Start: 2024-10-17

## 2024-10-17 RX ORDER — CARVEDILOL 25 MG/1
25 TABLET ORAL 2 TIMES DAILY WITH MEALS
Qty: 180 TABLET | Refills: 0 | Status: SHIPPED | OUTPATIENT
Start: 2024-10-17

## 2024-10-17 RX ORDER — LISINOPRIL 40 MG/1
TABLET ORAL
Qty: 90 TABLET | Refills: 0 | Status: SHIPPED | OUTPATIENT
Start: 2024-10-17

## 2024-10-17 NOTE — TELEPHONE ENCOUNTER
Care Due:                  Date            Visit Type   Department     Provider  --------------------------------------------------------------------------------                                MYCHART                              FOLLOWUP/OF  [facility] FAMILY  Last Visit: 06-      FICE VISIT   MEDICINE       Christiano Guy                               -                              PRIMARY      [facility]  Next Visit: 02-      CARE ([facility])   MEDICINE       Christiano Guy                                                            Last  Test          Frequency    Reason                     Performed    Due Date  --------------------------------------------------------------------------------    Office Visit  15 months..  carvediloL, ezetimibe,     06- 08-                             lisinopriL, sertraline...    [facility] Embedded Care Due Messages. Reference number: [id number].   10/17/2024 12:08:21 AM CDT

## 2024-10-21 ENCOUNTER — OFFICE VISIT (OUTPATIENT)
Dept: DERMATOLOGY | Facility: CLINIC | Age: 64
End: 2024-10-21
Payer: COMMERCIAL

## 2024-10-21 DIAGNOSIS — D48.5 NEOPLASM OF UNCERTAIN BEHAVIOR OF SKIN: ICD-10-CM

## 2024-10-21 DIAGNOSIS — L57.8 ACTINIC SKIN DAMAGE: ICD-10-CM

## 2024-10-21 DIAGNOSIS — D22.9 BENIGN NEVUS: ICD-10-CM

## 2024-10-21 DIAGNOSIS — L82.1 SEBORRHEIC KERATOSIS: ICD-10-CM

## 2024-10-21 DIAGNOSIS — I73.9 PERIPHERAL VASCULAR DISEASE OF EXTREMITY: ICD-10-CM

## 2024-10-21 DIAGNOSIS — Z85.828 HISTORY OF NONMELANOMA SKIN CANCER: Primary | ICD-10-CM

## 2024-10-21 DIAGNOSIS — L57.0 AK (ACTINIC KERATOSIS): ICD-10-CM

## 2024-10-21 DIAGNOSIS — L90.5 SCAR: ICD-10-CM

## 2024-10-21 PROCEDURE — 17000 DESTRUCT PREMALG LESION: CPT | Mod: XS,S$GLB,, | Performed by: STUDENT IN AN ORGANIZED HEALTH CARE EDUCATION/TRAINING PROGRAM

## 2024-10-21 PROCEDURE — 3046F HEMOGLOBIN A1C LEVEL >9.0%: CPT | Mod: CPTII,S$GLB,, | Performed by: STUDENT IN AN ORGANIZED HEALTH CARE EDUCATION/TRAINING PROGRAM

## 2024-10-21 PROCEDURE — 11102 TANGNTL BX SKIN SINGLE LES: CPT | Mod: S$GLB,,, | Performed by: STUDENT IN AN ORGANIZED HEALTH CARE EDUCATION/TRAINING PROGRAM

## 2024-10-21 PROCEDURE — 99213 OFFICE O/P EST LOW 20 MIN: CPT | Mod: 25,S$GLB,, | Performed by: STUDENT IN AN ORGANIZED HEALTH CARE EDUCATION/TRAINING PROGRAM

## 2024-10-21 PROCEDURE — 1159F MED LIST DOCD IN RCRD: CPT | Mod: CPTII,S$GLB,, | Performed by: STUDENT IN AN ORGANIZED HEALTH CARE EDUCATION/TRAINING PROGRAM

## 2024-10-21 PROCEDURE — 1160F RVW MEDS BY RX/DR IN RCRD: CPT | Mod: CPTII,S$GLB,, | Performed by: STUDENT IN AN ORGANIZED HEALTH CARE EDUCATION/TRAINING PROGRAM

## 2024-10-21 PROCEDURE — 17003 DESTRUCT PREMALG LES 2-14: CPT | Mod: S$GLB,,, | Performed by: STUDENT IN AN ORGANIZED HEALTH CARE EDUCATION/TRAINING PROGRAM

## 2024-10-21 PROCEDURE — 11103 TANGNTL BX SKIN EA SEP/ADDL: CPT | Mod: S$GLB,,, | Performed by: STUDENT IN AN ORGANIZED HEALTH CARE EDUCATION/TRAINING PROGRAM

## 2024-10-21 PROCEDURE — 4010F ACE/ARB THERAPY RXD/TAKEN: CPT | Mod: CPTII,S$GLB,, | Performed by: STUDENT IN AN ORGANIZED HEALTH CARE EDUCATION/TRAINING PROGRAM

## 2024-10-21 RX ORDER — CILOSTAZOL 50 MG/1
50 TABLET ORAL 2 TIMES DAILY
Qty: 180 TABLET | Refills: 1 | Status: SHIPPED | OUTPATIENT
Start: 2024-10-21

## 2024-10-21 NOTE — PROGRESS NOTES
Subjective:      Patient ID:  Jose G Shafer is a 64 y.o. male who presents for   Chief Complaint   Patient presents with    Spot     arms     LOV 4/22/24    Patient here today for skin check UBSE  Complains of spots on arms  Completed two rounds of PDT on face and two rounds on arms    Path from last visit: 4/22/24  Final Pathologic Diagnosis   1. Skin, right forearm, shave biopsy:   - INVASIVE SQUAMOUS CELL CARCINOMA, WELL-DIFFERENTIATED.  - THE TUMOR CLOSELY APPROACHES THE DEEP BIOPSY MARGIN.    Derm Hx  Pmh of NMSC on chest and nose- treated by Dr. Mckeon  Patient also had blue light therapy on his arms.   Denies Fhx MM          Review of Systems   Constitutional:  Negative for fever, chills and fatigue.   Respiratory:  Negative for cough and shortness of breath.    Gastrointestinal:  Negative for nausea, vomiting and diarrhea.   Skin:  Positive for activity-related sunscreen use and wears hat.   Hematologic/Lymphatic: Bruises/bleeds easily (asa, plavix).       Objective:   Physical Exam   Constitutional: He appears well-developed and well-nourished. No distress.   Neurological: He is alert and oriented to person, place, and time. He is not disoriented.   Psychiatric: He has a normal mood and affect.   Skin:   Areas Examined (abnormalities noted in diagram):   Scalp / Hair Palpated and Inspected  Head / Face Inspection Performed  Neck Inspection Performed  Chest / Axilla Inspection Performed  Abdomen Inspection Performed  Back Inspection Performed  RUE Inspected  LUE Inspection Performed  Nails and Digits Inspection Performed                 Diagram Legend     Erythematous scaling macule/papule c/w actinic keratosis       Vascular papule c/w angioma      Pigmented verrucoid papule/plaque c/w seborrheic keratosis      Yellow umbilicated papule c/w sebaceous hyperplasia      Irregularly shaped tan macule c/w lentigo     1-2 mm smooth white papules consistent with Milia      Movable subcutaneous cyst with  punctum c/w epidermal inclusion cyst      Subcutaneous movable cyst c/w pilar cyst      Firm pink to brown papule c/w dermatofibroma      Pedunculated fleshy papule(s) c/w skin tag(s)      Evenly pigmented macule c/w junctional nevus     Mildly variegated pigmented, slightly irregular-bordered macule c/w mildly atypical nevus      Flesh colored to evenly pigmented papule c/w intradermal nevus       Pink pearly papule/plaque c/w basal cell carcinoma      Erythematous hyperkeratotic cursted plaque c/w SCC      Surgical scar with no sign of skin cancer recurrence      Open and closed comedones      Inflammatory papules and pustules      Verrucoid papule consistent consistent with wart     Erythematous eczematous patches and plaques     Dystrophic onycholytic nail with subungual debris c/w onychomycosis     Umbilicated papule    Erythematous-base heme-crusted tan verrucoid plaque consistent with inflamed seborrheic keratosis     Erythematous Silvery Scaling Plaque c/w Psoriasis     See annotation          Today          Assessment / Plan:      Pathology Orders:       Normal Orders This Visit    Specimen to Pathology, Dermatology     Questions:    Procedure Type: Dermatology and skin neoplasms    Number of Specimens: 2    ------------------------: -------------------------    Spec 1 Procedure: Biopsy    Spec 1 Clinical Impression: r/o SCC    Spec 1 Source: left forearm    ------------------------: -------------------------    Spec 2 Procedure: Biopsy    Spec 2 Clinical Impression: excoriated papule dx: ISK vs. SCC    Spec 2 Source: right forearm    Release to patient:           History of nonmelanoma skin cancer  Scar  Area(s) of previous NMSC evaluated with no signs of recurrence.  Upper body skin examination performed today including at least 9 points as noted in physical examination. Suspicious lesions noted.  Patient instructed in importance in daily broad spectrum sun protection of at least spf 30. Mineral sunscreen  ingredients preferred (Zinc +/- Titanium) and can be found OTC.   Patient encouraged to wear hat for all outdoor exposure.   Also discussed sun avoidance and use of protective clothing.    AK (actinic keratosis)  Cryosurgery Procedure Note    Verbal consent from the patient is obtained and the patient is aware of the precancerous quality and need for treatment of these lesions. Liquid nitrogen cryosurgery is applied to the 10 actinic keratoses, as detailed in the physical exam, to produce a freeze injury. The patient is aware that blisters may form and is instructed on wound care with gentle cleansing and use of vaseline ointment to keep moist until healed. The patient is supplied a handout on cryosurgery and is instructed to call if lesions do not completely resolve.    Seborrheic keratosis  These are benign inherited growths without a malignant potential. Reassurance given to patient. No treatment is necessary.     Actinic skin damage  Completed PDT of arms and face x 2 each  Good response    Neoplasm of uncertain behavior of skin x2  -     Specimen to Pathology, Dermatology  Shave biopsy procedure note:    Shave biopsy performed after verbal consent including risk of infection, scar, recurrence, need for additional treatment of site. Area prepped with alcohol, anesthetized with approximately 1.0cc of 1% lidocaine with epinephrine. Lesional tissue shaved with razor blade. Hemostasis achieved with application of aluminum chloride followed by hyfrecation. No complications. Dressing applied. Wound care explained.    Benign nevus  Careful dermoscopy evaluation of nevi performed   Monitor for new mole or moles that are becoming bigger, darker, irritated, or developing irregular borders.     6 months             No follow-ups on file.

## 2024-10-21 NOTE — PATIENT INSTRUCTIONS
CRYOSURGERY      Your doctor has used a method called cryosurgery to treat your skin condition. Cryosurgery refers to the use of very cold substances to treat a variety of skin conditions such as warts, pre-skin cancers, molluscum contagiosum, sun spots, and several benign growths. The substance we use in cryosurgery is liquid nitrogen and is so cold (-195 degrees Celsius) that is burns when administered.     Following treatment in the office, the skin may immediately burn and become red. You may find the area around the lesion is affected as well. It is sometimes necessary to treat not only the lesion, but a small area of the surrounding normal skin to achieve a good response.     A blister, and even a blood filled blister, may form after treatment.   This is a normal response. If the blister is painful, it is acceptable to sterilize a needle and with rubbing alcohol and gently pop the blister. It is important that you gently wash the area with soap and warm water as the blister fluid may contain wart virus if a wart was treated. Do no remove the roof of the blister.     The area treated can take anywhere from 1-3 weeks to heal. Healing time depends on the kind skin lesion treated, the location, and how aggressively the lesion was treated. It is recommended that the areas treated are covered with Vaseline or bacitracin ointment and a band-aid. If a band-aid is not practical, just ointment applied several times per day will do. Keeping these areas moist will speed the healing time.    Treatment with liquid nitrogen can leave a scar. In dark skin, it may be a light or dark scar, in light skin it may be a white or pink scar. These will generally fade with time, but may never go away completely.     If you have any concerns after your treatment, please feel free to call the office.       [address] [facility] [place] [address]/ [phone] FAX/ [email or web address]  Shave Biopsy Wound Care    Your  doctor has performed a shave biopsy today.  A band aid and vaseline ointment has been placed over the site.  This should remain in place for 24 hours.  It is recommended that you keep the area dry for the first 24 hours.  After 24 hours, you may remove the band aid and wash the area with warm soap and water and apply Vaseline jelly.  Many patients prefer to use Neosporin or Bacitracin ointment.  This is acceptable; however, know that you can develop an allergy to this medication even if you have used it safely for years.  It is important to keep the area moist.  Letting it dry out and get air slows healing time, and will worsen the scar.  Band aid is optional after first 24 hours.      If you notice increasing redness, tenderness, pain, or yellow drainage at the biopsy site, please notify your doctor.  These are signs of an infection.    If your biopsy site is bleeding, apply firm pressure for 15 minutes straight.  Repeat for another 15 minutes, if it is still bleeding.   If the surgical site continues to bleed, then please contact your doctor.      [address] [place] [address]/ [phone] FAX/ [email or web address]

## 2024-10-21 NOTE — TELEPHONE ENCOUNTER
No care due was identified.  [facility] Embedded Care Due Messages. Reference number: [id number].   10/21/2024 6:52:07 AM CDT  
Please see the attached refill request.  
No

## 2024-10-29 DIAGNOSIS — R19.5 LOOSE BOWEL MOVEMENT: ICD-10-CM

## 2024-10-30 RX ORDER — DICYCLOMINE HYDROCHLORIDE 10 MG/1
CAPSULE ORAL
Qty: 120 CAPSULE | Refills: 1 | Status: SHIPPED | OUTPATIENT
Start: 2024-10-30

## 2024-11-07 ENCOUNTER — PROCEDURE VISIT (OUTPATIENT)
Dept: DERMATOLOGY | Facility: CLINIC | Age: 64
End: 2024-11-07
Payer: COMMERCIAL

## 2024-11-07 DIAGNOSIS — C44.629 SQUAMOUS CELL CARCINOMA OF ARM, LEFT: Primary | ICD-10-CM

## 2024-11-07 NOTE — PROGRESS NOTES
Subjective:      Patient ID:  Jose G Shafer is a 64 y.o. male who presents for   Chief Complaint   Patient presents with    Skin Cancer     E&S     Patient here today for E&S of SCC on left forearm.   Denies pacemaker.   He is on asa and plavix.     Final Pathologic Diagnosis 1. Skin,  Left forearm,  shave biopsy:   - WELL-DIFFERENTIATED SQUAMOUS CELL CARCINOMA   - The tumor involves the deep tissue edge.  The peripheral tissue edge is uninvolved by tumor.            Review of Systems   Constitutional:  Negative for fever, chills and fatigue.   Respiratory:  Negative for cough and shortness of breath.    Gastrointestinal:  Negative for nausea, vomiting and diarrhea.   Skin:  Positive for activity-related sunscreen use and wears hat.   Hematologic/Lymphatic: Bruises/bleeds easily (asa, plavix).       Objective:   Physical Exam   Constitutional: He appears well-developed and well-nourished.   Neurological: He is alert and oriented to person, place, and time.   Psychiatric: He has a normal mood and affect.   Skin:   Areas Examined (abnormalities noted in diagram):   LUE Inspection Performed            Diagram Legend     Erythematous scaling macule/papule c/w actinic keratosis       Vascular papule c/w angioma      Pigmented verrucoid papule/plaque c/w seborrheic keratosis      Yellow umbilicated papule c/w sebaceous hyperplasia      Irregularly shaped tan macule c/w lentigo     1-2 mm smooth white papules consistent with Milia      Movable subcutaneous cyst with punctum c/w epidermal inclusion cyst      Subcutaneous movable cyst c/w pilar cyst      Firm pink to brown papule c/w dermatofibroma      Pedunculated fleshy papule(s) c/w skin tag(s)      Evenly pigmented macule c/w junctional nevus     Mildly variegated pigmented, slightly irregular-bordered macule c/w mildly atypical nevus      Flesh colored to evenly pigmented papule c/w intradermal nevus       Pink pearly papule/plaque c/w basal cell carcinoma       Erythematous hyperkeratotic cursted plaque c/w SCC      Surgical scar with no sign of skin cancer recurrence      Open and closed comedones      Inflammatory papules and pustules      Verrucoid papule consistent consistent with wart     Erythematous eczematous patches and plaques     Dystrophic onycholytic nail with subungual debris c/w onychomycosis     Umbilicated papule    Erythematous-base heme-crusted tan verrucoid plaque consistent with inflamed seborrheic keratosis     Erythematous Silvery Scaling Plaque c/w Psoriasis     See annotation      Assessment / Plan:      Pathology Orders:       Normal Orders This Visit    Specimen to Pathology, Dermatology     Questions:    Procedure Type: Dermatology and skin neoplasms    Number of Specimens: 1    ------------------------: -------------------------    Spec 1 Procedure: Excision >2cm    Spec 1 Clinical Impression: biopsy proven SCC, please check margins    Spec 1 Source: left forearm    Release to patient:           Squamous cell carcinoma of arm, left  -     Specimen to Pathology, Dermatology  PROCEDURE: Elliptical excision with intermediate layered repair in order to decrease dead space, decrease tension, and close large gap.    ANESTHETIC: 8 cc 1% Xylocaine with Epinephrine 1:100,000, buffered    SURGEON: Twila Tellez MD  ASSISTANTS:  Susi Woodson MA    PREOPERATIVE DIAGNOSIS:  Biopsy-proven Squamous Cell Carcinoma    POSTOPERATIVE DIAGNOSIS:  Same as preoperative diagnosis    PATHOLOGIC DIAGNOSIS: Pending    LOCATION: left forearm    INITIAL LESION SIZE: 1.3 cm    EXCISED DIAMETER: 2.3 cm    PREPARATION: The diagnosis, procedure, alternatives, benefits and risks, including but not limited to: infection, bleeding/bruising, drug reactions, pain, scar or cosmetic defect, local sensation disturbances, wound dehiscence (separation of wound edges after sutures removed) and/or recurrence of present condition were explained to the patient. The patient elected to  proceed.  Patient's identity was verified using 2 patient identifiers and the side and site was verified.  Time out period with surgeon, assistant and patient in surgical suite was taken.    PROCEDURE: The location noted above was prepped and draped in the usual sterile fashion. The area was anesthetized with intradermal buffered xylocaine. Lesional tissue was carefully marked with at least 5 mm margins of clinically normal skin in all directions. A fusiform elliptical excision was done with #15 blade carried down completely through the dermis into the subcutaneous tissues to the level of the subcutaneous fat, and dissection was carried out in that plane.  Electrocoagulation was used to obtain hemostasis. Blood loss was minimal. The wound was then approximated in a layered fashion with subcutaneous and intradermal sutures of 3.0 Monocryl, approximately 7 in number, and the wound was then superficially closed with running sutures of 3.0 Prolene.    The patient tolerated the procedure well.    The area was cleaned and dressed appropriately and the patient was given wound care instructions, as well as an appointment for follow-up evaluation.    LENGTH OF REPAIR: 6.5 cm           2 weeks    No follow-ups on file.

## 2024-11-07 NOTE — PATIENT INSTRUCTIONS

## 2024-11-12 ENCOUNTER — OFFICE VISIT (OUTPATIENT)
Dept: CARDIOLOGY | Facility: CLINIC | Age: 64
End: 2024-11-12
Payer: COMMERCIAL

## 2024-11-12 VITALS
BODY MASS INDEX: 33.62 KG/M2 | HEIGHT: 70 IN | SYSTOLIC BLOOD PRESSURE: 138 MMHG | HEART RATE: 83 BPM | DIASTOLIC BLOOD PRESSURE: 93 MMHG | WEIGHT: 234.81 LBS

## 2024-11-12 DIAGNOSIS — I87.2 VENOUS INSUFFICIENCY OF BOTH LOWER EXTREMITIES: ICD-10-CM

## 2024-11-12 DIAGNOSIS — Z95.5 S/P DRUG ELUTING CORONARY STENT PLACEMENT: Primary | ICD-10-CM

## 2024-11-12 DIAGNOSIS — I10 ESSENTIAL HYPERTENSION: Chronic | ICD-10-CM

## 2024-11-12 DIAGNOSIS — I73.9 PERIPHERAL VASCULAR DISEASE OF EXTREMITY: ICD-10-CM

## 2024-11-12 DIAGNOSIS — I25.10 CORONARY ARTERY DISEASE INVOLVING NATIVE CORONARY ARTERY OF NATIVE HEART WITHOUT ANGINA PECTORIS: ICD-10-CM

## 2024-11-12 DIAGNOSIS — E78.2 MIXED HYPERLIPIDEMIA: Chronic | ICD-10-CM

## 2024-11-12 PROCEDURE — 99999 PR PBB SHADOW E&M-EST. PATIENT-LVL III: CPT | Mod: PBBFAC,,,

## 2024-11-12 PROCEDURE — 3046F HEMOGLOBIN A1C LEVEL >9.0%: CPT | Mod: CPTII,S$GLB,,

## 2024-11-12 PROCEDURE — 1159F MED LIST DOCD IN RCRD: CPT | Mod: CPTII,S$GLB,,

## 2024-11-12 PROCEDURE — 3008F BODY MASS INDEX DOCD: CPT | Mod: CPTII,S$GLB,,

## 2024-11-12 PROCEDURE — 3080F DIAST BP >= 90 MM HG: CPT | Mod: CPTII,S$GLB,,

## 2024-11-12 PROCEDURE — 4010F ACE/ARB THERAPY RXD/TAKEN: CPT | Mod: CPTII,S$GLB,,

## 2024-11-12 PROCEDURE — 99214 OFFICE O/P EST MOD 30 MIN: CPT | Mod: S$GLB,,,

## 2024-11-12 PROCEDURE — 3075F SYST BP GE 130 - 139MM HG: CPT | Mod: CPTII,S$GLB,,

## 2024-11-12 RX ORDER — TADALAFIL 5 MG/1
5 TABLET ORAL DAILY PRN
Qty: 30 TABLET | Refills: 0 | Status: SHIPPED | OUTPATIENT
Start: 2024-11-12 | End: 2024-12-12

## 2024-11-12 NOTE — PROGRESS NOTES
Subjective:    Patient ID:  Jose G Shafer is a 64 y.o. male patient here for evaluation No chief complaint on file.    History of Present Illness:     Jose G Shafer is a 64 y.o. male who follows Dr. Perez here today for angiogram follow up. Last seen in clinic 8/2024. He denies CP, SOB/JUAREZ, palpitations, dizziness, weakness, fatigue.     Focused Past History includes:  CAD s/p multiple PCI  Angiogram 2023: DAVID x3 to mid-distal LAD  Angiogram 9/2024: Lithotripsy and DAVID x1 to proximal circ  PAD s/p stents  Remote DCB to distal R CFA  LLE angiogram 2020: DAVID x3 to L SFA-Pop  RLE angiogram 2021: DAVID x1 to R SFA  Hypertension  Hyperlipidemia, severe hypertriglyceridemia   Type 2 DM -- uncontrolled, last A1C 11  Obesity   Current smoker, 0.5 PPD for 44 years      Most Recent Echocardiogram Results  Results for orders placed during the hospital encounter of 09/13/24    Echo    Interpretation Summary    Left Ventricle: The left ventricle is normal in size. Normal wall thickness. There is normal systolic function. Ejection fraction by visual approximation is 60%. There is normal diastolic function.    Right Ventricle: Normal right ventricular cavity size. Wall thickness is normal. Systolic function is normal.    Aortic Valve: The aortic valve is a trileaflet valve. There is mild aortic valve sclerosis without significant stenosis.    Pulmonary Artery: The estimated pulmonary artery systolic pressure is 17 mmHg.    IVC/SVC: Normal venous pressure at 3 mmHg.      Most Recent Nuclear Stress Test Results  Results for orders placed during the hospital encounter of 09/06/24    Nuclear Stress - Cardiology Interpreted    Interpretation Summary    Abnormal myocardial perfusion scan.    There is a mild to moderate intensity, small to medium sized, reversible perfusion abnormality that is consistent with ischemia in the  inferior wall(s).    There are no other significant perfusion abnormalities.    The gated perfusion  images showed an ejection fraction of 67% at rest.    There is normal wall motion at rest.    The ECG portion of the study is negative for ischemia.    The patient reported no chest pain during the stress test.    During stress, rare PVCs are noted.    When compared to a prior study from 5/5/2022, ischemia appreciated.      Most Recent Cardiac PET Stress Test Results  No results found for this or any previous visit.      Most Recent Cardiovascular Angiogram results  Results for orders placed during the hospital encounter of 09/26/24    Interventional Radiology    Conclusion  Procedures:  Moderate sedation  Left heart cath  Coronary angiogram  iFR/FFR of LCx  IVUS of LCx  Coronary angioplasty with stenting of LCx  Intravascular lithotripsy of LCx    Conclusions:  Elevated left filling pressure: EDP 21  80% stenosis due to proximal LCx calcific nodule s/p successful PCI with 4.0 x 24 Synergy Megatron DAVID (postdil to 4.5) after intravascular lithotripsy  Widely patent stents in LAD from proximal to distal  Diffuse disease in the RCA      Recommendations:  Same-day discharge  Dual antiplatelet therapy at least 6 months and preferably longer  Smoking cessation  High-intensity statin  Cardiac rehab  Follow-up with Dr. Perez or JOSUE in 1-2 months            Description of procedure:  Access was obtained using the modified Seldinger technique and a micropuncture needle with ultrasound guidance in the right radial artery. Diagnostic angiography was performed using Tejas catheter(s).    See details below.      Hemostasis:  TR band        Complications:  None  Estimated blood loss:  Minimal      The patient tolerated the procedure well and left the cath lab in stable condition.      Renetta Rivera MD, [facility]  Interventional Cardiology/Structural Heart Disease  [facility]  Office: [phone]    The clinically important portion of this report has been dictated in the section above.  Our Epic reporting system does not allow us to provide a clinically meaningful report without this dictation. Please beware that there may be errors and discrepancies in the computer transcription and all of the clicks required to finalize the report.  The procedure log was documented by Documenter: Lamonte Grullon and verified by Renetta Rivera MD.    Date: 9/27/2024  Time: 7:55 PM      Other Most Recent Cardiology Results  Results for orders placed during the hospital encounter of 08/16/23    CARDIAC MONITORING STRIPS      REVIEW OF SYSTEMS: As noted in HPI   CARDIOVASCULAR: No recent chest pain, palpitations, arm/neck/jaw pain, or edema.  RESPIRATORY: No recent fever, cough, SOB.  : No blood in the urine  GI: No reflux, nausea, vomiting, or blood in stool.   MUSCULOSKELETAL: No falls.   NEURO: No headaches, syncope, or dizziness.  EYES: No sudden changes in vision.     Past Medical History:   Diagnosis Date    Anticoagulant long-term use     Anxiety     BCC (basal cell carcinoma)     CAD (coronary artery disease)     Colon polyp     Erectile dysfunction     GERD (gastroesophageal reflux disease)     Hepatic steatosis     History of kidney stones     History of pneumonia 12/2022    HLD (hyperlipidemia)     HTN (hypertension)     Impaired fasting glucose     Lateral epicondylitis of left elbow 07/18/2019    Myocardial infarction     PONV (postoperative nausea and vomiting)     Sleep apnea     uses cpap    Type 2 diabetes mellitus without complications 08/29/2024     Past Surgical History:   Procedure Laterality Date    ANGIOGRAM, CORONARY, WITH LEFT HEART CATHETERIZATION Left 05/26/2022    Procedure: Angiogram, Coronary, with Left Heart Cath;  Surgeon: Christa Gunn MD;  Location: [facility];  Service: Cardiology;  Laterality: Left;    ANGIOGRAM, CORONARY, WITH LEFT HEART CATHETERIZATION  08/16/2023    Procedure: Left Heart Cath;  Surgeon: Renetta Rivera MD;  Location: [facility];  Service: Cardiology;;    ANGIOGRAM,  CORONARY, WITH LEFT HEART CATHETERIZATION  9/26/2024    Procedure: Left Heart Cath;  Surgeon: Renetta Rivera MD;  Location: [facility];  Service: Cardiology;;    ANGIOPLASTY      AORTOGRAPHY WITH EXTREMITY RUNOFF  11/16/2021    Procedure: AORTOGRAM, WITH EXTREMITY RUNOFF;  Surgeon: Blanca Arzola MD;  Location: [facility];  Service: Cardiovascular;;    AORTOGRAPHY WITH SERIALOGRAPHY N/A 11/22/2019    Procedure: AORTOGRAM, WITH SERIALOGRAPHY;  Surgeon: Blanca Arzola MD;  Location: [facility];  Service: Cardiovascular;  Laterality: N/A;    AORTOGRAPHY WITH SERIALOGRAPHY N/A 12/13/2019    Procedure: AORTOGRAM, WITH SERIALOGRAPHY;  Surgeon: Blanca Arzola MD;  Location: [facility];  Service: Cardiovascular;  Laterality: N/A;    AORTOGRAPHY WITH SERIALOGRAPHY  12/17/2020    Procedure: AORTOGRAM, WITH SERIALOGRAPHY;  Surgeon: Blanca Arzola MD;  Location: [facility];  Service: Cardiovascular;;    ATHERECTOMY, CORONARY  08/16/2023    Procedure: Atherectomy LAD (Rotoblator);  Surgeon: Renetta Rivera MD;  Location: [facility];  Service: Cardiology;;    CARDIAC SURGERY  2005    stents placed    COLONOSCOPY N/A 07/17/2017    Procedure: COLONOSCOPY Miralax;  Surgeon: Jeremiah Syed Jr., MD;  Location: [facility];  Service: Endoscopy;  Laterality: N/A; repeat in 5 years for surveillance    CORONARY ANGIOGRAPHY  9/26/2024    Procedure: Coronary angiogram study;  Surgeon: Renetta Rivera MD;  Location: [facility];  Service: Cardiology;;    INTRAVASCULAR ULTRASOUND, CORONARY  9/26/2024    Procedure: IVUS LCX;  Surgeon: Renetta Rivera MD;  Location: [facility];  Service: Cardiology;;    IVUS, CORONARY  08/16/2023    Procedure: IVUS LAD;  Surgeon: Renetta Rivera MD;  Location: [facility];  Service: Cardiology;;    KNEE ARTHROSCOPY W/ MENISCECTOMY      PERCUTANEOUS CORONARY INTERVENTION, ARTERY  08/16/2023    Procedure: DAVID LAD;  Surgeon: Renetta Rivera MD;  Location: [facility];  Service: Cardiology;;    PERCUTANEOUS TRANSLUMINAL ANGIOPLASTY N/A 11/22/2019    Procedure:  Pta (Angioplasty, Percutaneous, Transluminal);  Surgeon: Blanca Arzola MD;  Location: [facility];  Service: Cardiovascular;  Laterality: N/A;    PERCUTANEOUS TRANSLUMINAL ANGIOPLASTY N/A 2019    Procedure: Pta (Angioplasty, Percutaneous, Transluminal);  Surgeon: Blanca Arzola MD;  Location: [facility];  Service: Cardiovascular;  Laterality: N/A;    PERCUTANEOUS TRANSLUMINAL ANGIOPLASTY  2021    Procedure: PTA (ANGIOPLASTY, PERCUTANEOUS, TRANSLUMINAL);  Surgeon: Blanca Arzola MD;  Location: [facility];  Service: Cardiovascular;;    STENT, DRUG ELUTING, SINGLE VESSEL, CORONARY  2024    Procedure: DAVID LCX;  Surgeon: Renetta Rivera MD;  Location: [facility];  Service: Cardiology;;    VARICOSE VEIN SURGERY      left saphenous     Social History     Tobacco Use    Smoking status: Every Day     Current packs/day: 0.00     Average packs/day: 0.5 packs/day for 44.0 years (22.0 ttl pk-yrs)     Types: Cigarettes     Start date: 10/10/1984     Last attempt to quit: 2023     Years since quittin.8    Smokeless tobacco: Never   Substance Use Topics    Alcohol use: Yes     Alcohol/week: 14.0 standard drinks of alcohol     Types: 14 Cans of beer per week    Drug use: Never         Objective      Vitals:    24 1315   BP: (!) 138/93   Pulse: 83       The ASCVD Risk score (Mili KOCH, et al., 2019) failed to calculate for the following reasons:    The valid HDL cholesterol range is 20 to 100 mg/dL      LAST EKG  Results for orders placed or performed during the hospital encounter of 24   EKG 12-LEAD on arrival to floor    Collection Time: 24  1:19 PM   Result Value Ref Range    QRS Duration 110 ms    [facility] QTC Calculation 458 ms    Narrative    Test Reason : Z95.5,    Vent. Rate : 073 BPM     Atrial Rate : 073 BPM     P-R Int : 156 ms          QRS Dur : 110 ms      QT Int : 416 ms       P-R-T Axes : 022 -65 003 degrees     QTc Int : 458 ms    Normal sinus rhythm  Left axis deviation  Inferior infarct (cited on  or before 26-SEP-2024)  Abnormal ECG  When compared with ECG of 26-SEP-2024 09:05,  No significant change was found  Confirmed by Kishor Enriquez ([id number]) on 9/27/2024 1:15:19 PM    Referred By: Renetta Rivera MD           Confirmed By:Kishor Enriquez     LIPIDS - LAST 2   Lab Results   Component Value Date    CHOL 187 09/06/2024    CHOL 197 08/29/2024    HDL 19 (L) 09/06/2024    HDL 22 (L) 08/29/2024    LDLCALC Invalid, Trig>400.0 09/06/2024    LDLCALC Invalid, Trig>400.0 08/29/2024    TRIG 970 (H) 09/06/2024    TRIG 1,352 (H) 08/29/2024    CHOLHDL 10.2 (L) 09/06/2024    CHOLHDL 11.2 (L) 08/29/2024     CARDIAC PROFILE - LAST 2  Lab Results   Component Value Date     (H) 06/22/2005     (H) 06/21/2005    TROPONINI <0.012 09/26/2024    TROPONINI 1.060 (HH) 08/17/2023      CBC - LAST 2  Lab Results   Component Value Date    WBC 9.61 09/26/2024    WBC 10.72 08/17/2023    HGB 13.3 (L) 09/26/2024    HGB 13.0 (L) 08/17/2023    HCT 37.8 (L) 09/26/2024    HCT 37.7 (L) 08/17/2023     09/26/2024     08/17/2023     Lab Results   Component Value Date    LABPT 12.8 11/16/2021    LABPT 12.8 12/17/2020    INR 1.0 11/16/2021    INR 1.0 12/17/2020    APTT 24.8 11/16/2021    APTT 27.4 12/17/2020     CHEMISTRY - LAST 2  Lab Results   Component Value Date     09/26/2024     (L) 08/29/2024    K 4.4 09/26/2024    K 4.7 08/29/2024    CO2 22 09/26/2024    CO2 20 (L) 08/29/2024    BUN 15 09/26/2024    BUN 13 08/29/2024    CREATININE 0.80 09/26/2024    CREATININE 1.2 08/29/2024     (H) 09/26/2024     (H) 08/29/2024    CALCIUM 9.2 09/26/2024    CALCIUM 10.2 08/29/2024    PH 7.39 12/10/2022    MG 1.4 (L) 12/09/2022    MG 2.4 06/23/2005    ALBUMIN 4.4 09/26/2024    ALBUMIN 3.8 08/29/2024    ALT 46 09/26/2024    ALT 47 (H) 08/29/2024    AST 49 09/26/2024    AST 49 (H) 08/29/2024      ENDOCRINE - LAST 2  Lab Results   Component Value Date    HGBA1C 11.0 (H) 08/29/2024    HGBA1C 6.5 (H) 09/12/2022     TSH 1.460 09/06/2024    TSH 1.327 08/29/2024    TSH 1.327 08/29/2024        PHYSICAL EXAM  CONSTITUTIONAL: Well built, well nourished in no apparent distress  NECK: no carotid bruit, no JVD  LUNGS: CTA  CHEST WALL: no tenderness  HEART: regular rate and rhythm, S1, S2 normal, no murmur, click, rub or gallop   ABDOMEN: soft, non-tender; bowel sounds normal; no masses,  no organomegaly  EXTREMITIES: Extremities normal, no edema, no calf tenderness noted  NEURO: AAO X 3    I HAVE REVIEWED :    The vital signs, most recent cardiac testing, and most recent pertinent non-cardiology provider notes.    Current Outpatient Medications   Medication Instructions    albuterol (VENTOLIN HFA) 90 mcg/actuation inhaler 2 puffs, Inhalation, Every 4 hours PRN, Rescue    amLODIPine (NORVASC) 5 mg, Oral, Daily    ascorbic acid (vitamin C) (VITAMIN C) 500 mg, 2 times daily    aspirin (ECOTRIN) 81 mg, Oral, Daily    atorvastatin (LIPITOR) 80 mg, Oral, Nightly    blood sugar diagnostic Strp Use to monitor blood glucose twice daily    blood-glucose meter (BLULINK GLUCOSE MONITOR SYSTEM) Misc Use as directed    blood-glucose meter kit To check BG 2 times daily, to use with insurance preferred meter. E11.9    carvediloL (COREG) 25 mg, Oral, 2 times daily with meals    cilostazoL (PLETAL) 50 mg, Oral, 2 times daily    clopidogreL (PLAVIX) 75 mg, Oral, Daily    dicyclomine (BENTYL) 10 MG capsule TAKE 1 CAP BY MOUTH BEFORE MEALS AND AT BEDTIME AS NEEDED (ABDOMINAL CRAMPING/PAIN).    ezetimibe (ZETIA) 10 mg tablet TAKE 1 TABLET BY MOUTH EVERY DAY    fenofibrate (TRICOR) 54 mg, Oral, Daily    fluticasone propionate (FLONASE) 50 mcg/actuation nasal spray USE 1 SPRAY (50 MCG TOTAL) BY EACH NARE ROUTE ONCE DAILY.    fluticasone-umeclidin-vilanter (TRELEGY ELLIPTA) 200-62.5-25 mcg inhaler 1 puff, Inhalation, Daily    isosorbide mononitrate (IMDUR) 30 mg, Oral, Daily    lancets 28 gauge Misc Use to test blood glucose twice daily    lisinopriL  (PRINIVIL,ZESTRIL) 40 MG tablet TAKE 1 TABLET BY MOUTH EVERY DAY    metFORMIN (GLUCOPHAGE) 1,000 mg, Oral, 2 times daily with meals    MULTIVITAMIN ORAL 1 capsule, Daily    nitroGLYCERIN (NITROSTAT) 0.4 mg, Sublingual, Every 5 min PRN, 1 Tablet, Sublingual Sublingual    omega-3 fatty acids 500 mg Cap 1 capsule, Daily    pantoprazole (PROTONIX) 20 mg, Oral, Before breakfast    predniSONE (DELTASONE) 20 MG tablet Take one daily for 3 days and may repeat for shortness of breath.    sertraline (ZOLOFT) 50 mg, Oral, Daily    TRULICITY 0.75 mg, Subcutaneous, Every 7 days        Assessment & Plan   1. S/P drug eluting coronary stent placement (Primary)      2. Coronary artery disease involving native coronary artery of native heart without angina pectoris      3. Essential hypertension      4. Mixed hyperlipidemia      5. Peripheral vascular disease of extremity: s/p R. SFA DCB 10/19/2015      6. Venous insufficiency of both lower extremities         PLAN:     Continue aspirin 81 mg daily  Continue clopidogrel 75 mg daily  Continue cilostazol 50 mg BID  Continue atorvastatin 80 mg daily   Continue ezetimibe 10 mg daily   Continue fenofibrate 54 mg daily   Continue omega 3  Continue isosorbide mononitrate 30 mg daily   Continue lisinopril 40 mg daily    Emphasized the importance of modifying lifestyle related risk factors including tobacco avoidance, limiting alcohol intake, aerobic exercise, weight management and Mediterranean diet.      Follow up in about 6 months (around 5/12/2025).     David Marie NP  [facility]   Office: [phone]

## 2024-11-13 ENCOUNTER — TELEPHONE (OUTPATIENT)
Dept: DERMATOLOGY | Facility: CLINIC | Age: 64
End: 2024-11-13
Payer: COMMERCIAL

## 2024-11-13 NOTE — TELEPHONE ENCOUNTER
----- Message from Twila Tellez MD sent at 11/13/2024 12:25 PM CST -----  Final Pathologic Diagnosis 1. Skin, left forearm, excision:  - RESIDUAL INVASIVE SQUAMOUS CELL CARCINOMA.  - MARGINS ARE NEGATIVE.  - DERMAL SCAR.  Comment: Interp By Ari Pereira M.D., Signed on 11/12/2024 at 19:47    ##Margins are clear and no further intervention is warranted. Please notify patient at the time of suture removal.

## 2024-11-13 NOTE — TELEPHONE ENCOUNTER
Contacted pt in regard to bx results. Pt voices understanding no further intervention is required.

## 2024-11-17 DIAGNOSIS — E78.2 MIXED HYPERLIPIDEMIA: Chronic | ICD-10-CM

## 2024-11-17 RX ORDER — EZETIMIBE 10 MG/1
TABLET ORAL
Qty: 90 TABLET | Refills: 0 | Status: SHIPPED | OUTPATIENT
Start: 2024-11-17

## 2024-11-17 NOTE — TELEPHONE ENCOUNTER
No care due was identified.  [facility] Embedded Care Due Messages. Reference number: [id number].   11/17/2024 1:13:54 AM CST

## 2024-11-21 ENCOUNTER — CLINICAL SUPPORT (OUTPATIENT)
Dept: DERMATOLOGY | Facility: CLINIC | Age: 64
End: 2024-11-21
Payer: COMMERCIAL

## 2024-11-21 DIAGNOSIS — C44.629 SQUAMOUS CELL CARCINOMA OF ARM, LEFT: Primary | ICD-10-CM

## 2024-11-21 PROCEDURE — 99024 POSTOP FOLLOW-UP VISIT: CPT | Mod: S$GLB,,, | Performed by: STUDENT IN AN ORGANIZED HEALTH CARE EDUCATION/TRAINING PROGRAM

## 2024-11-27 DIAGNOSIS — R19.5 LOOSE BOWEL MOVEMENT: ICD-10-CM

## 2024-11-27 RX ORDER — DICYCLOMINE HYDROCHLORIDE 10 MG/1
CAPSULE ORAL
Qty: 120 CAPSULE | Refills: 3 | Status: SHIPPED | OUTPATIENT
Start: 2024-11-27

## 2024-12-09 DIAGNOSIS — J44.89 ASTHMA-COPD OVERLAP SYNDROME: ICD-10-CM

## 2024-12-09 RX ORDER — FLUTICASONE FUROATE, UMECLIDINIUM BROMIDE AND VILANTEROL TRIFENATATE 200; 62.5; 25 UG/1; UG/1; UG/1
1 POWDER RESPIRATORY (INHALATION) DAILY
Qty: 60 EACH | Refills: 3 | Status: SHIPPED | OUTPATIENT
Start: 2024-12-09 | End: 2024-12-10 | Stop reason: SDUPTHER

## 2024-12-10 ENCOUNTER — OFFICE VISIT (OUTPATIENT)
Dept: PULMONOLOGY | Facility: CLINIC | Age: 64
End: 2024-12-10
Payer: COMMERCIAL

## 2024-12-10 VITALS
SYSTOLIC BLOOD PRESSURE: 121 MMHG | BODY MASS INDEX: 33.79 KG/M2 | DIASTOLIC BLOOD PRESSURE: 84 MMHG | OXYGEN SATURATION: 96 % | HEIGHT: 70 IN | WEIGHT: 236 LBS | HEART RATE: 84 BPM

## 2024-12-10 DIAGNOSIS — R91.8 PULMONARY NODULES: Chronic | ICD-10-CM

## 2024-12-10 DIAGNOSIS — F41.9 ANXIETY: Chronic | ICD-10-CM

## 2024-12-10 DIAGNOSIS — F17.210 NICOTINE DEPENDENCE, CIGARETTES, UNCOMPLICATED: Primary | ICD-10-CM

## 2024-12-10 DIAGNOSIS — J44.89 ASTHMA-COPD OVERLAP SYNDROME: ICD-10-CM

## 2024-12-10 DIAGNOSIS — G47.33 OBSTRUCTIVE SLEEP APNEA: ICD-10-CM

## 2024-12-10 PROCEDURE — 4010F ACE/ARB THERAPY RXD/TAKEN: CPT | Mod: CPTII,S$GLB,, | Performed by: INTERNAL MEDICINE

## 2024-12-10 PROCEDURE — 3008F BODY MASS INDEX DOCD: CPT | Mod: CPTII,S$GLB,, | Performed by: INTERNAL MEDICINE

## 2024-12-10 PROCEDURE — 3046F HEMOGLOBIN A1C LEVEL >9.0%: CPT | Mod: CPTII,S$GLB,, | Performed by: INTERNAL MEDICINE

## 2024-12-10 PROCEDURE — 99214 OFFICE O/P EST MOD 30 MIN: CPT | Mod: S$GLB,,, | Performed by: INTERNAL MEDICINE

## 2024-12-10 PROCEDURE — 99999 PR PBB SHADOW E&M-EST. PATIENT-LVL V: CPT | Mod: PBBFAC,,, | Performed by: INTERNAL MEDICINE

## 2024-12-10 PROCEDURE — 1159F MED LIST DOCD IN RCRD: CPT | Mod: CPTII,S$GLB,, | Performed by: INTERNAL MEDICINE

## 2024-12-10 PROCEDURE — 3074F SYST BP LT 130 MM HG: CPT | Mod: CPTII,S$GLB,, | Performed by: INTERNAL MEDICINE

## 2024-12-10 PROCEDURE — 3079F DIAST BP 80-89 MM HG: CPT | Mod: CPTII,S$GLB,, | Performed by: INTERNAL MEDICINE

## 2024-12-10 RX ORDER — ALBUTEROL SULFATE 90 UG/1
2 INHALANT RESPIRATORY (INHALATION) EVERY 4 HOURS PRN
Qty: 54 G | Refills: 3 | Status: SHIPPED | OUTPATIENT
Start: 2024-12-10

## 2024-12-10 RX ORDER — PREDNISONE 20 MG/1
TABLET ORAL
Qty: 12 TABLET | Refills: 1 | Status: SHIPPED | OUTPATIENT
Start: 2024-12-10

## 2024-12-10 RX ORDER — ALPRAZOLAM 0.25 MG/1
0.25 TABLET ORAL 3 TIMES DAILY PRN
Qty: 30 TABLET | Refills: 2 | Status: SHIPPED | OUTPATIENT
Start: 2024-12-10

## 2024-12-10 RX ORDER — FLUTICASONE FUROATE, UMECLIDINIUM BROMIDE AND VILANTEROL TRIFENATATE 200; 62.5; 25 UG/1; UG/1; UG/1
1 POWDER RESPIRATORY (INHALATION) DAILY
Qty: 180 EACH | Refills: 3 | Status: SHIPPED | OUTPATIENT
Start: 2024-12-10

## 2024-12-10 NOTE — PATIENT INSTRUCTIONS
Ct chest from 8/2024 viewed compared to 12/2023-- area of left lung has some new scarring/density but not cancer concern.. will plan to do follow up in April    You have sleep apnea with 30 episodes hourly of stop breathing-- we discussed inspire device and could refer to ent doc? You have 90 min drive to work and were excess sleepy..      Should stop smokes     Will send in xanax for as needed use for anxiety    Will put in consult for ent....

## 2024-12-10 NOTE — PROGRESS NOTES
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   sleep apnea and COPD.  Lung nodules were seen on CT scan up to 11 mm.      Pt still  for [employer].  Dx diabetes and on Trulicity.  Sugars better and needs endo f/u    From sticky note done 4 months ago-Notify CT looked to be fairly stable.  Some small nodules are seen.  Next follow-up CT scan should be in 6 months.  6/3/2024 pt ran out trelegy and had yg mucous. Did well since last seen but use up prednisone and azithro. Uses prn albuterol ,  occ smokes but min    Had stent placed  Uses cpap, not needing xanax for cpap but has anxiety.  Still off smokes with occ smokes.  Needs ot work 2.5 yrs more..  .    Patient Instructions   You still smoke occasionally, would follow up ct chest soon-- once cleared  of exacerbation.  Ct chest from 12/2023 compared to 6/2023 -- 11 mm nodule stable    Use trelegy daily  Use albuterol as needed    May use prednisone 20mg daily for 3 days if exacerbates.  May use azithromyicn if any concerns re infection      Stiop smokes     Continue cpap.     12/14/2022pt worked [employer] since 78.  Pt non diabetic, stopped smokes last wk.  Pt has had cough and wheezes in past-- seasonal in past with weather changes.  Occ sinus drainage and stuffy.     Pt still working as  in ship construction industry.  Noted zhao/fatigue recent yrs.  Had acute illness and felt extreme distress ppt er visit. Cta done, had had 5mm lung nodule seen on prior ct chest screening.    Has cpap with nasal mask -- 4 hrs /night used -- had had eds driving which resolved.      Patient Instructions   Sleep study with 30 episodes an hour stopping/slowing breathing with oxygen low 1/2 time.    Would need to review compliance report from cpap.     Xanax  may help cpap compliance ?          You have had seasonal bronchial problems-- expect copd with ? Asthma   Use trelegy  for 24/7 prevention/controller-- if lung capacity over 50% would use if helps regular basis, if less than 50% need to use.  Should use seasonally at least.     Use albuterol 2 puff ( up to 3-4 ) as needed every 4 hrs for short breath or cough or activity     Use prednisone - one daily for 3 days in future if lungs worsen      May use azithromycin for sinus/bronchial infection.      Could check lung capacity.    Would do screening ct chest yearly with asbestos/smoking     PFSH:  Past Medical History:   Diagnosis Date    Anticoagulant long-term use     Anxiety     BCC (basal cell carcinoma)     CAD (coronary artery disease)     Colon polyp     Erectile dysfunction     GERD (gastroesophageal reflux disease)     Hepatic steatosis     History of kidney stones     History of pneumonia 12/2022    HLD (hyperlipidemia)     HTN (hypertension)     Impaired fasting glucose     Lateral epicondylitis of left elbow 07/18/2019    Myocardial infarction     PONV (postoperative nausea and vomiting)     Sleep apnea     uses cpap    Type 2 diabetes mellitus without complications 08/29/2024         Past Surgical History:   Procedure Laterality Date    ANGIOGRAM, CORONARY, WITH LEFT HEART CATHETERIZATION Left 05/26/2022    Procedure: Angiogram, Coronary, with Left Heart Cath;  Surgeon: Christa Gunn MD;  Location: [facility] CATH;  Service: Cardiology;  Laterality: Left;    ANGIOGRAM, CORONARY, WITH LEFT HEART CATHETERIZATION  08/16/2023    Procedure: Left Heart Cath;  Surgeon: Renetta Rivera MD;  Location: [facility] C[facility];  Service: Cardiology;;    ANGIOGRAM, CORONARY, WITH LEFT HEART CATHETERIZATION  9/26/2024    Procedure: Left Heart Cath;  Surgeon: Renetta Rivera MD;  Location: [facility] CATH;  Service: Cardiology;;    ANGIOPLASTY      AORTOGRAPHY WITH EXTREMITY RUNOFF  11/16/2021    Procedure: AORTOGRAM, WITH EXTREMITY RUNOFF;  Surgeon:  Blanca Arzola MD;  Location: [facility]ATH;  Service: Cardiovascular;;    AORTOGRAPHY WITH SERIALOGRAPHY N/A 11/22/2019    Procedure: AORTOGRAM, WITH SERIALOGRAPHY;  Surgeon: Blanca Arzola MD;  Location: [facility];  Service: Cardiovascular;  Laterality: N/A;    AORTOGRAPHY WITH SERIALOGRAPHY N/A 12/13/2019    Procedure: AORTOGRAM, WITH SERIALOGRAPHY;  Surgeon: Blanca Arzola MD;  Location: [facility];  Service: Cardiovascular;  Laterality: N/A;    AORTOGRAPHY WITH SERIALOGRAPHY  12/17/2020    Procedure: AORTOGRAM, WITH SERIALOGRAPHY;  Surgeon: Blanca Arzola MD;  Location: [facility];  Service: Cardiovascular;;    ATHERECTOMY, CORONARY  08/16/2023    Procedure: Atherectomy LAD (Rotoblator);  Surgeon: Renetta Rivera MD;  Location: [facility];  Service: Cardiology;;    CARDIAC SURGERY  2005    stents placed    COLONOSCOPY N/A 07/17/2017    Procedure: COLONOSCOPY Miralax;  Surgeon: Jeremiah Syed Jr., MD;  Location: [facility] ENDO;  Service: Endoscopy;  Laterality: N/A; repeat in 5 years for surveillance    CORONARY ANGIOGRAPHY  9/26/2024    Procedure: Coronary angiogram study;  Surgeon: Renetta Rivera MD;  Location: [facility];  Service: Cardiology;;    INTRAVASCULAR ULTRASOUND, CORONARY  9/26/2024    Procedure: IVUS LCX;  Surgeon: Renetta Rivera MD;  Location: [facility];  Service: Cardiology;;    IVUS, CORONARY  08/16/2023    Procedure: IVUS LAD;  Surgeon: Renetta Rivera MD;  Location: [facility];  Service: Cardiology;;    KNEE ARTHROSCOPY W/ MENISCECTOMY      PERCUTANEOUS CORONARY INTERVENTION, ARTERY  08/16/2023    Procedure: DAVID LAD;  Surgeon: Renetta Rivera MD;  Location: [facility];  Service: Cardiology;;    PERCUTANEOUS TRANSLUMINAL ANGIOPLASTY N/A 11/22/2019    Procedure: Pta (Angioplasty, Percutaneous, Transluminal);  Surgeon: Blanca Arzola MD;  Location: [facility]ATH;  Service: Cardiovascular;  Laterality: N/A;    PERCUTANEOUS TRANSLUMINAL ANGIOPLASTY N/A 12/13/2019    Procedure: Pta (Angioplasty, Percutaneous, Transluminal);  Surgeon: Blanca Arzola  MD;  Location: [facility];  Service: Cardiovascular;  Laterality: N/A;    PERCUTANEOUS TRANSLUMINAL ANGIOPLASTY  2021    Procedure: PTA (ANGIOPLASTY, PERCUTANEOUS, TRANSLUMINAL);  Surgeon: Blanca Arzola MD;  Location: [facility];  Service: Cardiovascular;;    STENT, DRUG ELUTING, SINGLE VESSEL, CORONARY  2024    Procedure: DAVID LCX;  Surgeon: Renetta Rivera MD;  Location: [facility]ATH;  Service: Cardiology;;    VARICOSE VEIN SURGERY      left saphenous     Social History     Tobacco Use    Smoking status: Every Day     Current packs/day: 0.00     Average packs/day: 0.5 packs/day for 44.0 years (22.0 ttl pk-yrs)     Types: Cigarettes     Start date: 10/10/1984     Last attempt to quit: 2023     Years since quittin.9    Smokeless tobacco: Never   Substance Use Topics    Alcohol use: Yes     Alcohol/week: 14.0 standard drinks of alcohol     Types: 14 Cans of beer per week    Drug use: Never     Family History   Problem Relation Name Age of Onset    Diabetes Mother      Stomach cancer Father      Diabetes Father      No Known Problems Sister      Prostate cancer Brother 2     Diabetes Brother 2     Coronary artery disease Brother 2         premature    Heart disease Brother 2     Hypertension Brother 2     No Known Problems Maternal Grandmother      No Known Problems Maternal Grandfather      No Known Problems Paternal Grandmother      No Known Problems Paternal Grandfather      No Known Problems Maternal Aunt      No Known Problems Maternal Uncle      No Known Problems Paternal Aunt      No Known Problems Paternal Uncle      Colon cancer Neg Hx      Anemia Neg Hx      Arrhythmia Neg Hx      Asthma Neg Hx      Clotting disorder Neg Hx      Fainting Neg Hx      Heart attack Neg Hx      Heart disease Neg Hx      Heart failure Neg Hx      Hyperlipidemia Neg Hx      Hypertension Neg Hx      Stroke Neg Hx      Atrial Septal Defect Neg Hx      Ulcerative colitis Neg Hx      Crohn's disease Neg Hx      Celiac disease  "Neg Hx       Review of patient's allergies indicates:  No Known Allergies       Review of Systems:  a review of eleven systems covering constitutional, Eye, HEENT, Psych, Respiratory, Cardiac, GI, , Musculoskeletal, Endocrine, Dermatologic was negative except for pertinent findings as listed ABOVE and below: rest good     Exam:Comprehensive exam done. /84 (BP Location: Right arm, Patient Position: Sitting)   Pulse 84   Ht 5' 10" (1.778 m)   Wt 107.1 kg (236 lb)   SpO2 96% Comment: on room air at rest  BMI 33.86 kg/m²   Exam included Vitals as listed, and patient's appearance and affect and alertness and mood, oral exam for yeast and hygiene and pharynx lesions and Mallapatti (M) score, neck with inspection for jvd and masses and thyroid abnormalities and lymph nodes (supraclavicular and infraclavicular nodes and axillary also examined and noted if abn), chest exam included symmetry and effort and fremitus and percussion and auscultation, cardiac exam included rhythm and gallops and murmur and rubs and jvd and edema, abdominal exam for mass and hepatosplenomegaly and tenderness and hernias and bowel sounds, Musculoskeletal exam with muscle tone and posture and mobility/gait and  strength, and skin for rashes and cyanosis and pallor and turgor, extremity for clubbing.  Findings were normal except for pertinent findings listed below:  M4, min rales left lower lung, no edema          Radiographs (ct chest and cxr) reviewed: view by direct vision  no asbesto markings with min ggo peripherally cw flu pneumonia.  Ct chest from 9/2022 with 5 mm nodule seen, prom bronchi    Labs reviewed           PFT will be done and results to be reviewed       Plan:  Clinical impression is apparently straight forward and impression with management as below.    Jose G was seen today for follow-up and apnea.    Diagnoses and all orders for this visit:    Nicotine dependence, cigarettes, uncomplicated  -     CT Chest Lung " Screening Low Dose; Future    Asthma-COPD overlap syndrome  -     albuterol (VENTOLIN HFA) 90 mcg/actuation inhaler; Inhale 2 puffs into the lungs every 4 (four) hours as needed for Wheezing or Shortness of Breath. Rescue  -     predniSONE (DELTASONE) 20 MG tablet; Take one daily for 3 days and may repeat for shortness of breath.  -     fluticasone-umeclidin-vilanter (TRELEGY ELLIPTA) 200-62.5-25 mcg inhaler; Inhale 1 puff into the lungs once daily.    Obstructive sleep apnea  -     CPAP/BIPAP SUPPLIES    Anxiety  -     ALPRAZolam (XANAX) 0.25 MG tablet; Take 1 tablet (0.25 mg total) by mouth 3 (three) times daily as needed for Anxiety.    Pulmonary nodules            Follow up in about 1 year (around 12/10/2025), or if symptoms worsen or fail to improve.    Discussed with patient above for education the following:      Patient Instructions   Ct chest from 8/2024 viewed compared to 12/2023-- area of left lung has some new scarring/density but not cancer concern.. will plan to do follow up in April    You have sleep apnea with 30 episodes hourly of stop breathing-- we discussed inspire device and could refer to ent doc? You have 90 min drive to work and were excess sleepy..      Should stop smokes     Will send in xanax for as needed use for anxiety    Will put in consult for ent....       Kallial took 32 min

## 2024-12-23 ENCOUNTER — LAB VISIT (OUTPATIENT)
Dept: LAB | Facility: HOSPITAL | Age: 64
End: 2024-12-23
Attending: INTERNAL MEDICINE
Payer: COMMERCIAL

## 2024-12-23 DIAGNOSIS — E78.2 MIXED HYPERLIPIDEMIA: Chronic | ICD-10-CM

## 2024-12-23 LAB
CHOLEST SERPL-MCNC: 129 MG/DL (ref 120–199)
CHOLEST/HDLC SERPL: 5.2 {RATIO} (ref 2–5)
HDLC SERPL-MCNC: 25 MG/DL (ref 40–75)
HDLC SERPL: 19.4 % (ref 20–50)
LDLC SERPL CALC-MCNC: 56.6 MG/DL (ref 63–159)
NONHDLC SERPL-MCNC: 104 MG/DL
TRIGL SERPL-MCNC: 237 MG/DL (ref 30–150)

## 2024-12-23 PROCEDURE — 80061 LIPID PANEL: CPT | Performed by: INTERNAL MEDICINE

## 2024-12-23 PROCEDURE — 36415 COLL VENOUS BLD VENIPUNCTURE: CPT | Mod: PO | Performed by: INTERNAL MEDICINE

## 2024-12-31 DIAGNOSIS — J44.9 CHRONIC OBSTRUCTIVE PULMONARY DISEASE, UNSPECIFIED COPD TYPE: Primary | ICD-10-CM

## 2024-12-31 RX ORDER — AZITHROMYCIN 500 MG/1
TABLET, FILM COATED ORAL
Qty: 3 TABLET | Refills: 3 | Status: SHIPPED | OUTPATIENT
Start: 2024-12-31

## 2025-01-11 DIAGNOSIS — I10 ESSENTIAL HYPERTENSION: Chronic | ICD-10-CM

## 2025-01-11 DIAGNOSIS — F41.9 ANXIETY: ICD-10-CM

## 2025-01-11 RX ORDER — SERTRALINE HYDROCHLORIDE 50 MG/1
50 TABLET, FILM COATED ORAL DAILY
Qty: 90 TABLET | Refills: 0 | Status: SHIPPED | OUTPATIENT
Start: 2025-01-11

## 2025-01-11 RX ORDER — LISINOPRIL 40 MG/1
TABLET ORAL
Qty: 90 TABLET | Refills: 0 | Status: SHIPPED | OUTPATIENT
Start: 2025-01-11

## 2025-01-11 RX ORDER — CARVEDILOL 25 MG/1
25 TABLET ORAL 2 TIMES DAILY WITH MEALS
Qty: 180 TABLET | Refills: 0 | Status: SHIPPED | OUTPATIENT
Start: 2025-01-11

## 2025-01-11 NOTE — TELEPHONE ENCOUNTER
No care due was identified.  [facility] Embedded Care Due Messages. Reference number: [id number].   1/11/2025 12:36:13 AM CST

## 2025-01-25 ENCOUNTER — TELEPHONE (OUTPATIENT)
Dept: PULMONOLOGY | Facility: CLINIC | Age: 65
End: 2025-01-25
Payer: COMMERCIAL

## 2025-01-27 ENCOUNTER — LAB VISIT (OUTPATIENT)
Dept: LAB | Facility: HOSPITAL | Age: 65
End: 2025-01-27
Attending: INTERNAL MEDICINE
Payer: COMMERCIAL

## 2025-01-27 ENCOUNTER — OFFICE VISIT (OUTPATIENT)
Dept: ENDOCRINOLOGY | Facility: CLINIC | Age: 65
End: 2025-01-27
Payer: COMMERCIAL

## 2025-01-27 VITALS
SYSTOLIC BLOOD PRESSURE: 120 MMHG | HEIGHT: 70 IN | BODY MASS INDEX: 33.62 KG/M2 | OXYGEN SATURATION: 97 % | DIASTOLIC BLOOD PRESSURE: 80 MMHG | WEIGHT: 234.81 LBS | HEART RATE: 87 BPM

## 2025-01-27 DIAGNOSIS — E11.65 TYPE 2 DIABETES MELLITUS WITH HYPERGLYCEMIA, WITHOUT LONG-TERM CURRENT USE OF INSULIN: Primary | ICD-10-CM

## 2025-01-27 DIAGNOSIS — E11.9 TYPE 2 DIABETES MELLITUS WITHOUT COMPLICATION, WITHOUT LONG-TERM CURRENT USE OF INSULIN: ICD-10-CM

## 2025-01-27 DIAGNOSIS — I10 ESSENTIAL HYPERTENSION: Chronic | ICD-10-CM

## 2025-01-27 DIAGNOSIS — E78.2 MIXED HYPERLIPIDEMIA: Chronic | ICD-10-CM

## 2025-01-27 DIAGNOSIS — E11.65 TYPE 2 DIABETES MELLITUS WITH HYPERGLYCEMIA, WITHOUT LONG-TERM CURRENT USE OF INSULIN: ICD-10-CM

## 2025-01-27 DIAGNOSIS — I25.10 CORONARY ARTERY DISEASE INVOLVING NATIVE CORONARY ARTERY OF NATIVE HEART WITHOUT ANGINA PECTORIS: ICD-10-CM

## 2025-01-27 PROBLEM — R73.03 PREDIABETES: Status: RESOLVED | Noted: 2019-08-16 | Resolved: 2025-01-27

## 2025-01-27 LAB
ESTIMATED AVG GLUCOSE: 163 MG/DL (ref 68–131)
HBA1C MFR BLD: 7.3 % (ref 4–5.6)

## 2025-01-27 PROCEDURE — 1101F PT FALLS ASSESS-DOCD LE1/YR: CPT | Mod: CPTII,S$GLB,, | Performed by: NURSE PRACTITIONER

## 2025-01-27 PROCEDURE — 99204 OFFICE O/P NEW MOD 45 MIN: CPT | Mod: S$GLB,,, | Performed by: NURSE PRACTITIONER

## 2025-01-27 PROCEDURE — 1159F MED LIST DOCD IN RCRD: CPT | Mod: CPTII,S$GLB,, | Performed by: NURSE PRACTITIONER

## 2025-01-27 PROCEDURE — 3079F DIAST BP 80-89 MM HG: CPT | Mod: CPTII,S$GLB,, | Performed by: NURSE PRACTITIONER

## 2025-01-27 PROCEDURE — 3074F SYST BP LT 130 MM HG: CPT | Mod: CPTII,S$GLB,, | Performed by: NURSE PRACTITIONER

## 2025-01-27 PROCEDURE — 3008F BODY MASS INDEX DOCD: CPT | Mod: CPTII,S$GLB,, | Performed by: NURSE PRACTITIONER

## 2025-01-27 PROCEDURE — G2211 COMPLEX E/M VISIT ADD ON: HCPCS | Mod: S$GLB,,, | Performed by: NURSE PRACTITIONER

## 2025-01-27 PROCEDURE — 4010F ACE/ARB THERAPY RXD/TAKEN: CPT | Mod: CPTII,S$GLB,, | Performed by: NURSE PRACTITIONER

## 2025-01-27 PROCEDURE — 83036 HEMOGLOBIN GLYCOSYLATED A1C: CPT | Performed by: NURSE PRACTITIONER

## 2025-01-27 PROCEDURE — 36415 COLL VENOUS BLD VENIPUNCTURE: CPT | Mod: PO | Performed by: NURSE PRACTITIONER

## 2025-01-27 PROCEDURE — 3288F FALL RISK ASSESSMENT DOCD: CPT | Mod: CPTII,S$GLB,, | Performed by: NURSE PRACTITIONER

## 2025-01-27 PROCEDURE — 99999 PR PBB SHADOW E&M-EST. PATIENT-LVL III: CPT | Mod: PBBFAC,,, | Performed by: NURSE PRACTITIONER

## 2025-01-27 RX ORDER — DULAGLUTIDE 1.5 MG/.5ML
1.5 INJECTION, SOLUTION SUBCUTANEOUS
Qty: 4 PEN | Refills: 11 | Status: SHIPPED | OUTPATIENT
Start: 2025-01-27 | End: 2026-01-27

## 2025-01-27 NOTE — PATIENT INSTRUCTIONS
Low Carb Snacks  Snacks can be an important part of a balanced, healthy meal plan. They allow you to eat more frequently, feeling full and satisfied throughout the day. Also, they allow you to spread carbohydrates evenly, which may stabilize blood sugars.  Plus, snacks are enjoyable!     The amount of carbohydrate needed at snacks varies. Generally, about 15-30 grams of carbohydrate per snack is recommended.  Below you will find some tasty treats.       0-5 gm carb  Crystal Light  Vitamin Water Zero  Herbal tea, unsweetened  2 tsp peanut butter on celery  1./2 cup sugar-free jell-o  1 sugar-free popsicle  ¼ cup blueberries  8oz Blue Brittnee unsweetened almond milk  5 baby carrots & celery sticks, cucumbers, bell peppers dipped in ¼ cup salsa, 2Tbsp light ranch dressing or 2Tbsp plain Greek yogurt  10 Goldfish crackers  ½ oz low-fat cheese or string cheese  1 closed handful of nuts, unsalted  1 Tbsp of sunflower seeds, unsalted  1 cup Smart Pop popcorn  1 whole grain brown rice cake        15 gm carb  1 small piece of fruit or ½ banana or 1/2 cup lite canned fruit  3 abigail cracker squares  3 cups Smart Pop popcorn, top spray butter, Augustine lite salt or cinnamon and Truvia  5 Vanilla Wafers  ½ cup low fat, no added sugar ice cream or frozen yogurt (Blue bell, Blue Bunny, Weight Watchers, Skinny Cow)  ½ turkey, ham, or chicken sandwich  ½ c fruit with ½ c Cottage cheese  4-6 unsalted wheat crackers with 1 oz low fat cheese or 1 tbsp peanut butter   30-45 goldfish crackers (depending on flavor)   7-8 Spiritism mini brown rice cakes (caramel, apple cinnamon, chocolate)   12 Spiritism mini brown rice cakes (cheddar, bbq, ranch)   1/3 cup hummus dip with raw veg  1/2 whole wheat chi, 1Tbsp hummus  Mini Pizza (1/2 whole wheat English muffin, low-fat  cheese, tomato sauce)  100 calorie snack pack (Oreo, Chips Ahoy, Ritz Mix, Baked Cheetos)  4-6 oz. light or Greek Style yogurt (Chobani, Yoplait, Fabienne, [facility] [place])  ½ cup  sugar-free pudding    6 in. wheat tortilla or chi oven toasted chips (topped with spray butter flavoring, cinnamon, Truvia OR spray butter, garlic powder, chili powder)   18 BBQ Popchips (available at Target, Whole Foods, Fresh Market)    Celery with peanut butter  Celery with tuna salad  Hard boiled eggs  Dill pickles and cheddar cheese (no kidding, it's a great combo)  Nuts (keep raw ones in the freezer if you think you'll overeat them)  Jerky (beef or turkey -- try to find low-sugar varieties)  2% Cheese sticks, such as string cheese  Sugar-free Jello, alone or with cottage cheese and a sprinkling of nuts. Make sugar-free lime Jello with part coconut milk -- For a large package, dissolve the powder in a cup of boiling water, add a can of coconut milk, and then add the rest of the water. Stir well.  2% Cheese with a few apple slices  4-ounce plain or sugar-free yogurt   Lettuce Roll-ups -- Roll luncheon meat, egg salad, tuna or other filling and veggies in lettuce leaves  Lunch Meat Roll-ups -- Roll cheese or veggies in lunch meat (read the labels for carbs on the lunch meat)  Raw veggies and spinach dip, or other low-carb dip  Low-carb snack bars (watch out for sugar alcohols, especially maltitol)  Product Review: Atkins Advantage Bars

## 2025-01-27 NOTE — PROGRESS NOTES
"CC: This 65 y.o. male presents for management of diabetes and chronic conditions pending review including HTN, HLP, CAD    HPI: He was diagnosed with T2DM in  8/2024. Has never been hospitalized r/t DM. He was having polyuria and polydipsia prior to diagnosis  Family hx of DM: mom and brother    Arrives new to endocrine today.  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   MS/Neuro: left foot sharp pain every now and then, Gait steady, speech clear  Other systems: negative.    PE:  GENERAL: Well developed, well nourished.  PSYCH: AAOx3, appropriate mood and affect, pleasant expression, conversant, appears relaxed, well groomed.   EYES: Conjunctiva, corneas clear  NECK: Supple, trachea midline    ABDOMEN: Soft, non-tender, non-distended   VASCULAR: DP pulses +2/4 bilaterally,   NEURO: Gait steady  SKIN:no acanthosis nigracans.  FOOT EXAMINATION: 1/27/2025  No foot deformity, corns or callus formation, Onychomycosis, nails in good condition and well trimmed, no interspace maceration or ulceration noted.  Decreased hair growth present over toes/feet. Protective sensation intact with 10 gram monofilament.  +2 dorsalis pedis and posterior pulses noted.     Personally reviewed Past Medical, Surgical, Social History.    /80   Pulse 87   Ht 5' 10" (1.778 m)   Wt 106.5 kg (234 lb 12.6 oz)   SpO2 97%   BMI 33.69 kg/m²      Personally reviewed the below labs:      Chemistry        Component " Value Date/Time     09/26/2024 0919    K 4.4 09/26/2024 0919     09/26/2024 0919    CO2 22 09/26/2024 0919    BUN 15 09/26/2024 0919    CREATININE 0.80 09/26/2024 0919     (H) 09/26/2024 0919        Component Value Date/Time    CALCIUM 9.2 09/26/2024 0919    ALKPHOS 57 09/26/2024 0919    AST 49 09/26/2024 0919    ALT 46 09/26/2024 0919    BILITOT 0.7 09/26/2024 0919    ESTGFRAFRICA >60 06/10/2022 1034    EGFRNONAA >60 06/10/2022 1034            Lab Results   Component Value Date    TSH 1.460 09/06/2024       Recent Labs   Lab 12/23/24  0705   LDL Cholesterol 56.6 L   HDL 25 L   Cholesterol 129        No results found for this or any previous visit.  No results found for this or any previous visit.    Lab Results   Component Value Date    MICALBCREAT 7.0 08/18/2021       Hemoglobin A1C   Date Value Ref Range Status   08/29/2024 11.0 (H) 4.0 - 5.6 % Final     Comment:     ADA Screening Guidelines:  5.7-6.4%  Consistent with prediabetes  >or=6.5%  Consistent with diabetes    High levels of fetal hemoglobin interfere with the HbA1C  assay. Heterozygous hemoglobin variants (HbS, HgC, etc)do  not significantly interfere with this assay.   However, presence of multiple variants may affect accuracy.     09/12/2022 6.5 (H) 4.0 - 5.6 % Final     Comment:     ADA Screening Guidelines:  5.7-6.4%  Consistent with prediabetes  >or=6.5%  Consistent with diabetes    High levels of fetal hemoglobin interfere with the HbA1C  assay. Heterozygous hemoglobin variants (HbS, HgC, etc)do  not significantly interfere with this assay.   However, presence of multiple variants may affect accuracy.     08/18/2021 5.9 (H) 4.0 - 5.6 % Final     Comment:     ADA Screening Guidelines:  5.7-6.4%  Consistent with prediabetes  >or=6.5%  Consistent with diabetes    High levels of fetal hemoglobin interfere with the HbA1C  assay. Heterozygous hemoglobin variants (HbS, HgC, etc)do  not significantly interfere with this assay.    However, presence of multiple variants may affect accuracy.          ASSESSMENT and PLAN:      1. T2DM with hyperglycemia-    A1C today  Increase Trulicity to 1.5 mg weekly  Check bg bid- log in 2 weeks   DM edu f/u    2. HTN - controlled, continue meds as previously prescribed and monitor.     3. HLP - on statin therapy,     4. CAD- follows w Dr Conner, optimize Bg     5. Smoker- smokes 1 ppd, not ready to quit    6. Obesity- Body mass index is 33.69 kg/m².  Encouraged exercise, walking 30 mins/day 5 days a week      Follow-up: in 3 months with A1C, [facility]

## 2025-01-28 ENCOUNTER — OFFICE VISIT (OUTPATIENT)
Dept: OTOLARYNGOLOGY | Facility: CLINIC | Age: 65
End: 2025-01-28
Payer: COMMERCIAL

## 2025-01-28 ENCOUNTER — CLINICAL SUPPORT (OUTPATIENT)
Dept: DIABETES | Facility: CLINIC | Age: 65
End: 2025-01-28
Payer: COMMERCIAL

## 2025-01-28 VITALS — WEIGHT: 234.81 LBS | HEIGHT: 70 IN | BODY MASS INDEX: 33.62 KG/M2

## 2025-01-28 VITALS — BODY MASS INDEX: 34.28 KG/M2 | WEIGHT: 239.44 LBS | HEIGHT: 70 IN

## 2025-01-28 DIAGNOSIS — E11.65 TYPE 2 DIABETES MELLITUS WITH HYPERGLYCEMIA, WITHOUT LONG-TERM CURRENT USE OF INSULIN: Primary | ICD-10-CM

## 2025-01-28 DIAGNOSIS — Z78.9 INTOLERANCE OF CONTINUOUS POSITIVE AIRWAY PRESSURE (CPAP) VENTILATION: ICD-10-CM

## 2025-01-28 DIAGNOSIS — G47.33 OBSTRUCTIVE SLEEP APNEA: Primary | ICD-10-CM

## 2025-01-28 PROCEDURE — 99999 PR PBB SHADOW E&M-EST. PATIENT-LVL V: CPT | Mod: PBBFAC,,, | Performed by: OTOLARYNGOLOGY

## 2025-01-28 PROCEDURE — 99204 OFFICE O/P NEW MOD 45 MIN: CPT | Mod: S$GLB,,, | Performed by: OTOLARYNGOLOGY

## 2025-01-28 PROCEDURE — 3051F HG A1C>EQUAL 7.0%<8.0%: CPT | Mod: CPTII,S$GLB,, | Performed by: OTOLARYNGOLOGY

## 2025-01-28 PROCEDURE — 3288F FALL RISK ASSESSMENT DOCD: CPT | Mod: CPTII,S$GLB,, | Performed by: OTOLARYNGOLOGY

## 2025-01-28 PROCEDURE — 1101F PT FALLS ASSESS-DOCD LE1/YR: CPT | Mod: CPTII,S$GLB,, | Performed by: OTOLARYNGOLOGY

## 2025-01-28 PROCEDURE — 1159F MED LIST DOCD IN RCRD: CPT | Mod: CPTII,S$GLB,, | Performed by: OTOLARYNGOLOGY

## 2025-01-28 PROCEDURE — G0108 DIAB MANAGE TRN  PER INDIV: HCPCS | Mod: S$GLB,,, | Performed by: DIETITIAN, REGISTERED

## 2025-01-28 PROCEDURE — 99999 PR PBB SHADOW E&M-EST. PATIENT-LVL II: CPT | Mod: PBBFAC,,, | Performed by: DIETITIAN, REGISTERED

## 2025-01-28 PROCEDURE — 3008F BODY MASS INDEX DOCD: CPT | Mod: CPTII,S$GLB,, | Performed by: OTOLARYNGOLOGY

## 2025-01-28 PROCEDURE — 4010F ACE/ARB THERAPY RXD/TAKEN: CPT | Mod: CPTII,S$GLB,, | Performed by: OTOLARYNGOLOGY

## 2025-01-28 PROCEDURE — 1160F RVW MEDS BY RX/DR IN RCRD: CPT | Mod: CPTII,S$GLB,, | Performed by: OTOLARYNGOLOGY

## 2025-01-28 NOTE — PROGRESS NOTES
Subjective:       Patient ID: Jose G Shafer is a 65 y.o. male.    Chief Complaint: Consult (Inspire and cpap alt)    Jose G is here for Obstructive sleep apnea and intolerance of CPAP.  he was diagnosed with LEAH years ago.   Last sleep study: 2022. OSCAR / AHI: 29; oxygen svetlana: 83  he has issues with CPAP compliance: he cannot tolerate the mask at night - he is restless with the mask. Cannot maintain sleep.  Previous surgical treatments for sleep apnea: none  Body mass index is 34.35 kg/m².      Pertinent medical issues: asthma / COPD  Tobacco use  Anticoagulation: Plavix  Pertinent surgery: Angio  ESS - 19  2-3 drinks on occasional / few times per week.    Patient validated questionnaires (if applicable):      %            No data to display                   No data to display                   No data to display                     Social History     Tobacco Use   Smoking Status Every Day    Current packs/day: 0.00    Average packs/day: 0.5 packs/day for 44.0 years (22.0 ttl pk-yrs)    Types: Cigarettes    Start date: 10/10/1984    Last attempt to quit: 2023    Years since quittin.0   Smokeless Tobacco Never     Social History     Substance and Sexual Activity   Alcohol Use Yes    Alcohol/week: 14.0 standard drinks of alcohol    Types: 14 Cans of beer per week          Objective:        Constitutional:   He is oriented to person, place, and time. He appears well-developed and well-nourished. He appears alert. He is active. Normal speech.      Head:  Normocephalic and atraumatic. Head is without TMJ tenderness. No scars. Salivary glands normal.  Facial strength is normal.      Ears:    Right Ear: No drainage or swelling. No middle ear effusion.   Left Ear: No drainage or swelling.  No middle ear effusion.     Nose:  No mucosal edema, rhinorrhea or sinus tenderness. No turbinate hypertrophy.      Mouth/Throat  Oropharynx clear and moist without lesions or asymmetry, normal uvula midline and mirror exam  normal. Normal dentition. No uvula swelling, lacerations or trismus. No oropharyngeal exudate. Tonsillar erythema, tonsillar exudate.    Martinez Tongue I      Neck:  Full range of motion with neck supple and no adenopathy. Thyroid tenderness is present. No tracheal deviation, no edema, no erythema, normal range of motion, no stridor, no crepitus and no neck rigidity present. No thyroid mass present.     Cardiovascular:    Intact distal pulses and normal pulses.              Pulmonary/Chest:   Effort normal and breath sounds normal. No stridor.     Psychiatric:   His speech is normal and behavior is normal. His mood appears not anxious. His affect is not labile.     Neurological:   He is alert and oriented to person, place, and time. No sensory deficit.     Skin:   No abrasions, lacerations, lesions, or rashes. No abrasion and no bruising noted.         Tests / Results:  I personally reviewed the HST/PSG as noted above in the HPI    Assessment:       1. Obstructive sleep apnea    2. Intolerance of continuous positive airway pressure (CPAP) ventilation    3. BMI 34.0-34.9,adult          Plan:         Discussed background of LEAH at length including medical therapy and surgical therapy    Patient interested in Inspire  Discussed  Repeat sleep study (> 2 yrs old)  Keep weight down   DISE (drug induced sleep endoscopy)    I discussed the risks of hypoglossal nerve stimulation including: scar, bleeding, numbness of incision, numbness or weakness of tongue or marginal mandibular nerve, irritation of tongue from stimulation, implant failure, inadequate improvement, need for further procedures, need for removal of implant, infection, pneumothorax, conditional MRI compatibility.

## 2025-01-28 NOTE — PROGRESS NOTES
"Diabetes Care Specialist Progress Note  Author: Maricruz Willingham RD, CDE  Date: 1/28/2025    Intake    Program Intake  Reason for Diabetes Program Visit:: Intervention  Type of Intervention:: Individual  Individual: Education  Education: Self-Management Skill Review  Type of Follow-Up: 3 month  Current diabetes risk level:: moderate  In the last month, have you used the ER or been admitted to the hospital: No  Permission to speak with others about care:: yes    Current Diabetes Treatment: DM Injectables  Oral Medication Type/Dose:  Metformin has been discontinued since his last visit  DM Injectables Type/Dose:  Trulicity was increased yesterday.  Usually takes injection on Saturdays    Continuous Glucose Monitoring  Patient has CGM: No    Lab Results   Component Value Date    HGBA1C 7.3 (H) 01/27/2025     Weight: 106.5 kg (234 lb 12.6 oz)   Height: 5' 10" (177.8 cm)   Body mass index is 33.69 kg/m².    Diabetes Self-Management Skills Assessment    Medication Skills Assessment  Patient is able to identify current diabetes medications, dosages, and appropriate timing of medications.: yes  Patient reports problems or concerns with current medication regimen.: no  Patient is  aware that some diabetes medications can cause low blood sugar?: No-reviewed today  Medication Skills Assessment Completed:: Yes  Assessment indicates:: Adequate understanding  Area of need?: Yes    Nutrition/Healthy Eating  Meal Plan 24 Hour Recall - Breakfast:  (Fruit bar)  Meal Plan 24 Hour Recall - Lunch:  (grilled piece of chicken, had [employer] yesterday)  Meal Plan 24 Hour Recall - Beverage:  (2 beers in the afternoon after work/ diet coke or water)  Who shops/cooks?:  (wife)  Patient can identify foods that impact blood sugar.: yes (reviewed today)  Nutrition/Healthy Eating Skills Assessment Completed:: Yes  Assessment indicates:: Other (comment)  Area of need?: Yes    Home Blood Glucose Monitoring  Patient states that blood sugar is checked " at home daily.: yes  Monitoring Method:: home glucometer  Fasting BG range history::  (150-190)  How often do you check your blood sugar?:  (once daily in am)  Home Blood Glucose Monitoring Skills Assessment Completed: : Yes  Assessment indicates:: Adequate understanding  Area of need?: Yes    Assessment Summary and Plan    Based on today's diabetes care assessment, the following areas of need were identified:      Identified Areas of Need      Medication/Current Diabetes Treatment: Yes   Lifestyle Coping/Support:     Diabetes Disease Process/Treatment Options:     Nutrition/Healthy Eating: Yes    Physical Activity/Exercise:      Home Blood Glucose Monitoring: Yes    Acute Complications:      Chronic Complications:       Today's interventions were provided through individual discussion, instruction, and written materials were provided.      Patient verbalized understanding of instruction and written materials.  Pt was able to return back demonstration of instructions today. Patient understood key points, needs reinforcement and further instruction.     Diabetes Self-Management Care Plan:    Today's Diabetes Self-Management Care Plan was developed with Jose G's input. Jose G has agreed to work toward the following goal(s) to improve his/her overall diabetes control.      Care Plan: Diabetes Management   Updates made since 1/29/2024 12:00 AM        Problem: Blood Glucose Self-Monitoring         Goal: Patient to initiate home glucose monitoring with new diagnosis of Type 2 DM.  Prescription pended to [facility] for glucometer and supplies.    Start Date: 9/17/2024   Expected End Date: 12/17/2024   This Visit's Progress: Met   Priority: High   Barriers: Lack of Supplies   Note:    Patient established care with Radha Cruz yesterday.  He was asked to increase his frequency of monitoring to twice daily and send in a log in 2 weeks for review.  Reviewed appropriate timing of monitoring as it relates to his meal times.         Task: Provided patient with a meter today and sent Rx request to provider to send to patients pharmacy. Completed 9/19/2024        Task: Reviewed the importance of self-monitoring blood glucose and keeping logs. Completed 9/19/2024        Task: Instructed on how to self-monitor blood glucose using a home glucometer, how to properly dispose of used strips and lancets after use, and how to appropriately store meter and supplies. Completed 9/19/2024        Task: Provided patient with blood glucose logs, reviewed appropriate timing and frequency to SMBG, education on parameters on when to notify provider and advised patient to bring logs to all appts with PCP/Endocrinologist/Diabetes Care Specialist. Completed 9/19/2024        Task: Discussed ways to minimize pain when monitoring blood glucose. Completed 9/19/2024        Problem: Medications         Goal: Patient with GI upset after initiating max dose metformin all at once--he will decrease to 1 tablet daily and titrate dose up slowly to see if GI symptoms resolve.  Pending initiation of Trulicity if insurance covers.    Start Date: 9/17/2024   Expected End Date: 12/17/2024   This Visit's Progress: Not met   Priority: Medium   Barriers: Other (comments)   Note:    Side effects from metformin and insurance coverage for Trulicity barriers at this time. Contacted pharmacy and PA required for Trulicity. Asked pharmacy to refax PA request.     1/28/25- Metformin was d/c'ed.  He will increase his Trulicity as prescribed once he picks up from the pharmacy.  Reports no side effects from Trulicity currently      Task: Reviewed with patient all current diabetes medications and provided basic review of the purpose, dosage, frequency, side effects, and storage of both oral and injectable diabetes medications. Completed 9/19/2024        Task: Reviewed possible resources for acquiring cost prohibitive medication. Completed 9/19/2024        Task: Instructed patient on how to  slowly titrate metformin in hopes to improve GI side effects. Completed 9/19/2024        Task: Discussed guidelines for preventing, detecting and treating hypoglycemia and hyperglycemia and reviewed the importance of meal and medication timing with diabetes mediations for prevention of hypoglycemia and maximum drug benefit. Completed 9/19/2024        Problem: Healthy Eating         Long-Range Goal: Choose balanced meals--incorporate closed fistful (45-60 grams) of carbs at each meal with lean protein and non-starchy vegetables.  Written education provided on meal planning for home use.    Start Date: 9/17/2024   This Visit's Progress: Not met   Priority: High   Barriers: No Barriers Identified   Note:    1/28/25- Reviewed carb sources and appropriate amounts per meal and snack.  Plate method reviewed.  Encouraged him to try to eat 3 times daily and not save and bank his carbs.       Task: Reviewed the sources and role of Carbohydrate, Protein, and Fat and how each nutrient impacts blood sugar. Completed 9/19/2024        Task: Provided visual examples using dry measuring cups, food models, and other familiar objects such as computer mouse, deck or cards, tennis ball etc. to help with visualization of portions. Completed 9/19/2024        Task: Discussed strategies for choosing healthier menu options when dining out. Completed 9/19/2024        Task: Review the importance of balancing carbohydrates with each meal using portion control techniques to count servings of carbohydrate and label reading to identify serving size and amount of total carbs per serving. Completed 9/19/2024        Task: Provided Sample plate method and reviewed the use of the plate to estimate amounts of carbohydrate per meal. Completed 9/19/2024        Problem: Physical Activity and Exercise         Long-Range Goal: Patient willing to initiate walking at home and work to increase activity. Goal to get 20-30 minutes walking in most days. Completed  9/19/2024   Start Date: 9/17/2024   Priority: Low   Barriers: No Barriers Identified        Task: Discussed role of physical activity on reducing insulin resistance and improvement in overall glycemic control. Completed 9/19/2024        Task: Discussed role of physical activity as it relates to weight loss Completed 9/19/2024        Task: Offered suggestions on how patient could increase their regular physical activity Completed 9/19/2024        Follow Up Plan     Follow up if symptoms worsen or fail to improve.  Continue to f/u for ongoing education as needed.  Encouraged him to call or send a message through the portal with questions/concerns.      Today's care plan and follow up schedule was discussed with patient.  Jose G verbalized understanding of the care plan, goals, and agrees to follow up plan.        The patient was encouraged to communicate with his/her health care provider/physician and care team regarding his/her condition(s) and treatment.  I provided the patient with my contact information today and encouraged to contact me via phone or [facility] Patient Portal as needed.     Length of Visit   Total Time: 45 Minutes

## 2025-01-28 NOTE — PATIENT INSTRUCTIONS
Information regarding Hypoglossal Nerve Stimulation Therapy:    Hypoglossal Nerve Stimulation Therapy (HGNS) is a surgical treatment for sleep apnea when a patient fails standard positive pressure (CPAP) therapy. This is an effective surgical therapy for sleep apnea with long term effectiveness in many patients.    What is HGNS and how does it work?  The hypoglossal nerve is the nerve that controls nearly all of the tongue movements.   During sleep, the tongue will often fall into the back of the throat. This causes obstruction and apneas by narrowing the throat.  By targeting the tongue muscles, we can often control the prevention of tongue collapse during sleep thereby preventing collapse and apnea.  This procedure involves inserting an implant over the muscles of the chest wall (similar to a pacemaker.) This implant is connected to the nerve that moves the tongue forward. During sleep, the implant will detect breathing effort, and during inspiration, it will cause a gentle pulse of the tongue forward to prevent collapse.   The machine can be turned on and off, and it can be dialed up or down depending on strength needed to move the tongue forward.    Will I feel it? Will it affect my sleep at night?  Once the implant is in place, it is activated after 1 month. After this, you will begin increasing the settings to find the most comfortable setting for you. We work hard with you to get the right settings.   A majority of patients are not disturbed at night with the device, and satisfaction is extremely high (95%.) This is especially true when compared to wearing an external device (CPAP.)   A post-titration sleep study is typically obtained about 3 months after surgery to assess response.     Am I a Candidate?  Candidacy for HGNS is changing over time; however, current general candidacy requirements are:  Moderate or Severe Sleep Apnea (AHI 15-65) as measured by a recent sleep study  Body Mass Index (BMI) < 35    Failure to tolerate CPAP therapy  Pre-operative endoscopy exam confirming candidacy (performed during a quick outpatient procedure)  No contraindications to implant  Approval by insurance company  **The current device is currently under conditional acceptance for use with MRI of the thoracic, shoulder, or abdomen area.    How is the surgery performed:  The surgery is usually performed in an outpatient setting, under general anesthesia.  The procedure generally takes a few hours.  The procedure involved two incisions: one in the neck, just below the jaw line. The other is in the chest between the 2nd and third rib. The surgical scars generally heal very well and are minimally noticeable.   Please let your doctor know if you have had surgery or significant trauma to the chest or neck.     What are the risks?  General surgical risks, similar to any procedure include  Bleeding or hematoma  Numbness over incision site  Incisional scar  Pain over incision site  Infection of implant requiring revision or replacement (<1%)  Intolerance to the therapy resulting in non-usage    Specific risks to this surgery include:  Temporary tongue weakness or tingling, rarely permanent (< 1%)  Mechanical failure of the implant  Temporary or permanent weakness to the muscles of the lip (< 1%)  Stimulation related discomfort or tongue abrasions (8%)  Pneumothorax (dropped lung)    After care:  Instructions will be given following the surgery  Generally, light activity for 2 weeks is recommended.  Blood thinners are held prior to surgery at the discretion of the surgeon and can usually be resumed within 48 hours.     Post-operative timeline:    WEEK 1:  About 7 days after your procedure, you will return to the office for a follow up visit. At this time any surtures that we placed will be removed and your incisions will be checked by the pysician. Please note that device will remain inactive for ONE MONTH. This is refered to as your  healing phase. We want to give your body time to heal before we turn the device on. During this time it would be benefical for you to watch the vidoes on the inspire danny. Simply download the danny if you have not done so, then click on the RESOURCES tab. From there, scrol to the INSPIRE CARE section. There are vidoes here that walk you though this phase and will prepare you for the activation visit. Focus on the vidoes entitled: Rest and Heal, Learning your sleep remote, and Tuning Inspire. This will help you feel educated and empowered for your visit.     WEEK 4:  At the one month visit you will come to the office for the device activation. Please wear a shirt or blouse that can allow the physician to check all the incisions and access the implant. Your physician will use a  to check the device for proper function and turn it on for you to begin using Inspire therapy. The process of activation is NOT painful. At this visit you will be given your remote control and instructions on how to use. You will return home to begin using your therapy with the goal of using it ALL NIGHT, EVERY NIGHT. If you have any issues with discomfort, please call the office so we can help you reach this goal.    WEEK 6-8:  Someone from our office will be doing a check in call to see how you are progressing. If you are having any issues, we can bring you in to the office and make some appropratie adjustments as needed. Otherwise, you will continue to keep stepping up your therapy and using it all night.     WEEK 12:  Roughly 3 months after your device is turned on, you will return to the sleep lab. This will help us determine if your therapy is at the appropriate level for you. A couple of weeks after your sleep study, you will return to the office to review your results and make any changes that your provider feels necessary.       FAQ:  I don't feel the stimulation?   Answer: During the tuning phase, you will get used to  stimulation.  It's very common to not feel stimulation.  Try stepping up your level.    The stimulation does not synchronize with my breathing?    Answer: This is normal.  Stimulation timing is designed to work best while you are asleep.  Your breathing pattern is much different while you are awake.  When you go to sleep, the sensor will work normally.     Can I receive an MRI?    Answer: If you have the model 3028 generator it is MRI Conditional, make sure your doctor goes to the Inspire website for more information on how to perform a safe MRI for you.     Can I go to the airport?    Answer: Yes.  You may go through metal detectors and scanners.  Make sure to show the TSA official your medical device registration card and tell them you have an implantable device in your body.  They may require extra screening, so make sure to plan for extra time your first time going to the airport.     Can I go scuba diving and mountain climbing?    Answer: You can go to as high in elevation as you want.  However, you can only go 30 meters below sea level or 4 atmospheres of pressure.     Can I continue welding?    Answer: Inspire does not recommend electrical welding.  Consult your patient manual for more details.    Can I go to the dentist?    Answer: Going to the dentist is perfectly fine. Let your dentist know you have an implantable device and the dentist will take any precautions needed.     Can I receive X-Rays or CT Scans?    Answer: Yes.  X-Ray and CT Scans are safe with Inspire.  If you are having a mammogram though, make sure to let the technician know where your device is implanted so the technician can make the mammogram as comfortable as possible.     Why is stimulation on when I'm awake?    Answer: This is normal.  Inspire works by stimulating during every breath.  Inspire does not know when you are asleep or awake, so once you turn on Inspire, it will stimulate with each breath. Remember, you are in complete  control of your Inspire and can turn Inspire ON or OFF using your Inspire Sleep Remote.     Why is stimulation off when I wake up?    Answer: You may have slept longer than the Therapy Duration Setting, which by default is 8 hours.  Inspire will turn off automatically after 8 hours.  If you are sleeping longer than 8 hours, ask your physician to change the duration setting to a longer time (for example 9 or 10 hours).  It is also possible that you are getting used to Inspire and just don't feel the stimulation when you wake up.  If you're sleep remote is green when you gently shake it, your Inspire is still on.  If your Inspire is white, then it is no longer stimulating.       More information can be found at:  Www.[email or web address].com

## 2025-02-01 DIAGNOSIS — I10 ESSENTIAL HYPERTENSION: Primary | ICD-10-CM

## 2025-02-01 DIAGNOSIS — I73.9 PERIPHERAL VASCULAR DISEASE OF EXTREMITY: ICD-10-CM

## 2025-02-01 RX ORDER — CILOSTAZOL 50 MG/1
50 TABLET ORAL 2 TIMES DAILY
Qty: 180 TABLET | Refills: 1 | OUTPATIENT
Start: 2025-02-01

## 2025-02-01 NOTE — TELEPHONE ENCOUNTER
No care due was identified.  [facility] Embedded Care Due Messages. Reference number: [id number].   2/01/2025 12:23:44 AM CST

## 2025-02-03 RX ORDER — AMLODIPINE BESYLATE 5 MG/1
5 TABLET ORAL
Qty: 90 TABLET | Refills: 3 | Status: SHIPPED | OUTPATIENT
Start: 2025-02-03

## 2025-02-09 DIAGNOSIS — I73.9 PERIPHERAL VASCULAR DISEASE OF EXTREMITY: ICD-10-CM

## 2025-02-09 RX ORDER — CILOSTAZOL 50 MG/1
50 TABLET ORAL 2 TIMES DAILY
Qty: 180 TABLET | Refills: 1 | OUTPATIENT
Start: 2025-02-09

## 2025-02-10 NOTE — TELEPHONE ENCOUNTER
Patient have an additional refill at his pharmacy. Chart message went to patient to contact [facility].

## 2025-02-17 ENCOUNTER — OFFICE VISIT (OUTPATIENT)
Dept: FAMILY MEDICINE | Facility: CLINIC | Age: 65
End: 2025-02-17
Payer: COMMERCIAL

## 2025-02-17 VITALS
HEIGHT: 70 IN | HEART RATE: 90 BPM | DIASTOLIC BLOOD PRESSURE: 74 MMHG | SYSTOLIC BLOOD PRESSURE: 132 MMHG | OXYGEN SATURATION: 97 % | WEIGHT: 236.56 LBS | BODY MASS INDEX: 33.87 KG/M2

## 2025-02-17 DIAGNOSIS — Z00.00 PHYSICAL EXAM, ROUTINE: Primary | ICD-10-CM

## 2025-02-17 DIAGNOSIS — E11.9 TYPE 2 DIABETES MELLITUS WITHOUT COMPLICATION, WITHOUT LONG-TERM CURRENT USE OF INSULIN: ICD-10-CM

## 2025-02-17 DIAGNOSIS — J44.9 CHRONIC OBSTRUCTIVE PULMONARY DISEASE, UNSPECIFIED COPD TYPE: ICD-10-CM

## 2025-02-17 DIAGNOSIS — I10 ESSENTIAL HYPERTENSION: ICD-10-CM

## 2025-02-17 DIAGNOSIS — Z98.61 CAD S/P PERCUTANEOUS CORONARY ANGIOPLASTY: ICD-10-CM

## 2025-02-17 DIAGNOSIS — I25.10 CAD S/P PERCUTANEOUS CORONARY ANGIOPLASTY: ICD-10-CM

## 2025-02-17 DIAGNOSIS — I73.9 PERIPHERAL VASCULAR DISEASE OF EXTREMITY: ICD-10-CM

## 2025-02-17 DIAGNOSIS — E78.2 MIXED HYPERLIPIDEMIA: ICD-10-CM

## 2025-02-17 DIAGNOSIS — F41.9 ANXIETY: ICD-10-CM

## 2025-02-17 PROCEDURE — 1160F RVW MEDS BY RX/DR IN RCRD: CPT | Mod: CPTII,S$GLB,, | Performed by: INTERNAL MEDICINE

## 2025-02-17 PROCEDURE — 1101F PT FALLS ASSESS-DOCD LE1/YR: CPT | Mod: CPTII,S$GLB,, | Performed by: INTERNAL MEDICINE

## 2025-02-17 PROCEDURE — 3051F HG A1C>EQUAL 7.0%<8.0%: CPT | Mod: CPTII,S$GLB,, | Performed by: INTERNAL MEDICINE

## 2025-02-17 PROCEDURE — 3008F BODY MASS INDEX DOCD: CPT | Mod: CPTII,S$GLB,, | Performed by: INTERNAL MEDICINE

## 2025-02-17 PROCEDURE — 4010F ACE/ARB THERAPY RXD/TAKEN: CPT | Mod: CPTII,S$GLB,, | Performed by: INTERNAL MEDICINE

## 2025-02-17 PROCEDURE — 3078F DIAST BP <80 MM HG: CPT | Mod: CPTII,S$GLB,, | Performed by: INTERNAL MEDICINE

## 2025-02-17 PROCEDURE — 3075F SYST BP GE 130 - 139MM HG: CPT | Mod: CPTII,S$GLB,, | Performed by: INTERNAL MEDICINE

## 2025-02-17 PROCEDURE — 3288F FALL RISK ASSESSMENT DOCD: CPT | Mod: CPTII,S$GLB,, | Performed by: INTERNAL MEDICINE

## 2025-02-17 PROCEDURE — 1159F MED LIST DOCD IN RCRD: CPT | Mod: CPTII,S$GLB,, | Performed by: INTERNAL MEDICINE

## 2025-02-17 RX ORDER — SERTRALINE HYDROCHLORIDE 100 MG/1
100 TABLET, FILM COATED ORAL DAILY
Qty: 90 TABLET | Refills: 3 | Status: SHIPPED | OUTPATIENT
Start: 2025-02-17

## 2025-02-17 RX ORDER — CILOSTAZOL 50 MG/1
50 TABLET ORAL 2 TIMES DAILY
Qty: 180 TABLET | Refills: 3 | Status: SHIPPED | OUTPATIENT
Start: 2025-02-17

## 2025-02-17 NOTE — PROGRESS NOTES
Subjective     Jose G Shafer is a 65 y.o. old, male here for a health maintenance visit.    66 y/o with PMH of CAD s/p stents, PAD, HTN, HLD, Type 2 DM, LEAH non-compliant with CPAP, GERD, tobacco use, anxiety     Health Habits  Exercise and Activity: long commute, little time for physical activity  Diet: trying not to snack on carbs  Started smoking again - had quit for 6 months after a bad infection, difficulty stopping due to stress    Mental Health  Increased stress at work, trying to make it to [employer]    History     Past Medical History:   Diagnosis Date    Anticoagulant long-term use     Anxiety     BCC (basal cell carcinoma)     CAD (coronary artery disease)     Colon polyp     Erectile dysfunction     GERD (gastroesophageal reflux disease)     Hepatic steatosis     History of kidney stones     History of pneumonia 12/2022    HLD (hyperlipidemia)     HTN (hypertension)     Impaired fasting glucose     Lateral epicondylitis of left elbow 07/18/2019    Myocardial infarction     PONV (postoperative nausea and vomiting)     Sleep apnea     uses cpap    Type 2 diabetes mellitus without complications 08/29/2024     Past Surgical History:   Procedure Laterality Date    ANGIOGRAM, CORONARY, WITH LEFT HEART CATHETERIZATION Left 05/26/2022    Procedure: Angiogram, Coronary, with Left Heart Cath;  Surgeon: Christa Gunn MD;  Location: [facility] CATH;  Service: Cardiology;  Laterality: Left;    ANGIOGRAM, CORONARY, WITH LEFT HEART CATHETERIZATION  08/16/2023    Procedure: Left Heart Cath;  Surgeon: Renetta Rivera MD;  Location: [facility] CATH;  Service: Cardiology;;    ANGIOGRAM, CORONARY, WITH LEFT HEART CATHETERIZATION  9/26/2024    Procedure: Left Heart Cath;  Surgeon: Renetta Rivera MD;  Location: [facility] CATH;  Service: Cardiology;;    ANGIOPLASTY      AORTOGRAPHY WITH EXTREMITY RUNOFF  11/16/2021    Procedure: AORTOGRAM, WITH EXTREMITY RUNOFF;  Surgeon: Blanca Arzola MD;  Location: [facility] CATH;  Service: Cardiovascular;;     AORTOGRAPHY WITH SERIALOGRAPHY N/A 11/22/2019    Procedure: AORTOGRAM, WITH SERIALOGRAPHY;  Surgeon: Blanca Arzola MD;  Location: [facility] CATH;  Service: Cardiovascular;  Laterality: N/A;    AORTOGRAPHY WITH SERIALOGRAPHY N/A 12/13/2019    Procedure: AORTOGRAM, WITH SERIALOGRAPHY;  Surgeon: Blanca Arzola MD;  Location: [facility];  Service: Cardiovascular;  Laterality: N/A;    AORTOGRAPHY WITH SERIALOGRAPHY  12/17/2020    Procedure: AORTOGRAM, WITH SERIALOGRAPHY;  Surgeon: Blanca Arzola MD;  Location: [facility];  Service: Cardiovascular;;    ATHERECTOMY, CORONARY  08/16/2023    Procedure: Atherectomy LAD (Rotoblator);  Surgeon: Renetta Rivera MD;  Location: [facility] CATH;  Service: Cardiology;;    CARDIAC SURGERY  2005    stents placed    COLONOSCOPY N/A 07/17/2017    Procedure: COLONOSCOPY Miralax;  Surgeon: Jeremiah Syed Jr., MD;  Location: [facility] ENDO;  Service: Endoscopy;  Laterality: N/A; repeat in 5 years for surveillance    CORONARY ANGIOGRAPHY  9/26/2024    Procedure: Coronary angiogram study;  Surgeon: Renetta Rivera MD;  Location: [facility] CATH;  Service: Cardiology;;    INTRAVASCULAR ULTRASOUND, CORONARY  9/26/2024    Procedure: IVUS LCX;  Surgeon: Renetta Rivera MD;  Location: [facility];  Service: Cardiology;;    IVUS, CORONARY  08/16/2023    Procedure: IVUS LAD;  Surgeon: Renetta Rivera MD;  Location: [facility] C[facility];  Service: Cardiology;;    KNEE ARTHROSCOPY W/ MENISCECTOMY      PERCUTANEOUS CORONARY INTERVENTION, ARTERY  08/16/2023    Procedure: DAVID LAD;  Surgeon: Renetta Rivera MD;  Location: [facility] CATH;  Service: Cardiology;;    PERCUTANEOUS TRANSLUMINAL ANGIOPLASTY N/A 11/22/2019    Procedure: Pta (Angioplasty, Percutaneous, Transluminal);  Surgeon: Blanca Arzola MD;  Location: [facility] CATH;  Service: Cardiovascular;  Laterality: N/A;    PERCUTANEOUS TRANSLUMINAL ANGIOPLASTY N/A 12/13/2019    Procedure: Pta (Angioplasty, Percutaneous, Transluminal);  Surgeon: Blanca Arzola MD;  Location: [facility] CATH;  Service: Cardiovascular;  Laterality:  N/A;    PERCUTANEOUS TRANSLUMINAL ANGIOPLASTY  11/16/2021    Procedure: PTA (ANGIOPLASTY, PERCUTANEOUS, TRANSLUMINAL);  Surgeon: Blanca Arzola MD;  Location: [facility] CATH;  Service: Cardiovascular;;    STENT, DRUG ELUTING, SINGLE VESSEL, CORONARY  9/26/2024    Procedure: DAVID LCX;  Surgeon: Renetta Rivera MD;  Location: [facility] CATH;  Service: Cardiology;;    VARICOSE VEIN SURGERY      left saphenous     Review of patient's allergies indicates:  No Known Allergies  No outpatient medications have been marked as taking for the 2/17/25 encounter (Office Visit) with Christiano Guy MD.     Current Facility-Administered Medications for the 2/17/25 encounter (Office Visit) with Christiano Guy MD   Medication Dose Route Frequency Provider Last Rate Last Admin    sodium chloride 0.9% flush 10 mL  10 mL Intravenous PRN Renetta Rivera MD         Social History[1]  Family History   Problem Relation Name Age of Onset    Diabetes Mother      Stomach cancer Father      Diabetes Father      No Known Problems Sister      Prostate cancer Brother 2     Diabetes Brother 2     Coronary artery disease Brother 2         premature    Heart disease Brother 2     Hypertension Brother 2     No Known Problems Maternal Grandmother      No Known Problems Maternal Grandfather      No Known Problems Paternal Grandmother      No Known Problems Paternal Grandfather      No Known Problems Maternal Aunt      No Known Problems Maternal Uncle      No Known Problems Paternal Aunt      No Known Problems Paternal Uncle      Colon cancer Neg Hx      Anemia Neg Hx      Arrhythmia Neg Hx      Asthma Neg Hx      Clotting disorder Neg Hx      Fainting Neg Hx      Heart attack Neg Hx      Heart disease Neg Hx      Heart failure Neg Hx      Hyperlipidemia Neg Hx      Hypertension Neg Hx      Stroke Neg Hx      Atrial Septal Defect Neg Hx      Ulcerative colitis Neg Hx      Crohn's disease Neg Hx      Celiac disease Neg Hx         Review of Systems   ROS  Objective  "  /74   Pulse 90   Ht 5' 10" (1.778 m)   Wt 107.3 kg (236 lb 8.9 oz)   SpO2 97%   BMI 33.94 kg/m²   Physical Exam  Constitutional:       General: He is not in acute distress.     Appearance: Normal appearance. He is well-developed. He is obese.   HENT:      Head: Normocephalic and atraumatic.   Eyes:      Conjunctiva/sclera: Conjunctivae normal.      Pupils: Pupils are equal, round, and reactive to light.   Neck:      Thyroid: No thyroid mass or thyromegaly.   Cardiovascular:      Rate and Rhythm: Normal rate and regular rhythm.      Heart sounds: Normal heart sounds. No murmur heard.  Pulmonary:      Effort: No respiratory distress.      Breath sounds: Normal breath sounds.   Abdominal:      General: Bowel sounds are normal.      Palpations: Abdomen is soft.      Tenderness: There is no abdominal tenderness.   Musculoskeletal:         General: No deformity.      Cervical back: Neck supple.   Lymphadenopathy:      Cervical: No cervical adenopathy.      Upper Body:      Right upper body: No supraclavicular adenopathy.      Left upper body: No supraclavicular adenopathy.   Skin:     General: Skin is warm and dry.      Findings: No rash.   Neurological:      Mental Status: He is alert and oriented to person, place, and time.   Psychiatric:         Behavior: Behavior normal.       Assessment and Plan     Physical exam, routine    Essential hypertension    Mixed hyperlipidemia    Type 2 diabetes mellitus without complication, without long-term current use of insulin    CAD S/P percutaneous coronary angioplasty    Chronic obstructive pulmonary disease, unspecified COPD type    Peripheral vascular disease of extremity: s/p R. SFA [facility] 10/19/2015  -     cilostazoL (PLETAL) 50 MG Tab; Take 1 tablet (50 mg total) by mouth 2 (two) times daily.  Dispense: 180 tablet; Refill: 3    Anxiety  -     sertraline (ZOLOFT) 100 MG tablet; Take 1 tablet (100 mg total) by mouth once daily.  Dispense: 90 tablet; Refill: " 3      Followed by multiple specialists.  Increase zoloft as above.      ___________________  Christiano Guy MD  Internal Medicine and Pediatrics       [1]   Social History  Tobacco Use    Smoking status: Every Day     Current packs/day: 0.00     Average packs/day: 0.5 packs/day for 44.0 years (22.0 ttl pk-yrs)     Types: Cigarettes     Start date: 10/10/1984     Last attempt to quit: 2023     Years since quittin.1    Smokeless tobacco: Never   Substance Use Topics    Alcohol use: Yes     Alcohol/week: 14.0 standard drinks of alcohol     Types: 14 Cans of beer per week    Drug use: Never

## 2025-02-20 ENCOUNTER — RESULTS FOLLOW-UP (OUTPATIENT)
Dept: OTOLARYNGOLOGY | Facility: CLINIC | Age: 65
End: 2025-02-20
Payer: COMMERCIAL

## 2025-02-20 DIAGNOSIS — Z78.9 INTOLERANCE OF CONTINUOUS POSITIVE AIRWAY PRESSURE (CPAP) VENTILATION: ICD-10-CM

## 2025-02-20 DIAGNOSIS — G47.33 OBSTRUCTIVE SLEEP APNEA: Primary | ICD-10-CM

## 2025-02-20 NOTE — TELEPHONE ENCOUNTER
----- Message from Blayne Freitas MD sent at 2/20/2025  8:26 AM CST -----  Patient called me and wants to schedule DISE.  TY  ----- Message -----  From: Interface, Lab In [facility]  Sent: 2/20/2025   8:01 AM CST  To: Blayne Freitas MD

## 2025-02-27 DIAGNOSIS — R19.5 LOOSE BOWEL MOVEMENT: ICD-10-CM

## 2025-02-28 RX ORDER — DICYCLOMINE HYDROCHLORIDE 10 MG/1
CAPSULE ORAL
Qty: 120 CAPSULE | Refills: 1 | Status: SHIPPED | OUTPATIENT
Start: 2025-02-28

## 2025-03-14 DIAGNOSIS — R19.5 LOOSE BOWEL MOVEMENT: ICD-10-CM

## 2025-03-14 RX ORDER — DICYCLOMINE HYDROCHLORIDE 10 MG/1
CAPSULE ORAL
Qty: 360 CAPSULE | Refills: 1 | OUTPATIENT
Start: 2025-03-14

## 2025-03-17 DIAGNOSIS — M25.571 RIGHT ANKLE PAIN, UNSPECIFIED CHRONICITY: Primary | ICD-10-CM

## 2025-03-18 ENCOUNTER — HOSPITAL ENCOUNTER (OUTPATIENT)
Dept: RADIOLOGY | Facility: HOSPITAL | Age: 65
Discharge: HOME | End: 2025-03-18
Attending: ORTHOPAEDIC SURGERY
Payer: COMMERCIAL

## 2025-03-18 ENCOUNTER — OFFICE VISIT (OUTPATIENT)
Dept: ORTHOPEDICS | Facility: CLINIC | Age: 65
End: 2025-03-18
Payer: COMMERCIAL

## 2025-03-18 DIAGNOSIS — S82.831A CLOSED FRACTURE OF DISTAL END OF RIGHT FIBULA, UNSPECIFIED FRACTURE MORPHOLOGY, INITIAL ENCOUNTER: ICD-10-CM

## 2025-03-18 DIAGNOSIS — M25.571 RIGHT ANKLE PAIN, UNSPECIFIED CHRONICITY: ICD-10-CM

## 2025-03-18 DIAGNOSIS — S82.61XA DISPLACED FRACTURE OF LATERAL MALLEOLUS OF RIGHT FIBULA, INITIAL ENCOUNTER FOR CLOSED FRACTURE: Primary | ICD-10-CM

## 2025-03-18 PROCEDURE — 73610 X-RAY EXAM OF ANKLE: CPT | Mod: TC,PO,RT

## 2025-03-18 PROCEDURE — 73630 X-RAY EXAM OF FOOT: CPT | Mod: 26,RT,, | Performed by: RADIOLOGY

## 2025-03-18 PROCEDURE — 73610 X-RAY EXAM OF ANKLE: CPT | Mod: 26,RT,, | Performed by: RADIOLOGY

## 2025-03-18 PROCEDURE — 3051F HG A1C>EQUAL 7.0%<8.0%: CPT | Mod: CPTII,S$GLB,, | Performed by: ORTHOPAEDIC SURGERY

## 2025-03-18 PROCEDURE — 99204 OFFICE O/P NEW MOD 45 MIN: CPT | Mod: S$GLB,,, | Performed by: ORTHOPAEDIC SURGERY

## 2025-03-18 PROCEDURE — 73630 X-RAY EXAM OF FOOT: CPT | Mod: TC,PO,RT

## 2025-03-18 PROCEDURE — 4010F ACE/ARB THERAPY RXD/TAKEN: CPT | Mod: CPTII,S$GLB,, | Performed by: ORTHOPAEDIC SURGERY

## 2025-03-18 PROCEDURE — 99999 PR PBB SHADOW E&M-EST. PATIENT-LVL II: CPT | Mod: PBBFAC,,, | Performed by: ORTHOPAEDIC SURGERY

## 2025-03-18 NOTE — PROGRESS NOTES
Status/Diagnosis: Displaced Right lateral malleolus fracture, Jefferson B.  Date of Surgery: none  Date of Injury: 03/11/2025  Return visit: 2 weeks  X-rays on Return: Single leg stance WB 3-views Right ankle    Chief Complaint:   Chief Complaint   Patient presents with    Right Ankle - Pain, Injury     Present History:  65-year-old male who presents today after acute right ankle injury.  States that he has stent only hot we will car and fell on 03/11.  Immediate right ankle pain, swelling, painful weight-bearing.    Seen and evaluated at local urgent care at which time x-rays were taken and consistent with fracture.  Extensive PMH including but not limited to CAD status post stent x 5 on Plavix and aspirin; type 2 diabetes, A1c of 7.3; hypertension; LEAH.  Smokes 1+ pack per day for 40+ years.  Uses a Rollator walker at baseline.      Past Medical History:   Diagnosis Date    Anticoagulant long-term use     Anxiety     BCC (basal cell carcinoma)     CAD (coronary artery disease)     Colon polyp     Erectile dysfunction     GERD (gastroesophageal reflux disease)     Hepatic steatosis     History of kidney stones     History of pneumonia 12/2022    HLD (hyperlipidemia)     HTN (hypertension)     Impaired fasting glucose     Lateral epicondylitis of left elbow 07/18/2019    Myocardial infarction     PONV (postoperative nausea and vomiting)     Sleep apnea     uses cpap    Type 2 diabetes mellitus without complications 08/29/2024       Past Surgical History:   Procedure Laterality Date    ANGIOGRAM, CORONARY, WITH LEFT HEART CATHETERIZATION Left 05/26/2022    Procedure: Angiogram, Coronary, with Left Heart Cath;  Surgeon: Christa Gunn MD;  Location: [facility] CATH;  Service: Cardiology;  Laterality: Left;    ANGIOGRAM, CORONARY, WITH LEFT HEART CATHETERIZATION  08/16/2023    Procedure: Left Heart Cath;  Surgeon: Renetta Rivera MD;  Location: [facility] CATH;  Service: Cardiology;;    ANGIOGRAM, CORONARY, WITH LEFT HEART CATHETERIZATION   9/26/2024    Procedure: Left Heart Cath;  Surgeon: Renetta Rivera MD;  Location: [facility] CATH;  Service: Cardiology;;    ANGIOPLASTY      AORTOGRAPHY WITH EXTREMITY RUNOFF  11/16/2021    Procedure: AORTOGRAM, WITH EXTREMITY RUNOFF;  Surgeon: Blanca Arzola MD;  Location: [facility] CATH;  Service: Cardiovascular;;    AORTOGRAPHY WITH SERIALOGRAPHY N/A 11/22/2019    Procedure: AORTOGRAM, WITH SERIALOGRAPHY;  Surgeon: Blanca Arzola MD;  Location: [facility] CATH;  Service: Cardiovascular;  Laterality: N/A;    AORTOGRAPHY WITH SERIALOGRAPHY N/A 12/13/2019    Procedure: AORTOGRAM, WITH SERIALOGRAPHY;  Surgeon: Blanca Arzola MD;  Location: [facility] CATH;  Service: Cardiovascular;  Laterality: N/A;    AORTOGRAPHY WITH SERIALOGRAPHY  12/17/2020    Procedure: AORTOGRAM, WITH SERIALOGRAPHY;  Surgeon: Blanca Arzola MD;  Location: [facility] CATH;  Service: Cardiovascular;;    ATHERECTOMY, CORONARY  08/16/2023    Procedure: Atherectomy LAD (Rotoblator);  Surgeon: Renetta Rivera MD;  Location: [facility] CATH;  Service: Cardiology;;    CARDIAC SURGERY  2005    stents placed    COLONOSCOPY N/A 07/17/2017    Procedure: COLONOSCOPY Miralax;  Surgeon: Jeremiah Syed Jr., MD;  Location: [facility] ENDO;  Service: Endoscopy;  Laterality: N/A; repeat in 5 years for surveillance    CORONARY ANGIOGRAPHY  9/26/2024    Procedure: Coronary angiogram study;  Surgeon: Renetta Rivera MD;  Location: [facility] CATH;  Service: Cardiology;;    INTRAVASCULAR ULTRASOUND, CORONARY  9/26/2024    Procedure: IVUS LCX;  Surgeon: Renetta Rivera MD;  Location: [facility] CATH;  Service: Cardiology;;    IVUS, CORONARY  08/16/2023    Procedure: IVUS LAD;  Surgeon: Renetta Rivera MD;  Location: [facility] CATH;  Service: Cardiology;;    KNEE ARTHROSCOPY W/ MENISCECTOMY      PERCUTANEOUS CORONARY INTERVENTION, ARTERY  08/16/2023    Procedure: DAVID LAD;  Surgeon: Renetta Rivera MD;  Location: [facility] CATH;  Service: Cardiology;;    PERCUTANEOUS TRANSLUMINAL ANGIOPLASTY N/A 11/22/2019    Procedure: Pta (Angioplasty, Percutaneous,  Transluminal);  Surgeon: Blanca Arzola MD;  Location: [facility] CATH;  Service: Cardiovascular;  Laterality: N/A;    PERCUTANEOUS TRANSLUMINAL ANGIOPLASTY N/A 12/13/2019    Procedure: Pta (Angioplasty, Percutaneous, Transluminal);  Surgeon: Blanca Arzola MD;  [facility];  Service: Cardiovascular;  Laterality: N/A;    PERCUTANEOUS TRANSLUMINAL ANGIOPLASTY  11/16/2021    Procedure: PTA (ANGIOPLASTY, PERCUTANEOUS, TRANSLUMINAL);  Surgeon: Blanca Arzola MD;  [facility];  Service: Cardiovascular;;    STENT, DRUG ELUTING, SINGLE VESSEL, CORONARY  9/26/2024    Procedure: DAVID LCX;  Surgeon: Renetta Rivera MD;  [facility];  Service: Cardiology;;    VARICOSE VEIN SURGERY      left saphenous       Current Outpatient Medications   Medication Sig    albuterol (VENTOLIN HFA) 90 mcg/actuation inhaler Inhale 2 puffs into the lungs every 4 (four) hours as needed for Wheezing or Shortness of Breath. Rescue    ALPRAZolam (XANAX) 0.25 MG tablet Take 1 tablet (0.25 mg total) by mouth 3 (three) times daily as needed for Anxiety.    amLODIPine (NORVASC) 5 MG tablet TAKE 1 TABLET BY MOUTH EVERY DAY    ascorbic acid (VITAMIN C) 500 MG tablet Take 500 mg by mouth 2 (two) times daily.    aspirin (ECOTRIN) 81 MG EC tablet Take 1 tablet (81 mg total) by mouth once daily.    atorvastatin (LIPITOR) 80 MG tablet Take 1 tablet (80 mg total) by mouth every evening.    azithromycin (ZITHROMAX) 500 MG tablet One daily for yellow mucous, repeat if needed (Patient not taking: Reported on 1/28/2025)    blood sugar diagnostic Strp Use to monitor blood glucose twice daily    blood-glucose meter (BLULINK GLUCOSE MONITOR SYSTEM) Misc Use as directed    blood-glucose meter kit To check BG 2 times daily, to use with insurance preferred meter. E11.9    carvediloL (COREG) 25 MG tablet Take 1 tablet (25 mg total) by mouth 2 (two) times daily with meals.    cilostazoL (PLETAL) 50 MG Tab Take 1 tablet (50 mg total) by mouth 2 (two) times daily.     clopidogreL (PLAVIX) 75 mg tablet Take 1 tablet (75 mg total) by mouth once daily.    dicyclomine (BENTYL) 10 MG capsule TAKE 1 CAP BY MOUTH BEFORE MEALS AND AT BEDTIME AS NEEDED (ABDOMINAL CRAMPING/PAIN).    dulaglutide (TRULICITY) 1.5 mg/0.5 mL pen injector Inject 1.5 mg into the skin every 7 days.    ezetimibe (ZETIA) 10 mg tablet TAKE 1 TABLET BY MOUTH EVERY DAY    fenofibrate (TRICOR) 54 MG tablet Take 1 tablet (54 mg total) by mouth once daily.    fluticasone propionate (FLONASE) 50 mcg/actuation nasal spray USE 1 SPRAY (50 MCG TOTAL) BY EACH NARE ROUTE ONCE DAILY.    fluticasone-umeclidin-vilanter (TRELEGY ELLIPTA) 200-62.5-25 mcg inhaler Inhale 1 puff into the lungs once daily.    HYDROcodone-acetaminophen (NORCO) 5-325 mg per tablet Take 1 tablet by mouth every 6 (six) hours as needed for Pain.    isosorbide mononitrate (IMDUR) 30 MG 24 hr tablet Take 1 tablet (30 mg total) by mouth once daily.    lancets 28 gauge Misc Use to test blood glucose twice daily    lisinopriL (PRINIVIL,ZESTRIL) 40 MG tablet TAKE 1 TABLET BY MOUTH EVERY DAY    MULTIVITAMIN ORAL Take 1 capsule by mouth once daily.    nitroGLYCERIN (NITROSTAT) 0.4 MG SL tablet Place 1 tablet (0.4 mg total) under the tongue every 5 (five) minutes as needed for Chest pain. 1 Tablet, Sublingual Sublingual    omega-3 fatty acids 500 mg Cap Take 1 capsule by mouth once daily. 1 Capsule Oral     pantoprazole (PROTONIX) 20 MG tablet Take 1 tablet (20 mg total) by mouth before breakfast.    predniSONE (DELTASONE) 20 MG tablet Take one daily for 3 days and may repeat for shortness of breath.    sertraline (ZOLOFT) 100 MG tablet Take 1 tablet (100 mg total) by mouth once daily.    tadalafiL (CIALIS) 5 MG tablet Take 1 tablet (5 mg total) by mouth daily as needed for Erectile Dysfunction.     Current Facility-Administered Medications   Medication    sodium chloride 0.9% flush 10 mL       Review of patient's allergies indicates:  No Known Allergies    Family  History   Problem Relation Name Age of Onset    Diabetes Mother      Stomach cancer Father      Diabetes Father      No Known Problems Sister      Prostate cancer Brother 2     Diabetes Brother 2     Coronary artery disease Brother 2         premature    Heart disease Brother 2     Hypertension Brother 2     No Known Problems Maternal Grandmother      No Known Problems Maternal Grandfather      No Known Problems Paternal Grandmother      No Known Problems Paternal Grandfather      No Known Problems Maternal Aunt      No Known Problems Maternal Uncle      No Known Problems Paternal Aunt      No Known Problems Paternal Uncle      Colon cancer Neg Hx      Anemia Neg Hx      Arrhythmia Neg Hx      Asthma Neg Hx      Clotting disorder Neg Hx      Fainting Neg Hx      Heart attack Neg Hx      Heart disease Neg Hx      Heart failure Neg Hx      Hyperlipidemia Neg Hx      Hypertension Neg Hx      Stroke Neg Hx      Atrial Septal Defect Neg Hx      Ulcerative colitis Neg Hx      Crohn's disease Neg Hx      Celiac disease Neg Hx         Social History[1]    Physical exam:  There were no vitals filed for this visit.  There is no height or weight on file to calculate BMI.  General: In no apparent distress; well developed and well nourished.  HEENT: normocephalic; atraumatic.  Cardiovascular: regular rate.  Respiratory: no increased work of breathing.  Musculoskeletal:   Gait: unable to assess  Inspection:   Moderate residual swelling about the lateral> medial ankle.  Tenderness on deep palpation of the lateral malleolar fracture site.  Little to no tenderness over the medial ankle.    No pain referable to the foot.    Gross motor function intact.  Good sensation on monofilament testing.  Palpable pedal pulse.                   Imaging Studies/Outside documentation:  I have ordered/reviewed/interpreted the following images/outside documentation:  1. Weight-bearing 3-views of Right foot and ankle:   On my independent review,  moderately displaced spiral fracture of the lateral malleolus at the level of the syndesmosis.  Approximately 3 mm of lateral translation and 2 mm of shortening.  No evidence of clear space widening.        Assessment:  Jose G Shafer is a 65 y.o. male with Displaced Right lateral malleolus fracture, Jefferson B.     Plan:   Clinical and radiographic findings were discussed.  Operative versus nonoperative treatment options were described.    Patient with multiple medical comorbidities and extensive chronic tobacco use.    Recommend conservative management at this time despite mild fracture displacement.  Ankle mortise does appear to be congruent on weight-bearing stress x-rays.    Patient to be transition into a tall cam boot with even up shoe lift on the left.    Discussed rest, ice, compression, elevation, over-the-counter oral Tylenol as needed for pain.    Patient voiced understanding all questions were answered.  Follow up in 2 weeks for repeat evaluation and x-ray, or sooner if needed.    We performed a custom orthotic/brace fitting, adjusting and training with the patient. The patient demonstrated understanding and proper care. This was performed for 15 minutes.    This note was created using voice recognition software and may contain grammatical errors.         [1]   Social History  Socioeconomic History    Marital status:    Tobacco Use    Smoking status: Every Day     Current packs/day: 0.00     Average packs/day: 0.5 packs/day for 44.0 years (22.0 ttl pk-yrs)     Types: Cigarettes     Start date: 10/10/1984     Last attempt to quit: 2023     Years since quittin.2    Smokeless tobacco: Never   Substance and Sexual Activity    Alcohol use: Yes     Alcohol/week: 14.0 standard drinks of alcohol     Types: 14 Cans of beer per week    Drug use: Never    Sexual activity: Not Currently     Partners: Female, Male     Birth control/protection: None     Social Drivers of Health     Financial Resource  Strain: Low Risk  (3/17/2025)    Overall Financial Resource Strain (CARDIA)     Difficulty of Paying Living Expenses: Not very hard   Food Insecurity: No Food Insecurity (3/17/2025)    Hunger Vital Sign     Worried About Running Out of Food in the Last Year: Never true     Ran Out of Food in the Last Year: Never true   Transportation Needs: No Transportation Needs (3/17/2025)    PRAPARE - Transportation     Lack of Transportation (Medical): No     Lack of Transportation (Non-Medical): No   Physical Activity: Inactive (3/17/2025)    Exercise Vital Sign     Days of Exercise per Week: 5 days     Minutes of Exercise per Session: 0 min   Stress: No Stress Concern Present (3/17/2025)    [employer] - Occupational Stress Questionnaire     Feeling of Stress : Not at all   Housing Stability: Low Risk  (3/17/2025)    Housing Stability Vital Sign     Unable to Pay for Housing in the Last Year: No     Number of Times Moved in the Last Year: 0     Homeless in the Last Year: No

## 2025-03-20 ENCOUNTER — ANESTHESIA EVENT (OUTPATIENT)
Dept: SURGERY | Facility: HOSPITAL | Age: 65
End: 2025-03-20
Payer: COMMERCIAL

## 2025-03-21 ENCOUNTER — HOSPITAL ENCOUNTER (OUTPATIENT)
Facility: HOSPITAL | Age: 65
Discharge: HOME | End: 2025-03-21
Attending: OTOLARYNGOLOGY | Admitting: OTOLARYNGOLOGY
Payer: COMMERCIAL

## 2025-03-21 ENCOUNTER — ANESTHESIA (OUTPATIENT)
Dept: SURGERY | Facility: HOSPITAL | Age: 65
End: 2025-03-21
Payer: COMMERCIAL

## 2025-03-21 VITALS
BODY MASS INDEX: 32.93 KG/M2 | WEIGHT: 230 LBS | HEIGHT: 70 IN | DIASTOLIC BLOOD PRESSURE: 74 MMHG | RESPIRATION RATE: 16 BRPM | HEART RATE: 78 BPM | OXYGEN SATURATION: 96 % | SYSTOLIC BLOOD PRESSURE: 102 MMHG | TEMPERATURE: 97 F

## 2025-03-21 DIAGNOSIS — G47.33 OSA (OBSTRUCTIVE SLEEP APNEA): Primary | ICD-10-CM

## 2025-03-21 DIAGNOSIS — Z78.9 INTOLERANCE OF CONTINUOUS POSITIVE AIRWAY PRESSURE (CPAP) VENTILATION: ICD-10-CM

## 2025-03-21 LAB — GLUCOSE SERPL-MCNC: 191 MG/DL (ref 70–110)

## 2025-03-21 PROCEDURE — 63600175 PHARM REV CODE 636 W HCPCS: Mod: PO | Performed by: ANESTHESIOLOGY

## 2025-03-21 PROCEDURE — 63600175 PHARM REV CODE 636 W HCPCS: Mod: PO | Performed by: NURSE ANESTHETIST, CERTIFIED REGISTERED

## 2025-03-21 PROCEDURE — 36000709 HC OR TIME LEV III EA ADD 15 MIN: Mod: PO | Performed by: OTOLARYNGOLOGY

## 2025-03-21 PROCEDURE — 71000015 HC POSTOP RECOV 1ST HR: Mod: PO | Performed by: OTOLARYNGOLOGY

## 2025-03-21 PROCEDURE — 37000008 HC ANESTHESIA 1ST 15 MINUTES: Mod: PO | Performed by: OTOLARYNGOLOGY

## 2025-03-21 PROCEDURE — 25000003 PHARM REV CODE 250: Mod: PO | Performed by: OTOLARYNGOLOGY

## 2025-03-21 PROCEDURE — 42975 DISE EVAL SLP DO BRTH FLX DX: CPT | Mod: ,,, | Performed by: OTOLARYNGOLOGY

## 2025-03-21 PROCEDURE — 37000009 HC ANESTHESIA EA ADD 15 MINS: Mod: PO | Performed by: OTOLARYNGOLOGY

## 2025-03-21 PROCEDURE — 71000033 HC RECOVERY, INTIAL HOUR: Mod: PO | Performed by: OTOLARYNGOLOGY

## 2025-03-21 PROCEDURE — 63600175 PHARM REV CODE 636 W HCPCS: Mod: PO | Performed by: OTOLARYNGOLOGY

## 2025-03-21 PROCEDURE — 36000708 HC OR TIME LEV III 1ST 15 MIN: Mod: PO | Performed by: OTOLARYNGOLOGY

## 2025-03-21 RX ORDER — METOCLOPRAMIDE HYDROCHLORIDE 5 MG/ML
10 INJECTION INTRAMUSCULAR; INTRAVENOUS EVERY 10 MIN PRN
Status: DISCONTINUED | OUTPATIENT
Start: 2025-03-21 | End: 2025-03-21 | Stop reason: HOSPADM

## 2025-03-21 RX ORDER — FENTANYL CITRATE 50 UG/ML
25 INJECTION, SOLUTION INTRAMUSCULAR; INTRAVENOUS EVERY 5 MIN PRN
Status: DISCONTINUED | OUTPATIENT
Start: 2025-03-21 | End: 2025-03-21 | Stop reason: HOSPADM

## 2025-03-21 RX ORDER — LIDOCAINE HYDROCHLORIDE 10 MG/ML
1 INJECTION, SOLUTION EPIDURAL; INFILTRATION; INTRACAUDAL; PERINEURAL ONCE
Status: DISCONTINUED | OUTPATIENT
Start: 2025-03-21 | End: 2025-03-21 | Stop reason: HOSPADM

## 2025-03-21 RX ORDER — OXYMETAZOLINE HCL 0.05 %
2 SPRAY, NON-AEROSOL (ML) NASAL
Status: ACTIVE | OUTPATIENT
Start: 2025-03-21 | End: 2025-03-21

## 2025-03-21 RX ORDER — SODIUM CHLORIDE 0.9 % (FLUSH) 0.9 %
3 SYRINGE (ML) INJECTION
Status: DISCONTINUED | OUTPATIENT
Start: 2025-03-21 | End: 2025-03-21 | Stop reason: HOSPADM

## 2025-03-21 RX ORDER — LIDOCAINE HYDROCHLORIDE 20 MG/ML
INJECTION INTRAVENOUS
Status: DISCONTINUED | OUTPATIENT
Start: 2025-03-21 | End: 2025-03-21

## 2025-03-21 RX ORDER — LIDOCAINE HYDROCHLORIDE 20 MG/ML
JELLY TOPICAL ONCE
Status: DISCONTINUED | OUTPATIENT
Start: 2025-03-21 | End: 2025-03-21 | Stop reason: HOSPADM

## 2025-03-21 RX ORDER — PROPOFOL 10 MG/ML
VIAL (ML) INTRAVENOUS CONTINUOUS PRN
Status: DISCONTINUED | OUTPATIENT
Start: 2025-03-21 | End: 2025-03-21

## 2025-03-21 RX ORDER — OXYCODONE HYDROCHLORIDE 5 MG/1
5 TABLET ORAL ONCE AS NEEDED
Status: DISCONTINUED | OUTPATIENT
Start: 2025-03-21 | End: 2025-03-21 | Stop reason: HOSPADM

## 2025-03-21 RX ORDER — SODIUM CHLORIDE, SODIUM LACTATE, POTASSIUM CHLORIDE, CALCIUM CHLORIDE 600; 310; 30; 20 MG/100ML; MG/100ML; MG/100ML; MG/100ML
INJECTION, SOLUTION INTRAVENOUS CONTINUOUS
Status: DISCONTINUED | OUTPATIENT
Start: 2025-03-21 | End: 2025-03-21 | Stop reason: HOSPADM

## 2025-03-21 RX ADMIN — PROPOFOL 175 MCG/KG/MIN: 10 INJECTION, EMULSION INTRAVENOUS at 09:03

## 2025-03-21 RX ADMIN — SODIUM CHLORIDE, POTASSIUM CHLORIDE, SODIUM LACTATE AND CALCIUM CHLORIDE: 600; 310; 30; 20 INJECTION, SOLUTION INTRAVENOUS at 08:03

## 2025-03-21 RX ADMIN — Medication 2 SPRAY: at 08:03

## 2025-03-21 RX ADMIN — GLYCOPYRROLATE 0.1 MG: 0.2 INJECTION, SOLUTION INTRAMUSCULAR; INTRAVENOUS at 08:03

## 2025-03-21 RX ADMIN — LIDOCAINE HYDROCHLORIDE 20 MG: 20 INJECTION INTRAVENOUS at 09:03

## 2025-03-21 NOTE — DISCHARGE INSTRUCTIONS
Post-op Sleep Endoscopy  Blayne Freitas MD  Otolaryngology - [facility] - [phone]  Cell Phone (after hours) - [phone]      Pain and Activity  You will have minimal nasal and throat pain. This can be related to the pressure from the scope and will resolve after a few days.  You can resume your normal activities as tolerated on the day following surgery.    Diet  Make sure to get enough fluids and nutrients. Food and drink guidelines include  No diet restrictions.       Medication  Give only medications approved by your doctor. Follow directions closely when giving your child medications.  Resume your normal medications. OK to take Tylenol or Ibuprofen for pain as needed.       Dr. Freitas will call to go over the results and options.

## 2025-03-21 NOTE — BRIEF OP NOTE
[facility] - Surgery  Brief Operative Note     SUMMARY     Surgery Date: 3/21/2025     Surgeons and Role:     * Blayne Freitas MD - Primary    Assisting Surgeon: None    Pre-op Diagnosis:  Obstructive sleep apnea [G47.33]  Intolerance of continuous positive airway pressure (CPAP) ventilation [Z78.9]  BMI 34.0-34.9,adult [Z68.34]    Post-op Diagnosis:  Post-Op Diagnosis Codes:     * Obstructive sleep apnea [G47.33]     * Intolerance of continuous positive airway pressure (CPAP) ventilation [Z78.9]     * BMI 34.0-34.9,adult [Z68.34]    Procedure(s) (LRB):  SLEEP ENDOSCOPY,DRUG-INDUCED (Bilateral)    Anesthesia: General    Description of the findings of the procedure: DISE    Findings/Key Components: DISE    Estimated Blood Loss: * No values recorded between 3/21/2025  9:27 AM and 3/21/2025  9:43 AM *         Specimens:   Specimen (24h ago, onward)      None            Discharge Note    SUMMARY     Admit Date: 3/21/2025    Discharge Date and Time:  03/21/2025 9:43 AM    Hospital Course (synopsis of major diagnoses, care, treatment, and services provided during the course of the hospital stay): Did well following surgery and was discharged uneventfully     Final Diagnosis: Post-Op Diagnosis Codes:     * Obstructive sleep apnea [G47.33]     * Intolerance of continuous positive airway pressure (CPAP) ventilation [Z78.9]     * BMI 34.0-34.9,adult [Z68.34]    Disposition: Home or Self Care    Follow Up/Patient Instructions: Regular diet. Activity as tolerated    Medications:  Reconciled Home Medications:   Current Discharge Medication List        CONTINUE these medications which have NOT CHANGED    Details   amLODIPine (NORVASC) 5 MG tablet TAKE 1 TABLET BY MOUTH EVERY DAY  Qty: 90 tablet, Refills: 3    Comments: .  Associated Diagnoses: Essential hypertension      aspirin (ECOTRIN) 81 MG EC tablet Take 1 tablet (81 mg total) by mouth once daily.      carvediloL (COREG) 25 MG tablet Take 1 tablet (25 mg total) by mouth 2  (two) times daily with meals.  Qty: 180 tablet, Refills: 0    Comments: .  Associated Diagnoses: Essential hypertension      cilostazoL (PLETAL) 50 MG Tab Take 1 tablet (50 mg total) by mouth 2 (two) times daily.  Qty: 180 tablet, Refills: 3    Associated Diagnoses: Peripheral vascular disease of extremity      clopidogreL (PLAVIX) 75 mg tablet Take 1 tablet (75 mg total) by mouth once daily.  Qty: 90 tablet, Refills: 3    Associated Diagnoses: Peripheral vascular disease of extremity; Coronary artery disease involving native coronary artery of native heart without angina pectoris      lisinopriL (PRINIVIL,ZESTRIL) 40 MG tablet TAKE 1 TABLET BY MOUTH EVERY DAY  Qty: 90 tablet, Refills: 0    Comments: .  Associated Diagnoses: Essential hypertension      albuterol (VENTOLIN HFA) 90 mcg/actuation inhaler Inhale 2 puffs into the lungs every 4 (four) hours as needed for Wheezing or Shortness of Breath. Rescue  Qty: 54 g, Refills: 3    Associated Diagnoses: Asthma-COPD overlap syndrome      ALPRAZolam (XANAX) 0.25 MG tablet Take 1 tablet (0.25 mg total) by mouth 3 (three) times daily as needed for Anxiety.  Qty: 30 tablet, Refills: 2    Associated Diagnoses: Anxiety      ascorbic acid (VITAMIN C) 500 MG tablet Take 500 mg by mouth 2 (two) times daily.      atorvastatin (LIPITOR) 80 MG tablet Take 1 tablet (80 mg total) by mouth every evening.  Qty: 90 tablet, Refills: 3    Associated Diagnoses: Mixed hyperlipidemia      azithromycin (ZITHROMAX) 500 MG tablet One daily for yellow mucous, repeat if needed  Qty: 3 tablet, Refills: 3    Associated Diagnoses: Chronic obstructive pulmonary disease, unspecified COPD type      blood sugar diagnostic Strp Use to monitor blood glucose twice daily  Qty: 100 strip, Refills: 6    Associated Diagnoses: Type 2 diabetes mellitus without complication, without long-term current use of insulin      blood-glucose meter ([employer] GLUCOSE MONITOR SYSTEM) Misc Use as directed  Qty: 1 each,  Refills: 0      blood-glucose meter kit To check BG 2 times daily, to use with insurance preferred meter. E11.9  Qty: 1 each, Refills: 0    Associated Diagnoses: Type 2 diabetes mellitus without complication, without long-term current use of insulin      dicyclomine (BENTYL) 10 MG capsule TAKE 1 CAP BY MOUTH BEFORE MEALS AND AT BEDTIME AS NEEDED (ABDOMINAL CRAMPING/PAIN).  Qty: 120 capsule, Refills: 1    Associated Diagnoses: Loose bowel movement      dulaglutide (TRULICITY) 1.5 mg/0.5 mL pen injector Inject 1.5 mg into the skin every 7 days.  Qty: 4 pen , Refills: 11    Associated Diagnoses: Type 2 diabetes mellitus with hyperglycemia, without long-term current use of insulin      ezetimibe (ZETIA) 10 mg tablet TAKE 1 TABLET BY MOUTH EVERY DAY  Qty: 90 tablet, Refills: 0    Associated Diagnoses: Mixed hyperlipidemia      fenofibrate (TRICOR) 54 MG tablet Take 1 tablet (54 mg total) by mouth once daily.  Qty: 30 tablet, Refills: 11    Associated Diagnoses: Mixed hyperlipidemia      fluticasone propionate (FLONASE) 50 mcg/actuation nasal spray USE 1 SPRAY (50 MCG TOTAL) BY EACH NARE ROUTE ONCE DAILY.  Qty: 16 mL, Refills: 11    Associated Diagnoses: Encounter for long-term (current) use of medications; Medication refill      fluticasone-umeclidin-vilanter (TRELEGY ELLIPTA) 200-62.5-25 mcg inhaler Inhale 1 puff into the lungs once daily.  Qty: 180 each, Refills: 3    Associated Diagnoses: Asthma-COPD overlap syndrome      HYDROcodone-acetaminophen (NORCO) 5-325 mg per tablet Take 1 tablet by mouth every 6 (six) hours as needed for Pain.  Qty: 12 tablet, Refills: 0    Comments: Quantity prescribed more than 7 day supply? No  Associated Diagnoses: Closed fracture of distal end of right fibula, unspecified fracture morphology, initial encounter      isosorbide mononitrate (IMDUR) 30 MG 24 hr tablet Take 1 tablet (30 mg total) by mouth once daily.  Qty: 90 tablet, Refills: 3    Associated Diagnoses: Coronary artery  disease involving native coronary artery of native heart without angina pectoris      lancets 28 gauge Misc Use to test blood glucose twice daily  Qty: 100 each, Refills: 6    Associated Diagnoses: Type 2 diabetes mellitus without complication, without long-term current use of insulin      MULTIVITAMIN ORAL Take 1 capsule by mouth once daily.      nitroGLYCERIN (NITROSTAT) 0.4 MG SL tablet Place 1 tablet (0.4 mg total) under the tongue every 5 (five) minutes as needed for Chest pain. 1 Tablet, Sublingual Sublingual  Qty: 100 tablet, Refills: 3    Associated Diagnoses: Chest pain at rest; Encounter for long-term (current) use of medications; Medication refill      omega-3 fatty acids 500 mg Cap Take 1 capsule by mouth once daily. 1 Capsule Oral       pantoprazole (PROTONIX) 20 MG tablet Take 1 tablet (20 mg total) by mouth before breakfast.  Qty: 90 tablet, Refills: 3    Associated Diagnoses: History of gastroesophageal reflux (GERD)      predniSONE (DELTASONE) 20 MG tablet Take one daily for 3 days and may repeat for shortness of breath.  Qty: 12 tablet, Refills: 1    Associated Diagnoses: Asthma-COPD overlap syndrome      sertraline (ZOLOFT) 100 MG tablet Take 1 tablet (100 mg total) by mouth once daily.  Qty: 90 tablet, Refills: 3    Associated Diagnoses: Anxiety      tadalafiL (CIALIS) 5 MG tablet Take 1 tablet (5 mg total) by mouth daily as needed for Erectile Dysfunction.  Qty: 30 tablet, Refills: 0           No discharge procedures on file.

## 2025-03-21 NOTE — ANESTHESIA PREPROCEDURE EVALUATION
03/21/2025  Jose G Shafer is a 65 y.o., male with CAD s/p PCI in 2005, smoker, PVD, HTN and HLD for colonoscopy and EGD under MAC.     Anesthesia Evaluation    I have reviewed the Patient Summary Reports.     I have reviewed the Nursing Notes. I have reviewed the NPO Status.   I have reviewed the Medications.     Review of Systems  Social:  Smoker       Hematology/Oncology:    Oncology Normal                                   EENT/Dental:  EENT/Dental Normal           Cardiovascular:  Exercise tolerance: good   Hypertension  Past MI CAD   CABG/stent       PVD hyperlipidemia                               Pulmonary:   COPD     Sleep Apnea                Hepatic/GI:     GERD Liver Disease,               Neurological:  Neurology Normal                                      Endocrine:  Diabetes (pre-diabetes)           Psych:   anxiety                 Physical Exam  General:  Well nourished       Airway/Jaw/Neck:  Airway Findings: Mouth Opening: Normal     Tongue: Normal      General Airway Assessment: Adult      Mallampati: III   TM Distance: Normal, at least 6 cm               Dental:  Dental Findings: Periodontal disease, Severe      Chest/Lungs:  Chest/Lungs Findings:  Decreased Breath Sounds Bilateral, Normal Respiratory Rate, Expiratory Wheezes, Mild       Heart/Vascular:  Heart Findings: Normal                            Lab Results   Component Value Date    WBC 9.61 09/26/2024    HGB 13.3 (L) 09/26/2024    HCT 37.8 (L) 09/26/2024    MCV 92 09/26/2024     09/26/2024       Chemistry        Component Value Date/Time     09/26/2024 0919    K 4.4 09/26/2024 0919     09/26/2024 0919    CO2 22 09/26/2024 0919    BUN 15 09/26/2024 0919    CREATININE 0.80 09/26/2024 0919     (H) 09/26/2024 0919        Component Value Date/Time    CALCIUM 9.2 09/26/2024 0919    ALKPHOS 57 09/26/2024  0919    AST 49 09/26/2024 0919    ALT 46 09/26/2024 0919    BILITOT 0.7 09/26/2024 0919    ESTGFRAFRICA >60 06/10/2022 1034    EGFRNONAA >60 06/10/2022 1034        ECG 6/23/2017  Normal sinus rhythm  Left axis deviation  Incomplete right bundle branch block  Possible Inferior infarct ,age undetermined  Abnormal ECG  When compared with ECG of 19-OCT-2015 09:35,  Possible Inferior infarct is now Present  Confirmed by SUSHIL JORDAN MD ([id number]) on 6/23/2017 5:44:40 PM      Anesthesia Plan  Type of Anesthesia, risks & benefits discussed:  Anesthesia Type:  MAC, general    Patient's Preference:   Plan Factors:          Intra-op Monitoring Plan: standard ASA monitors  Intra-op Monitoring Plan Comments:   Post Op Pain Control Plan:   Post Op Pain Control Plan Comments:     Induction:   IV  Beta Blocker:  Patient is on a Beta-Blocker and has received one dose within the past 24 hours (No further documentation required).       Informed Consent: Informed consent signed with the Patient and all parties understand the risks and agree with anesthesia plan.  All questions answered.  Anesthesia consent signed with patient.  ASA Score: 3     Day of Surgery Review of History & Physical:              Ready For Surgery From Anesthesia Perspective.                 Physical Exam  General: Well nourished    Airway:  Mallampati: III   Mouth Opening: Normal  TM Distance: Normal, at least 6 cm  Tongue: Normal    Dental:  Periodontal disease, Severe    Chest/Lungs:  Decreased Breath Sounds Bilateral, Normal Respiratory Rate, Expiratory Wheezes, Mild        Anesthesia Plan  Type of Anesthesia, risks & benefits discussed:    Anesthesia Type: MAC, general  Intra-op Monitoring Plan: standard ASA monitors  Induction:  IV  Informed Consent: Informed consent signed with the Patient and all parties understand the risks and agree with anesthesia plan.  All questions answered.   ASA Score: 3    Ready For Surgery From Anesthesia Perspective.     .

## 2025-03-21 NOTE — H&P
Subjective:       Patient ID: Jose G Shafer is a 65 y.o. male.    Chief Complaint: No chief complaint on file.    Jose G is here for DISE. No changes since clinic visit     Review of Systems   Constitutional: Negative for activity change and appetite change.   Eyes: Negative for discharge.   Respiratory: Negative for difficulty breathing and wheezing   Cardiovascular: Negative for chest pain.   Gastrointestinal: Negative for abdominal distention and abdominal pain.   Endocrine: Negative for cold intolerance and heat intolerance.   Genitourinary: Negative for dysuria.   Musculoskeletal: Negative for gait problem and joint swelling.   Skin: Negative for color change and pallor.   Neurological: Negative for syncope and weakness.   Psychiatric/Behavioral: Negative for agitation and confusion.     Objective:        Constitutional:   He is oriented to person, place, and time. He appears well-developed and well-nourished. He appears alert. He is active. Normal speech.      Head:  Normocephalic and atraumatic. Head is without TMJ tenderness. No scars. Salivary glands normal.  Facial strength is normal.      Ears:    Right Ear: No drainage or swelling. No middle ear effusion.   Left Ear: No drainage or swelling.  No middle ear effusion.     Nose:  No mucosal edema, rhinorrhea or sinus tenderness. No turbinate hypertrophy.      Mouth/Throat  Oropharynx clear and moist without lesions or asymmetry, normal uvula midline and mirror exam normal. Normal dentition. No uvula swelling, lacerations or trismus. No oropharyngeal exudate. Tonsillar erythema, tonsillar exudate.      Neck:  Full range of motion with neck supple and no adenopathy. Thyroid tenderness is present. No tracheal deviation, no edema, no erythema, normal range of motion, no stridor, no crepitus and no neck rigidity present. No thyroid mass present.     Cardiovascular:    Intact distal pulses and normal pulses.              Pulmonary/Chest:   Effort normal and  breath sounds normal. No stridor.     Psychiatric:   His speech is normal and behavior is normal. His mood appears not anxious. His affect is not labile.     Neurological:   He is alert and oriented to person, place, and time. No sensory deficit.     Skin:   No abrasions, lacerations, lesions, or rashes. No abrasion and no bruising noted.         Tests / Results:  Reviewed     Assessment:       1. LEAH (obstructive sleep apnea)    2. Intolerance of continuous positive airway pressure (CPAP) ventilation    3. BMI 33.0-33.9,adult          Plan:         DISE as planned

## 2025-03-21 NOTE — OP NOTE
03/21/2025    PREOPERATIVE DIAGNOSES:   1. Obstructive sleep apnea  2. Positive pressure intolerance and persistent sleep apnea after CPAP    POSTOPERATIVE DIAGNOSES:   1. Obstructive sleep apnea  2. Positive pressure intolerance and persistent sleep apnea after CPAP    PROCEDURES:   1. Drug-induced sleep endoscopy (flexible fiberoptic laryngoscopy with examination under anesthesia).     SURGEON: Blayne Freitas MD    ASSISTANT: None    ANESTHESIA: IV sedation.     ESTIMATED BLOOD LOSS: None.     COMPLICATIONS: None.     BRIEF CLINICAL HISTORY: This is a 65 y.o. -year-old male with a history of   Severe symptomatic obstructive sleep apnea, who is intolerant   and unable to achieve benefit with positive pressure therapy.  he   presents today for drug-induced sleep endoscopy to better characterize the locations and pattern of obstruction and to predict appropriate medical and/or surgical options moving forward.     DESCRIPTION OF PROCEDURE:  The patient was seen in the pre-operative holding area. Informaed consent was signed. Oxymetazoline was sprayed into the nasal cavities, followed by 2% viscous Lidocaine on pledgets placed into the nasal cavities for anesthesia.     The patient was brought to the operating room and was anesthetized via the standard drug-induced sleep endoscopy  protocol. The propofol infusion rate was started at 100 mcg and gradually increased to a level of 200 mcg, at which point, conditions that mimic sleep were gradually observed.     The patient was non-responsive to verbal commands, but still with spontaneous respiration. Sleep disordered breathing and associated desaturation events were clearly observed.     Under these conditions, the flexible endoscope was inserted to examine both sides of the nose as well as the pharynx and larynx.     The nose was relatively unremarkable. The retropalatal space showed a  intermediate  oriented palate.     The VOTE score at baseline was:  Velopharynx:2,  Last annual 4/12/18 with Jennie De La Cruz NP.   Last filled Lexapro 4/12/18 #90 +3 refills.   No pending appts.   Mychart message back to pt that she is due for her annual and will need to schedule an appt.   Routing to provider for review and instruction.    Primarily AP  Oropharynx: 2, lateral  Tongue Base: 2, AP  Epiglottis: 0    Modified jaw thrust resulted in improvement at all levels.  Protrusion of tongue alone improved patency a bit but still with snoring.     In summary, there was no evidence of complete concentric palatal obstruction   and she/he does appear to be a candidate anatomically for hypoglossal nerve   stimulation therapy.     I performed the entire procedure.

## 2025-03-21 NOTE — TRANSFER OF CARE
"Anesthesia Transfer of Care Note    Patient: Jose G Shafer    Procedure(s) Performed: Procedure(s) (LRB):  SLEEP ENDOSCOPY,DRUG-INDUCED (Bilateral)    Patient location: PACU    Anesthesia Type: MAC    Transport from OR: Transported from OR on room air with adequate spontaneous ventilation    Post pain: adequate analgesia    Post assessment: no apparent anesthetic complications and tolerated procedure well    Post vital signs: stable    Level of consciousness: responds to stimulation    Nausea/Vomiting: no nausea/vomiting    Complications: none    Transfer of care protocol was followed      Last vitals: Visit Vitals  /68   Pulse 91   Temp 36.1 °C (97 °F) (Skin)   Resp 16   Ht 5' 10" (1.778 m)   Wt 104.3 kg (230 lb)   SpO2 97%   BMI 33.00 kg/m²     "

## 2025-03-21 NOTE — ANESTHESIA POSTPROCEDURE EVALUATION
Anesthesia Post Evaluation    Patient: Jose G Shafer    Procedure(s) Performed: Procedure(s) (LRB):  SLEEP ENDOSCOPY,DRUG-INDUCED (Bilateral)    Final Anesthesia Type: MAC      Patient location during evaluation: PACU  Patient participation: Yes- Able to Participate  Level of consciousness: awake  Post-procedure vital signs: reviewed and stable  Pain management: adequate  Airway patency: patent    PONV status at discharge: No PONV  Anesthetic complications: no      Cardiovascular status: blood pressure returned to baseline and hemodynamically stable  Respiratory status: spontaneous ventilation  Hydration status: euvolemic  Follow-up not needed.              Vitals Value Taken Time   /74 03/21/25 09:56   Temp  03/21/25 10:17   Pulse 78 03/21/25 10:04   Resp 16 03/21/25 10:04   SpO2 96 % 03/21/25 10:04         Event Time   Out of Recovery 09:51:13         Pain/Duran Score: Duran Score: 10 (3/21/2025  9:57 AM)

## 2025-03-24 ENCOUNTER — PATIENT MESSAGE (OUTPATIENT)
Dept: ORTHOPEDICS | Facility: CLINIC | Age: 65
End: 2025-03-24
Payer: COMMERCIAL

## 2025-03-24 ENCOUNTER — TELEPHONE (OUTPATIENT)
Dept: OTOLARYNGOLOGY | Facility: CLINIC | Age: 65
End: 2025-03-24
Payer: COMMERCIAL

## 2025-03-24 DIAGNOSIS — G47.33 OBSTRUCTIVE SLEEP APNEA: Primary | ICD-10-CM

## 2025-03-24 DIAGNOSIS — Z79.02 ANTIPLATELET OR ANTITHROMBOTIC LONG-TERM USE: ICD-10-CM

## 2025-03-24 NOTE — TELEPHONE ENCOUNTER
----- Message from Blayne Freitas MD sent at 3/21/2025  9:45 AM CDT -----  Pls call to schedule [facility].

## 2025-03-27 ENCOUNTER — E-CONSULT (OUTPATIENT)
Dept: CARDIOLOGY | Facility: CLINIC | Age: 65
End: 2025-03-27
Payer: COMMERCIAL

## 2025-03-27 DIAGNOSIS — Z98.62 S/P ANGIOPLASTY: ICD-10-CM

## 2025-03-27 DIAGNOSIS — Z01.810 PRE-OPERATIVE CARDIOVASCULAR EXAMINATION: Primary | ICD-10-CM

## 2025-03-27 NOTE — CONSULTS
[facility] - Cardiology  Response for E-Consult     Patient Name: Jose G Shafer  MRN: [id number]  Primary Care Provider: Christiano Guy MD   Requesting Provider: Blayne Freitas MD  E-Consult to General Cardiology  Consult performed by: Harjinder Perez MD  Consult ordered by: Blayne Freitas MD          Reviewed personally.      Most recent PCI greater than 6 months ago, per catheterization report, needed uninterrupted dual antiplatelet therapy for 6 months.      As long as no significant chest pain or shortness of breath with exertion, patient is at acceptable risk to proceed from a Cardiology standpoint.    Okay to hold Plavix 5 days prior to procedure, restart as soon as safe postprocedure.      Additional future steps to consider: NA    Total time of Consultation: 5 minute    I did not speak to the requesting provider verbally about this.     *This eConsult is based on the clinical data available to me and is furnished without benefit of a physical examination. The eConsult will need to be interpreted in light of any clinical issues or changes in patient status not available to me at the time of filing this eConsults. Significant changes in patient condition or level of acuity should result in immediate formal consultation and reevaluation. Please alert me if you have further questions.    Thank you for this eConsult referral.     Harjinder Perez MD  [facility]iology

## 2025-03-31 DIAGNOSIS — S82.831A CLOSED FRACTURE OF DISTAL END OF RIGHT FIBULA, UNSPECIFIED FRACTURE MORPHOLOGY, INITIAL ENCOUNTER: Primary | ICD-10-CM

## 2025-04-01 ENCOUNTER — OFFICE VISIT (OUTPATIENT)
Dept: ORTHOPEDICS | Facility: CLINIC | Age: 65
End: 2025-04-01
Payer: COMMERCIAL

## 2025-04-01 ENCOUNTER — HOSPITAL ENCOUNTER (OUTPATIENT)
Dept: RADIOLOGY | Facility: HOSPITAL | Age: 65
Discharge: HOME | End: 2025-04-01
Attending: ORTHOPAEDIC SURGERY
Payer: COMMERCIAL

## 2025-04-01 VITALS — HEIGHT: 70 IN | WEIGHT: 229.94 LBS | BODY MASS INDEX: 32.92 KG/M2

## 2025-04-01 DIAGNOSIS — S82.61XA DISPLACED FRACTURE OF LATERAL MALLEOLUS OF RIGHT FIBULA, INITIAL ENCOUNTER FOR CLOSED FRACTURE: Primary | ICD-10-CM

## 2025-04-01 DIAGNOSIS — S82.831A CLOSED FRACTURE OF DISTAL END OF RIGHT FIBULA, UNSPECIFIED FRACTURE MORPHOLOGY, INITIAL ENCOUNTER: ICD-10-CM

## 2025-04-01 PROCEDURE — 1159F MED LIST DOCD IN RCRD: CPT | Mod: CPTII,S$GLB,, | Performed by: ORTHOPAEDIC SURGERY

## 2025-04-01 PROCEDURE — 3051F HG A1C>EQUAL 7.0%<8.0%: CPT | Mod: CPTII,S$GLB,, | Performed by: ORTHOPAEDIC SURGERY

## 2025-04-01 PROCEDURE — 73610 X-RAY EXAM OF ANKLE: CPT | Mod: TC,PO,RT

## 2025-04-01 PROCEDURE — 4010F ACE/ARB THERAPY RXD/TAKEN: CPT | Mod: CPTII,S$GLB,, | Performed by: ORTHOPAEDIC SURGERY

## 2025-04-01 PROCEDURE — 99213 OFFICE O/P EST LOW 20 MIN: CPT | Mod: S$GLB,,, | Performed by: ORTHOPAEDIC SURGERY

## 2025-04-01 PROCEDURE — 1160F RVW MEDS BY RX/DR IN RCRD: CPT | Mod: CPTII,S$GLB,, | Performed by: ORTHOPAEDIC SURGERY

## 2025-04-01 PROCEDURE — 3008F BODY MASS INDEX DOCD: CPT | Mod: CPTII,S$GLB,, | Performed by: ORTHOPAEDIC SURGERY

## 2025-04-01 PROCEDURE — 73610 X-RAY EXAM OF ANKLE: CPT | Mod: 26,RT,, | Performed by: RADIOLOGY

## 2025-04-01 PROCEDURE — 99999 PR PBB SHADOW E&M-EST. PATIENT-LVL IV: CPT | Mod: PBBFAC,,, | Performed by: ORTHOPAEDIC SURGERY

## 2025-04-01 PROCEDURE — 1101F PT FALLS ASSESS-DOCD LE1/YR: CPT | Mod: CPTII,S$GLB,, | Performed by: ORTHOPAEDIC SURGERY

## 2025-04-01 PROCEDURE — 3288F FALL RISK ASSESSMENT DOCD: CPT | Mod: CPTII,S$GLB,, | Performed by: ORTHOPAEDIC SURGERY

## 2025-04-01 NOTE — PROGRESS NOTES
Status/Diagnosis: Displaced Right lateral malleolus fracture, Jefferson B.  Date of Surgery: none  Date of Injury: 03/11/2025  Return visit: 3 weeks  X-rays on Return: Single leg stance WB 3-views Right ankle    Chief Complaint:   Chief Complaint   Patient presents with    Right Ankle - Pain     Present History:  65-year-old male who presents today after acute right ankle injury.  States that he has stent only hot we will car and fell on 03/11.  Immediate right ankle pain, swelling, painful weight-bearing.    Seen and evaluated at local urgent care at which time x-rays were taken and consistent with fracture.  Extensive PMH including but not limited to CAD status post stent x 5 on Plavix and aspirin; type 2 diabetes, A1c of 7.3; hypertension; LEAH.  Smokes 1+ pack per day for 40+ years.  Uses a Rollator walker at baseline.    04/01/2025:  Patient returns today for repeat clinical evaluation.  Patient has been doing well until he sustained another fall this morning.  States he hit his right knee on a flower bed with superficial laceration.  Some mild increased swelling at the ankle fracture site compared to before the fall.  He had not been wearing the boot.      Past Medical History:   Diagnosis Date    Anticoagulant long-term use     Anxiety     BCC (basal cell carcinoma)     CAD (coronary artery disease)     Colon polyp     Erectile dysfunction     GERD (gastroesophageal reflux disease)     Hepatic steatosis     History of kidney stones     History of pneumonia 12/2022    HLD (hyperlipidemia)     HTN (hypertension)     Impaired fasting glucose     Lateral epicondylitis of left elbow 07/18/2019    Myocardial infarction     PONV (postoperative nausea and vomiting)     Sleep apnea     uses cpap    Type 2 diabetes mellitus without complications 08/29/2024       Past Surgical History:   Procedure Laterality Date    ANGIOGRAM, CORONARY, WITH LEFT HEART CATHETERIZATION Left 05/26/2022    Procedure: Angiogram, Coronary, with  Left Heart Cath;  Surgeon: Christa Gunn MD;  Location: [facility];  Service: Cardiology;  Laterality: Left;    ANGIOGRAM, CORONARY, WITH LEFT HEART CATHETERIZATION  08/16/2023    Procedure: Left Heart Cath;  Surgeon: Renetta Rivera MD;  Location: [facility];  Service: Cardiology;;    ANGIOGRAM, CORONARY, WITH LEFT HEART CATHETERIZATION  9/26/2024    Procedure: Left Heart Cath;  Surgeon: Renetta Rivera MD;  Location: [facility];  Service: Cardiology;;    ANGIOPLASTY      AORTOGRAPHY WITH EXTREMITY RUNOFF  11/16/2021    Procedure: AORTOGRAM, WITH EXTREMITY RUNOFF;  Surgeon: Blanca Arzola MD;  Location: [facility];  Service: Cardiovascular;;    AORTOGRAPHY WITH SERIALOGRAPHY N/A 11/22/2019    Procedure: AORTOGRAM, WITH SERIALOGRAPHY;  Surgeon: Blanca Arzola MD;  Location: [facility];  Service: Cardiovascular;  Laterality: N/A;    AORTOGRAPHY WITH SERIALOGRAPHY N/A 12/13/2019    Procedure: AORTOGRAM, WITH SERIALOGRAPHY;  Surgeon: Blanca Arzola MD;  Location: [facility];  Service: Cardiovascular;  Laterality: N/A;    AORTOGRAPHY WITH SERIALOGRAPHY  12/17/2020    Procedure: AORTOGRAM, WITH SERIALOGRAPHY;  Surgeon: Blanca Arzola MD;  Location: [facility];  Service: Cardiovascular;;    ATHERECTOMY, CORONARY  08/16/2023    Procedure: Atherectomy LAD (Rotoblator);  Surgeon: Renetta Rivera MD;  Location: [facility];  Service: Cardiology;;    CARDIAC SURGERY  2005    stents placed    COLONOSCOPY N/A 07/17/2017    Procedure: COLONOSCOPY Miralax;  Surgeon: Jeremiah Syed Jr., MD;  Location: [facility] END[facility];  Service: Endoscopy;  Laterality: N/A; repeat in 5 years for surveillance    CORONARY ANGIOGRAPHY  9/26/2024    Procedure: Coronary angiogram study;  Surgeon: Renetta Rivera MD;  Location: [facility];  Service: Cardiology;;    INTRAVASCULAR ULTRASOUND, CORONARY  9/26/2024    Procedure: IVUS LCX;  Surgeon: Renetta Rivera MD;  Location: [facility];  Service: Cardiology;;    IVUS, CORONARY  08/16/2023    Procedure: IVUS LAD;  Surgeon: Renetta Rivera MD;  Location:  [facility];  Service: Cardiology;;    KNEE ARTHROSCOPY W/ MENISCECTOMY      PERCUTANEOUS CORONARY INTERVENTION, ARTERY  08/16/2023    Procedure: DAVID LAD;  Surgeon: Renetta Rivera MD;  [facility];  Service: Cardiology;;    PERCUTANEOUS TRANSLUMINAL ANGIOPLASTY N/A 11/22/2019    Procedure: Pta (Angioplasty, Percutaneous, Transluminal);  Surgeon: Blanca Arzola MD;  Location: [facility];  Service: Cardiovascular;  Laterality: N/A;    PERCUTANEOUS TRANSLUMINAL ANGIOPLASTY N/A 12/13/2019    Procedure: Pta (Angioplasty, Percutaneous, Transluminal);  Surgeon: Blanca Arzola MD;  Location: [facility];  Service: Cardiovascular;  Laterality: N/A;    PERCUTANEOUS TRANSLUMINAL ANGIOPLASTY  11/16/2021    Procedure: PTA (ANGIOPLASTY, PERCUTANEOUS, TRANSLUMINAL);  Surgeon: Blanca Arzola MD;  Location: [facility];  Service: Cardiovascular;;    SLEEP ENDOSCOPY, DRUG-INDUCED Bilateral 3/21/2025    Procedure: SLEEP ENDOSCOPY,DRUG-INDUCED;  Surgeon: Blayne Freitas MD;  Location: [facility] OR;  Service: ENT;  Laterality: Bilateral;    STENT, DRUG ELUTING, SINGLE VESSEL, CORONARY  9/26/2024    Procedure: DAVID LCX;  Surgeon: Renetta Rivera MD;  [facility];  Service: Cardiology;;    VARICOSE VEIN SURGERY      left saphenous       Current Outpatient Medications   Medication Sig    albuterol (VENTOLIN HFA) 90 mcg/actuation inhaler Inhale 2 puffs into the lungs every 4 (four) hours as needed for Wheezing or Shortness of Breath. Rescue    ALPRAZolam (XANAX) 0.25 MG tablet Take 1 tablet (0.25 mg total) by mouth 3 (three) times daily as needed for Anxiety.    amLODIPine (NORVASC) 5 MG tablet TAKE 1 TABLET BY MOUTH EVERY DAY    ascorbic acid (VITAMIN C) 500 MG tablet Take 500 mg by mouth 2 (two) times daily.    aspirin (ECOTRIN) 81 MG EC tablet Take 1 tablet (81 mg total) by mouth once daily.    atorvastatin (LIPITOR) 80 MG tablet Take 1 tablet (80 mg total) by mouth every evening.    azithromycin (ZITHROMAX) 500 MG tablet One daily for yellow  mucous, repeat if needed (Patient not taking: Reported on 1/27/2025)    blood sugar diagnostic Strp Use to monitor blood glucose twice daily    blood-glucose meter (BLULINK GLUCOSE MONITOR SYSTEM) Misc Use as directed    blood-glucose meter kit To check BG 2 times daily, to use with insurance preferred meter. E11.9    carvediloL (COREG) 25 MG tablet Take 1 tablet (25 mg total) by mouth 2 (two) times daily with meals.    cilostazoL (PLETAL) 50 MG Tab Take 1 tablet (50 mg total) by mouth 2 (two) times daily.    clopidogreL (PLAVIX) 75 mg tablet Take 1 tablet (75 mg total) by mouth once daily.    dicyclomine (BENTYL) 10 MG capsule TAKE 1 CAP BY MOUTH BEFORE MEALS AND AT BEDTIME AS NEEDED (ABDOMINAL CRAMPING/PAIN).    dulaglutide (TRULICITY) 1.5 mg/0.5 mL pen injector Inject 1.5 mg into the skin every 7 days.    ezetimibe (ZETIA) 10 mg tablet TAKE 1 TABLET BY MOUTH EVERY DAY    fenofibrate (TRICOR) 54 MG tablet Take 1 tablet (54 mg total) by mouth once daily.    fluticasone propionate (FLONASE) 50 mcg/actuation nasal spray USE 1 SPRAY (50 MCG TOTAL) BY EACH NARE ROUTE ONCE DAILY.    fluticasone-umeclidin-vilanter (TRELEGY ELLIPTA) 200-62.5-25 mcg inhaler Inhale 1 puff into the lungs once daily.    HYDROcodone-acetaminophen (NORCO) 5-325 mg per tablet Take 1 tablet by mouth every 6 (six) hours as needed for Pain.    isosorbide mononitrate (IMDUR) 30 MG 24 hr tablet Take 1 tablet (30 mg total) by mouth once daily.    lancets 28 gauge Misc Use to test blood glucose twice daily    lisinopriL (PRINIVIL,ZESTRIL) 40 MG tablet TAKE 1 TABLET BY MOUTH EVERY DAY    MULTIVITAMIN ORAL Take 1 capsule by mouth once daily.    nitroGLYCERIN (NITROSTAT) 0.4 MG SL tablet Place 1 tablet (0.4 mg total) under the tongue every 5 (five) minutes as needed for Chest pain. 1 Tablet, Sublingual Sublingual    omega-3 fatty acids 500 mg Cap Take 1 capsule by mouth once daily. 1 Capsule Oral     pantoprazole (PROTONIX) 20 MG tablet Take 1 tablet (20  mg total) by mouth before breakfast.    predniSONE (DELTASONE) 20 MG tablet Take one daily for 3 days and may repeat for shortness of breath.    sertraline (ZOLOFT) 100 MG tablet Take 1 tablet (100 mg total) by mouth once daily.    tadalafiL (CIALIS) 5 MG tablet Take 1 tablet (5 mg total) by mouth daily as needed for Erectile Dysfunction.     Current Facility-Administered Medications   Medication    sodium chloride 0.9% flush 10 mL       Review of patient's allergies indicates:  No Known Allergies    Family History   Problem Relation Name Age of Onset    Diabetes Mother      Stomach cancer Father      Diabetes Father      No Known Problems Sister      Prostate cancer Brother 2     Diabetes Brother 2     Coronary artery disease Brother 2         premature    Heart disease Brother 2     Hypertension Brother 2     No Known Problems Maternal Grandmother      No Known Problems Maternal Grandfather      No Known Problems Paternal Grandmother      No Known Problems Paternal Grandfather      No Known Problems Maternal Aunt      No Known Problems Maternal Uncle      No Known Problems Paternal Aunt      No Known Problems Paternal Uncle      Colon cancer Neg Hx      Anemia Neg Hx      Arrhythmia Neg Hx      Asthma Neg Hx      Clotting disorder Neg Hx      Fainting Neg Hx      Heart attack Neg Hx      Heart disease Neg Hx      Heart failure Neg Hx      Hyperlipidemia Neg Hx      Hypertension Neg Hx      Stroke Neg Hx      Atrial Septal Defect Neg Hx      Ulcerative colitis Neg Hx      Crohn's disease Neg Hx      Celiac disease Neg Hx         Social History[1]    Physical exam:  There were no vitals filed for this visit.  Body mass index is 32.99 kg/m².  General: In no apparent distress; well developed and well nourished.  HEENT: normocephalic; atraumatic.  Cardiovascular: regular rate.  Respiratory: no increased work of breathing.  Musculoskeletal:   Gait: unable to assess  Inspection:   New onset superficial laceration over  the anterior knee of the level of the patellar tendon.  No deep extension.  No warmth or erythema.  No signs or symptoms of infection.  Moderate residual swelling about the lateral > medial ankle.  Tenderness on deep palpation of the lateral malleolar fracture site.  Little to no tenderness over the medial ankle.    No pain referable to the foot.    Gross motor function intact.  Good sensation on monofilament testing.  Palpable pedal pulse.                   Imaging Studies/Outside documentation:  I have ordered/reviewed/interpreted the following images/outside documentation:  1. Weight-bearing 3-views of Right foot and ankle:   On my independent review, previously noted moderately displaced spiral fracture of the lateral malleolus at the level of the syndesmosis.  Approximately 3 mm of lateral translation and 2 mm of shortening.  No evidence of clear space widening.        Assessment:  Jose G Shafer is a 65 y.o. male with Displaced Right lateral malleolus fracture, Jefferson B.     Plan:   Clinical and radiographic findings were discussed.  Operative versus nonoperative treatment options were described.    Patient with multiple medical comorbidities and extensive chronic tobacco use.    Recommend conservative management at this time despite mild fracture displacement.  Ankle mortise does appear to be congruent on weight-bearing stress x-rays.    Specifically discussed no interval fracture healing on repeat x-ray.  Reiterated the importance of smoking cessation as it pertains to bone healing.    Recommend continuation of tall cam boot and cane.  Patient was provided with an even up shoe lift of the last visit but states that this caused hip and low back pain.    Wound care for the right knee superficial laceration.    Follow up in 3 weeks for repeat evaluation and x-ray, or sooner if needed.        This note was created using voice recognition software and may contain grammatical errors.           [1]   Social  History  Socioeconomic History    Marital status:    Tobacco Use    Smoking status: Every Day     Current packs/day: 0.00     Average packs/day: 0.5 packs/day for 44.0 years (22.0 ttl pk-yrs)     Types: Cigarettes     Start date: 10/10/1984     Last attempt to quit: 2023     Years since quittin.2    Smokeless tobacco: Never   Substance and Sexual Activity    Alcohol use: Yes     Alcohol/week: 14.0 standard drinks of alcohol     Types: 14 Cans of beer per week    Drug use: Never    Sexual activity: Not Currently     Partners: Female, Male     Birth control/protection: None     Social Drivers of Health     Financial Resource Strain: Low Risk  (3/17/2025)    Overall Financial Resource Strain (CARDIA)     Difficulty of Paying Living Expenses: Not very hard   Food Insecurity: No Food Insecurity (3/17/2025)    Hunger Vital Sign     Worried About Running Out of Food in the Last Year: Never true     Ran Out of Food in the Last Year: Never true   Transportation Needs: No Transportation Needs (3/17/2025)    PRAPARE - Transportation     Lack of Transportation (Medical): No     Lack of Transportation (Non-Medical): No   Physical Activity: Inactive (3/17/2025)    Exercise Vital Sign     Days of Exercise per Week: 5 days     Minutes of Exercise per Session: 0 min   Stress: No Stress Concern Present (3/17/2025)    [employer] [place] [employer] - Occupational Stress Questionnaire     Feeling of Stress : Not at all   Housing Stability: Low Risk  (3/17/2025)    Housing Stability Vital Sign     Unable to Pay for Housing in the Last Year: No     Number of Times Moved in the Last Year: 0     Homeless in the Last Year: No

## 2025-04-12 DIAGNOSIS — I10 ESSENTIAL HYPERTENSION: Chronic | ICD-10-CM

## 2025-04-12 RX ORDER — LISINOPRIL 40 MG/1
40 TABLET ORAL DAILY
Qty: 90 TABLET | Refills: 1 | Status: SHIPPED | OUTPATIENT
Start: 2025-04-12

## 2025-04-12 NOTE — TELEPHONE ENCOUNTER
Refill Routing Note   Medication(s) are not appropriate for processing by [facility] for the following reason(s):        Drug-disease interaction  Drug-Disease: carvediloL and Chronic obstructive pulmonary disease    [facility] action(s):  Defer  Approve        Medication Therapy Plan: Patient visited ed on 03/21/25 for LEAH (obstructive sleep apnea). No changes to lisinopril therapy during encounter; APPROVE    Extended chart review required: Yes     Appointments  past 12m or future 3m with PCP    Date Provider   Last Visit   2/17/2025 Christiano Guy MD   Next Visit   Visit date not found Christiano Guy MD   ED visits in past 90 days: 1        Note composed:8:21 AM 04/12/2025

## 2025-04-12 NOTE — TELEPHONE ENCOUNTER
No care due was identified.  [facility] Embedded Care Due Messages. Reference number: [id number].   4/12/2025 12:22:58 AM CDT

## 2025-04-14 ENCOUNTER — TELEPHONE (OUTPATIENT)
Dept: GASTROENTEROLOGY | Facility: CLINIC | Age: 65
End: 2025-04-14
Payer: COMMERCIAL

## 2025-04-14 ENCOUNTER — TELEPHONE (OUTPATIENT)
Dept: SURGERY | Facility: HOSPITAL | Age: 65
End: 2025-04-14
Payer: COMMERCIAL

## 2025-04-14 RX ORDER — CARVEDILOL 25 MG/1
25 TABLET ORAL 2 TIMES DAILY WITH MEALS
Qty: 180 TABLET | Refills: 3 | Status: SHIPPED | OUTPATIENT
Start: 2025-04-14

## 2025-04-14 NOTE — TELEPHONE ENCOUNTER
Pt scheduled for colon & EGD on Thursday 4/17 with Joseph. Pt needs to reschedule due to having a broken leg and no ride to and from procedure. Please reach out to pt to reschedule.

## 2025-04-14 NOTE — TELEPHONE ENCOUNTER
----- Message from Jyoti sent at 4/14/2025  9:43 AM CDT -----  Contact: Patient  Type:  Appointment RequestCaller is requesting a sooner appointment.  Caller declined first available appointment listed below.  Caller will not accept being placed on the waitlist and is requesting a message be sent to doctor.Name of Caller:  PatientWhen is the first available appointment?  N/AWould the patient rather a call back or a response via [facility]?  Call [facility] Call Back Number:  [phone] Information:   States he would like to speak with someone about his 4/17 procedure - states not sure if canceling or rescheduling - please call - thank you

## 2025-04-14 NOTE — TELEPHONE ENCOUNTER
----- Message from Evangelina sent at 4/14/2025  3:06 PM CDT -----  Type:  Patient Returning CallWho Called:  pt Who Left Message for Patient:  ssDoes the patient know what this is regarding?:  yes Best Call Back Number:  [phone] (home) Additional Information:  please advise

## 2025-04-14 NOTE — TELEPHONE ENCOUNTER
Spoke to pt, pt stated he can not do procedure at this time. Advised pt once he heals from leg injury to give us a call back to reschedule procedure. Pt verbalized understanding.

## 2025-04-17 DIAGNOSIS — S82.61XA DISPLACED FRACTURE OF LATERAL MALLEOLUS OF RIGHT FIBULA, INITIAL ENCOUNTER FOR CLOSED FRACTURE: Primary | ICD-10-CM

## 2025-04-22 ENCOUNTER — RESULTS FOLLOW-UP (OUTPATIENT)
Dept: PULMONOLOGY | Facility: CLINIC | Age: 65
End: 2025-04-22

## 2025-04-22 ENCOUNTER — OFFICE VISIT (OUTPATIENT)
Dept: ORTHOPEDICS | Facility: CLINIC | Age: 65
End: 2025-04-22
Payer: COMMERCIAL

## 2025-04-22 ENCOUNTER — HOSPITAL ENCOUNTER (OUTPATIENT)
Dept: RADIOLOGY | Facility: HOSPITAL | Age: 65
Discharge: HOME | End: 2025-04-22
Attending: INTERNAL MEDICINE
Payer: COMMERCIAL

## 2025-04-22 ENCOUNTER — RESULTS FOLLOW-UP (OUTPATIENT)
Dept: FAMILY MEDICINE | Facility: CLINIC | Age: 65
End: 2025-04-22

## 2025-04-22 ENCOUNTER — HOSPITAL ENCOUNTER (OUTPATIENT)
Dept: RADIOLOGY | Facility: HOSPITAL | Age: 65
Discharge: HOME | End: 2025-04-22
Attending: ORTHOPAEDIC SURGERY
Payer: COMMERCIAL

## 2025-04-22 ENCOUNTER — OFFICE VISIT (OUTPATIENT)
Dept: FAMILY MEDICINE | Facility: CLINIC | Age: 65
End: 2025-04-22
Payer: COMMERCIAL

## 2025-04-22 VITALS
HEIGHT: 70 IN | DIASTOLIC BLOOD PRESSURE: 68 MMHG | OXYGEN SATURATION: 99 % | BODY MASS INDEX: 32.51 KG/M2 | WEIGHT: 227.06 LBS | SYSTOLIC BLOOD PRESSURE: 102 MMHG | HEART RATE: 82 BPM

## 2025-04-22 DIAGNOSIS — G45.8 OTHER SPECIFIED TRANSIENT CEREBRAL ISCHEMIAS: ICD-10-CM

## 2025-04-22 DIAGNOSIS — S82.61XA DISPLACED FRACTURE OF LATERAL MALLEOLUS OF RIGHT FIBULA, INITIAL ENCOUNTER FOR CLOSED FRACTURE: ICD-10-CM

## 2025-04-22 DIAGNOSIS — F17.210 NICOTINE DEPENDENCE, CIGARETTES, UNCOMPLICATED: ICD-10-CM

## 2025-04-22 DIAGNOSIS — G45.8 OTHER SPECIFIED TRANSIENT CEREBRAL ISCHEMIAS: Primary | ICD-10-CM

## 2025-04-22 DIAGNOSIS — G47.33 OSA ON CPAP: ICD-10-CM

## 2025-04-22 DIAGNOSIS — I10 ESSENTIAL HYPERTENSION: Chronic | ICD-10-CM

## 2025-04-22 DIAGNOSIS — S82.61XA DISPLACED FRACTURE OF LATERAL MALLEOLUS OF RIGHT FIBULA, INITIAL ENCOUNTER FOR CLOSED FRACTURE: Primary | ICD-10-CM

## 2025-04-22 PROCEDURE — 73610 X-RAY EXAM OF ANKLE: CPT | Mod: TC,PO,RT

## 2025-04-22 PROCEDURE — 99213 OFFICE O/P EST LOW 20 MIN: CPT | Mod: S$GLB,,, | Performed by: ORTHOPAEDIC SURGERY

## 2025-04-22 PROCEDURE — 1159F MED LIST DOCD IN RCRD: CPT | Mod: CPTII,S$GLB,, | Performed by: INTERNAL MEDICINE

## 2025-04-22 PROCEDURE — 1160F RVW MEDS BY RX/DR IN RCRD: CPT | Mod: CPTII,S$GLB,, | Performed by: INTERNAL MEDICINE

## 2025-04-22 PROCEDURE — 93880 EXTRACRANIAL BILAT STUDY: CPT | Mod: 26,,, | Performed by: RADIOLOGY

## 2025-04-22 PROCEDURE — 3074F SYST BP LT 130 MM HG: CPT | Mod: CPTII,S$GLB,, | Performed by: INTERNAL MEDICINE

## 2025-04-22 PROCEDURE — 1159F MED LIST DOCD IN RCRD: CPT | Mod: CPTII,S$GLB,, | Performed by: ORTHOPAEDIC SURGERY

## 2025-04-22 PROCEDURE — 99999 PR PBB SHADOW E&M-EST. PATIENT-LVL V: CPT | Mod: PBBFAC,,, | Performed by: INTERNAL MEDICINE

## 2025-04-22 PROCEDURE — 4010F ACE/ARB THERAPY RXD/TAKEN: CPT | Mod: CPTII,S$GLB,, | Performed by: ORTHOPAEDIC SURGERY

## 2025-04-22 PROCEDURE — 3008F BODY MASS INDEX DOCD: CPT | Mod: CPTII,S$GLB,, | Performed by: INTERNAL MEDICINE

## 2025-04-22 PROCEDURE — 3078F DIAST BP <80 MM HG: CPT | Mod: CPTII,S$GLB,, | Performed by: INTERNAL MEDICINE

## 2025-04-22 PROCEDURE — 93971 EXTREMITY STUDY: CPT | Mod: TC,PO,RT

## 2025-04-22 PROCEDURE — 3051F HG A1C>EQUAL 7.0%<8.0%: CPT | Mod: CPTII,S$GLB,, | Performed by: INTERNAL MEDICINE

## 2025-04-22 PROCEDURE — 73610 X-RAY EXAM OF ANKLE: CPT | Mod: 26,RT,, | Performed by: RADIOLOGY

## 2025-04-22 PROCEDURE — 1100F PTFALLS ASSESS-DOCD GE2>/YR: CPT | Mod: CPTII,S$GLB,, | Performed by: ORTHOPAEDIC SURGERY

## 2025-04-22 PROCEDURE — 99215 OFFICE O/P EST HI 40 MIN: CPT | Mod: S$GLB,,, | Performed by: INTERNAL MEDICINE

## 2025-04-22 PROCEDURE — 1101F PT FALLS ASSESS-DOCD LE1/YR: CPT | Mod: CPTII,S$GLB,, | Performed by: INTERNAL MEDICINE

## 2025-04-22 PROCEDURE — 4010F ACE/ARB THERAPY RXD/TAKEN: CPT | Mod: CPTII,S$GLB,, | Performed by: INTERNAL MEDICINE

## 2025-04-22 PROCEDURE — 3051F HG A1C>EQUAL 7.0%<8.0%: CPT | Mod: CPTII,S$GLB,, | Performed by: ORTHOPAEDIC SURGERY

## 2025-04-22 PROCEDURE — 93880 EXTRACRANIAL BILAT STUDY: CPT | Mod: TC,PO

## 2025-04-22 PROCEDURE — 71271 CT THORAX LUNG CANCER SCR C-: CPT | Mod: TC,PO

## 2025-04-22 PROCEDURE — 93971 EXTREMITY STUDY: CPT | Mod: 26,RT,, | Performed by: RADIOLOGY

## 2025-04-22 PROCEDURE — 70551 MRI BRAIN STEM W/O DYE: CPT | Mod: 26,,, | Performed by: RADIOLOGY

## 2025-04-22 PROCEDURE — 3288F FALL RISK ASSESSMENT DOCD: CPT | Mod: CPTII,S$GLB,, | Performed by: INTERNAL MEDICINE

## 2025-04-22 PROCEDURE — 99999 PR PBB SHADOW E&M-EST. PATIENT-LVL III: CPT | Mod: PBBFAC,,, | Performed by: ORTHOPAEDIC SURGERY

## 2025-04-22 PROCEDURE — 71271 CT THORAX LUNG CANCER SCR C-: CPT | Mod: 26,,, | Performed by: STUDENT IN AN ORGANIZED HEALTH CARE EDUCATION/TRAINING PROGRAM

## 2025-04-22 PROCEDURE — 3288F FALL RISK ASSESSMENT DOCD: CPT | Mod: CPTII,S$GLB,, | Performed by: ORTHOPAEDIC SURGERY

## 2025-04-22 PROCEDURE — 70551 MRI BRAIN STEM W/O DYE: CPT | Mod: TC

## 2025-04-22 NOTE — PROGRESS NOTES
Status/Diagnosis: Displaced Right lateral mal fracture, Jefferson B.  Date of Surgery: none  Date of Injury: 03/11/2025  Return visit: 6 weeks  X-rays on Return: Single leg stance WB 3-views Right ankle    Chief Complaint:   Right ankle pain    Present History:  65-year-old male who presents today after acute right ankle injury.  States that he has stent only hot we will car and fell on 03/11.  Immediate right ankle pain, swelling, painful weight-bearing.    Seen and evaluated at local urgent care at which time x-rays were taken and consistent with fracture.  Extensive PMH including but not limited to CAD status post stent x 5 on Plavix and aspirin; type 2 diabetes, A1c of 7.3; hypertension; LEAH.  Smokes 1+ pack per day for 40+ years.  Uses a Rollator walker at baseline?    04/01/2025:  Patient returns today for repeat clinical evaluation.  Patient has been doing well until he sustained another fall this morning.  States he hit his right knee on a flower bed with superficial laceration.  Some mild increased swelling at the ankle fracture site compared to before the fall.  He had not been wearing the boot.    04/22/2025:  Patient returns today for repeat clinical evaluation and x-ray.  Reports compliance with tall cam boot wear.  Still smoking regularly.  0/10 pain today.  No new injuries.  Remains off of work but inquiring about going back to light duty the beginning of April.      Past Medical History:   Diagnosis Date    Anticoagulant long-term use     Anxiety     BCC (basal cell carcinoma)     CAD (coronary artery disease)     Colon polyp     Erectile dysfunction     GERD (gastroesophageal reflux disease)     Hepatic steatosis     History of kidney stones     History of pneumonia 12/2022    HLD (hyperlipidemia)     HTN (hypertension)     Impaired fasting glucose     Lateral epicondylitis of left elbow 07/18/2019    Myocardial infarction     PONV (postoperative nausea and vomiting)     Sleep apnea     uses cpap     Type 2 diabetes mellitus without complications 08/29/2024       Past Surgical History:   Procedure Laterality Date    ANGIOGRAM, CORONARY, WITH LEFT HEART CATHETERIZATION Left 05/26/2022    Procedure: Angiogram, Coronary, with Left Heart Cath;  Surgeon: Christa Gunn MD;  Location: [facility] CATH;  Service: Cardiology;  Laterality: Left;    ANGIOGRAM, CORONARY, WITH LEFT HEART CATHETERIZATION  08/16/2023    Procedure: Left Heart Cath;  Surgeon: Renetta Rivera MD;  Location: [facility];  Service: Cardiology;;    ANGIOGRAM, CORONARY, WITH LEFT HEART CATHETERIZATION  9/26/2024    Procedure: Left Heart Cath;  Surgeon: Renetta Rivera MD;  Location: [facility];  Service: Cardiology;;    ANGIOPLASTY      AORTOGRAPHY WITH EXTREMITY RUNOFF  11/16/2021    Procedure: AORTOGRAM, WITH EXTREMITY RUNOFF;  Surgeon: Blanca Arzola MD;  Location: [facility];  Service: Cardiovascular;;    AORTOGRAPHY WITH SERIALOGRAPHY N/A 11/22/2019    Procedure: AORTOGRAM, WITH SERIALOGRAPHY;  Surgeon: Blanca Arzola MD;  Location: [facility];  Service: Cardiovascular;  Laterality: N/A;    AORTOGRAPHY WITH SERIALOGRAPHY N/A 12/13/2019    Procedure: AORTOGRAM, WITH SERIALOGRAPHY;  Surgeon: Blanca Arzola MD;  Location: [facility];  Service: Cardiovascular;  Laterality: N/A;    AORTOGRAPHY WITH SERIALOGRAPHY  12/17/2020    Procedure: AORTOGRAM, WITH SERIALOGRAPHY;  Surgeon: Blanca Arzola MD;  Location: [facility];  Service: Cardiovascular;;    ATHERECTOMY, CORONARY  08/16/2023    Procedure: Atherectomy LAD (Rotoblator);  Surgeon: Renetta Rivera MD;  Location: [facility];  Service: Cardiology;;    CARDIAC SURGERY  2005    stents placed    COLONOSCOPY N/A 07/17/2017    Procedure: COLONOSCOPY Miralax;  Surgeon: Jeremiah Syed Jr., MD;  Location: [facility] ENDO;  Service: Endoscopy;  Laterality: N/A; repeat in 5 years for surveillance    CORONARY ANGIOGRAPHY  9/26/2024    Procedure: Coronary angiogram study;  Surgeon: Renetta Rivera MD;  Location: [facility];  Service: Cardiology;;     INTRAVASCULAR ULTRASOUND, CORONARY  9/26/2024    Procedure: IVUS LCX;  Surgeon: Renetta Rivera MD;  Location: [facility];  Service: Cardiology;;    IVUS, CORONARY  08/16/2023    Procedure: IVUS LAD;  Surgeon: Renetta Rivera MD;  Location: [facility];  Service: Cardiology;;    KNEE ARTHROSCOPY W/ MENISCECTOMY      PERCUTANEOUS CORONARY INTERVENTION, ARTERY  08/16/2023    Procedure: DAVID LAD;  Surgeon: Renetta Rivera MD;  Location: [facility];  Service: Cardiology;;    PERCUTANEOUS TRANSLUMINAL ANGIOPLASTY N/A 11/22/2019    Procedure: Pta (Angioplasty, Percutaneous, Transluminal);  Surgeon: Blanca Arzola MD;  Location: [facility];  Service: Cardiovascular;  Laterality: N/A;    PERCUTANEOUS TRANSLUMINAL ANGIOPLASTY N/A 12/13/2019    Procedure: Pta (Angioplasty, Percutaneous, Transluminal);  Surgeon: Blanca Arzola MD;  Location: [facility];  Service: Cardiovascular;  Laterality: N/A;    PERCUTANEOUS TRANSLUMINAL ANGIOPLASTY  11/16/2021    Procedure: PTA (ANGIOPLASTY, PERCUTANEOUS, TRANSLUMINAL);  Surgeon: Blanca Arzola MD;  Location: [facility];  Service: Cardiovascular;;    SLEEP ENDOSCOPY, DRUG-INDUCED Bilateral 3/21/2025    Procedure: SLEEP ENDOSCOPY,DRUG-INDUCED;  Surgeon: Blayne Freitas MD;  Location: [facility] OR;  Service: ENT;  Laterality: Bilateral;    STENT, DRUG ELUTING, SINGLE VESSEL, CORONARY  9/26/2024    Procedure: DAVID LCX;  Surgeon: Renetta Rivera MD;  Location: [facility]ATH;  Service: Cardiology;;    VARICOSE VEIN SURGERY      left saphenous       Current Outpatient Medications   Medication Sig    albuterol (VENTOLIN HFA) 90 mcg/actuation inhaler Inhale 2 puffs into the lungs every 4 (four) hours as needed for Wheezing or Shortness of Breath. Rescue    ALPRAZolam (XANAX) 0.25 MG tablet Take 1 tablet (0.25 mg total) by mouth 3 (three) times daily as needed for Anxiety.    amLODIPine (NORVASC) 5 MG tablet TAKE 1 TABLET BY MOUTH EVERY DAY    ascorbic acid (VITAMIN C) 500 MG tablet Take 500 mg by mouth 2 (two) times daily.    aspirin  (ECOTRIN) 81 MG EC tablet Take 1 tablet (81 mg total) by mouth once daily.    atorvastatin (LIPITOR) 80 MG tablet Take 1 tablet (80 mg total) by mouth every evening.    azithromycin (ZITHROMAX) 500 MG tablet One daily for yellow mucous, repeat if needed (Patient not taking: Reported on 1/27/2025)    blood sugar diagnostic Strp Use to monitor blood glucose twice daily    blood-glucose meter (BLULINK GLUCOSE MONITOR SYSTEM) Misc Use as directed    blood-glucose meter kit To check BG 2 times daily, to use with insurance preferred meter. E11.9    carvediloL (COREG) 25 MG tablet Take 1 tablet (25 mg total) by mouth 2 (two) times daily with meals.    cilostazoL (PLETAL) 50 MG Tab Take 1 tablet (50 mg total) by mouth 2 (two) times daily.    clopidogreL (PLAVIX) 75 mg tablet Take 1 tablet (75 mg total) by mouth once daily.    dicyclomine (BENTYL) 10 MG capsule TAKE 1 CAP BY MOUTH BEFORE MEALS AND AT BEDTIME AS NEEDED (ABDOMINAL CRAMPING/PAIN).    dulaglutide (TRULICITY) 1.5 mg/0.5 mL pen injector Inject 1.5 mg into the skin every 7 days.    ezetimibe (ZETIA) 10 mg tablet TAKE 1 TABLET BY MOUTH EVERY DAY    fenofibrate (TRICOR) 54 MG tablet Take 1 tablet (54 mg total) by mouth once daily.    fluticasone propionate (FLONASE) 50 mcg/actuation nasal spray USE 1 SPRAY (50 MCG TOTAL) BY EACH NARE ROUTE ONCE DAILY.    fluticasone-umeclidin-vilanter (TRELEGY ELLIPTA) 200-62.5-25 mcg inhaler Inhale 1 puff into the lungs once daily.    HYDROcodone-acetaminophen (NORCO) 5-325 mg per tablet Take 1 tablet by mouth every 6 (six) hours as needed for Pain.    isosorbide mononitrate (IMDUR) 30 MG 24 hr tablet Take 1 tablet (30 mg total) by mouth once daily.    lancets 28 gauge Misc Use to test blood glucose twice daily    lisinopriL (PRINIVIL,ZESTRIL) 40 MG tablet Take 1 tablet (40 mg total) by mouth once daily.    MULTIVITAMIN ORAL Take 1 capsule by mouth once daily.    nitroGLYCERIN (NITROSTAT) 0.4 MG SL tablet Place 1 tablet (0.4 mg  total) under the tongue every 5 (five) minutes as needed for Chest pain. 1 Tablet, Sublingual Sublingual    omega-3 fatty acids 500 mg Cap Take 1 capsule by mouth once daily. 1 Capsule Oral     pantoprazole (PROTONIX) 20 MG tablet Take 1 tablet (20 mg total) by mouth before breakfast.    predniSONE (DELTASONE) 20 MG tablet Take one daily for 3 days and may repeat for shortness of breath.    sertraline (ZOLOFT) 100 MG tablet Take 1 tablet (100 mg total) by mouth once daily.    tadalafiL (CIALIS) 5 MG tablet Take 1 tablet (5 mg total) by mouth daily as needed for Erectile Dysfunction.     Current Facility-Administered Medications   Medication    sodium chloride 0.9% flush 10 mL       Review of patient's allergies indicates:  No Known Allergies    Family History   Problem Relation Name Age of Onset    Diabetes Mother      Stomach cancer Father      Diabetes Father      No Known Problems Sister      Prostate cancer Brother 2     Diabetes Brother 2     Coronary artery disease Brother 2         premature    Heart disease Brother 2     Hypertension Brother 2     No Known Problems Maternal Grandmother      No Known Problems Maternal Grandfather      No Known Problems Paternal Grandmother      No Known Problems Paternal Grandfather      No Known Problems Maternal Aunt      No Known Problems Maternal Uncle      No Known Problems Paternal Aunt      No Known Problems Paternal Uncle      Colon cancer Neg Hx      Anemia Neg Hx      Arrhythmia Neg Hx      Asthma Neg Hx      Clotting disorder Neg Hx      Fainting Neg Hx      Heart attack Neg Hx      Heart disease Neg Hx      Heart failure Neg Hx      Hyperlipidemia Neg Hx      Hypertension Neg Hx      Stroke Neg Hx      Atrial Septal Defect Neg Hx      Ulcerative colitis Neg Hx      Crohn's disease Neg Hx      Celiac disease Neg Hx         Social History[1]    Physical exam:  There were no vitals filed for this visit.  There is no height or weight on file to calculate  BMI.  General: In no apparent distress; well developed and well nourished.  HEENT: normocephalic; atraumatic.  Cardiovascular: regular rate.  Respiratory: no increased work of breathing.  Musculoskeletal:   Gait: unable to assess  Inspection:   Previous laceration over the anterior knee is well healed.  Mild residual swelling over the lateral ankle.  No tenderness on deep palpation of the fracture site.   No pain referable to the foot.    Gross motor function intact.  Good sensation on monofilament testing.  Palpable pedal pulse.                   Imaging Studies/Outside documentation:  I have ordered/reviewed/interpreted the following images/outside documentation:  1. Weight-bearing 3-views of Right ankle:   On my independent review, previously noted moderately displaced spiral fracture of the lateral malleolus at the level of the syndesmosis.  Approximately 3 mm of lateral translation and 2 mm of shortening.  No evidence of clear space widening.  Interval callus formation is present, best seen on the lateral view at the fracture apex.        Assessment:  Jose G Shafer is a 65 y.o. male with Displaced Right lateral malleolus fracture, Jefferson B.     Plan:   Clinical and radiographic findings were discussed.  Operative versus nonoperative treatment options were described.    Patient with multiple medical comorbidities and extensive chronic tobacco use.    Recommend conservative management at this time despite mild fracture displacement.  Ankle mortise remains congruent on weight-bearing stress x-rays.    Specifically discussed no interval fracture healing on repeat x-ray.  Reiterated the importance of smoking cessation as it pertains to bone healing.    Recommend continuation of tall cam boot and cane for the next 2 weeks.    After that time, patient may then transition into a lace-up ankle brace.  Weightbear as tolerated right lower extremity.    Okay to return to work in a limited capacity starting on  2025.    Follow up in 6 weeks for repeat evaluation, or sooner if needed.    Patient voiced understanding all questions were answered.    We performed a custom orthotic/brace fitting, adjusting and training with the patient. The patient demonstrated understanding and proper care. This was performed for 15 minutes.      This note was created using voice recognition software and may contain grammatical errors.             [1]   Social History  Socioeconomic History    Marital status:    Tobacco Use    Smoking status: Every Day     Current packs/day: 0.00     Average packs/day: 0.5 packs/day for 44.0 years (22.0 ttl pk-yrs)     Types: Cigarettes     Start date: 10/10/1984     Last attempt to quit: 2023     Years since quittin.3    Smokeless tobacco: Never   Substance and Sexual Activity    Alcohol use: Yes     Alcohol/week: 14.0 standard drinks of alcohol     Types: 14 Cans of beer per week    Drug use: Never    Sexual activity: Not Currently     Partners: Female, Male     Birth control/protection: None     Social Drivers of Health     Financial Resource Strain: Low Risk  (3/17/2025)    Overall Financial Resource Strain (CARDIA)     Difficulty of Paying Living Expenses: Not very hard   Food Insecurity: No Food Insecurity (3/17/2025)    Hunger Vital Sign     Worried About Running Out of Food in the Last Year: Never true     Ran Out of Food in the Last Year: Never true   Transportation Needs: No Transportation Needs (3/17/2025)    PRAPARE - Transportation     Lack of Transportation (Medical): No     Lack of Transportation (Non-Medical): No   Physical Activity: Inactive (3/17/2025)    Exercise Vital Sign     Days of Exercise per Week: 5 days     Minutes of Exercise per Session: 0 min   Stress: No Stress Concern Present (3/17/2025)    [employer] - Occupational Stress Questionnaire     Feeling of Stress : Not at all   Housing Stability: Low Risk  (3/17/2025)    Housing  Stability Vital Sign     Unable to Pay for Housing in the Last Year: No     Number of Times Moved in the Last Year: 0     Homeless in the Last Year: No

## 2025-04-22 NOTE — PROGRESS NOTES
[facility]  Primary Care   [address] [facility]       Patient ID: Jose G Shafer     Chief Complaint: TIA symptoms      HPI:  New patient to me here today that had an issue while on vacation in [place] 4 days ago.  He states that he was visiting relatives in [place] and as he was getting out of his car (he was the passenger), he started coughing.  I would not call the coughing fit but he coughed a few times.  His wife who is in attendance noted that he is face was beat red and once the coughing stopped after a few moments he slept back into the chair of his car and was unresponsive.  His eyes were open and he did not pass out but he did not respond.  That is situation lasted about 5 minutes and then he came to and was his normal self.  He did not have any loss of bowel or bladder control and he did not have a postictal state.  EMS was not activated.  He presents to me today with this story and both he and I are concerned that he may have had a transient ischemic attack.  He does not have any history of atrial fibrillation as far as we know.  Echocardiogram back in September looked good but they did not do a bubble study to check for a PFO.  He does have a broken right ankle and has a walking boot on it with a little swelling around the ankle.  He is scheduled for the inspire device to be implanted in the next 2 weeks and I am hoping we do not have to push it back but I want to do due diligence and get an MRI of his brain and an ultrasound of his carotids and I do want to get an ultrasound of his leg to see if he has a clot there.  I am hoping that this was all due to a vasovagal episode because his blood pressure is quite low.  He does take amlodipine and carvedilol and Imdur and lisinopril and they may just be too much for him.    Review of Systems       TIA symptoms     Objective:      Physical Exam   Physical Exam       Right Lower Extremity walking boot     Vitals:   Vitals:     "04/22/25 1006   BP: 102/68   Pulse: 82   SpO2: 99%   Weight: 103 kg (227 lb 1.2 oz)   Height: 5' 10" (1.778 m)        Assessment:           Plan:       Jose G Shafer  was seen today for follow-up and may need lab work.    Diagnoses and all orders for this visit:    Diagnoses and all orders for this visit:    Other specified transient cerebral ischemias  -     MRI Brain Without Contrast; Future  -     US Carotid Bilateral; Future  -     US Lower Extremity Veins Left; Future  We will get the above studies and hopefully this is just because his blood pressure is being overmedicated.  Consider repeat echo to look for PFO.  No indication of AFib    LEAH on CPAP  Scheduled for the inspire implantation soon and I hope we can keep it     Essential hypertension  Overmedicated.  Stop the amlodipine, but continue lisinopril carvedilol and Imdur for now.         Jeremiah Ferguson MD    "

## 2025-04-22 NOTE — LETTER
April 22, 2025      [facility]  [address]  [place] [address]  Phone: [phone]       Patient: Jose G Shafer   YOB: 1960  Date of Visit: 04/22/2025    To Whom It May Concern:    Juan Shafer  was at [facility] on 04/22/2025. The patient may return to work May 8th with light duty restrictions. Patient can not lift or climb. If you have any questions or concerns, or if I can be of further assistance, please do not hesitate to contact me.    Sincerely,    Mickey Sharma M.D.

## 2025-04-28 ENCOUNTER — TELEPHONE (OUTPATIENT)
Dept: OTOLARYNGOLOGY | Facility: CLINIC | Age: 65
End: 2025-04-28
Payer: COMMERCIAL

## 2025-04-28 NOTE — TELEPHONE ENCOUNTER
S/w pt and advised, per Dr. Freitas, he needs to hold all his blood thinners starting today. Pt states that he was advised to that when he scheduled his surgery, pt did hold today and will continue until advised to restart by Dr. Freitas.

## 2025-04-28 NOTE — TELEPHONE ENCOUNTER
----- Message from Blayne Freitas MD sent at 4/28/2025 12:21 PM CDT -----  OK to stop now for upcoming surgery  ----- Message -----  From: Jasmin Cartwright LPN  Sent: 4/28/2025   9:49 AM CDT  To: Blayne Freitas MD    Pt is currently taking Pletal, as well as ASA and Plavix. We have cardiac cx for Plavix but not Pletal, please advise how long pt needs to hold prior to procedure (Inspire on Friday). Thanks.

## 2025-05-01 ENCOUNTER — ANESTHESIA EVENT (OUTPATIENT)
Dept: SURGERY | Facility: HOSPITAL | Age: 65
End: 2025-05-01
Payer: COMMERCIAL

## 2025-05-02 ENCOUNTER — HOSPITAL ENCOUNTER (OUTPATIENT)
Dept: RADIOLOGY | Facility: HOSPITAL | Age: 65
Discharge: HOME | End: 2025-05-02
Attending: OTOLARYNGOLOGY | Admitting: OTOLARYNGOLOGY
Payer: COMMERCIAL

## 2025-05-02 ENCOUNTER — TELEPHONE (OUTPATIENT)
Dept: OTOLARYNGOLOGY | Facility: CLINIC | Age: 65
End: 2025-05-02
Payer: COMMERCIAL

## 2025-05-02 ENCOUNTER — HOSPITAL ENCOUNTER (OUTPATIENT)
Facility: HOSPITAL | Age: 65
Discharge: HOME | End: 2025-05-02
Attending: OTOLARYNGOLOGY | Admitting: OTOLARYNGOLOGY
Payer: COMMERCIAL

## 2025-05-02 ENCOUNTER — ANESTHESIA (OUTPATIENT)
Dept: SURGERY | Facility: HOSPITAL | Age: 65
End: 2025-05-02
Payer: COMMERCIAL

## 2025-05-02 ENCOUNTER — APPOINTMENT (OUTPATIENT)
Dept: LAB | Facility: HOSPITAL | Age: 65
End: 2025-05-02
Attending: NURSE PRACTITIONER
Payer: COMMERCIAL

## 2025-05-02 ENCOUNTER — RESULTS FOLLOW-UP (OUTPATIENT)
Dept: OTOLARYNGOLOGY | Facility: CLINIC | Age: 65
End: 2025-05-02

## 2025-05-02 DIAGNOSIS — G47.33 OSA (OBSTRUCTIVE SLEEP APNEA): Primary | ICD-10-CM

## 2025-05-02 DIAGNOSIS — Z78.9 INTOLERANCE OF CONTINUOUS POSITIVE AIRWAY PRESSURE (CPAP) VENTILATION: ICD-10-CM

## 2025-05-02 LAB
GLUCOSE SERPL-MCNC: 145 MG/DL (ref 70–110)
GLUCOSE SERPL-MCNC: 147 MG/DL (ref 70–110)

## 2025-05-02 PROCEDURE — 25000242 PHARM REV CODE 250 ALT 637 W/ HCPCS: Mod: PO | Performed by: NURSE ANESTHETIST, CERTIFIED REGISTERED

## 2025-05-02 PROCEDURE — 63600175 PHARM REV CODE 636 W HCPCS: Mod: PO | Performed by: OTOLARYNGOLOGY

## 2025-05-02 PROCEDURE — 82962 GLUCOSE BLOOD TEST: CPT | Mod: PO | Performed by: OTOLARYNGOLOGY

## 2025-05-02 PROCEDURE — 37000008 HC ANESTHESIA 1ST 15 MINUTES: Mod: PO | Performed by: OTOLARYNGOLOGY

## 2025-05-02 PROCEDURE — 71045 X-RAY EXAM CHEST 1 VIEW: CPT | Mod: 26,,, | Performed by: RADIOLOGY

## 2025-05-02 PROCEDURE — 25000003 PHARM REV CODE 250: Mod: PO | Performed by: NURSE ANESTHETIST, CERTIFIED REGISTERED

## 2025-05-02 PROCEDURE — 63600175 PHARM REV CODE 636 W HCPCS: Mod: PO | Performed by: ANESTHESIOLOGY

## 2025-05-02 PROCEDURE — 27201423 OPTIME MED/SURG SUP & DEVICES STERILE SUPPLY: Mod: PO | Performed by: OTOLARYNGOLOGY

## 2025-05-02 PROCEDURE — 64582 OPN MPLTJ HPGLSL NSTM ARY PG: CPT | Mod: RT,,, | Performed by: OTOLARYNGOLOGY

## 2025-05-02 PROCEDURE — 71000033 HC RECOVERY, INTIAL HOUR: Mod: PO | Performed by: OTOLARYNGOLOGY

## 2025-05-02 PROCEDURE — 36000706: Mod: PO | Performed by: OTOLARYNGOLOGY

## 2025-05-02 PROCEDURE — 36000707: Mod: PO | Performed by: OTOLARYNGOLOGY

## 2025-05-02 PROCEDURE — 72020 X-RAY EXAM OF SPINE 1 VIEW: CPT | Mod: TC,FY,PO

## 2025-05-02 PROCEDURE — 63600175 PHARM REV CODE 636 W HCPCS: Mod: PO | Performed by: NURSE ANESTHETIST, CERTIFIED REGISTERED

## 2025-05-02 PROCEDURE — 37000009 HC ANESTHESIA EA ADD 15 MINS: Mod: PO | Performed by: OTOLARYNGOLOGY

## 2025-05-02 PROCEDURE — 71000015 HC POSTOP RECOV 1ST HR: Mod: PO | Performed by: OTOLARYNGOLOGY

## 2025-05-02 PROCEDURE — 71045 X-RAY EXAM CHEST 1 VIEW: CPT | Mod: TC,FY,PO

## 2025-05-02 PROCEDURE — C1767 GENERATOR, NEURO NON-RECHARG: HCPCS | Mod: PO | Performed by: OTOLARYNGOLOGY

## 2025-05-02 PROCEDURE — 72020 X-RAY EXAM OF SPINE 1 VIEW: CPT | Mod: 26,,, | Performed by: RADIOLOGY

## 2025-05-02 PROCEDURE — C1778 LEAD, NEUROSTIMULATOR: HCPCS | Mod: PO | Performed by: OTOLARYNGOLOGY

## 2025-05-02 PROCEDURE — C1787 PATIENT PROGR, NEUROSTIM: HCPCS | Mod: PO | Performed by: OTOLARYNGOLOGY

## 2025-05-02 DEVICE — GENERATOR PULSE IMPLANTABLE: Type: IMPLANTABLE DEVICE | Site: CHEST | Status: FUNCTIONAL

## 2025-05-02 DEVICE — LEAD STIMULATION IMPLANTABLE: Type: IMPLANTABLE DEVICE | Site: NECK | Status: FUNCTIONAL

## 2025-05-02 RX ORDER — ONDANSETRON HYDROCHLORIDE 2 MG/ML
INJECTION, SOLUTION INTRAVENOUS
Status: DISCONTINUED | OUTPATIENT
Start: 2025-05-02 | End: 2025-05-02

## 2025-05-02 RX ORDER — ONDANSETRON HYDROCHLORIDE 2 MG/ML
4 INJECTION, SOLUTION INTRAVENOUS DAILY PRN
Status: DISCONTINUED | OUTPATIENT
Start: 2025-05-02 | End: 2025-05-02 | Stop reason: HOSPADM

## 2025-05-02 RX ORDER — HYDROMORPHONE HYDROCHLORIDE 2 MG/ML
0.2 INJECTION, SOLUTION INTRAMUSCULAR; INTRAVENOUS; SUBCUTANEOUS EVERY 5 MIN PRN
Status: DISCONTINUED | OUTPATIENT
Start: 2025-05-02 | End: 2025-05-02 | Stop reason: HOSPADM

## 2025-05-02 RX ORDER — METOCLOPRAMIDE HYDROCHLORIDE 5 MG/ML
10 INJECTION INTRAMUSCULAR; INTRAVENOUS ONCE
Status: COMPLETED | OUTPATIENT
Start: 2025-05-02 | End: 2025-05-02

## 2025-05-02 RX ORDER — FENTANYL CITRATE 50 UG/ML
25 INJECTION, SOLUTION INTRAMUSCULAR; INTRAVENOUS EVERY 5 MIN PRN
Status: DISCONTINUED | OUTPATIENT
Start: 2025-05-02 | End: 2025-05-02 | Stop reason: HOSPADM

## 2025-05-02 RX ORDER — FAMOTIDINE 10 MG/ML
20 INJECTION, SOLUTION INTRAVENOUS ONCE
Status: COMPLETED | OUTPATIENT
Start: 2025-05-02 | End: 2025-05-02

## 2025-05-02 RX ORDER — ONDANSETRON 4 MG/1
4 TABLET, ORALLY DISINTEGRATING ORAL EVERY 6 HOURS PRN
Qty: 10 TABLET | Refills: 0 | Status: SHIPPED | OUTPATIENT
Start: 2025-05-02

## 2025-05-02 RX ORDER — CEFAZOLIN SODIUM 1 G/3ML
2 INJECTION, POWDER, FOR SOLUTION INTRAMUSCULAR; INTRAVENOUS ONCE
Status: COMPLETED | OUTPATIENT
Start: 2025-05-02 | End: 2025-05-02

## 2025-05-02 RX ORDER — MIDAZOLAM HYDROCHLORIDE 1 MG/ML
INJECTION INTRAMUSCULAR; INTRAVENOUS
Status: DISCONTINUED | OUTPATIENT
Start: 2025-05-02 | End: 2025-05-02

## 2025-05-02 RX ORDER — HYDROCODONE BITARTRATE AND ACETAMINOPHEN 5; 325 MG/1; MG/1
1 TABLET ORAL EVERY 6 HOURS PRN
Qty: 15 TABLET | Refills: 0 | Status: SHIPPED | OUTPATIENT
Start: 2025-05-02

## 2025-05-02 RX ORDER — SODIUM CHLORIDE, SODIUM LACTATE, POTASSIUM CHLORIDE, CALCIUM CHLORIDE 600; 310; 30; 20 MG/100ML; MG/100ML; MG/100ML; MG/100ML
INJECTION, SOLUTION INTRAVENOUS CONTINUOUS
Status: DISCONTINUED | OUTPATIENT
Start: 2025-05-02 | End: 2025-05-02 | Stop reason: HOSPADM

## 2025-05-02 RX ORDER — ALBUTEROL SULFATE 90 UG/1
INHALANT RESPIRATORY (INHALATION)
Status: DISCONTINUED | OUTPATIENT
Start: 2025-05-02 | End: 2025-05-02

## 2025-05-02 RX ORDER — EPHEDRINE SULFATE 50 MG/ML
INJECTION, SOLUTION INTRAVENOUS
Status: DISCONTINUED | OUTPATIENT
Start: 2025-05-02 | End: 2025-05-02

## 2025-05-02 RX ORDER — FENTANYL CITRATE 50 UG/ML
INJECTION, SOLUTION INTRAMUSCULAR; INTRAVENOUS
Status: DISCONTINUED | OUTPATIENT
Start: 2025-05-02 | End: 2025-05-02

## 2025-05-02 RX ORDER — GLUCAGON 1 MG
1 KIT INJECTION
Status: DISCONTINUED | OUTPATIENT
Start: 2025-05-02 | End: 2025-05-02 | Stop reason: HOSPADM

## 2025-05-02 RX ORDER — DIPHENHYDRAMINE HYDROCHLORIDE 50 MG/ML
12.5 INJECTION, SOLUTION INTRAMUSCULAR; INTRAVENOUS EVERY 6 HOURS PRN
Status: DISCONTINUED | OUTPATIENT
Start: 2025-05-02 | End: 2025-05-02 | Stop reason: HOSPADM

## 2025-05-02 RX ORDER — OXYCODONE HYDROCHLORIDE 5 MG/1
5 TABLET ORAL
Status: DISCONTINUED | OUTPATIENT
Start: 2025-05-02 | End: 2025-05-02 | Stop reason: HOSPADM

## 2025-05-02 RX ORDER — SUCCINYLCHOLINE CHLORIDE 20 MG/ML
INJECTION INTRAMUSCULAR; INTRAVENOUS
Status: DISCONTINUED | OUTPATIENT
Start: 2025-05-02 | End: 2025-05-02

## 2025-05-02 RX ORDER — ROCURONIUM BROMIDE 10 MG/ML
INJECTION, SOLUTION INTRAVENOUS
Status: DISCONTINUED | OUTPATIENT
Start: 2025-05-02 | End: 2025-05-02

## 2025-05-02 RX ORDER — LIDOCAINE HYDROCHLORIDE 10 MG/ML
1 INJECTION, SOLUTION EPIDURAL; INFILTRATION; INTRACAUDAL; PERINEURAL ONCE
Status: DISCONTINUED | OUTPATIENT
Start: 2025-05-02 | End: 2025-05-02 | Stop reason: HOSPADM

## 2025-05-02 RX ORDER — DEXMEDETOMIDINE HYDROCHLORIDE 100 UG/ML
INJECTION, SOLUTION INTRAVENOUS
Status: DISCONTINUED | OUTPATIENT
Start: 2025-05-02 | End: 2025-05-02

## 2025-05-02 RX ORDER — LIDOCAINE HYDROCHLORIDE AND EPINEPHRINE 10; 10 UG/ML; MG/ML
INJECTION, SOLUTION INFILTRATION; PERINEURAL
Status: DISCONTINUED | OUTPATIENT
Start: 2025-05-02 | End: 2025-05-02 | Stop reason: HOSPADM

## 2025-05-02 RX ORDER — PROPOFOL 10 MG/ML
VIAL (ML) INTRAVENOUS
Status: DISCONTINUED | OUTPATIENT
Start: 2025-05-02 | End: 2025-05-02

## 2025-05-02 RX ORDER — LIDOCAINE HYDROCHLORIDE 20 MG/ML
INJECTION INTRAVENOUS
Status: DISCONTINUED | OUTPATIENT
Start: 2025-05-02 | End: 2025-05-02

## 2025-05-02 RX ORDER — PHENYLEPHRINE HYDROCHLORIDE 10 MG/ML
INJECTION INTRAVENOUS
Status: DISCONTINUED | OUTPATIENT
Start: 2025-05-02 | End: 2025-05-02

## 2025-05-02 RX ADMIN — ONDANSETRON 4 MG: 2 INJECTION, SOLUTION INTRAMUSCULAR; INTRAVENOUS at 10:05

## 2025-05-02 RX ADMIN — SODIUM CHLORIDE, POTASSIUM CHLORIDE, SODIUM LACTATE AND CALCIUM CHLORIDE: 600; 310; 30; 20 INJECTION, SOLUTION INTRAVENOUS at 11:05

## 2025-05-02 RX ADMIN — DEXMEDETOMIDINE HYDROCHLORIDE 12 MCG: 100 INJECTION, SOLUTION, CONCENTRATE INTRAVENOUS at 10:05

## 2025-05-02 RX ADMIN — MIDAZOLAM HYDROCHLORIDE 2 MG: 2 INJECTION, SOLUTION INTRAMUSCULAR; INTRAVENOUS at 09:05

## 2025-05-02 RX ADMIN — ROCURONIUM BROMIDE 5 MG: 10 INJECTION, SOLUTION INTRAVENOUS at 09:05

## 2025-05-02 RX ADMIN — PHENYLEPHRINE HYDROCHLORIDE 100 MCG: 10 INJECTION INTRAVENOUS at 10:05

## 2025-05-02 RX ADMIN — SUCCINYLCHOLINE CHLORIDE 180 MG: 20 INJECTION, SOLUTION INTRAMUSCULAR; INTRAVENOUS at 09:05

## 2025-05-02 RX ADMIN — FENTANYL CITRATE 100 MCG: 50 INJECTION, SOLUTION INTRAMUSCULAR; INTRAVENOUS at 09:05

## 2025-05-02 RX ADMIN — PROPOFOL 50 MG: 10 INJECTION, EMULSION INTRAVENOUS at 11:05

## 2025-05-02 RX ADMIN — EPHEDRINE SULFATE 10 MG: 50 INJECTION INTRAVENOUS at 10:05

## 2025-05-02 RX ADMIN — FAMOTIDINE 20 MG: 10 INJECTION, SOLUTION INTRAVENOUS at 09:05

## 2025-05-02 RX ADMIN — SODIUM CHLORIDE, POTASSIUM CHLORIDE, SODIUM LACTATE AND CALCIUM CHLORIDE: 600; 310; 30; 20 INJECTION, SOLUTION INTRAVENOUS at 09:05

## 2025-05-02 RX ADMIN — PROPOFOL 150 MG: 10 INJECTION, EMULSION INTRAVENOUS at 10:05

## 2025-05-02 RX ADMIN — ALBUTEROL SULFATE 2 PUFF: 108 AEROSOL, METERED RESPIRATORY (INHALATION) at 10:05

## 2025-05-02 RX ADMIN — PROPOFOL 50 MG: 10 INJECTION, EMULSION INTRAVENOUS at 10:05

## 2025-05-02 RX ADMIN — DEXMEDETOMIDINE HYDROCHLORIDE 12 MCG: 100 INJECTION, SOLUTION, CONCENTRATE INTRAVENOUS at 11:05

## 2025-05-02 RX ADMIN — LIDOCAINE HYDROCHLORIDE 100 MG: 20 INJECTION INTRAVENOUS at 09:05

## 2025-05-02 RX ADMIN — CEFAZOLIN 2 G: 330 INJECTION, POWDER, FOR SOLUTION INTRAMUSCULAR; INTRAVENOUS at 09:05

## 2025-05-02 RX ADMIN — PROPOFOL 200 MG: 10 INJECTION, EMULSION INTRAVENOUS at 09:05

## 2025-05-02 RX ADMIN — METOCLOPRAMIDE 10 MG: 5 INJECTION, SOLUTION INTRAMUSCULAR; INTRAVENOUS at 09:05

## 2025-05-02 NOTE — BRIEF OP NOTE
[facility] - [facility]  Brief Operative Note     SUMMARY     Surgery Date: 5/2/2025     Surgeons and Role:     * Blayne Freitas MD - Primary    Assisting Surgeon: None    Pre-op Diagnosis:  LEAH (obstructive sleep apnea) [G47.33]  Intolerance of continuous positive airway pressure (CPAP) ventilation [Z78.9]  BMI 33.0-33.9,adult [Z68.33]    Post-op Diagnosis:  Post-Op Diagnosis Codes:     * LEAH (obstructive sleep apnea) [G47.33]     * Intolerance of continuous positive airway pressure (CPAP) ventilation [Z78.9]     * BMI 33.0-33.9,adult [Z68.33]    Procedure(s) (LRB):  INSERTION,NEUROSTIMULATOR,HYPOGLOSSAL (Right)    Anesthesia: General    Description of the findings of the procedure: Insertion of hypoglossal nerve stimulator    Findings/Key Components: Successful implantation of Inspire    Estimated Blood Loss: * No values recorded between 5/2/2025  9:51 AM and 5/2/2025 12:04 PM *         Specimens:   Specimen (24h ago, onward)      None            Discharge Note    SUMMARY     Admit Date: 5/2/2025    Discharge Date and Time:  05/02/2025 12:04 PM    Hospital Course (synopsis of major diagnoses, care, treatment, and services provided during the course of the hospital stay): Did well following surgery and was discharged uneventfully     Final Diagnosis: Post-Op Diagnosis Codes:     * LEAH (obstructive sleep apnea) [G47.33]     * Intolerance of continuous positive airway pressure (CPAP) ventilation [Z78.9]     * BMI 33.0-33.9,adult [Z68.33]    Disposition: Home or Self Care    Follow Up/Patient Instructions: Regular diet, Follow-up 1 week. Activity light    Medications:  Reconciled Home Medications:   Current Discharge Medication List        START taking these medications    Details   ondansetron (ZOFRAN-ODT) 4 MG TbDL Take 1 tablet (4 mg total) by mouth every 6 (six) hours as needed (Nausea).  Qty: 10 tablet, Refills: 0           CONTINUE these medications which have CHANGED    Details   HYDROcodone-acetaminophen  (NORCO) 5-325 mg per tablet Take 1 tablet by mouth every 6 (six) hours as needed for Pain.  Qty: 15 tablet, Refills: 0    Associated Diagnoses: LEAH (obstructive sleep apnea); Intolerance of continuous positive airway pressure (CPAP) ventilation; BMI 33.0-33.9,adult           CONTINUE these medications which have NOT CHANGED    Details   amLODIPine (NORVASC) 5 MG tablet TAKE 1 TABLET BY MOUTH EVERY DAY  Qty: 90 tablet, Refills: 3    Comments: .  Associated Diagnoses: Essential hypertension      aspirin (ECOTRIN) 81 MG EC tablet Take 1 tablet (81 mg total) by mouth once daily.      atorvastatin (LIPITOR) 80 MG tablet Take 1 tablet (80 mg total) by mouth every evening.  Qty: 90 tablet, Refills: 3    Associated Diagnoses: Mixed hyperlipidemia      carvediloL (COREG) 25 MG tablet Take 1 tablet (25 mg total) by mouth 2 (two) times daily with meals.  Qty: 180 tablet, Refills: 3    Comments: .  Associated Diagnoses: Essential hypertension      cilostazoL (PLETAL) 50 MG Tab Take 1 tablet (50 mg total) by mouth 2 (two) times daily.  Qty: 180 tablet, Refills: 3    Associated Diagnoses: Peripheral vascular disease of extremity      clopidogreL (PLAVIX) 75 mg tablet Take 1 tablet (75 mg total) by mouth once daily.  Qty: 90 tablet, Refills: 3    Associated Diagnoses: Peripheral vascular disease of extremity; Coronary artery disease involving native coronary artery of native heart without angina pectoris      ezetimibe (ZETIA) 10 mg tablet TAKE 1 TABLET BY MOUTH EVERY DAY  Qty: 90 tablet, Refills: 0    Associated Diagnoses: Mixed hyperlipidemia      fenofibrate (TRICOR) 54 MG tablet Take 1 tablet (54 mg total) by mouth once daily.  Qty: 30 tablet, Refills: 11    Associated Diagnoses: Mixed hyperlipidemia      isosorbide mononitrate (IMDUR) 30 MG 24 hr tablet Take 1 tablet (30 mg total) by mouth once daily.  Qty: 90 tablet, Refills: 3    Associated Diagnoses: Coronary artery disease involving native coronary artery of native heart  without angina pectoris      lisinopriL (PRINIVIL,ZESTRIL) 40 MG tablet Take 1 tablet (40 mg total) by mouth once daily.  Qty: 90 tablet, Refills: 1    Comments: .  Associated Diagnoses: Essential hypertension      pantoprazole (PROTONIX) 20 MG tablet Take 1 tablet (20 mg total) by mouth before breakfast.  Qty: 90 tablet, Refills: 3    Associated Diagnoses: History of gastroesophageal reflux (GERD)      sertraline (ZOLOFT) 100 MG tablet Take 1 tablet (100 mg total) by mouth once daily.  Qty: 90 tablet, Refills: 3    Associated Diagnoses: Anxiety      albuterol (VENTOLIN HFA) 90 mcg/actuation inhaler Inhale 2 puffs into the lungs every 4 (four) hours as needed for Wheezing or Shortness of Breath. Rescue  Qty: 54 g, Refills: 3    Associated Diagnoses: Asthma-COPD overlap syndrome      ALPRAZolam (XANAX) 0.25 MG tablet Take 1 tablet (0.25 mg total) by mouth 3 (three) times daily as needed for Anxiety.  Qty: 30 tablet, Refills: 2    Associated Diagnoses: Anxiety      ascorbic acid (VITAMIN C) 500 MG tablet Take 500 mg by mouth 2 (two) times daily.      azithromycin (ZITHROMAX) 500 MG tablet One daily for yellow mucous, repeat if needed  Qty: 3 tablet, Refills: 3    Associated Diagnoses: Chronic obstructive pulmonary disease, unspecified COPD type      blood sugar diagnostic Strp Use to monitor blood glucose twice daily  Qty: 100 strip, Refills: 6    Associated Diagnoses: Type 2 diabetes mellitus without complication, without long-term current use of insulin      blood-glucose meter (BLULINK GLUCOSE MONITOR SYSTEM) Misc Use as directed  Qty: 1 each, Refills: 0      blood-glucose meter kit To check BG 2 times daily, to use with insurance preferred meter. E11.9  Qty: 1 each, Refills: 0    Associated Diagnoses: Type 2 diabetes mellitus without complication, without long-term current use of insulin      dicyclomine (BENTYL) 10 MG capsule TAKE 1 CAP BY MOUTH BEFORE MEALS AND AT BEDTIME AS NEEDED (ABDOMINAL  CRAMPING/PAIN).  Qty: 120 capsule, Refills: 1    Associated Diagnoses: Loose bowel movement      dulaglutide (TRULICITY) 1.5 mg/0.5 mL pen injector Inject 1.5 mg into the skin every 7 days.  Qty: 4 pen , Refills: 11    Associated Diagnoses: Type 2 diabetes mellitus with hyperglycemia, without long-term current use of insulin      fluticasone propionate (FLONASE) 50 mcg/actuation nasal spray USE 1 SPRAY (50 MCG TOTAL) BY EACH NARE ROUTE ONCE DAILY.  Qty: 16 mL, Refills: 11    Associated Diagnoses: Encounter for long-term (current) use of medications; Medication refill      fluticasone-umeclidin-vilanter (TRELEGY ELLIPTA) 200-62.5-25 mcg inhaler Inhale 1 puff into the lungs once daily.  Qty: 180 each, Refills: 3    Associated Diagnoses: Asthma-COPD overlap syndrome      lancets 28 gauge Misc Use to test blood glucose twice daily  Qty: 100 each, Refills: 6    Associated Diagnoses: Type 2 diabetes mellitus without complication, without long-term current use of insulin      MULTIVITAMIN ORAL Take 1 capsule by mouth once daily.      nitroGLYCERIN (NITROSTAT) 0.4 MG SL tablet Place 1 tablet (0.4 mg total) under the tongue every 5 (five) minutes as needed for Chest pain. 1 Tablet, Sublingual Sublingual  Qty: 100 tablet, Refills: 3    Associated Diagnoses: Chest pain at rest; Encounter for long-term (current) use of medications; Medication refill      omega-3 fatty acids 500 mg Cap Take 1 capsule by mouth once daily. 1 Capsule Oral       predniSONE (DELTASONE) 20 MG tablet Take one daily for 3 days and may repeat for shortness of breath.  Qty: 12 tablet, Refills: 1    Associated Diagnoses: Asthma-COPD overlap syndrome      tadalafiL (CIALIS) 5 MG tablet Take 1 tablet (5 mg total) by mouth daily as needed for Erectile Dysfunction.  Qty: 30 tablet, Refills: 0           No discharge procedures on file.

## 2025-05-02 NOTE — TRANSFER OF CARE
"Anesthesia Transfer of Care Note    Patient: Jose G Shafer    Procedure(s) Performed: Procedure(s) (LRB):  INSERTION,NEUROSTIMULATOR,HYPOGLOSSAL (Right)    Patient location: PACU    Anesthesia Type: general    Transport from OR: Transported from OR on room air with adequate spontaneous ventilation    Post pain: adequate analgesia    Post assessment: no apparent anesthetic complications and tolerated procedure well    Post vital signs: stable    Level of consciousness: responds to stimulation    Nausea/Vomiting: no nausea/vomiting    Complications: none    Transfer of care protocol was followed    Last vitals: Visit Vitals  /89 (BP Location: Left arm, Patient Position: Sitting)   Pulse 82   Temp 36.3 °C (97.3 °F) (Skin)   Resp 16   Ht 5' 10" (1.778 m)   Wt 104.3 kg (230 lb)   SpO2 98%   BMI 33.00 kg/m²     "

## 2025-05-02 NOTE — ANESTHESIA PROCEDURE NOTES
Intubation    Date/Time: 5/2/2025 10:01 AM    Performed by: Lana Perez CRNA  Authorized by: Osito Zee MD    Intubation:     Induction:  Intravenous    Intubated:  Postinduction    Mask Ventilation:  Not attempted    Attempts:  1    Attempted By:  CRNA    Method of Intubation:  Video laryngoscopy    Blade:  Galloway 4    Laryngeal View Grade: Grade I - full view of cords      Difficult Airway Encountered?: No      Complications:  None    Airway Device:  Oral endotracheal tube    Airway Device Size:  8.0    Style/Cuff Inflation:  Cuffed (inflated to minimal occlusive pressure)    Inflation Amount (mL):  8    Tube secured:  23    Secured at:  The lips    Placement Verified By:  Capnometry    Complicating Factors:  None    Findings Post-Intubation:  BS equal bilateral and atraumatic/condition of teeth unchanged

## 2025-05-02 NOTE — TELEPHONE ENCOUNTER
----- Message from Blayne Freitas MD sent at 5/2/2025  3:26 PM CDT -----  Jose G,  Your stimulator looks good. The radiologist isn't familiar with the position of the wiring, but I don't have any concerns about it. No cut wires.    Blayne Freitas MD  Otolaryngology - Head and Neck Surgery  Office: [phone]  Cell: [phone]  Fax: [phone]    This message was generated using voice dictation. Please excuse any errors that may have been created by the transcription software.    ----- Message -----  From: Interface, Rad Results In  Sent: 5/2/2025   3:10 PM CDT  To: Blayne Freitas MD

## 2025-05-02 NOTE — PLAN OF CARE
VSS, all questions answered. Denies recent fever or illness. Patient had water at 0830, medications ordered per Dr. Castro for prevention, see MAR. Pt states ready for procedure.

## 2025-05-02 NOTE — ANESTHESIA POSTPROCEDURE EVALUATION
Anesthesia Post Evaluation    Patient: Jose G Shafer    Procedure(s) Performed: Procedure(s) (LRB):  INSERTION,NEUROSTIMULATOR,HYPOGLOSSAL (Right)    Final Anesthesia Type: general      Patient location during evaluation: PACU  Patient participation: Yes- Able to Participate  Level of consciousness: awake  Post-procedure vital signs: reviewed and stable  Pain management: adequate  Airway patency: patent    PONV status at discharge: No PONV  Anesthetic complications: no      Cardiovascular status: blood pressure returned to baseline  Respiratory status: unassisted  Hydration status: euvolemic  Follow-up not needed.              Vitals Value Taken Time   /85 05/02/25 12:53   Temp 36.6 °C (97.8 °F) 05/02/25 12:24   Pulse 92 05/02/25 12:53   Resp 18 05/02/25 12:53   SpO2 97 % 05/02/25 12:24         Event Time   Out of Recovery 12:52:00         Pain/Duran Score: Duran Score: 10 (5/2/2025 12:53 PM)

## 2025-05-02 NOTE — ANESTHESIA PREPROCEDURE EVALUATION
05/02/2025  Jose G Shfaer is a 65 y.o., male with CAD s/p PCI in 2005, smoker, PVD, HTN and HLD for colonoscopy and EGD under MAC.     Anesthesia Evaluation    I have reviewed the Patient Summary Reports.     I have reviewed the Nursing Notes. I have reviewed the NPO Status.   I have reviewed the Medications.     Review of Systems  Social:  Smoker       Hematology/Oncology:    Oncology Normal                                   EENT/Dental:  EENT/Dental Normal           Cardiovascular:  Exercise tolerance: good   Hypertension  Past MI CAD   CABG/stent       PVD hyperlipidemia   ECG has been reviewed. Off plavix/asa 4/27/25 for procedure                           Pulmonary:   COPD     Sleep Apnea                Hepatic/GI:     GERD Liver Disease,               Neurological:  Neurology Normal                                      Endocrine:  Diabetes (pre-diabetes)           Psych:   anxiety                 Physical Exam  General:  Well nourished       Airway/Jaw/Neck:  Airway Findings: Mouth Opening: Normal     Tongue: Normal      General Airway Assessment: Adult      Mallampati: III   TM Distance: Normal, at least 6 cm               Dental:  Dental Findings: Periodontal disease, Severe      Chest/Lungs:  Chest/Lungs Findings:  Decreased Breath Sounds Bilateral, Normal Respiratory Rate, Expiratory Wheezes, Mild       Heart/Vascular:  Heart Findings: Normal                            Lab Results   Component Value Date    WBC 9.61 09/26/2024    HGB 13.3 (L) 09/26/2024    HCT 37.8 (L) 09/26/2024    MCV 92 09/26/2024     09/26/2024       Chemistry        Component Value Date/Time     09/26/2024 0919    K 4.4 09/26/2024 0919     09/26/2024 0919    CO2 22 09/26/2024 0919    BUN 15 09/26/2024 0919    CREATININE 0.80 09/26/2024 0919     (H) 09/26/2024 0919        Component Value Date/Time     CALCIUM 9.2 09/26/2024 0919    ALKPHOS 57 09/26/2024 0919    AST 49 09/26/2024 0919    ALT 46 09/26/2024 0919    BILITOT 0.7 09/26/2024 0919    ESTGFRAFRICA >60 06/10/2022 1034    EGFRNONAA >60 06/10/2022 1034        ECG 6/23/2017  Normal sinus rhythm  Left axis deviation  Incomplete right bundle branch block  Possible Inferior infarct ,age undetermined  Abnormal ECG  When compared with ECG of 19-OCT-2015 09:35,  Possible Inferior infarct is now Present  Confirmed by SUSHIL JORDAN MD ([id number]) on 6/23/2017 5:44:40 PM      Anesthesia Plan  Type of Anesthesia, risks & benefits discussed:  Anesthesia Type:  general    Patient's Preference:   Plan Factors:          Intra-op Monitoring Plan: standard ASA monitors  Intra-op Monitoring Plan Comments:   Post Op Pain Control Plan: multimodal analgesia and IV/PO Opioids PRN  Post Op Pain Control Plan Comments:     Induction:   IV and Inhalation  Beta Blocker:  Patient is on a Beta-Blocker and has received one dose within the past 24 hours (No further documentation required).       Informed Consent: Informed consent signed with the Patient and all parties understand the risks and agree with anesthesia plan.  All questions answered.  Anesthesia consent signed with patient.  ASA Score: 3     Day of Surgery Review of History & Physical:              Ready For Surgery From Anesthesia Perspective.                 Physical Exam  General: Well nourished    Airway:  Mallampati: III   Mouth Opening: Normal  TM Distance: Normal, at least 6 cm  Tongue: Normal    Dental:  Periodontal disease, Severe    Chest/Lungs:  Decreased Breath Sounds Bilateral, Normal Respiratory Rate, Expiratory Wheezes, Mild        Anesthesia Plan  Type of Anesthesia, risks & benefits discussed:    Anesthesia Type: general  Intra-op Monitoring Plan: standard ASA monitors  Post Op Pain Control Plan: multimodal analgesia and IV/PO Opioids PRN  Induction:  IV and Inhalation  Informed Consent: Informed consent signed with  the Patient and all parties understand the risks and agree with anesthesia plan.  All questions answered.   ASA Score: 3    Ready For Surgery From Anesthesia Perspective.     .

## 2025-05-02 NOTE — OP NOTE
05/02/2025     Jose G Shafer  [id number]  1960    PRE-OPERATIVE DIAGNOSIS  1. LEAH (obstructive sleep apnea)    2. Intolerance of continuous positive airway pressure (CPAP) ventilation    3. BMI 33.0-33.9,adult        POST-OPERATIVE DIAGNOSIS  1. LEAH (obstructive sleep apnea)    2. Intolerance of continuous positive airway pressure (CPAP) ventilation    3. BMI 33.0-33.9,adult        PROCEDURES PERFORMED  Insertion of hypoglossal nerve stimulator electrode and generator and breathing electrode sensor (CPT 47577)    SURGEON: Blayne Freitas MD    ASSISTANT: Misty Pritchard    ANESTHESIA: General    COMPLICATIONS: None    BLOOD LOSS: 10 mL    SPECIMENS  None    DISPOSITION  PACU    OPERATIVE FINDINGS  Successful placement of stimulating leads around protruding branches of right CN XII and sensing lead in the right 2nd intercostal space. Successful device check with good activation of muscles and respiration sensing.     INDICATIONS  Jose G Shafer is a 65 y.o. male patient with a history of moderate obstructive sleep apnea as measured by sleep study, dated: 5/2022. They were found to have an OSCAR /AHI of: 28. They are intolerant of and unable to achieve benefit from positive pressure therapy. he has undergone a pre-operative drug induced sleep endoscopy which confirmed that the velopharynx closure was NOT concentric. They have passed the clinical, polysomnographic, and endoscopic screening criteria and presents today for the implant.     PROCEDURE IN DETAIL  The patient was seen in the pre-operative holding area, and consent was signed. They were wheeled into the operating room and placed supine on the table. Anesthesia was induced via general endotracheal tube and this was secured to the left. The bed was turned. A shoulder roll was placed and the patient was prepped and draped in usual sterile fashion with the head turned to the left. Prior to prepping and draping, electrodes were placed in the genioglossus and  hyo/styloglossus muscle and continuous EMG monitoring was undertaken.     A modified sub-mandibular incision was made in the right upper neck approximately 1 cm below the mandible in the natural skin crease. Dissection was carried down through the subcutaneous tissue and platysma. The inferior border of the submandibular gland was identified as well as the digastric tendon. The submandibular gland and the overlying fascia with the marginal mandibular nerve were retracted superiorly. The digastric was retracted inferiorly. Dissection was carried down into the digastric triangle where the hypoglossal nerve was identified in its usual fashion. The mylohyoid muscle was retracted anteriorly, and the hypoglossal nerve was dissected up towards the floor of the mouth. The lateral branches to retrusor muscles were identified, and tested intra-operatively using the bipolar NIM stimulator. I sequentially identified inclusion branches of C1, genioglossus, and transverse / vertical. I then identified stylo/hyoglossus exclusion branches. I carefully dissected the break point between inclusion and exclusion and the cuff electrode for the hypoglossal nerve stimulator was placed carefully around the inclusion branches. Diagnostic evaluation confirmed activation of the genioglossus nerve, resulting in genioglossal activation and tongue protrusion, confirmed visually. The stimulation electrode was then looped under and secured to the digastric tendon on its lateral surface with the provided anchor.     A second 5 cm incision was made in the right upper chest approximately 3 cm lateral to the lateral sternal border over the 2nd rib space. Dissection was carried down to the pectoralis muscle. An inferior pocket was created deep to the subcutaneous layer and superficial to the pectoralis muscle. fascia The stimulation lead was then tunneled in a subplatysmal plane and brought out into the sub-clavicular pocket. I placed my stay sutures  (2-0 silk) for the IPG in the pectoralis fascia. I then carefully divided the fibers of the pectoralis major and retracted the muscle. I identified the subpectoralis fat pad. I then identified the external and intercostal muscles and placed a 3-0 silk in the external intercostal at the medial aspect in preparation for the sensing lead insertion. I carefully dissected a plane between the external and intercostal muscles and the pleural respiration sensor was placed laterally into the pocket overlying the internal intercostals. The sensor was sutured to the fascia using the provided anchors to maintain the sensor facing the pleural space. The hypoglossal lead was tunneled through to the chest incision, avoiding external jugular and anterior jugular. The cuff electrode and the respiration sensing lead were connected to the implantable pulse generator. Diagnostic evaluation was again run, which confirmed a good respiration sensing signal as well as good tongue protrusion stimulation.     The implantable pulse generator was placed in the subclavicular pocket and secured loosely to the pectoralis fascia at the superior aspect using non-resorbable sutures. All the wounds were thoroughly irrigated with bacitracin irrigation. The wounds were then closed in three layers with 3-0 Vicryl, 4-0 Vicryl and Dermabond. Pressure dressings were then applied.      This marked the end of the procedure. The patient was turned back over to the Anesthesia team.  They were awoken and transported back to the PACU in stable condition. Counts were correct. I performed the entire procedure.

## 2025-05-02 NOTE — DISCHARGE INSTRUCTIONS
Post-op Insertion of Hypoglossal Nerve Stimulator  Blayne Freitas MD  Otolaryngology - [facility] - [phone]  Cell Phone (after hours) - [phone]    WHAT TO DO    1. You should follow-up in 1 week. We will schedule this appointment, but call if you need to change.   2. Use the recommended medications / treatments as described  3. Familiarize yourself with the information in this pamphlet  4. Call me with questions. Also, please call me the day after surgery to let me know how you are doing.  5. Cell phone: [phone], please call me with any concerns or questions. I am not always available to answer my cell phone, so for any urgent or important issues, use the other numbers provided above.    Pain and Activity  You will have pain from the incision sites for 1-2 weeks. This will decrease between day 3-7.  You will have moderate swelling under the jaw which may become a little firm before it softens and goes away. If you believe this is more than expected, you can reach out for advice.   You will have pain in the back of the tongue and right sided ear pain. This will gradually improve.    NO repetitive or strenuous activity for 4 weeks. You should especially avoid vigorous movement of the right upper extremity. No lifting more than 10 lb from the right extremity for 4 weeks. An exception is the exercises for the NECK, listed below.  Avoid heavy backpack or straps for 4 weeks, particularly on the right side.  You should perform circular neck rolls (10 clockwise and 10 counter clockwise) three times daily, beginning on post-operative day 1. This will reduce the scarring between the lead stimulator and the generator in the chest.     Incision Care  You may remove the bandages after 24 hours  You can shower after the bandages have been removed. You can get the incisions wet with warm,soapy water. Do not scrub the incisions, but it is OK to wash around them.  The incision is closed with skin  glue and this will dissolve  Do not apply ointment, lotion, or cream to the incision until your follow-up  A small amount of redness and swelling over the incision is normal. Call me if this is continuing to enlarge or the pain begins to increase after it had started to feel better.      Diet  Make sure to get enough fluids and nutrients. Food and drink guidelines include:  Take lots of fluids. Good choices are smoothies, water, and mild juices. Hydration is the MOST IMPORTANT factor in your nutrition during the healing process. Other examples include Boost, Ensure, and electrolyte containing juices)  No diet restrictions. You may want to eat more of a modified diet (with softer foods) but I am fine with you eating anything in your normal diet.  You may want to avoid spicy/acidic and hard foods during this time, strictly for comfort.     Medication  Give only medications approved by your doctor. Follow directions closely when giving medications.  Pain medication  You should alternate the pain medications so that you are taking one medication up to every 3 hours. An example is Ibuprofen, then Oxycodone 3 hours later.   Oxycodone / Acetaminophen - this can be taken every 6 hours as needed for pain. It is usually needed for the first few days regularly. Do not take plain Tylenol (acetaminophen) within 6 hours of this medication.   You should also take an anti-inflammatory for pain in addition to the narcotic, including Ibuprofen/Advil/Motrin. You can take 600 mg every 6 hours as needed for pain (You can take 800 mg if weight > 175 lb..)       Medication Tips  Avoid fish oil (omega acids) and vitamin E for 1 week.  As your pain lessens, you may Replace doses of prescription pain medications with acetaminophen (Tylenol). However, Do Not take doses of prescription pain medications at the same time as Tylenol because this could cause an overdose of Tylenol.  Do Not drive or operate machinery if taking prescription pain  medications  Read each medication label carefully, ask your pharmacist if you have any questions regarding warnings on the label  Do not measure liquid with a kitchen spoon. There are pediatric measuring devices available at the pharmacy. Ask for one when you get your prescription filled.  Store all mediations out of reach of children.      When to Call the Doctor  Mild pain and a slight fever are normal after surgery. But call the doctor right if you have any of the following:  Fever: temperature greater than 101  Trouble breathing  Bright red bleeding  Any other concerns

## 2025-05-05 ENCOUNTER — RESULTS FOLLOW-UP (OUTPATIENT)
Dept: ENDOCRINOLOGY | Facility: CLINIC | Age: 65
End: 2025-05-05

## 2025-05-05 VITALS
RESPIRATION RATE: 18 BRPM | SYSTOLIC BLOOD PRESSURE: 132 MMHG | BODY MASS INDEX: 32.93 KG/M2 | DIASTOLIC BLOOD PRESSURE: 85 MMHG | TEMPERATURE: 98 F | HEIGHT: 70 IN | WEIGHT: 230 LBS | OXYGEN SATURATION: 97 % | HEART RATE: 92 BPM

## 2025-05-05 PROBLEM — R55 NEAR SYNCOPE: Status: ACTIVE | Noted: 2025-05-05

## 2025-05-05 PROBLEM — R29.818 TRANSIENT NEUROLOGICAL SYMPTOMS: Status: ACTIVE | Noted: 2025-05-05

## 2025-05-05 NOTE — PROGRESS NOTES
Subjective:    Patient ID:  Jose G Shafer is a 65 y.o. male patient here for evaluation No chief complaint on file.    History of Present Illness:     Jose G Shafer is a 65 y.o. male who follows with Dr. Perez here today for follow up. Last seen in clinic 11/2024. He reports/denies CP, SOB/JUAREZ, palpitations, dizziness, fatigue, activity intolerance.     Saw his PCP in 4/2025, where he reported what sounds like vasovagal syncope (coughing fit, face turned red, unresponsive in chair, lasted 5 minutes) while in [place] on vacation. No post-ictal state or loss of bowel/bladder control. EMS was not activated. MRI Brain and Carotid US negative. He apparently broke his right ankle and had walking boot on, swelling noted by PCP. US RLE ordered to rule out DVT, negative.     Today, he is accompanied by his wife via telephone.  She states that they were driving to a restaurant and had parked.  He got out of the car and stood up then fell back into the car seat.  His face was very red, he was drooling, did not recognize brother, unable to talk.  Entire episode lasted 3-5 minutes before resolving spontaneously.  No further issues after this.  Of note, he had similar episode in 2022, syncopal episode after standing up to get off a riding .  Subsequent nuclear stress abnormal, leading to angiogram which revealed multivessel CAD.     Focused Active Problem List includes:  Transient neurological event - see HPI  Coronary artery disease s/p multiple PCI  Angiogram 2023: DAVID x3 to mid-distal LAD  Angiogram 9/2024: Lithotripsy and DAVID x1 to proximal circ  Peripheral artery disease s/p stents  Remote DCB to distal R CFA  LLE angiogram 2020: DAVID x3 to L SFA-Pop  RLE angiogram 2021: DAVID x1 to R SFA  Hypertension  Hyperlipidemia, severe hypertriglyceridemia   Type 2 DM   Obesity   Current smoker, 0.5 PPD for 44 years      Most Recent Echocardiogram Results  Results for orders placed during the hospital  encounter of 09/13/24    Echo    Interpretation Summary    Left Ventricle: The left ventricle is normal in size. Normal wall thickness. There is normal systolic function. Ejection fraction by visual approximation is 60%. There is normal diastolic function.    Right Ventricle: Normal right ventricular cavity size. Wall thickness is normal. Systolic function is normal.    Aortic Valve: The aortic valve is a trileaflet valve. There is mild aortic valve sclerosis without significant stenosis.    Pulmonary Artery: The estimated pulmonary artery systolic pressure is 17 mmHg.    IVC/SVC: Normal venous pressure at 3 mmHg.      Most Recent Nuclear Stress Test Results  Results for orders placed during the hospital encounter of 09/06/24    Nuclear Stress - Cardiology Interpreted    Interpretation Summary    Abnormal myocardial perfusion scan.    There is a mild to moderate intensity, small to medium sized, reversible perfusion abnormality that is consistent with ischemia in the  inferior wall(s).    There are no other significant perfusion abnormalities.    The gated perfusion images showed an ejection fraction of 67% at rest.    There is normal wall motion at rest.    The ECG portion of the study is negative for ischemia.    The patient reported no chest pain during the stress test.    During stress, rare PVCs are noted.    When compared to a prior study from 5/5/2022, ischemia appreciated.      Most Recent Cardiac PET Stress Test Results  No results found for this or any previous visit.      Most Recent Cardiovascular Angiogram results  Results for orders placed during the hospital encounter of 09/26/24    Interventional Radiology    Conclusion  Procedures:  Moderate sedation  Left heart cath  Coronary angiogram  iFR/FFR of LCx  IVUS of LCx  Coronary angioplasty with stenting of LCx  Intravascular lithotripsy of LCx    Conclusions:  Elevated left filling pressure: EDP 21  80% stenosis due to proximal LCx calcific nodule s/p  successful PCI with 4.0 x 24 Synergy Megatron DAVID (postdil to 4.5) after intravascular lithotripsy  Widely patent stents in LAD from proximal to distal  Diffuse disease in the RCA      Recommendations:  Same-day discharge  Dual antiplatelet therapy at least 6 months and preferably longer  Smoking cessation  High-intensity statin  Cardiac rehab  Follow-up with Dr. Perez or JOSUE in 1-2 months            Description of procedure:  Access was obtained using the modified Seldinger technique and a micropuncture needle with ultrasound guidance in the right radial artery. Diagnostic angiography was performed using Tejas catheter(s).    See details below.      Hemostasis:  TR band        Complications:  None  Estimated blood loss:  Minimal      The patient tolerated the procedure well and left the cath lab in stable condition.      Renetta Rivera MD, [facility]  Interventional Cardiology/Structural Heart Disease  [facility]  Office: [phone]    The clinically important portion of this report has been dictated in the section above. Our Epic reporting system does not allow us to provide a clinically meaningful report without this dictation. Please beware that there may be errors and discrepancies in the computer transcription and all of the clicks required to finalize the report.  The procedure log was documented by Documenter: Lamonte Grullon and verified by Renetta Rivera MD.    Date: 9/27/2024  Time: 7:55 PM      Other Most Recent Cardiology Results  Results for orders placed during the hospital encounter of 08/16/23    CARDIAC MONITORING STRIPS      REVIEW OF SYSTEMS: As noted in HPI   CARDIOVASCULAR: No recent chest pain, palpitations, arm/neck/jaw pain, or edema.  RESPIRATORY: No recent fever, cough, SOB.  : No blood in the urine  GI: No reflux, nausea, vomiting, or blood in stool.   MUSCULOSKELETAL: No falls.   NEURO: See HPI  EYES: No sudden changes in vision.     Past Medical  History:   Diagnosis Date    Anticoagulant long-term use     Anxiety     BCC (basal cell carcinoma)     CAD (coronary artery disease)     Colon polyp     Erectile dysfunction     GERD (gastroesophageal reflux disease)     Hepatic steatosis     History of kidney stones     History of pneumonia 12/2022    HLD (hyperlipidemia)     HTN (hypertension)     Impaired fasting glucose     Lateral epicondylitis of left elbow 07/18/2019    Myocardial infarction     PONV (postoperative nausea and vomiting)     Sleep apnea     uses cpap    Type 2 diabetes mellitus without complications 08/29/2024     Past Surgical History:   Procedure Laterality Date    ANGIOGRAM, CORONARY, WITH LEFT HEART CATHETERIZATION Left 05/26/2022    Procedure: Angiogram, Coronary, with Left Heart Cath;  Surgeon: Christa Gunn MD;  Location: [facility] CATH;  Service: Cardiology;  Laterality: Left;    ANGIOGRAM, CORONARY, WITH LEFT HEART CATHETERIZATION  08/16/2023    Procedure: Left Heart Cath;  Surgeon: Renetta Rivera MD;  Location: [facility] CATH;  Service: Cardiology;;    ANGIOGRAM, CORONARY, WITH LEFT HEART CATHETERIZATION  9/26/2024    Procedure: Left Heart Cath;  Surgeon: Renetta Rivera MD;  Location: [facility] CATH;  Service: Cardiology;;    ANGIOPLASTY      AORTOGRAPHY WITH EXTREMITY RUNOFF  11/16/2021    Procedure: AORTOGRAM, WITH EXTREMITY RUNOFF;  Surgeon: Blanca Arzola MD;  Location: [facility] CATH;  Service: Cardiovascular;;    AORTOGRAPHY WITH SERIALOGRAPHY N/A 11/22/2019    Procedure: AORTOGRAM, WITH SERIALOGRAPHY;  Surgeon: Blanca Arzola MD;  Location: [facility] CATH;  Service: Cardiovascular;  Laterality: N/A;    AORTOGRAPHY WITH SERIALOGRAPHY N/A 12/13/2019    Procedure: AORTOGRAM, WITH SERIALOGRAPHY;  Surgeon: Blanca Arzola MD;  Location: [facility] CATH;  Service: Cardiovascular;  Laterality: N/A;    AORTOGRAPHY WITH SERIALOGRAPHY  12/17/2020    Procedure: AORTOGRAM, WITH SERIALOGRAPHY;  Surgeon: Blanca Arzola MD;  Location: [facility]ATH;  Service: Cardiovascular;;    ATHERECTOMY,  CORONARY  08/16/2023    Procedure: Atherectomy LAD (Rotoblator);  Surgeon: Renetta Rivera MD;  Location: [facility]ATH;  Service: Cardiology;;    CARDIAC SURGERY  2005    stents placed    COLONOSCOPY N/A 07/17/2017    Procedure: COLONOSCOPY Miralax;  Surgeon: Jeremiah Syed Jr., MD;  Location: [facility] ENDO;  Service: Endoscopy;  Laterality: N/A; repeat in 5 years for surveillance    CORONARY ANGIOGRAPHY  9/26/2024    Procedure: Coronary angiogram study;  Surgeon: Renetta Rivera MD;  Location: [facility] CATH;  Service: Cardiology;;    INSERTION, NEUROSTIMULATOR, HYPOGLOSSAL Right 5/2/2025    Procedure: INSERTION,NEUROSTIMULATOR,HYPOGLOSSAL;  Surgeon: Blayne Freitas MD;  Location: [facility] OR;  Service: ENT;  Laterality: Right;    INTRAVASCULAR ULTRASOUND, CORONARY  9/26/2024    Procedure: IVUS LCX;  Surgeon: Renetta Rivera MD;  Location: [facility]ATH;  Service: Cardiology;;    IVUS, CORONARY  08/16/2023    Procedure: IVUS LAD;  Surgeon: Renetta Rivera MD;  Location: [facility]ATH;  Service: Cardiology;;    KNEE ARTHROSCOPY W/ MENISCECTOMY      PERCUTANEOUS CORONARY INTERVENTION, ARTERY  08/16/2023    Procedure: DAVID LAD;  Surgeon: Renetta Rivera MD;  Location: [facility] CATH;  Service: Cardiology;;    PERCUTANEOUS TRANSLUMINAL ANGIOPLASTY N/A 11/22/2019    Procedure: Pta (Angioplasty, Percutaneous, Transluminal);  Surgeon: Blanca Arzola MD;  Location: [facility] CATH;  Service: Cardiovascular;  Laterality: N/A;    PERCUTANEOUS TRANSLUMINAL ANGIOPLASTY N/A 12/13/2019    Procedure: Pta (Angioplasty, Percutaneous, Transluminal);  Surgeon: Blanca Arzola MD;  Location: [facility] CATH;  Service: Cardiovascular;  Laterality: N/A;    PERCUTANEOUS TRANSLUMINAL ANGIOPLASTY  11/16/2021    Procedure: PTA (ANGIOPLASTY, PERCUTANEOUS, TRANSLUMINAL);  Surgeon: Blanca Arzola MD;  Location: [facility] CATH;  Service: Cardiovascular;;    SLEEP ENDOSCOPY, DRUG-INDUCED Bilateral 3/21/2025    Procedure: SLEEP ENDOSCOPY,DRUG-INDUCED;  Surgeon: Blayne Freitas MD;  Location: [facility] OR;  Service:  ENT;  Laterality: Bilateral;    STENT, DRUG ELUTING, SINGLE VESSEL, CORONARY  9/26/2024    Procedure: DAVID LCX;  Surgeon: Renetta Rivera MD;  Location: [facility];  Service: Cardiology;;    VARICOSE VEIN SURGERY      left saphenous     Social History[1]      Objective      Vitals:    05/12/25 1253   BP: (!) 140/91   Pulse: 73       The ASCVD Risk score (Mili KOCH, et al., 2019) failed to calculate for the following reasons:    Risk score cannot be calculated because patient has a medical history suggesting prior/existing ASCVD      LAST EKG  Results for orders placed or performed during the hospital encounter of 09/26/24   EKG 12-LEAD on arrival to floor    Collection Time: 09/26/24  1:19 PM   Result Value Ref Range    QRS Duration 110 ms    [facility] QTC Calculation 458 ms    Narrative    Test Reason : Z95.5,    Vent. Rate : 073 BPM     Atrial Rate : 073 BPM     P-R Int : 156 ms          QRS Dur : 110 ms      QT Int : 416 ms       P-R-T Axes : 022 -65 003 degrees     QTc Int : 458 ms    Normal sinus rhythm  Left axis deviation  Inferior infarct (cited on or before 26-SEP-2024)  Abnormal ECG  When compared with ECG of 26-SEP-2024 09:05,  No significant change was found  Confirmed by Kishor Enriquez ([id number]) on 9/27/2024 1:15:19 PM    Referred By: Renetta Rivera MD           Confirmed By:Kishor Enriquez     LIPIDS - LAST 2   Lab Results   Component Value Date    CHOL 129 12/23/2024    CHOL 187 09/06/2024    HDL 25 (L) 12/23/2024    HDL 19 (L) 09/06/2024    LDLCALC 56.6 (L) 12/23/2024    LDLCALC Invalid, Trig>400.0 09/06/2024    TRIG 237 (H) 12/23/2024    TRIG 970 (H) 09/06/2024    CHOLHDL 19.4 (L) 12/23/2024    CHOLHDL 10.2 (L) 09/06/2024     CARDIAC PROFILE - LAST 2  Lab Results   Component Value Date     (H) 06/22/2005     (H) 06/21/2005    TROPONINI <0.012 09/26/2024    TROPONINI 1.060 (HH) 08/17/2023      CBC - LAST 2  Lab Results   Component Value Date    WBC 9.61 09/26/2024    WBC 10.72 08/17/2023    HGB 13.3  (L) 09/26/2024    HGB 13.0 (L) 08/17/2023    HCT 37.8 (L) 09/26/2024    HCT 37.7 (L) 08/17/2023     09/26/2024     08/17/2023     Lab Results   Component Value Date    LABPT 12.8 11/16/2021    LABPT 12.8 12/17/2020    INR 1.0 11/16/2021    INR 1.0 12/17/2020    APTT 24.8 11/16/2021    APTT 27.4 12/17/2020     CHEMISTRY - LAST 2  Lab Results   Component Value Date     09/26/2024     (L) 08/29/2024    K 4.4 09/26/2024    K 4.7 08/29/2024    CO2 22 09/26/2024    CO2 20 (L) 08/29/2024    BUN 15 09/26/2024    BUN 13 08/29/2024    CREATININE 0.80 09/26/2024    CREATININE 1.2 08/29/2024     (H) 09/26/2024     (H) 08/29/2024    CALCIUM 9.2 09/26/2024    CALCIUM 10.2 08/29/2024    PH 7.39 12/10/2022    MG 1.4 (L) 12/09/2022    MG 2.4 06/23/2005    ALBUMIN 4.4 09/26/2024    ALBUMIN 3.8 08/29/2024    ALT 46 09/26/2024    ALT 47 (H) 08/29/2024    AST 49 09/26/2024    AST 49 (H) 08/29/2024      ENDOCRINE - LAST 2  Lab Results   Component Value Date    HGBA1C 6.8 (H) 05/02/2025    HGBA1C 7.3 (H) 01/27/2025    TSH 1.460 09/06/2024    TSH 1.327 08/29/2024    TSH 1.327 08/29/2024        PHYSICAL EXAM  CONSTITUTIONAL: Well built, well nourished in no apparent distress  NECK: no carotid bruit, no JVD  LUNGS: CTA  CHEST WALL: no tenderness  HEART: regular rate and rhythm, S1, S2 normal, no murmur, click, rub or gallop   ABDOMEN: soft, non-tender; bowel sounds normal; no masses,  no organomegaly  EXTREMITIES: Extremities normal, no edema, no calf tenderness noted  NEURO: AAO X 3    I HAVE REVIEWED :    The vital signs, most recent cardiac testing, and most recent pertinent non-cardiology provider notes.    Current Outpatient Medications   Medication Instructions    albuterol (VENTOLIN HFA) 90 mcg/actuation inhaler 2 puffs, Inhalation, Every 4 hours PRN, Rescue    ALPRAZolam (XANAX) 0.25 mg, Oral, 3 times daily PRN    amLODIPine (NORVASC) 5 mg, Oral    ascorbic acid (vitamin C) (VITAMIN C) 500 mg,  2 times daily    aspirin (ECOTRIN) 81 mg, Oral, Daily    atorvastatin (LIPITOR) 80 mg, Oral, Nightly    azithromycin (ZITHROMAX) 500 MG tablet One daily for yellow mucous, repeat if needed    blood sugar diagnostic Strp Use to monitor blood glucose twice daily    blood-glucose meter (BLULINK GLUCOSE MONITOR SYSTEM) Misc Use as directed    blood-glucose meter kit To check BG 2 times daily, to use with insurance preferred meter. E11.9    carvediloL (COREG) 25 mg, Oral, 2 times daily with meals    cilostazoL (PLETAL) 50 mg, Oral, 2 times daily    clopidogreL (PLAVIX) 75 mg, Oral, Daily    dicyclomine (BENTYL) 10 MG capsule TAKE 1 CAP BY MOUTH BEFORE MEALS AND AT BEDTIME AS NEEDED (ABDOMINAL CRAMPING/PAIN).    ezetimibe (ZETIA) 10 mg tablet TAKE 1 TABLET BY MOUTH EVERY DAY    fenofibrate (TRICOR) 54 mg, Oral, Daily    fluticasone propionate (FLONASE) 50 mcg/actuation nasal spray USE 1 SPRAY (50 MCG TOTAL) BY EACH NARE ROUTE ONCE DAILY.    fluticasone-umeclidin-vilanter (TRELEGY ELLIPTA) 200-62.5-25 mcg inhaler 1 puff, Inhalation, Daily    HYDROcodone-acetaminophen (NORCO) 5-325 mg per tablet 1 tablet, Oral, Every 6 hours PRN    isosorbide mononitrate (IMDUR) 30 mg, Oral, Daily    lancets 28 gauge Misc Use to test blood glucose twice daily    lisinopriL (PRINIVIL,ZESTRIL) 40 mg, Oral, Daily    MULTIVITAMIN ORAL 1 capsule, Daily    nitroGLYCERIN (NITROSTAT) 0.4 mg, Sublingual, Every 5 min PRN, 1 Tablet, Sublingual Sublingual    omega-3 fatty acids 500 mg Cap 1 capsule, Daily    ondansetron (ZOFRAN-ODT) 4 mg, Oral, Every 6 hours PRN    pantoprazole (PROTONIX) 20 mg, Oral, Before breakfast    predniSONE (DELTASONE) 20 MG tablet Take one daily for 3 days and may repeat for shortness of breath.    sertraline (ZOLOFT) 100 mg, Oral, Daily    tadalafiL (CIALIS) 5 mg, Oral, Daily PRN    TRULICITY 1.5 mg, Subcutaneous, Every 7 days        Assessment & Plan   Transient neurological event (Primary)  Concern for ischemic etiology  given history (see HPI)  Carotid US and MRI Brain negative  Recommend completion of TIA workup + stress test  -Nuclear stress test  -Echo with bubble study  -30-day event monitor     Coronary artery disease s/p PCI  No anginal equivalents   Continue aspirin 81 mg daily  Continue clopidogrel 75 mg daily  Continue atorvastatin 80 mg daily   Continue ezetimibe 10 mg daily   Continue fenofibrate 54 mg daily   Continue omega 3  Continue carvedilol 25 mg BID  Continue isosorbide mononitrate 30 mg daily   Continue lisinopril 40 mg daily    Hypertension  Stable   Continue lisinopril 40 mg daily    Mixed hyperlipidemia  Continue atorvastatin 80 mg daily   Continue ezetimibe 10 mg daily   Continue fenofibrate 54 mg daily   Continue omega 3    Peripheral artery disease s/p stent  Stable  Continue cilostazol 50 mg BID    Type 2 DM  Significant improvement in A1C, 6.8    Cigarette smoker  Encourage cessation    LEAH  Inspire          I emphasized the importance of modifying lifestyle related risk factors including tobacco avoidance, limiting alcohol intake, aerobic exercise, weight management and Mediterranean diet.      Follow up after tests.     David Marie NP  [facility]   Office: [phone]       [1]   Social History  Tobacco Use    Smoking status: Every Day     Current packs/day: 0.00     Average packs/day: 0.5 packs/day for 44.0 years (22.0 ttl pk-yrs)     Types: Cigarettes     Start date: 10/10/1984     Last attempt to quit: 2023     Years since quittin.3    Smokeless tobacco: Never   Substance Use Topics    Alcohol use: Yes     Alcohol/week: 14.0 standard drinks of alcohol     Types: 14 Cans of beer per week    Drug use: Never

## 2025-05-08 ENCOUNTER — OFFICE VISIT (OUTPATIENT)
Dept: ENDOCRINOLOGY | Facility: CLINIC | Age: 65
End: 2025-05-08
Payer: COMMERCIAL

## 2025-05-08 VITALS
RESPIRATION RATE: 16 BRPM | WEIGHT: 234.13 LBS | BODY MASS INDEX: 33.52 KG/M2 | HEIGHT: 70 IN | HEART RATE: 86 BPM | DIASTOLIC BLOOD PRESSURE: 80 MMHG | SYSTOLIC BLOOD PRESSURE: 122 MMHG

## 2025-05-08 DIAGNOSIS — I10 ESSENTIAL HYPERTENSION: Chronic | ICD-10-CM

## 2025-05-08 DIAGNOSIS — I25.10 CORONARY ARTERY DISEASE INVOLVING NATIVE CORONARY ARTERY OF NATIVE HEART WITHOUT ANGINA PECTORIS: ICD-10-CM

## 2025-05-08 DIAGNOSIS — E11.65 TYPE 2 DIABETES MELLITUS WITH HYPERGLYCEMIA, WITHOUT LONG-TERM CURRENT USE OF INSULIN: ICD-10-CM

## 2025-05-08 DIAGNOSIS — E11.9 TYPE 2 DIABETES MELLITUS WITHOUT COMPLICATION, WITHOUT LONG-TERM CURRENT USE OF INSULIN: Primary | ICD-10-CM

## 2025-05-08 DIAGNOSIS — E78.2 MIXED HYPERLIPIDEMIA: Chronic | ICD-10-CM

## 2025-05-08 DIAGNOSIS — E66.811 CLASS 1 OBESITY DUE TO EXCESS CALORIES WITHOUT SERIOUS COMORBIDITY WITH BODY MASS INDEX (BMI) OF 33.0 TO 33.9 IN ADULT: ICD-10-CM

## 2025-05-08 DIAGNOSIS — E66.09 CLASS 1 OBESITY DUE TO EXCESS CALORIES WITHOUT SERIOUS COMORBIDITY WITH BODY MASS INDEX (BMI) OF 33.0 TO 33.9 IN ADULT: ICD-10-CM

## 2025-05-08 LAB — GLUCOSE SERPL-MCNC: 119 MG/DL (ref 70–110)

## 2025-05-08 PROCEDURE — 99214 OFFICE O/P EST MOD 30 MIN: CPT | Mod: S$GLB,,, | Performed by: NURSE PRACTITIONER

## 2025-05-08 PROCEDURE — 3074F SYST BP LT 130 MM HG: CPT | Mod: CPTII,S$GLB,, | Performed by: NURSE PRACTITIONER

## 2025-05-08 PROCEDURE — 3044F HG A1C LEVEL LT 7.0%: CPT | Mod: CPTII,S$GLB,, | Performed by: NURSE PRACTITIONER

## 2025-05-08 PROCEDURE — 99999 PR PBB SHADOW E&M-EST. PATIENT-LVL III: CPT | Mod: PBBFAC,,, | Performed by: NURSE PRACTITIONER

## 2025-05-08 PROCEDURE — 3079F DIAST BP 80-89 MM HG: CPT | Mod: CPTII,S$GLB,, | Performed by: NURSE PRACTITIONER

## 2025-05-08 PROCEDURE — 3066F NEPHROPATHY DOC TX: CPT | Mod: CPTII,S$GLB,, | Performed by: NURSE PRACTITIONER

## 2025-05-08 PROCEDURE — 3008F BODY MASS INDEX DOCD: CPT | Mod: CPTII,S$GLB,, | Performed by: NURSE PRACTITIONER

## 2025-05-08 PROCEDURE — G2211 COMPLEX E/M VISIT ADD ON: HCPCS | Mod: S$GLB,,, | Performed by: NURSE PRACTITIONER

## 2025-05-08 PROCEDURE — 4010F ACE/ARB THERAPY RXD/TAKEN: CPT | Mod: CPTII,S$GLB,, | Performed by: NURSE PRACTITIONER

## 2025-05-08 PROCEDURE — 3061F NEG MICROALBUMINURIA REV: CPT | Mod: CPTII,S$GLB,, | Performed by: NURSE PRACTITIONER

## 2025-05-08 PROCEDURE — 82962 GLUCOSE BLOOD TEST: CPT | Mod: S$GLB,,, | Performed by: NURSE PRACTITIONER

## 2025-05-08 RX ORDER — DULAGLUTIDE 1.5 MG/.5ML
1.5 INJECTION, SOLUTION SUBCUTANEOUS
Qty: 4 PEN | Refills: 11 | Status: SHIPPED | OUTPATIENT
Start: 2025-05-08 | End: 2026-05-08

## 2025-05-08 RX ORDER — BLOOD-GLUCOSE SENSOR
EACH MISCELLANEOUS
Qty: 6 EACH | Refills: 3 | Status: SHIPPED | OUTPATIENT
Start: 2025-05-08

## 2025-05-08 NOTE — PROGRESS NOTES
"CC: This 65 y.o. male presents for management of diabetes and chronic conditions pending review including HTN, HLP, CAD    HPI: He was diagnosed with T2DM in  8/2024. Has never been hospitalized r/t DM. He was having polyuria and polydipsia prior to diagnosis  Family hx of DM: mom and brother     Had inspire placed last Friday  Also broke his right leg after he tripped over a Hot Wheel, first day out his boot is today   hypoglycemia at home- none  monitoring BG at home:  Fasting 150-190s  Pre-supper 140-190s  BG in office last meal- 119  biscuit and shabazz at 7am this     Diet: Eats 3  Meals a day, snacks- rarely   Exercise: none  CURRENT DM MEDS: Trulicity 1.5 mg weekly (Wednesday)  Off metformin for GI issues  Glucometer type:  True Metrix     Standards of Care:  Eye exam: [facility]  4/2025    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   MS/Neuro:L> R tingling in his toes, Gait steady, speech clear  Other systems: negative.    PE:  GENERAL: Well developed, well nourished.  PSYCH: AAOx3, appropriate mood and affect, pleasant expression, conversant, appears relaxed, well groomed.   EYES: Conjunctiva, corneas clear  NECK: Supple, trachea midline    ABDOMEN: Soft, non-tender, non-distended   NEURO: Gait steady  SKIN:no acanthosis nigracans.  FOOT EXAMINATION: 1/27/2025  No foot deformity, corns or callus formation, Onychomycosis, nails in good condition and well trimmed, no interspace maceration or ulceration noted.  Decreased hair growth present over toes/feet. Protective sensation intact with 10 gram monofilament.  +2 dorsalis pedis and posterior pulses noted.     Personally reviewed Past Medical, Surgical, Social History.    /80 (BP Location: Right arm)   Pulse 86   Resp 16   Ht 5' 10" (1.778 " m)   Wt 106.2 kg (234 lb 2.1 oz)   BMI 33.59 kg/m²      Personally reviewed the below labs:      Chemistry        Component Value Date/Time     09/26/2024 0919    K 4.4 09/26/2024 0919     09/26/2024 0919    CO2 22 09/26/2024 0919    BUN 15 09/26/2024 0919    CREATININE 0.80 09/26/2024 0919     (H) 09/26/2024 0919        Component Value Date/Time    CALCIUM 9.2 09/26/2024 0919    ALKPHOS 57 09/26/2024 0919    AST 49 09/26/2024 0919    ALT 46 09/26/2024 0919    BILITOT 0.7 09/26/2024 0919    ESTGFRAFRICA >60 06/10/2022 1034    EGFRNONAA >60 06/10/2022 1034            Lab Results   Component Value Date    TSH 1.460 09/06/2024       Recent Labs   Lab 12/23/24  0705   LDL Cholesterol 56.6 L   HDL 25 L   Cholesterol 129        No results found for this or any previous visit.  No results found for this or any previous visit.    Lab Results   Component Value Date    MICALBCREAT 10.7 05/02/2025       Hemoglobin A1C   Date Value Ref Range Status   01/27/2025 7.3 (H) 4.0 - 5.6 % Final     Comment:     ADA Screening Guidelines:  5.7-6.4%  Consistent with prediabetes  >or=6.5%  Consistent with diabetes    High levels of fetal hemoglobin interfere with the HbA1C  assay. Heterozygous hemoglobin variants (HbS, HgC, etc)do  not significantly interfere with this assay.   However, presence of multiple variants may affect accuracy.     08/29/2024 11.0 (H) 4.0 - 5.6 % Final     Comment:     ADA Screening Guidelines:  5.7-6.4%  Consistent with prediabetes  >or=6.5%  Consistent with diabetes    High levels of fetal hemoglobin interfere with the HbA1C  assay. Heterozygous hemoglobin variants (HbS, HgC, etc)do  not significantly interfere with this assay.   However, presence of multiple variants may affect accuracy.     09/12/2022 6.5 (H) 4.0 - 5.6 % Final     Comment:     ADA Screening Guidelines:  5.7-6.4%  Consistent with prediabetes  >or=6.5%  Consistent with diabetes    High levels of fetal hemoglobin interfere  with the HbA1C  assay. Heterozygous hemoglobin variants (HbS, HgC, etc)do  not significantly interfere with this assay.   However, presence of multiple variants may affect accuracy.       Hemoglobin A1c   Date Value Ref Range Status   05/02/2025 6.8 (H) 4.0 - 5.6 % Final     Comment:     ADA Screening Guidelines:  5.7-6.4%  Consistent with prediabetes  >=6.5%  Consistent with diabetes    High levels of fetal hemoglobin interfere with the HbA1C  assay. Heterozygous hemoglobin variants (HbS, HgC, etc)do  not significantly interfere with this assay.   However, presence of multiple variants may affect accuracy.        ASSESSMENT and PLAN:      1. T2DM controlled  Continue Trulicity to 1.5 mg weekly  Check bg bid- stagger times log in 2 weeks   RX for Continuous Glucose Monitor   -Recommend Caitlin 3 Plus  Continuous Glucose monitor                         Pt would benefit from therapeutic continuous glucose monitor to be able                         to make frequent insulin adjustments, based on current glucoses.     2. HTN - controlled, continue meds as previously prescribed and monitor.     3. HLP - on statin therapy,     4. CAD- follows w Dr Conner, optimize Bg     5. Smoker- smokes 1 ppd, not ready to quit    6. Obesity- Body mass index is 33.59 kg/m².  Encouraged exercise once cleared by sx, walking 30 mins/day 5 days a week      Follow-up: in 3 months with A1C , CMP, lipid, UMCR, TSH

## 2025-05-09 ENCOUNTER — OFFICE VISIT (OUTPATIENT)
Dept: OTOLARYNGOLOGY | Facility: CLINIC | Age: 65
End: 2025-05-09
Payer: COMMERCIAL

## 2025-05-09 VITALS — BODY MASS INDEX: 33.59 KG/M2 | WEIGHT: 234.13 LBS

## 2025-05-09 DIAGNOSIS — Z98.890 POST-OPERATIVE STATE: Primary | ICD-10-CM

## 2025-05-09 PROCEDURE — 99999 PR PBB SHADOW E&M-EST. PATIENT-LVL IV: CPT | Mod: PBBFAC,,, | Performed by: OTOLARYNGOLOGY

## 2025-05-09 RX ORDER — CEPHALEXIN 500 MG/1
500 CAPSULE ORAL EVERY 8 HOURS
Qty: 15 CAPSULE | Refills: 0 | Status: SHIPPED | OUTPATIENT
Start: 2025-05-09 | End: 2025-05-14

## 2025-05-09 NOTE — PROGRESS NOTES
Jose G is here for a post-operative visit following   Hypoglossal nerve stimulator      A little more swelling than usual.  Mildly tender to palpation of the neck site.  Otherwise it is gradually improving    Exam:  Alert, active, appropriate  Incisions c/d/I  Mild increase in swelling of the neck over what is expected.  Mild increase in tenderness to palpation.  Mild erythema.  Overall it is still somewhat appropriate  Chest incision looks good  Tongue midline with protrusion and strong  No facial weakness    Healing well but with what looks like a little hematoma on the neck.  Mildly tender so will add Keflex just to be sure.  No signs of an infection right now  RTC 3 weeks for activation

## 2025-05-09 NOTE — Clinical Note
Patient requesting an activation on Monday or Friday because his wife Is off of work and would like to be here.

## 2025-05-12 ENCOUNTER — TELEPHONE (OUTPATIENT)
Dept: OTOLARYNGOLOGY | Facility: CLINIC | Age: 65
End: 2025-05-12
Payer: COMMERCIAL

## 2025-05-12 ENCOUNTER — OFFICE VISIT (OUTPATIENT)
Dept: CARDIOLOGY | Facility: CLINIC | Age: 65
End: 2025-05-12
Payer: COMMERCIAL

## 2025-05-12 VITALS
WEIGHT: 231.94 LBS | HEIGHT: 70 IN | HEART RATE: 73 BPM | DIASTOLIC BLOOD PRESSURE: 91 MMHG | SYSTOLIC BLOOD PRESSURE: 140 MMHG | BODY MASS INDEX: 33.21 KG/M2

## 2025-05-12 DIAGNOSIS — G47.33 OSA ON CPAP: ICD-10-CM

## 2025-05-12 DIAGNOSIS — I25.10 CORONARY ARTERY DISEASE INVOLVING NATIVE CORONARY ARTERY OF NATIVE HEART WITHOUT ANGINA PECTORIS: ICD-10-CM

## 2025-05-12 DIAGNOSIS — E11.65 TYPE 2 DIABETES MELLITUS WITH HYPERGLYCEMIA, WITHOUT LONG-TERM CURRENT USE OF INSULIN: ICD-10-CM

## 2025-05-12 DIAGNOSIS — I73.9 PERIPHERAL VASCULAR DISEASE OF EXTREMITY: ICD-10-CM

## 2025-05-12 DIAGNOSIS — E78.2 MIXED HYPERLIPIDEMIA: Chronic | ICD-10-CM

## 2025-05-12 DIAGNOSIS — R55 NEAR SYNCOPE: ICD-10-CM

## 2025-05-12 DIAGNOSIS — I10 ESSENTIAL HYPERTENSION: Chronic | ICD-10-CM

## 2025-05-12 DIAGNOSIS — Z95.5 S/P DRUG ELUTING CORONARY STENT PLACEMENT: ICD-10-CM

## 2025-05-12 DIAGNOSIS — F17.210 CIGARETTE SMOKER: Chronic | ICD-10-CM

## 2025-05-12 DIAGNOSIS — R29.818 TRANSIENT NEUROLOGICAL SYMPTOMS: Primary | ICD-10-CM

## 2025-05-12 PROCEDURE — 1159F MED LIST DOCD IN RCRD: CPT | Mod: CPTII,S$GLB,,

## 2025-05-12 PROCEDURE — 1101F PT FALLS ASSESS-DOCD LE1/YR: CPT | Mod: CPTII,S$GLB,,

## 2025-05-12 PROCEDURE — 3080F DIAST BP >= 90 MM HG: CPT | Mod: CPTII,S$GLB,,

## 2025-05-12 PROCEDURE — 3061F NEG MICROALBUMINURIA REV: CPT | Mod: CPTII,S$GLB,,

## 2025-05-12 PROCEDURE — 99214 OFFICE O/P EST MOD 30 MIN: CPT | Mod: S$GLB,,,

## 2025-05-12 PROCEDURE — 3044F HG A1C LEVEL LT 7.0%: CPT | Mod: CPTII,S$GLB,,

## 2025-05-12 PROCEDURE — 3066F NEPHROPATHY DOC TX: CPT | Mod: CPTII,S$GLB,,

## 2025-05-12 PROCEDURE — 4010F ACE/ARB THERAPY RXD/TAKEN: CPT | Mod: CPTII,S$GLB,,

## 2025-05-12 PROCEDURE — 3077F SYST BP >= 140 MM HG: CPT | Mod: CPTII,S$GLB,,

## 2025-05-12 PROCEDURE — 99999 PR PBB SHADOW E&M-EST. PATIENT-LVL IV: CPT | Mod: PBBFAC,,,

## 2025-05-12 PROCEDURE — 3288F FALL RISK ASSESSMENT DOCD: CPT | Mod: CPTII,S$GLB,,

## 2025-05-12 PROCEDURE — 3008F BODY MASS INDEX DOCD: CPT | Mod: CPTII,S$GLB,,

## 2025-05-12 NOTE — PROGRESS NOTES
"Story:  Driving to eat. Parked. Got out of car and stood up, got very dizzy, "coughed" once, red face, drooling, did not recognize brother, could not talk, resolved after few minutes.     Happened 1-2 years ago, fell off riding lawnmower, syncope     Echo w/ bubble   30-day monitor   +/-[employer]?        "

## 2025-05-12 NOTE — TELEPHONE ENCOUNTER
----- Message from Blayne Freitas MD sent at 5/9/2025 10:34 AM CDT -----  Patient requesting an activation on Monday or Friday because his wife Is off of work and would like to be here.

## 2025-05-13 ENCOUNTER — TELEPHONE (OUTPATIENT)
Dept: CARDIOLOGY | Facility: CLINIC | Age: 65
End: 2025-05-13
Payer: COMMERCIAL

## 2025-05-13 NOTE — TELEPHONE ENCOUNTER
----- Message from David Marie NP sent at 5/13/2025  1:25 PM CDT -----  Regarding: Stress test  Please call patient to schedule nuclear stress test, first available.  Thanks.

## 2025-05-13 NOTE — TELEPHONE ENCOUNTER
Called pt to schedule stress test. Pt stated that the first available date wouldn't work, and suggested to schedule on Memorial Day since he is of of work. Scheduled for 5/26, pt V/U instructions.

## 2025-05-14 ENCOUNTER — PATIENT MESSAGE (OUTPATIENT)
Dept: ENDOCRINOLOGY | Facility: CLINIC | Age: 65
End: 2025-05-14
Payer: COMMERCIAL

## 2025-05-22 ENCOUNTER — TELEPHONE (OUTPATIENT)
Dept: ENDOCRINOLOGY | Facility: CLINIC | Age: 65
End: 2025-05-22
Payer: COMMERCIAL

## 2025-05-22 ENCOUNTER — PATIENT MESSAGE (OUTPATIENT)
Dept: OBSTETRICS AND GYNECOLOGY | Facility: CLINIC | Age: 65
End: 2025-05-22
Payer: COMMERCIAL

## 2025-05-22 ENCOUNTER — PATIENT MESSAGE (OUTPATIENT)
Dept: CARDIOLOGY | Facility: HOSPITAL | Age: 65
End: 2025-05-22
Payer: COMMERCIAL

## 2025-05-22 DIAGNOSIS — E11.65 TYPE 2 DIABETES MELLITUS WITH HYPERGLYCEMIA, WITHOUT LONG-TERM CURRENT USE OF INSULIN: Primary | ICD-10-CM

## 2025-05-22 RX ORDER — TIRZEPATIDE 5 MG/.5ML
5 INJECTION, SOLUTION SUBCUTANEOUS
Qty: 4 PEN | Refills: 3 | Status: SHIPPED | OUTPATIENT
Start: 2025-05-22

## 2025-05-23 ENCOUNTER — TELEPHONE (OUTPATIENT)
Dept: ENDOCRINOLOGY | Facility: CLINIC | Age: 65
End: 2025-05-23
Payer: COMMERCIAL

## 2025-05-23 NOTE — TELEPHONE ENCOUNTER
Rec'd fax from [employer] that Caitlin 3 sensors are not covered because patient is not on multiple daily injections or a long-acting insulin.     Patient ID: [id number]    Request ID: [id number]

## 2025-05-26 ENCOUNTER — TELEPHONE (OUTPATIENT)
Dept: ORTHOPEDICS | Facility: CLINIC | Age: 65
End: 2025-05-26
Payer: COMMERCIAL

## 2025-05-26 ENCOUNTER — HOSPITAL ENCOUNTER (OUTPATIENT)
Dept: RADIOLOGY | Facility: HOSPITAL | Age: 65
Discharge: HOME | End: 2025-05-26
Payer: COMMERCIAL

## 2025-05-26 ENCOUNTER — HOSPITAL ENCOUNTER (OUTPATIENT)
Dept: CARDIOLOGY | Facility: HOSPITAL | Age: 65
Discharge: HOME | End: 2025-05-26
Payer: COMMERCIAL

## 2025-05-26 VITALS — BODY MASS INDEX: 33.07 KG/M2 | WEIGHT: 231 LBS | HEIGHT: 70 IN

## 2025-05-26 DIAGNOSIS — R29.818 TRANSIENT NEUROLOGICAL SYMPTOMS: ICD-10-CM

## 2025-05-26 DIAGNOSIS — I25.10 CORONARY ARTERY DISEASE INVOLVING NATIVE CORONARY ARTERY OF NATIVE HEART WITHOUT ANGINA PECTORIS: ICD-10-CM

## 2025-05-26 DIAGNOSIS — R55 NEAR SYNCOPE: ICD-10-CM

## 2025-05-26 DIAGNOSIS — S82.61XA DISPLACED FRACTURE OF LATERAL MALLEOLUS OF RIGHT FIBULA, INITIAL ENCOUNTER FOR CLOSED FRACTURE: Primary | ICD-10-CM

## 2025-05-26 LAB
CV PHARM DOSE: 0.4 MG
CV STRESS BASE HR: 78 BPM
DIASTOLIC BLOOD PRESSURE: 78 MMHG
NUC REST EJECTION FRACTION: 60
OHS CV CPX 1 MINUTE RECOVERY HEART RATE: 98 BPM
OHS CV CPX 85 PERCENT MAX PREDICTED HEART RATE MALE: 132
OHS CV CPX MAX PREDICTED HEART RATE: 155
OHS CV CPX PATIENT IS FEMALE: 0
OHS CV CPX PATIENT IS MALE: 1
OHS CV CPX PEAK DIASTOLIC BLOOD PRESSURE: 78 MMHG
OHS CV CPX PEAK HEAR RATE: 99 BPM
OHS CV CPX PEAK RATE PRESSURE PRODUCT: NORMAL
OHS CV CPX PEAK SYSTOLIC BLOOD PRESSURE: 138 MMHG
OHS CV CPX PERCENT MAX PREDICTED HEART RATE ACHIEVED: 64
OHS CV CPX RATE PRESSURE PRODUCT PRESENTING: NORMAL
OHS CV INITIAL DOSE: 9.9 MCG/KG/MIN
OHS CV PEAK DOSE: 33 MCG/KG/MIN
OHS CV PHARM TIME: 932 MIN
SYSTOLIC BLOOD PRESSURE: 138 MMHG

## 2025-05-26 PROCEDURE — 93016 CV STRESS TEST SUPVJ ONLY: CPT | Mod: ,,, | Performed by: INTERNAL MEDICINE

## 2025-05-26 PROCEDURE — 78452 HT MUSCLE IMAGE SPECT MULT: CPT | Mod: PO

## 2025-05-26 PROCEDURE — A9502 TC99M TETROFOSMIN: HCPCS | Mod: PO

## 2025-05-26 PROCEDURE — 63600175 PHARM REV CODE 636 W HCPCS: Mod: PO

## 2025-05-26 PROCEDURE — 93018 CV STRESS TEST I&R ONLY: CPT | Mod: ,,, | Performed by: INTERNAL MEDICINE

## 2025-05-26 PROCEDURE — 78452 HT MUSCLE IMAGE SPECT MULT: CPT | Mod: 26,,, | Performed by: INTERNAL MEDICINE

## 2025-05-26 PROCEDURE — 93017 CV STRESS TEST TRACING ONLY: CPT | Mod: PO

## 2025-05-26 RX ORDER — REGADENOSON 0.08 MG/ML
0.4 INJECTION, SOLUTION INTRAVENOUS
Status: COMPLETED | OUTPATIENT
Start: 2025-05-26 | End: 2025-05-26

## 2025-05-26 RX ADMIN — TETROFOSMIN 33 MILLICURIE: 1.38 INJECTION, POWDER, LYOPHILIZED, FOR SOLUTION INTRAVENOUS at 09:05

## 2025-05-26 RX ADMIN — TETROFOSMIN 9.9 MILLICURIE: 1.38 INJECTION, POWDER, LYOPHILIZED, FOR SOLUTION INTRAVENOUS at 09:05

## 2025-05-26 RX ADMIN — REGADENOSON 0.4 MG: 0.08 INJECTION, SOLUTION INTRAVENOUS at 09:05

## 2025-05-26 NOTE — TELEPHONE ENCOUNTER
Spoke with patient and informed him Dr Sharma is in surgery on Mondays. Pt stated he will keep his appt on 6/3

## 2025-05-26 NOTE — TELEPHONE ENCOUNTER
----- Message from Nurse Aj sent at 5/26/2025  8:56 AM CDT -----  Regarding: FW: Appointment    ----- Message -----  From: Mayela Presley  Sent: 5/26/2025   8:06 AM CDT  To: Marcin Kilpatrick Staff; Lenin RYEES Staff  Subject: Appointment                                      Patient's wife came in stating he will be here all day on Monday, June 2nd and cannot take off from work the very next day to see Dr. Phelps. Said they just need an x-ray and be released. Requested to see another provider that has availability on June 2nd. Would like someone to call with appointment details at [phone].

## 2025-05-27 DIAGNOSIS — R94.39 ABNORMAL NUCLEAR STRESS TEST: Primary | ICD-10-CM

## 2025-05-27 DIAGNOSIS — E11.69 TYPE 2 DIABETES MELLITUS WITH OTHER SPECIFIED COMPLICATION, WITHOUT LONG-TERM CURRENT USE OF INSULIN: ICD-10-CM

## 2025-05-27 DIAGNOSIS — F17.210 CIGARETTE SMOKER: ICD-10-CM

## 2025-05-27 DIAGNOSIS — I10 ESSENTIAL HYPERTENSION: ICD-10-CM

## 2025-05-27 RX ORDER — SODIUM CHLORIDE 0.9 % (FLUSH) 0.9 %
10 SYRINGE (ML) INJECTION
Status: SHIPPED | OUTPATIENT
Start: 2025-05-27

## 2025-05-27 RX ORDER — SODIUM CHLORIDE 9 MG/ML
INJECTION, SOLUTION INTRAVENOUS ONCE
Status: CANCELLED | OUTPATIENT
Start: 2025-05-27 | End: 2025-05-27

## 2025-05-29 ENCOUNTER — TELEPHONE (OUTPATIENT)
Dept: ENDOCRINOLOGY | Facility: CLINIC | Age: 65
End: 2025-05-29
Payer: COMMERCIAL

## 2025-05-29 ENCOUNTER — TELEPHONE (OUTPATIENT)
Dept: CARDIOLOGY | Facility: CLINIC | Age: 65
End: 2025-05-29
Payer: COMMERCIAL

## 2025-05-29 NOTE — TELEPHONE ENCOUNTER
Instructed we would still like to have the Echo with bubble study done to make sure there are no structural abnormalities with his heart as well. Pt VU and will keep appt.

## 2025-05-29 NOTE — TELEPHONE ENCOUNTER
Initiated PA for Mounjaro 5MG/0.5ML auto-injectors    Key: [id number]    Rx #: [id number]    Awaiting determination

## 2025-05-29 NOTE — TELEPHONE ENCOUNTER
----- Message from Shannon sent at 5/29/2025  8:36 AM CDT -----  Contact: Wife  Pt advice, msg sent to providerType: Needs Medical AdviceWho Called:  Miguelito Call Back Number: [phone] Information: Pt had angiogram and everything was negative and wanted to see if they still needed him to move forward with the echo that is scheduled. Can we please call pt back to assist.

## 2025-05-29 NOTE — TELEPHONE ENCOUNTER
[facility]  [address] [place] [address]  Phone:  [phone]  Fax:  [phone]                                    Leatha Trujillo   MRN: [id number]    Department:  [facility]   Date of Visit:  2/16/2019           After Visit Summary Signature Page    I have received my discharge instructions, and my questions have been answered. I have discussed any challenges I see with this plan with the nurse or doctor.    ..........................................................................................................................................  Patient/Patient Representative Signature      ..........................................................................................................................................  Patient Representative Print Name and Relationship to Patient    ..................................................               ................................................  Date                                   Time    ..........................................................................................................................................  Reviewed by Signature/Title    ...................................................              ..............................................  Date                                               Time          22EPIC Rev 08/18        Copied from CRM #[id number]. Topic: General Inquiry - Patient Advice  >> May 29, 2025  8:30 AM Shannon wrote:  Pt advice, msg sent to provider  Type: Needs Medical Advice  Who Called:  Pt  Best Call Back Number: [phone]      Additional Information: Pt had angiogram and everything was negative and wanted to see if they still needed him to move forward with the echo that is scheduled. Can we please call pt back to assist

## 2025-05-29 NOTE — TELEPHONE ENCOUNTER
PA for Mounjaro 5MG/0.5ML auto-injectors approved     Case ID: [id number]    Status: Approved    Review Type: Prior Auth    Coverage Start Date: 05/29/2025    Coverage End Date: 05/29/2026    Authorization Expiration Date: May 29, 2026     Patient notified

## 2025-06-02 ENCOUNTER — OFFICE VISIT (OUTPATIENT)
Dept: OTOLARYNGOLOGY | Facility: CLINIC | Age: 65
End: 2025-06-02
Payer: COMMERCIAL

## 2025-06-02 ENCOUNTER — HOSPITAL ENCOUNTER (OUTPATIENT)
Dept: CARDIOLOGY | Facility: HOSPITAL | Age: 65
Discharge: HOME | End: 2025-06-02
Payer: COMMERCIAL

## 2025-06-02 VITALS — BODY MASS INDEX: 32.71 KG/M2 | WEIGHT: 227.94 LBS

## 2025-06-02 VITALS — WEIGHT: 227 LBS | BODY MASS INDEX: 32.5 KG/M2 | HEIGHT: 70 IN

## 2025-06-02 DIAGNOSIS — G47.33 OBSTRUCTIVE SLEEP APNEA: ICD-10-CM

## 2025-06-02 DIAGNOSIS — Z98.890 POST-OPERATIVE STATE: Primary | ICD-10-CM

## 2025-06-02 DIAGNOSIS — R29.818 TRANSIENT NEUROLOGICAL SYMPTOMS: ICD-10-CM

## 2025-06-02 PROCEDURE — 3061F NEG MICROALBUMINURIA REV: CPT | Mod: CPTII,S$GLB,, | Performed by: OTOLARYNGOLOGY

## 2025-06-02 PROCEDURE — 1101F PT FALLS ASSESS-DOCD LE1/YR: CPT | Mod: CPTII,S$GLB,, | Performed by: OTOLARYNGOLOGY

## 2025-06-02 PROCEDURE — 99999 PR PBB SHADOW E&M-EST. PATIENT-LVL IV: CPT | Mod: PBBFAC,,, | Performed by: OTOLARYNGOLOGY

## 2025-06-02 PROCEDURE — 93306 TTE W/DOPPLER COMPLETE: CPT | Mod: PO

## 2025-06-02 PROCEDURE — 93306 TTE W/DOPPLER COMPLETE: CPT | Mod: 26,,, | Performed by: INTERNAL MEDICINE

## 2025-06-02 PROCEDURE — 3066F NEPHROPATHY DOC TX: CPT | Mod: CPTII,S$GLB,, | Performed by: OTOLARYNGOLOGY

## 2025-06-02 PROCEDURE — 3288F FALL RISK ASSESSMENT DOCD: CPT | Mod: CPTII,S$GLB,, | Performed by: OTOLARYNGOLOGY

## 2025-06-02 PROCEDURE — 4010F ACE/ARB THERAPY RXD/TAKEN: CPT | Mod: CPTII,S$GLB,, | Performed by: OTOLARYNGOLOGY

## 2025-06-02 PROCEDURE — 99024 POSTOP FOLLOW-UP VISIT: CPT | Mod: S$GLB,,, | Performed by: OTOLARYNGOLOGY

## 2025-06-02 PROCEDURE — 3044F HG A1C LEVEL LT 7.0%: CPT | Mod: CPTII,S$GLB,, | Performed by: OTOLARYNGOLOGY

## 2025-06-03 ENCOUNTER — HOSPITAL ENCOUNTER (OUTPATIENT)
Dept: RADIOLOGY | Facility: HOSPITAL | Age: 65
Discharge: HOME | End: 2025-06-03
Attending: ORTHOPAEDIC SURGERY
Payer: COMMERCIAL

## 2025-06-03 ENCOUNTER — HOSPITAL ENCOUNTER (OUTPATIENT)
Dept: CARDIOLOGY | Facility: HOSPITAL | Age: 65
Discharge: HOME | End: 2025-06-03
Payer: COMMERCIAL

## 2025-06-03 ENCOUNTER — OFFICE VISIT (OUTPATIENT)
Dept: ORTHOPEDICS | Facility: CLINIC | Age: 65
End: 2025-06-03
Payer: COMMERCIAL

## 2025-06-03 VITALS — HEIGHT: 70 IN | WEIGHT: 227.06 LBS | BODY MASS INDEX: 32.51 KG/M2

## 2025-06-03 DIAGNOSIS — S82.61XA DISPLACED FRACTURE OF LATERAL MALLEOLUS OF RIGHT FIBULA, INITIAL ENCOUNTER FOR CLOSED FRACTURE: Primary | ICD-10-CM

## 2025-06-03 DIAGNOSIS — R29.818 TRANSIENT NEUROLOGICAL SYMPTOMS: ICD-10-CM

## 2025-06-03 DIAGNOSIS — S82.61XA DISPLACED FRACTURE OF LATERAL MALLEOLUS OF RIGHT FIBULA, INITIAL ENCOUNTER FOR CLOSED FRACTURE: ICD-10-CM

## 2025-06-03 LAB
AORTIC SIZE INDEX (SOV): 1.6 CM/M2
AORTIC SIZE INDEX: 1.5 CM/M2
ASCENDING AORTA: 3.4 CM
AV INDEX (PROSTH): 0.58
AV MEAN GRADIENT: 9 MMHG
AV PEAK GRADIENT: 19 MMHG
AV VALVE AREA BY VELOCITY RATIO: 1.7 CM²
AV VALVE AREA: 1.8 CM²
AV VELOCITY RATIO: 0.55
BSA FOR ECHO PROCEDURE: 2.25 M2
CV ECHO LV RWT: 0.46 CM
DOP CALC AO PEAK VEL: 2.2 M/S
DOP CALC AO VTI: 33.7 CM
DOP CALC LVOT AREA: 3.1 CM2
DOP CALC LVOT DIAMETER: 2 CM
DOP CALC LVOT PEAK VEL: 1.2 M/S
DOP CALC LVOT STROKE VOLUME: 61.9 CM3
DOP CALCLVOT PEAK VEL VTI: 19.7 CM
E WAVE DECELERATION TIME: 189 MSEC
E/A RATIO: 0.84
E/E' RATIO: 9 M/S
ECHO LV POSTERIOR WALL: 1.2 CM (ref 0.6–1.1)
EJECTION FRACTION: 65 %
FRACTIONAL SHORTENING: 28.8 % (ref 28–44)
INTERVENTRICULAR SEPTUM: 1.2 CM (ref 0.6–1.1)
LEFT ATRIUM AREA SYSTOLIC (APICAL 2 CHAMBER): 14.32 CM2
LEFT ATRIUM AREA SYSTOLIC (APICAL 4 CHAMBER): 14.28 CM2
LEFT ATRIUM SIZE: 4.1 CM
LEFT ATRIUM VOLUME INDEX MOD: 15 ML/M2
LEFT ATRIUM VOLUME MOD: 32 ML
LEFT INTERNAL DIMENSION IN SYSTOLE: 3.7 CM (ref 2.1–4)
LEFT VENTRICLE DIASTOLIC VOLUME INDEX: 60 ML/M2
LEFT VENTRICLE DIASTOLIC VOLUME: 132 ML
LEFT VENTRICLE END SYSTOLIC VOLUME APICAL 2 CHAMBER: 33.26 ML
LEFT VENTRICLE END SYSTOLIC VOLUME APICAL 4 CHAMBER: 30.99 ML
LEFT VENTRICLE MASS INDEX: 113.1 G/M2
LEFT VENTRICLE SYSTOLIC VOLUME INDEX: 26.4 ML/M2
LEFT VENTRICLE SYSTOLIC VOLUME: 58 ML
LEFT VENTRICULAR INTERNAL DIMENSION IN DIASTOLE: 5.2 CM (ref 3.5–6)
LEFT VENTRICULAR MASS: 248.8 G
LV LATERAL E/E' RATIO: 7.4 M/S
LV SEPTAL E/E' RATIO: 11.6 M/S
LVED V (TEICH): 131.98 ML
LVES V (TEICH): 57.95 ML
LVOT MG: 2.67 MMHG
LVOT MV: 0.75 CM/S
MV PEAK A VEL: 0.96 M/S
MV PEAK E VEL: 0.81 M/S
MV STENOSIS PRESSURE HALF TIME: 54.93 MS
MV VALVE AREA P 1/2 METHOD: 4.01 CM2
OHS CV RV/LV RATIO: 0.83 CM
PISA TR MAX VEL: 2.1 M/S
PULM VEIN S/D RATIO: 0.97
PV PEAK D VEL: 0.58 M/S
PV PEAK S VEL: 0.56 M/S
RA PRESSURE ESTIMATED: 3 MMHG
RA VOL SYS: 44.23 ML
RIGHT ATRIAL AREA: 16 CM2
RIGHT ATRIUM END SYSTOLIC VOLUME APICAL 4 CHAMBER INDEX BSA: 18.9 ML/M2
RIGHT ATRIUM VOLUME AREA LENGTH APICAL 4 CHAMBER: 41.59 ML
RIGHT VENTRICLE DIASTOLIC BASEL DIMENSION: 4.3 CM
RIGHT VENTRICLE DIASTOLIC LENGTH: 8.1 CM
RIGHT VENTRICLE DIASTOLIC MID DIMENSION: 3.6 CM
RIGHT VENTRICULAR END-DIASTOLIC DIMENSION: 4.28 CM
RIGHT VENTRICULAR LENGTH IN DIASTOLE (APICAL 4-CHAMBER VIEW): 8.14 CM
RV MID DIAMA: 3.61 CM
RV TB RVSP: 5 MMHG
RV TISSUE DOPPLER FREE WALL SYSTOLIC VELOCITY 1 (APICAL 4 CHAMBER VIEW): 20.21 CM/S
SINUS: 3.58 CM
STJ: 3 CM
TDI LATERAL: 0.11 M/S
TDI SEPTAL: 0.07 M/S
TDI: 0.09 M/S
TR MAX PG: 17 MMHG
TRICUSPID ANNULAR PLANE SYSTOLIC EXCURSION: 2.8 CM
TV REST PULMONARY ARTERY PRESSURE: 21 MMHG
Z-SCORE OF LEFT VENTRICULAR DIMENSION IN END DIASTOLE: -3.68
Z-SCORE OF LEFT VENTRICULAR DIMENSION IN END SYSTOLE: -1.66

## 2025-06-03 PROCEDURE — 3061F NEG MICROALBUMINURIA REV: CPT | Mod: CPTII,S$GLB,, | Performed by: ORTHOPAEDIC SURGERY

## 2025-06-03 PROCEDURE — 99213 OFFICE O/P EST LOW 20 MIN: CPT | Mod: S$GLB,,, | Performed by: ORTHOPAEDIC SURGERY

## 2025-06-03 PROCEDURE — 93271 ECG/MONITORING AND ANALYSIS: CPT | Mod: PO

## 2025-06-03 PROCEDURE — 3066F NEPHROPATHY DOC TX: CPT | Mod: CPTII,S$GLB,, | Performed by: ORTHOPAEDIC SURGERY

## 2025-06-03 PROCEDURE — 99999 PR PBB SHADOW E&M-EST. PATIENT-LVL IV: CPT | Mod: PBBFAC,,, | Performed by: ORTHOPAEDIC SURGERY

## 2025-06-03 PROCEDURE — 1160F RVW MEDS BY RX/DR IN RCRD: CPT | Mod: CPTII,S$GLB,, | Performed by: ORTHOPAEDIC SURGERY

## 2025-06-03 PROCEDURE — 3044F HG A1C LEVEL LT 7.0%: CPT | Mod: CPTII,S$GLB,, | Performed by: ORTHOPAEDIC SURGERY

## 2025-06-03 PROCEDURE — 3008F BODY MASS INDEX DOCD: CPT | Mod: CPTII,S$GLB,, | Performed by: ORTHOPAEDIC SURGERY

## 2025-06-03 PROCEDURE — 1159F MED LIST DOCD IN RCRD: CPT | Mod: CPTII,S$GLB,, | Performed by: ORTHOPAEDIC SURGERY

## 2025-06-03 PROCEDURE — 3288F FALL RISK ASSESSMENT DOCD: CPT | Mod: CPTII,S$GLB,, | Performed by: ORTHOPAEDIC SURGERY

## 2025-06-03 PROCEDURE — 1101F PT FALLS ASSESS-DOCD LE1/YR: CPT | Mod: CPTII,S$GLB,, | Performed by: ORTHOPAEDIC SURGERY

## 2025-06-03 PROCEDURE — 73610 X-RAY EXAM OF ANKLE: CPT | Mod: TC,PO,RT

## 2025-06-03 PROCEDURE — 73610 X-RAY EXAM OF ANKLE: CPT | Mod: 26,RT,, | Performed by: RADIOLOGY

## 2025-06-03 PROCEDURE — 4010F ACE/ARB THERAPY RXD/TAKEN: CPT | Mod: CPTII,S$GLB,, | Performed by: ORTHOPAEDIC SURGERY

## 2025-06-04 ENCOUNTER — PATIENT MESSAGE (OUTPATIENT)
Dept: CARDIOLOGY | Facility: CLINIC | Age: 65
End: 2025-06-04
Payer: COMMERCIAL

## 2025-07-14 ENCOUNTER — DOCUMENTATION ONLY (OUTPATIENT)
Dept: CARDIOLOGY | Facility: CLINIC | Age: 65
End: 2025-07-14
Payer: COMMERCIAL

## 2025-07-14 NOTE — PROGRESS NOTES
Reviewed event monitor results with patient via telephone. He denies cardiac symptoms (e.g., chest pain, shortness of breath, dizziness, palpitations) while wearing the monitor. No further monitoring at this time. Routine follow up as scheduled. Instructed him to contact clinic if new issues arise.

## 2025-07-26 DIAGNOSIS — E78.2 MIXED HYPERLIPIDEMIA: Chronic | ICD-10-CM

## 2025-07-28 ENCOUNTER — OFFICE VISIT (OUTPATIENT)
Dept: DERMATOLOGY | Facility: CLINIC | Age: 65
End: 2025-07-28
Payer: COMMERCIAL

## 2025-07-28 VITALS — WEIGHT: 230 LBS | BODY MASS INDEX: 32.93 KG/M2 | HEIGHT: 70 IN

## 2025-07-28 DIAGNOSIS — D22.9 MULTIPLE BENIGN NEVI: ICD-10-CM

## 2025-07-28 DIAGNOSIS — D18.01 CHERRY ANGIOMA: ICD-10-CM

## 2025-07-28 DIAGNOSIS — L90.5 SCAR: ICD-10-CM

## 2025-07-28 DIAGNOSIS — Z85.828 HISTORY OF NONMELANOMA SKIN CANCER: ICD-10-CM

## 2025-07-28 DIAGNOSIS — L82.1 SEBORRHEIC KERATOSIS: ICD-10-CM

## 2025-07-28 DIAGNOSIS — L57.0 AK (ACTINIC KERATOSIS): Primary | ICD-10-CM

## 2025-07-28 DIAGNOSIS — L72.9 CYST OF SKIN: ICD-10-CM

## 2025-07-28 PROCEDURE — 3044F HG A1C LEVEL LT 7.0%: CPT | Mod: CPTII,S$GLB,, | Performed by: STUDENT IN AN ORGANIZED HEALTH CARE EDUCATION/TRAINING PROGRAM

## 2025-07-28 PROCEDURE — 17000 DESTRUCT PREMALG LESION: CPT | Mod: S$GLB,,, | Performed by: STUDENT IN AN ORGANIZED HEALTH CARE EDUCATION/TRAINING PROGRAM

## 2025-07-28 PROCEDURE — 4010F ACE/ARB THERAPY RXD/TAKEN: CPT | Mod: CPTII,S$GLB,, | Performed by: STUDENT IN AN ORGANIZED HEALTH CARE EDUCATION/TRAINING PROGRAM

## 2025-07-28 PROCEDURE — 1159F MED LIST DOCD IN RCRD: CPT | Mod: CPTII,S$GLB,, | Performed by: STUDENT IN AN ORGANIZED HEALTH CARE EDUCATION/TRAINING PROGRAM

## 2025-07-28 PROCEDURE — 1101F PT FALLS ASSESS-DOCD LE1/YR: CPT | Mod: CPTII,S$GLB,, | Performed by: STUDENT IN AN ORGANIZED HEALTH CARE EDUCATION/TRAINING PROGRAM

## 2025-07-28 PROCEDURE — 17003 DESTRUCT PREMALG LES 2-14: CPT | Mod: S$GLB,,, | Performed by: STUDENT IN AN ORGANIZED HEALTH CARE EDUCATION/TRAINING PROGRAM

## 2025-07-28 PROCEDURE — 99213 OFFICE O/P EST LOW 20 MIN: CPT | Mod: 25,S$GLB,, | Performed by: STUDENT IN AN ORGANIZED HEALTH CARE EDUCATION/TRAINING PROGRAM

## 2025-07-28 PROCEDURE — 3066F NEPHROPATHY DOC TX: CPT | Mod: CPTII,S$GLB,, | Performed by: STUDENT IN AN ORGANIZED HEALTH CARE EDUCATION/TRAINING PROGRAM

## 2025-07-28 PROCEDURE — 1160F RVW MEDS BY RX/DR IN RCRD: CPT | Mod: CPTII,S$GLB,, | Performed by: STUDENT IN AN ORGANIZED HEALTH CARE EDUCATION/TRAINING PROGRAM

## 2025-07-28 PROCEDURE — 3008F BODY MASS INDEX DOCD: CPT | Mod: CPTII,S$GLB,, | Performed by: STUDENT IN AN ORGANIZED HEALTH CARE EDUCATION/TRAINING PROGRAM

## 2025-07-28 PROCEDURE — 3061F NEG MICROALBUMINURIA REV: CPT | Mod: CPTII,S$GLB,, | Performed by: STUDENT IN AN ORGANIZED HEALTH CARE EDUCATION/TRAINING PROGRAM

## 2025-07-28 PROCEDURE — 3288F FALL RISK ASSESSMENT DOCD: CPT | Mod: CPTII,S$GLB,, | Performed by: STUDENT IN AN ORGANIZED HEALTH CARE EDUCATION/TRAINING PROGRAM

## 2025-07-28 RX ORDER — FENOFIBRATE 54 MG/1
54 TABLET ORAL
Qty: 90 TABLET | Refills: 3 | Status: SHIPPED | OUTPATIENT
Start: 2025-07-28

## 2025-07-28 NOTE — PATIENT INSTRUCTIONS

## 2025-07-28 NOTE — PROGRESS NOTES
Subjective:      Patient ID:  Jose G Shafer is a 65 y.o. male who presents for   Chief Complaint   Patient presents with    Skin Check     tbsc    Spot     arms     LOV - 10/21/24    Pt here today for a TBSC  C/os spots on arms that are bothersome    11/7/24  Patient here today for E&S of SCC on left forearm.   Denies pacemaker.   He is on asa and plavix.      Final Pathologic Diagnosis     1. Skin,  Left forearm,  shave biopsy:   - WELL-DIFFERENTIATED SQUAMOUS CELL CARCINOMA   - The tumor involves the deep tissue edge.  The peripheral tissue edge is uninvolved by tumor.    Derm Hx  Pmh of NMSC on chest and nose- treated by Dr. Mckeon  SCC left forearm- E&S 11/7/24  SCC right forearm- E&S Dr Tellez- 6/18/24  Patient also had blue light therapy on his arms.   Denies Fhx MM          Review of Systems   Constitutional:  Negative for fever, chills and fatigue.   Respiratory:  Negative for cough and shortness of breath.    Gastrointestinal:  Negative for nausea, vomiting and diarrhea.   Skin:  Positive for activity-related sunscreen use and wears hat. Negative for itching, rash and dry skin.   Hematologic/Lymphatic: Bruises/bleeds easily (asa, plavix).       Objective:   Physical Exam   Constitutional: He appears well-developed and well-nourished. No distress.   Neurological: He is alert and oriented to person, place, and time. He is not disoriented.   Psychiatric: He has a normal mood and affect.   Skin:   Areas Examined (abnormalities noted in diagram):   Scalp / Hair Palpated and Inspected  Head / Face Inspection Performed  Neck Inspection Performed  Chest / Axilla Inspection Performed  Abdomen Inspection Performed  Back Inspection Performed  RUE Inspected  LUE Inspection Performed  Nails and Digits Inspection Performed                 Diagram Legend     Erythematous scaling macule/papule c/w actinic keratosis       Vascular papule c/w angioma      Pigmented verrucoid papule/plaque c/w seborrheic keratosis       Yellow umbilicated papule c/w sebaceous hyperplasia      Irregularly shaped tan macule c/w lentigo     1-2 mm smooth white papules consistent with Milia      Movable subcutaneous cyst with punctum c/w epidermal inclusion cyst      Subcutaneous movable cyst c/w pilar cyst      Firm pink to brown papule c/w dermatofibroma      Pedunculated fleshy papule(s) c/w skin tag(s)      Evenly pigmented macule c/w junctional nevus     Mildly variegated pigmented, slightly irregular-bordered macule c/w mildly atypical nevus      Flesh colored to evenly pigmented papule c/w intradermal nevus       Pink pearly papule/plaque c/w basal cell carcinoma      Erythematous hyperkeratotic cursted plaque c/w SCC      Surgical scar with no sign of skin cancer recurrence      Open and closed comedones      Inflammatory papules and pustules      Verrucoid papule consistent consistent with wart     Erythematous eczematous patches and plaques     Dystrophic onycholytic nail with subungual debris c/w onychomycosis     Umbilicated papule    Erythematous-base heme-crusted tan verrucoid plaque consistent with inflamed seborrheic keratosis     Erythematous Silvery Scaling Plaque c/w Psoriasis     See annotation      Assessment / Plan:        AK (actinic keratosis)  Cryosurgery Procedure Note    Verbal consent from the patient is obtained and the patient is aware of the precancerous quality and need for treatment of these lesions. Liquid nitrogen cryosurgery is applied to the 5 actinic keratoses, as detailed in the physical exam, to produce a freeze injury. The patient is aware that blisters may form and is instructed on wound care with gentle cleansing and use of vaseline ointment to keep moist until healed. The patient is supplied a handout on cryosurgery and is instructed to call if lesions do not completely resolve.    Seborrheic keratosis  These are benign inherited growths without a malignant potential. Reassurance given to patient. No  treatment is necessary.     History of nonmelanoma skin cancer  Scar  Area of previous NMSC examined. Site well healed with no signs of recurrence.  Total body skin examination performed today including at least 12 points as noted in physical examination. No lesions suspicious for malignancy noted.  Patient instructed in importance in daily broad spectrum sun protection of at least spf 30. Mineral sunscreen ingredients preferred (Zinc +/- Titanium) and can be found OTC.   Patient encouraged to wear hat for all outdoor exposure.   Also discussed sun avoidance and use of protective clothing.    Multiple benign nevi  Careful dermoscopy evaluation of nevi performed with none identified as needing biopsy today  Monitor for new mole or moles that are becoming bigger, darker, irritated, or developing irregular borders.     Cherry angioma  This is a benign vascular lesion. Reassurance given. No treatment required.     Cyst of skin  Reassurance given to patient. No treatment is necessary.   Discussed treatment options - excision vs observation. Cysts may recur with excision. Pt will defer treatment at this time.          6 months    No follow-ups on file.

## 2025-08-19 ENCOUNTER — PATIENT MESSAGE (OUTPATIENT)
Dept: ADMINISTRATIVE | Facility: HOSPITAL | Age: 65
End: 2025-08-19
Payer: COMMERCIAL

## 2025-08-29 ENCOUNTER — LAB VISIT (OUTPATIENT)
Dept: LAB | Facility: HOSPITAL | Age: 65
End: 2025-08-29
Attending: NURSE PRACTITIONER
Payer: COMMERCIAL

## 2025-08-29 DIAGNOSIS — E11.65 TYPE 2 DIABETES MELLITUS WITH HYPERGLYCEMIA, WITHOUT LONG-TERM CURRENT USE OF INSULIN: ICD-10-CM

## 2025-08-29 LAB
ALBUMIN SERPL BCP-MCNC: 4.3 G/DL (ref 3.5–5.2)
ALBUMIN/CREAT UR: 13.5 UG/MG
ALP SERPL-CCNC: 57 UNIT/L (ref 40–150)
ALT SERPL W/O P-5'-P-CCNC: 38 UNIT/L (ref 0–55)
ANION GAP (OHS): 13 MMOL/L (ref 8–16)
AST SERPL-CCNC: 36 UNIT/L (ref 0–50)
BILIRUB SERPL-MCNC: 0.4 MG/DL (ref 0.1–1)
BUN SERPL-MCNC: 18 MG/DL (ref 8–23)
CALCIUM SERPL-MCNC: 10 MG/DL (ref 8.7–10.5)
CHLORIDE SERPL-SCNC: 106 MMOL/L (ref 95–110)
CHOLEST SERPL-MCNC: 162 MG/DL (ref 120–199)
CHOLEST/HDLC SERPL: 6.5 {RATIO} (ref 2–5)
CO2 SERPL-SCNC: 21 MMOL/L (ref 23–29)
CREAT SERPL-MCNC: 1 MG/DL (ref 0.5–1.4)
CREAT UR-MCNC: 141 MG/DL (ref 23–375)
EAG (OHS): 140 MG/DL (ref 68–131)
GFR SERPLBLD CREATININE-BSD FMLA CKD-EPI: >60 ML/MIN/1.73/M2
GLUCOSE SERPL-MCNC: 155 MG/DL (ref 70–110)
HBA1C MFR BLD: 6.5 % (ref 4–5.6)
HDLC SERPL-MCNC: 25 MG/DL (ref 40–75)
HDLC SERPL: 15.4 % (ref 20–50)
LDLC SERPL CALC-MCNC: 76.6 MG/DL (ref 63–159)
MICROALBUMIN UR-MCNC: 19 UG/ML
NONHDLC SERPL-MCNC: 137 MG/DL
POTASSIUM SERPL-SCNC: 4.7 MMOL/L (ref 3.5–5.1)
PROT SERPL-MCNC: 7.3 GM/DL (ref 6–8.4)
SODIUM SERPL-SCNC: 140 MMOL/L (ref 136–145)
TRIGL SERPL-MCNC: 302 MG/DL (ref 30–150)
TSH SERPL-ACNC: 0.88 UIU/ML (ref 0.4–4)

## 2025-08-29 PROCEDURE — 80061 LIPID PANEL: CPT

## 2025-08-29 PROCEDURE — 82570 ASSAY OF URINE CREATININE: CPT

## 2025-08-29 PROCEDURE — 36415 COLL VENOUS BLD VENIPUNCTURE: CPT | Mod: PO

## 2025-08-29 PROCEDURE — 80053 COMPREHEN METABOLIC PANEL: CPT

## 2025-08-29 PROCEDURE — 84443 ASSAY THYROID STIM HORMONE: CPT

## 2025-08-29 PROCEDURE — 83036 HEMOGLOBIN GLYCOSYLATED A1C: CPT

## (undated) DEVICE — SUT 2/0 30IN SILK BLK BRAI

## (undated) DEVICE — TOWEL OR DISP STRL BLUE 4/PK

## (undated) DEVICE — Device

## (undated) DEVICE — TRAY SKIN SCRUB WET PREMIUM

## (undated) DEVICE — STRAP OR TABLE 5IN X 72IN

## (undated) DEVICE — DRAPE INCISE IOBAN 2 23X17IN

## (undated) DEVICE — DRAPE HEAD

## (undated) DEVICE — DRAPE INVISISHIELD TWL 18X24IN

## (undated) DEVICE — SEE MEDLINE ITEM 153688

## (undated) DEVICE — HANDLE SURG LIGHT NONRIGID

## (undated) DEVICE — MICROSCOPE DRAPE FOR ZEISS S7

## (undated) DEVICE — SPONGE PATTY SURGICAL .5X3IN

## (undated) DEVICE — COVER PROBE US 5.5X58L NON LTX

## (undated) DEVICE — CATH IV INTROCAN 18G X 1 1/4

## (undated) DEVICE — PENCIL ROCKER SWITCH 10FT CORD

## (undated) DEVICE — BLADE TONGUE DEPRESSOR STRL

## (undated) DEVICE — GLOVE SENSICARE PI ALOE 7

## (undated) DEVICE — ADHESIVE DERMABOND ADVANCED

## (undated) DEVICE — PROBE BPLR SIDE BY SIDE STIMTN

## (undated) DEVICE — PASSER

## (undated) DEVICE — STAPLER SKIN ROTATING HEAD

## (undated) DEVICE — NDL 27G X 1 1/4

## (undated) DEVICE — KIT ANTIFOG

## (undated) DEVICE — DRESSING TRANS 2X2 TEGADERM

## (undated) DEVICE — DRAPE EENT SPLIT STERILE

## (undated) DEVICE — SLEEP REMOTE MODEL 2580

## (undated) DEVICE — SUT MONOCRYL 4-0 UND RB-1

## (undated) DEVICE — LOOP VESSEL BLUE MAXI

## (undated) DEVICE — KIT SAHARA DRAPE DRAW/LIFT

## (undated) DEVICE — GLOVE PROTEXIS HYDROGEL SZ7

## (undated) DEVICE — GAUZE SPONGE 4X4 12PLY

## (undated) DEVICE — ELECTRODE EMG NEEDLE

## (undated) DEVICE — SUT VICRYL 3-0 27 SH

## (undated) DEVICE — DRESSING MEPORE ISLAND 31/2X4

## (undated) DEVICE — CONTAINER SPECIMEN OR STER 4OZ

## (undated) DEVICE — SPONGE KITTNER 1/4X 5/8 L STRL

## (undated) DEVICE — SUT SILK 3-0 RB-1 30IN BLK

## (undated) DEVICE — ELECTRODE BLADE W/SLEEVE 2.75

## (undated) DEVICE — BLADE SURG #15 CARBON STEEL

## (undated) DEVICE — CORD BIPOLAR 12 FOOT

## (undated) DEVICE — SYR 10CC LUER LOCK

## (undated) DEVICE — DRAPE STERI INSTRUMENT 1018

## (undated) DEVICE — MARKER SKIN RULER STERILE